# Patient Record
Sex: FEMALE | Race: NATIVE HAWAIIAN OR OTHER PACIFIC ISLANDER | NOT HISPANIC OR LATINO | Employment: UNEMPLOYED | URBAN - METROPOLITAN AREA
[De-identification: names, ages, dates, MRNs, and addresses within clinical notes are randomized per-mention and may not be internally consistent; named-entity substitution may affect disease eponyms.]

---

## 2019-06-02 ENCOUNTER — HOSPITAL ENCOUNTER (INPATIENT)
Facility: MEDICAL CENTER | Age: 39
LOS: 6 days | DRG: 194 | End: 2019-06-08
Attending: EMERGENCY MEDICINE | Admitting: INTERNAL MEDICINE
Payer: MEDICAID

## 2019-06-02 ENCOUNTER — APPOINTMENT (OUTPATIENT)
Dept: RADIOLOGY | Facility: MEDICAL CENTER | Age: 39
DRG: 194 | End: 2019-06-02
Attending: EMERGENCY MEDICINE
Payer: MEDICAID

## 2019-06-02 DIAGNOSIS — F41.9 ANXIETY: ICD-10-CM

## 2019-06-02 DIAGNOSIS — D64.9 ANEMIA, UNSPECIFIED TYPE: ICD-10-CM

## 2019-06-02 DIAGNOSIS — R11.2 NON-INTRACTABLE VOMITING WITH NAUSEA, UNSPECIFIED VOMITING TYPE: ICD-10-CM

## 2019-06-02 DIAGNOSIS — J18.9 PNEUMONIA OF RIGHT MIDDLE LOBE DUE TO INFECTIOUS ORGANISM: ICD-10-CM

## 2019-06-02 DIAGNOSIS — C79.9 METASTATIC CANCER (HCC): ICD-10-CM

## 2019-06-02 PROBLEM — E87.6 HYPOKALEMIA: Status: ACTIVE | Noted: 2019-06-02

## 2019-06-02 PROBLEM — R73.9 HYPERGLYCEMIA: Status: ACTIVE | Noted: 2019-06-02

## 2019-06-02 PROBLEM — Z72.0 NICOTINE ABUSE: Status: ACTIVE | Noted: 2019-06-02

## 2019-06-02 LAB
ALBUMIN SERPL BCP-MCNC: 3.9 G/DL (ref 3.2–4.9)
ALBUMIN/GLOB SERPL: 1 G/DL
ALP SERPL-CCNC: 122 U/L (ref 30–99)
ALT SERPL-CCNC: 41 U/L (ref 2–50)
ANION GAP SERPL CALC-SCNC: 6 MMOL/L (ref 0–11.9)
ANISOCYTOSIS BLD QL SMEAR: ABNORMAL
AST SERPL-CCNC: 38 U/L (ref 12–45)
BASOPHILS # BLD AUTO: 1.7 % (ref 0–1.8)
BASOPHILS # BLD: 0.13 K/UL (ref 0–0.12)
BILIRUB SERPL-MCNC: 0.9 MG/DL (ref 0.1–1.5)
BUN SERPL-MCNC: 9 MG/DL (ref 8–22)
CALCIUM SERPL-MCNC: 8.5 MG/DL (ref 8.5–10.5)
CHLORIDE SERPL-SCNC: 102 MMOL/L (ref 96–112)
CO2 SERPL-SCNC: 27 MMOL/L (ref 20–33)
CREAT SERPL-MCNC: 0.57 MG/DL (ref 0.5–1.4)
EOSINOPHIL # BLD AUTO: 0.13 K/UL (ref 0–0.51)
EOSINOPHIL NFR BLD: 1.7 % (ref 0–6.9)
ERYTHROCYTE [DISTWIDTH] IN BLOOD BY AUTOMATED COUNT: 67.6 FL (ref 35.9–50)
GIANT PLATELETS BLD QL SMEAR: NORMAL
GLOBULIN SER CALC-MCNC: 3.9 G/DL (ref 1.9–3.5)
GLUCOSE SERPL-MCNC: 118 MG/DL (ref 65–99)
HCG SERPL QL: NEGATIVE
HCT VFR BLD AUTO: 26.4 % (ref 37–47)
HGB BLD-MCNC: 7.7 G/DL (ref 12–16)
HYPOCHROMIA BLD QL SMEAR: ABNORMAL
LACTATE BLD-SCNC: 1.2 MMOL/L (ref 0.5–2)
LIPASE SERPL-CCNC: 5 U/L (ref 11–82)
LYMPHOCYTES # BLD AUTO: 2.04 K/UL (ref 1–4.8)
LYMPHOCYTES NFR BLD: 26.1 % (ref 22–41)
MACROCYTES BLD QL SMEAR: ABNORMAL
MANUAL DIFF BLD: NORMAL
MCH RBC QN AUTO: 26.3 PG (ref 27–33)
MCHC RBC AUTO-ENTMCNC: 29.2 G/DL (ref 33.6–35)
MCV RBC AUTO: 90.1 FL (ref 81.4–97.8)
METAMYELOCYTES NFR BLD MANUAL: 0.9 %
MICROCYTES BLD QL SMEAR: ABNORMAL
MONOCYTES # BLD AUTO: 0.27 K/UL (ref 0–0.85)
MONOCYTES NFR BLD AUTO: 3.5 % (ref 0–13.4)
MORPHOLOGY BLD-IMP: NORMAL
NEUTROPHILS # BLD AUTO: 5.16 K/UL (ref 2–7.15)
NEUTROPHILS NFR BLD: 66.1 % (ref 44–72)
NRBC # BLD AUTO: 0.39 K/UL
NRBC BLD-RTO: 5 /100 WBC
PLATELET # BLD AUTO: 130 K/UL (ref 164–446)
PLATELET BLD QL SMEAR: NORMAL
PMV BLD AUTO: 9.4 FL (ref 9–12.9)
POIKILOCYTOSIS BLD QL SMEAR: NORMAL
POLYCHROMASIA BLD QL SMEAR: NORMAL
POTASSIUM SERPL-SCNC: 3.3 MMOL/L (ref 3.6–5.5)
PROT SERPL-MCNC: 7.8 G/DL (ref 6–8.2)
RBC # BLD AUTO: 2.93 M/UL (ref 4.2–5.4)
RBC BLD AUTO: PRESENT
SCHISTOCYTES BLD QL SMEAR: NORMAL
SMUDGE CELLS BLD QL SMEAR: NORMAL
SODIUM SERPL-SCNC: 135 MMOL/L (ref 135–145)
VARIANT LYMPHS BLD QL SMEAR: NORMAL
WBC # BLD AUTO: 7.8 K/UL (ref 4.8–10.8)

## 2019-06-02 PROCEDURE — 36415 COLL VENOUS BLD VENIPUNCTURE: CPT

## 2019-06-02 PROCEDURE — 700105 HCHG RX REV CODE 258: Performed by: EMERGENCY MEDICINE

## 2019-06-02 PROCEDURE — 99285 EMERGENCY DEPT VISIT HI MDM: CPT

## 2019-06-02 PROCEDURE — 83605 ASSAY OF LACTIC ACID: CPT

## 2019-06-02 PROCEDURE — 84703 CHORIONIC GONADOTROPIN ASSAY: CPT

## 2019-06-02 PROCEDURE — 700105 HCHG RX REV CODE 258: Performed by: INTERNAL MEDICINE

## 2019-06-02 PROCEDURE — 87086 URINE CULTURE/COLONY COUNT: CPT

## 2019-06-02 PROCEDURE — 96365 THER/PROPH/DIAG IV INF INIT: CPT

## 2019-06-02 PROCEDURE — 85007 BL SMEAR W/DIFF WBC COUNT: CPT

## 2019-06-02 PROCEDURE — 94760 N-INVAS EAR/PLS OXIMETRY 1: CPT

## 2019-06-02 PROCEDURE — 87641 MR-STAPH DNA AMP PROBE: CPT

## 2019-06-02 PROCEDURE — 700111 HCHG RX REV CODE 636 W/ 250 OVERRIDE (IP): Performed by: INTERNAL MEDICINE

## 2019-06-02 PROCEDURE — 99223 1ST HOSP IP/OBS HIGH 75: CPT | Mod: 25 | Performed by: INTERNAL MEDICINE

## 2019-06-02 PROCEDURE — 96375 TX/PRO/DX INJ NEW DRUG ADDON: CPT

## 2019-06-02 PROCEDURE — 85027 COMPLETE CBC AUTOMATED: CPT

## 2019-06-02 PROCEDURE — 83690 ASSAY OF LIPASE: CPT

## 2019-06-02 PROCEDURE — 700111 HCHG RX REV CODE 636 W/ 250 OVERRIDE (IP): Performed by: EMERGENCY MEDICINE

## 2019-06-02 PROCEDURE — 770009 HCHG ROOM/CARE - ONCOLOGY SEMI PRI*

## 2019-06-02 PROCEDURE — 80053 COMPREHEN METABOLIC PANEL: CPT

## 2019-06-02 PROCEDURE — 99407 BEHAV CHNG SMOKING > 10 MIN: CPT | Performed by: INTERNAL MEDICINE

## 2019-06-02 PROCEDURE — 74022 RADEX COMPL AQT ABD SERIES: CPT

## 2019-06-02 PROCEDURE — A9270 NON-COVERED ITEM OR SERVICE: HCPCS | Performed by: INTERNAL MEDICINE

## 2019-06-02 PROCEDURE — 700102 HCHG RX REV CODE 250 W/ 637 OVERRIDE(OP): Performed by: INTERNAL MEDICINE

## 2019-06-02 PROCEDURE — 87040 BLOOD CULTURE FOR BACTERIA: CPT

## 2019-06-02 RX ORDER — SODIUM CHLORIDE 9 MG/ML
INJECTION, SOLUTION INTRAVENOUS CONTINUOUS
Status: DISCONTINUED | OUTPATIENT
Start: 2019-06-02 | End: 2019-06-07

## 2019-06-02 RX ORDER — POLYETHYLENE GLYCOL 3350 17 G/17G
1 POWDER, FOR SOLUTION ORAL
Status: DISCONTINUED | OUTPATIENT
Start: 2019-06-02 | End: 2019-06-08 | Stop reason: HOSPADM

## 2019-06-02 RX ORDER — ONDANSETRON 2 MG/ML
4 INJECTION INTRAMUSCULAR; INTRAVENOUS EVERY 4 HOURS PRN
Status: DISCONTINUED | OUTPATIENT
Start: 2019-06-02 | End: 2019-06-08 | Stop reason: HOSPADM

## 2019-06-02 RX ORDER — ACETAMINOPHEN 325 MG/1
650 TABLET ORAL EVERY 6 HOURS PRN
Status: DISCONTINUED | OUTPATIENT
Start: 2019-06-02 | End: 2019-06-08 | Stop reason: HOSPADM

## 2019-06-02 RX ORDER — PROMETHAZINE HYDROCHLORIDE 25 MG/1
12.5-25 TABLET ORAL EVERY 4 HOURS PRN
Status: DISCONTINUED | OUTPATIENT
Start: 2019-06-02 | End: 2019-06-08 | Stop reason: HOSPADM

## 2019-06-02 RX ORDER — ONDANSETRON 2 MG/ML
4 INJECTION INTRAMUSCULAR; INTRAVENOUS ONCE
Status: COMPLETED | OUTPATIENT
Start: 2019-06-02 | End: 2019-06-02

## 2019-06-02 RX ORDER — SODIUM CHLORIDE 9 MG/ML
1000 INJECTION, SOLUTION INTRAVENOUS ONCE
Status: COMPLETED | OUTPATIENT
Start: 2019-06-02 | End: 2019-06-02

## 2019-06-02 RX ORDER — BISACODYL 10 MG
10 SUPPOSITORY, RECTAL RECTAL
Status: DISCONTINUED | OUTPATIENT
Start: 2019-06-02 | End: 2019-06-08 | Stop reason: HOSPADM

## 2019-06-02 RX ORDER — OXYCODONE HYDROCHLORIDE 30 MG/1
30 TABLET ORAL EVERY 4 HOURS PRN
Status: ON HOLD | COMMUNITY
End: 2019-06-08

## 2019-06-02 RX ORDER — PROMETHAZINE HYDROCHLORIDE 25 MG/1
12.5-25 SUPPOSITORY RECTAL EVERY 4 HOURS PRN
Status: DISCONTINUED | OUTPATIENT
Start: 2019-06-02 | End: 2019-06-08 | Stop reason: HOSPADM

## 2019-06-02 RX ORDER — OXYCODONE HCL 40 MG/1
40 TABLET, FILM COATED, EXTENDED RELEASE ORAL EVERY 12 HOURS
Status: ON HOLD | COMMUNITY
End: 2019-06-08

## 2019-06-02 RX ORDER — POTASSIUM CHLORIDE 20 MEQ/1
40 TABLET, EXTENDED RELEASE ORAL ONCE
Status: COMPLETED | OUTPATIENT
Start: 2019-06-02 | End: 2019-06-03

## 2019-06-02 RX ORDER — ALPRAZOLAM 1 MG/1
1 TABLET ORAL 3 TIMES DAILY PRN
Status: ON HOLD | COMMUNITY
End: 2019-06-08

## 2019-06-02 RX ORDER — AMOXICILLIN 250 MG
2 CAPSULE ORAL 2 TIMES DAILY
Status: DISCONTINUED | OUTPATIENT
Start: 2019-06-02 | End: 2019-06-08 | Stop reason: HOSPADM

## 2019-06-02 RX ORDER — ONDANSETRON 4 MG/1
4 TABLET, ORALLY DISINTEGRATING ORAL EVERY 4 HOURS PRN
Status: DISCONTINUED | OUTPATIENT
Start: 2019-06-02 | End: 2019-06-08 | Stop reason: HOSPADM

## 2019-06-02 RX ADMIN — SODIUM CHLORIDE: 900 INJECTION INTRAVENOUS at 21:59

## 2019-06-02 RX ADMIN — ONDANSETRON 4 MG: 4 TABLET, ORALLY DISINTEGRATING ORAL at 19:47

## 2019-06-02 RX ADMIN — VANCOMYCIN HYDROCHLORIDE 2300 MG: 100 INJECTION, POWDER, LYOPHILIZED, FOR SOLUTION INTRAVENOUS at 22:04

## 2019-06-02 RX ADMIN — PIPERACILLIN AND TAZOBACTAM 4.5 G: 4; .5 INJECTION, POWDER, LYOPHILIZED, FOR SOLUTION INTRAVENOUS; PARENTERAL at 18:45

## 2019-06-02 RX ADMIN — ONDANSETRON 4 MG: 2 INJECTION INTRAMUSCULAR; INTRAVENOUS at 17:45

## 2019-06-02 RX ADMIN — ACETAMINOPHEN 650 MG: 325 TABLET, FILM COATED ORAL at 19:47

## 2019-06-02 RX ADMIN — SODIUM CHLORIDE 1000 ML: 9 INJECTION, SOLUTION INTRAVENOUS at 17:45

## 2019-06-02 RX ADMIN — PROCHLORPERAZINE EDISYLATE 5 MG: 5 INJECTION INTRAMUSCULAR; INTRAVENOUS at 20:21

## 2019-06-02 ASSESSMENT — COPD QUESTIONNAIRES
HAVE YOU SMOKED AT LEAST 100 CIGARETTES IN YOUR ENTIRE LIFE: YES
DURING THE PAST 4 WEEKS HOW MUCH DID YOU FEEL SHORT OF BREATH: NONE/LITTLE OF THE TIME
DO YOU EVER COUGH UP ANY MUCUS OR PHLEGM?: NO/ONLY WITH OCCASIONAL COLDS OR INFECTIONS
COPD SCREENING SCORE: 3

## 2019-06-02 ASSESSMENT — LIFESTYLE VARIABLES
EVER_SMOKED: YES
DO YOU DRINK ALCOHOL: NO

## 2019-06-02 ASSESSMENT — ENCOUNTER SYMPTOMS
VOMITING: 1
NECK PAIN: 0
PALPITATIONS: 0
COUGH: 0
BACK PAIN: 0
SHORTNESS OF BREATH: 0
EYE DISCHARGE: 0
EYE PAIN: 0
HEADACHES: 0
DIARRHEA: 1
FEVER: 0
INSOMNIA: 0
CHILLS: 0
MYALGIAS: 0
HEMOPTYSIS: 1
FOCAL WEAKNESS: 0
DIZZINESS: 0
HEARTBURN: 0
BLURRED VISION: 0
ORTHOPNEA: 0
NERVOUS/ANXIOUS: 0
NAUSEA: 1
EYE REDNESS: 0
WEIGHT LOSS: 0
SPUTUM PRODUCTION: 0
DEPRESSION: 0
SEIZURES: 0
STRIDOR: 0
ABDOMINAL PAIN: 1

## 2019-06-02 NOTE — ED TRIAGE NOTES
"Pt just moved here from [place]. Pt has epithelioid hemangioendotherlioma, diagnosed january 2018. Pt last chemotherapy last week. Pt does not have oncologist set up in [place] and now finds out the one she was referred to does not accept her insurance. Pt c/o vomiting, states \"can't keep anything down\".   "

## 2019-06-03 ENCOUNTER — APPOINTMENT (OUTPATIENT)
Dept: RADIOLOGY | Facility: MEDICAL CENTER | Age: 39
DRG: 194 | End: 2019-06-03
Attending: INTERNAL MEDICINE
Payer: MEDICAID

## 2019-06-03 PROBLEM — E66.9 CLASS 1 OBESITY IN ADULT: Status: ACTIVE | Noted: 2019-06-03

## 2019-06-03 LAB
ABO + RH BLD: NORMAL
ABO GROUP BLD: NORMAL
ALBUMIN SERPL BCP-MCNC: 3.3 G/DL (ref 3.2–4.9)
ALBUMIN/GLOB SERPL: 1.1 G/DL
ALP SERPL-CCNC: 107 U/L (ref 30–99)
ALT SERPL-CCNC: 31 U/L (ref 2–50)
ANION GAP SERPL CALC-SCNC: 7 MMOL/L (ref 0–11.9)
APPEARANCE UR: CLEAR
AST SERPL-CCNC: 30 U/L (ref 12–45)
BACTERIA #/AREA URNS HPF: NEGATIVE /HPF
BARCODED ABORH UBTYP: 9500
BARCODED PRD CODE UBPRD: NORMAL
BARCODED UNIT NUM UBUNT: NORMAL
BILIRUB SERPL-MCNC: 0.8 MG/DL (ref 0.1–1.5)
BILIRUB UR QL STRIP.AUTO: NEGATIVE
BLD GP AB SCN SERPL QL: NORMAL
BUN SERPL-MCNC: 8 MG/DL (ref 8–22)
CALCIUM SERPL-MCNC: 7.7 MG/DL (ref 8.5–10.5)
CHLORIDE SERPL-SCNC: 106 MMOL/L (ref 96–112)
CO2 SERPL-SCNC: 25 MMOL/L (ref 20–33)
COLOR UR: YELLOW
COMPONENT R 8504R: NORMAL
CREAT SERPL-MCNC: 0.61 MG/DL (ref 0.5–1.4)
EPI CELLS #/AREA URNS HPF: ABNORMAL /HPF
ERYTHROCYTE [DISTWIDTH] IN BLOOD BY AUTOMATED COUNT: 69.4 FL (ref 35.9–50)
GLOBULIN SER CALC-MCNC: 3.1 G/DL (ref 1.9–3.5)
GLUCOSE SERPL-MCNC: 110 MG/DL (ref 65–99)
GLUCOSE UR STRIP.AUTO-MCNC: NEGATIVE MG/DL
HCT VFR BLD AUTO: 22.6 % (ref 37–47)
HGB BLD-MCNC: 6.5 G/DL (ref 12–16)
HYALINE CASTS #/AREA URNS LPF: ABNORMAL /LPF
KETONES UR STRIP.AUTO-MCNC: NEGATIVE MG/DL
LEUKOCYTE ESTERASE UR QL STRIP.AUTO: ABNORMAL
MCH RBC QN AUTO: 26.4 PG (ref 27–33)
MCHC RBC AUTO-ENTMCNC: 28.8 G/DL (ref 33.6–35)
MCV RBC AUTO: 91.9 FL (ref 81.4–97.8)
MICRO URNS: ABNORMAL
MRSA DNA SPEC QL NAA+PROBE: NORMAL
NITRITE UR QL STRIP.AUTO: NEGATIVE
PH UR STRIP.AUTO: 7 [PH]
PLATELET # BLD AUTO: 115 K/UL (ref 164–446)
PMV BLD AUTO: 9 FL (ref 9–12.9)
POTASSIUM SERPL-SCNC: 3.4 MMOL/L (ref 3.6–5.5)
PROCALCITONIN SERPL-MCNC: 0.11 NG/ML
PRODUCT TYPE UPROD: NORMAL
PROT SERPL-MCNC: 6.4 G/DL (ref 6–8.2)
PROT UR QL STRIP: 30 MG/DL
RBC # BLD AUTO: 2.46 M/UL (ref 4.2–5.4)
RBC # URNS HPF: ABNORMAL /HPF
RBC UR QL AUTO: ABNORMAL
RH BLD: NORMAL
SIGNIFICANT IND 70042: NORMAL
SITE SITE: NORMAL
SODIUM SERPL-SCNC: 138 MMOL/L (ref 135–145)
SOURCE SOURCE: NORMAL
SP GR UR STRIP.AUTO: 1
UNIT STATUS USTAT: NORMAL
UROBILINOGEN UR STRIP.AUTO-MCNC: 0.2 MG/DL
WBC # BLD AUTO: 6.9 K/UL (ref 4.8–10.8)
WBC #/AREA URNS HPF: ABNORMAL /HPF

## 2019-06-03 PROCEDURE — 700105 HCHG RX REV CODE 258: Performed by: INTERNAL MEDICINE

## 2019-06-03 PROCEDURE — 700111 HCHG RX REV CODE 636 W/ 250 OVERRIDE (IP): Performed by: INTERNAL MEDICINE

## 2019-06-03 PROCEDURE — 86900 BLOOD TYPING SEROLOGIC ABO: CPT

## 2019-06-03 PROCEDURE — 86901 BLOOD TYPING SEROLOGIC RH(D): CPT

## 2019-06-03 PROCEDURE — A9270 NON-COVERED ITEM OR SERVICE: HCPCS | Performed by: INTERNAL MEDICINE

## 2019-06-03 PROCEDURE — P9016 RBC LEUKOCYTES REDUCED: HCPCS

## 2019-06-03 PROCEDURE — 85027 COMPLETE CBC AUTOMATED: CPT

## 2019-06-03 PROCEDURE — 86923 COMPATIBILITY TEST ELECTRIC: CPT

## 2019-06-03 PROCEDURE — 99406 BEHAV CHNG SMOKING 3-10 MIN: CPT | Performed by: INTERNAL MEDICINE

## 2019-06-03 PROCEDURE — 86850 RBC ANTIBODY SCREEN: CPT

## 2019-06-03 PROCEDURE — 770009 HCHG ROOM/CARE - ONCOLOGY SEMI PRI*

## 2019-06-03 PROCEDURE — 36415 COLL VENOUS BLD VENIPUNCTURE: CPT

## 2019-06-03 PROCEDURE — 87086 URINE CULTURE/COLONY COUNT: CPT

## 2019-06-03 PROCEDURE — 81001 URINALYSIS AUTO W/SCOPE: CPT

## 2019-06-03 PROCEDURE — 80053 COMPREHEN METABOLIC PANEL: CPT

## 2019-06-03 PROCEDURE — 700102 HCHG RX REV CODE 250 W/ 637 OVERRIDE(OP): Performed by: INTERNAL MEDICINE

## 2019-06-03 PROCEDURE — 36430 TRANSFUSION BLD/BLD COMPNT: CPT

## 2019-06-03 PROCEDURE — 99233 SBSQ HOSP IP/OBS HIGH 50: CPT | Mod: 25 | Performed by: INTERNAL MEDICINE

## 2019-06-03 PROCEDURE — 84145 PROCALCITONIN (PCT): CPT

## 2019-06-03 RX ADMIN — ENOXAPARIN SODIUM 40 MG: 100 INJECTION SUBCUTANEOUS at 06:27

## 2019-06-03 RX ADMIN — PIPERACILLIN AND TAZOBACTAM 4.5 G: 4; .5 INJECTION, POWDER, LYOPHILIZED, FOR SOLUTION INTRAVENOUS; PARENTERAL at 01:41

## 2019-06-03 RX ADMIN — PROCHLORPERAZINE EDISYLATE 10 MG: 5 INJECTION INTRAMUSCULAR; INTRAVENOUS at 17:17

## 2019-06-03 RX ADMIN — POTASSIUM CHLORIDE 40 MEQ: 1500 TABLET, EXTENDED RELEASE ORAL at 06:26

## 2019-06-03 RX ADMIN — PIPERACILLIN AND TAZOBACTAM 4.5 G: 4; .5 INJECTION, POWDER, LYOPHILIZED, FOR SOLUTION INTRAVENOUS; PARENTERAL at 17:14

## 2019-06-03 RX ADMIN — PIPERACILLIN AND TAZOBACTAM 4.5 G: 4; .5 INJECTION, POWDER, LYOPHILIZED, FOR SOLUTION INTRAVENOUS; PARENTERAL at 11:13

## 2019-06-03 ASSESSMENT — ENCOUNTER SYMPTOMS
BACK PAIN: 0
DIARRHEA: 0
BLURRED VISION: 0
ABDOMINAL PAIN: 0
PALPITATIONS: 0
NAUSEA: 1
HEADACHES: 0
SPUTUM PRODUCTION: 0
WEIGHT LOSS: 0
WEAKNESS: 1
PND: 0
DEPRESSION: 0
CONSTIPATION: 0
WHEEZING: 0
FEVER: 0
SEIZURES: 0
FALLS: 0
TREMORS: 0
VOMITING: 0
DIZZINESS: 0
NECK PAIN: 0
SPEECH CHANGE: 0
COUGH: 1
HEARTBURN: 0
SHORTNESS OF BREATH: 0
EYE PAIN: 0
CHILLS: 0

## 2019-06-03 ASSESSMENT — COGNITIVE AND FUNCTIONAL STATUS - GENERAL
SUGGESTED CMS G CODE MODIFIER DAILY ACTIVITY: CH
WALKING IN HOSPITAL ROOM: A LITTLE
MOVING FROM LYING ON BACK TO SITTING ON SIDE OF FLAT BED: A LITTLE
SUGGESTED CMS G CODE MODIFIER MOBILITY: CK
DAILY ACTIVITIY SCORE: 24
MOBILITY SCORE: 19
CLIMB 3 TO 5 STEPS WITH RAILING: A LOT
STANDING UP FROM CHAIR USING ARMS: A LITTLE

## 2019-06-03 ASSESSMENT — COPD QUESTIONNAIRES
COPD SCREENING SCORE: 2
DO YOU EVER COUGH UP ANY MUCUS OR PHLEGM?: NO/ONLY WITH OCCASIONAL COLDS OR INFECTIONS
IN THE PAST 12 MONTHS DO YOU DO LESS THAN YOU USED TO BECAUSE OF YOUR BREATHING PROBLEMS: DISAGREE/UNSURE
HAVE YOU SMOKED AT LEAST 100 CIGARETTES IN YOUR ENTIRE LIFE: YES
DURING THE PAST 4 WEEKS HOW MUCH DID YOU FEEL SHORT OF BREATH: NONE/LITTLE OF THE TIME

## 2019-06-03 ASSESSMENT — LIFESTYLE VARIABLES
SUBSTANCE_ABUSE: 0
ALCOHOL_USE: NO
EVER_SMOKED: YES

## 2019-06-03 ASSESSMENT — PATIENT HEALTH QUESTIONNAIRE - PHQ9
2. FEELING DOWN, DEPRESSED, IRRITABLE, OR HOPELESS: NOT AT ALL
SUM OF ALL RESPONSES TO PHQ9 QUESTIONS 1 AND 2: 0
1. LITTLE INTEREST OR PLEASURE IN DOING THINGS: NOT AT ALL

## 2019-06-03 NOTE — ED PROVIDER NOTES
"ED Provider Note    Scribed for Savanna Melgoza M.D. by Cecil Castellanos. 6/2/2019, 5:21 PM.    Primary care provider: No primary care provider on file.  Means of arrival: Walk in  History obtained from: Patient  History limited by: none    CHIEF COMPLAINT  Chief Complaint   Patient presents with   • Vomiting     HPI  Amanda Bae is a 39 y.o. female who presents to the Emergency Department for evaluation of vomiting for the last two days. She endorses associated decreased appetite, nausea, abdominal pain, and diarrhea initially but has resolved. Patient has OTC and prescribed medications that she takes for nausea and vomiting, but is unsure of the name of medication at this time. Patient has history of epithelioid hemangioendotherlioma, diagnosed 01/2018. Her last IV chemotherapy treatment was last week. She needs 6 more chemotherapy treatments and PET scan, with her next treatment that needs to be done this week. Patient has abdominal surgery to remove gallstones. Denies fever, chills, rash     REVIEW OF SYSTEMS  Pertinent positives include Vomiting, decreased appetite, nausea, abdominal pain, and diarrhea. Pertinent negatives include no fever, chills, rash . All other systems reviewed and negative.     PAST MEDICAL HISTORY   Patient with history of epithelioid hemangioendotherlioma, diagnosed 01/2018    SURGICAL HISTORY  patient denies any surgical history    SOCIAL HISTORY  Social History   Substance Use Topics   • Smoking status: Not on file   • Smokeless tobacco: Not on file   • Alcohol use Not on file      History   Drug use: Unknown       CURRENT MEDICATIONS  Home Medications    **Home medications have not yet been reviewed for this encounter**         ALLERGIES  No Known Allergies    PHYSICAL EXAM  VITAL SIGNS: /70   Pulse (!) 102   Temp 36.9 °C (98.5 °F) (Temporal)   Resp 16   Ht 1.626 m (5' 4\")   Wt 90.7 kg (200 lb)   SpO2 95%   BMI 34.33 kg/m²     Constitutional: Patient laying " uncomfortably in ED bed with a mask on, Well developed, No acute distress, Non-toxic appearance.   HENT: Normocephalic, Atraumatic, Bilateral external ears normal, Oropharynx moist, No oral exudates, Nose normal.   Eyes: PERRL, EOMI, Conjunctiva normal, No discharge.   Neck: Normal range of motion, No tenderness, Supple, No stridor. No meningeal signs  Cardiovascular: Normal heart rate, Normal rhythm,    Thorax & Lungs: Normal breath sounds, No respiratory distress, No chest tenderness.   Abdomen: Diffuse abdominal tenderness, no focal tenderness, no guarding no rebound, no masses, no pulsatile mass, no tenderness, no distention  Skin: Warm, Dry, No erythema, No rash.   Back: No tenderness, No CVA tenderness.   Extremities: Intact distal pulses, No edema, No tenderness   Neurologic: Alert & oriented x 3, Normal motor function, Normal sensory function, No focal deficits noted.   Psychiatric: Appropriate                                                     DIAGNOSTIC STUDIES / PROCEDURES\    LABS  Results for orders placed or performed during the hospital encounter of 06/02/19   CBC WITH DIFFERENTIAL   Result Value Ref Range    WBC 7.8 4.8 - 10.8 K/uL    RBC 2.93 (L) 4.20 - 5.40 M/uL    Hemoglobin 7.7 (L) 12.0 - 16.0 g/dL    Hematocrit 26.4 (L) 37.0 - 47.0 %    MCV 90.1 81.4 - 97.8 fL    MCH 26.3 (L) 27.0 - 33.0 pg    MCHC 29.2 (L) 33.6 - 35.0 g/dL    RDW 67.6 (H) 35.9 - 50.0 fL    Platelet Count 130 (L) 164 - 446 K/uL    MPV 9.4 9.0 - 12.9 fL    Neutrophils-Polys 66.10 44.00 - 72.00 %    Lymphocytes 26.10 22.00 - 41.00 %    Monocytes 3.50 0.00 - 13.40 %    Eosinophils 1.70 0.00 - 6.90 %    Basophils 1.70 0.00 - 1.80 %    Nucleated RBC 5.00 /100 WBC    Neutrophils (Absolute) 5.16 2.00 - 7.15 K/uL    Lymphs (Absolute) 2.04 1.00 - 4.80 K/uL    Monos (Absolute) 0.27 0.00 - 0.85 K/uL    Eos (Absolute) 0.13 0.00 - 0.51 K/uL    Baso (Absolute) 0.13 (H) 0.00 - 0.12 K/uL    NRBC (Absolute) 0.39 K/uL    Hypochromia 1+      Anisocytosis 1+     Macrocytosis 1+     Microcytosis 1+    COMP METABOLIC PANEL   Result Value Ref Range    Sodium 135 135 - 145 mmol/L    Potassium 3.3 (L) 3.6 - 5.5 mmol/L    Chloride 102 96 - 112 mmol/L    Co2 27 20 - 33 mmol/L    Anion Gap 6.0 0.0 - 11.9    Glucose 118 (H) 65 - 99 mg/dL    Bun 9 8 - 22 mg/dL    Creatinine 0.57 0.50 - 1.40 mg/dL    Calcium 8.5 8.5 - 10.5 mg/dL    AST(SGOT) 38 12 - 45 U/L    ALT(SGPT) 41 2 - 50 U/L    Alkaline Phosphatase 122 (H) 30 - 99 U/L    Total Bilirubin 0.9 0.1 - 1.5 mg/dL    Albumin 3.9 3.2 - 4.9 g/dL    Total Protein 7.8 6.0 - 8.2 g/dL    Globulin 3.9 (H) 1.9 - 3.5 g/dL    A-G Ratio 1.0 g/dL   LIPASE   Result Value Ref Range    Lipase 5 (L) 11 - 82 U/L   LACTIC ACID   Result Value Ref Range    Lactic Acid 1.2 0.5 - 2.0 mmol/L   HCG QUAL SERUM   Result Value Ref Range    Beta-Hcg Qualitative Serum Negative Negative   ESTIMATED GFR   Result Value Ref Range    GFR If African American >60 >60 mL/min/1.73 m 2    GFR If Non African American >60 >60 mL/min/1.73 m 2   DIFFERENTIAL MANUAL   Result Value Ref Range    Metamyelocytes 0.90 %    Manual Diff Status PERFORMED    PERIPHERAL SMEAR REVIEW   Result Value Ref Range    Peripheral Smear Review see below    PLATELET ESTIMATE   Result Value Ref Range    Plt Estimation Decreased    MORPHOLOGY   Result Value Ref Range    RBC Morphology Present     Giant Platelets 1+     Polychromia 2+     Poikilocytosis 1+     Schistocytes 1+     Smudge Cells Few     Reactive Lymphocytes Few       All labs reviewed by me.    RADIOLOGY  DX-ABDOMEN COMPLETE WITH AP OR PA CXR   Final Result         Airspace opacities in the right middle lobe and right middle lobe, in keeping with multifocal pneumonia.      No finding to suggest small bowel obstruction.           The radiologist's interpretation of all radiological studies have been reviewed by me.    COURSE & MEDICAL DECISION MAKING  Nursing notes, VS, PMSFHx reviewed in chart.     Patient with a  history of cancer with recent chemotherapy last week presents with vomiting.  She has diffuse abdominal tenderness but no focal tenderness.  She has positive bowel sounds.  I have low suspicion for obstruction but will obtain an x-ray of her abdomen.  I do not feel at this point with just nausea that she needs a CAT scan.  Patient will be given IV fluids.  Will evaluate for possible neutropenia and sepsis since patient had recent chemotherapy.  Patient is not hypotensive here and is afebrile.  She does not report being ill or febrile at home.  We will also rule out dehydration.    5:21 PM Patient seen and examined at bedside.  Ordered for Abdomen Xray, HCG qual, CBC, CMP, Lipase, UA culture, Blood culture, lactic acid to evaluate. Patient was treated with IV fluids, and Zofran 4 MG for her symptoms.     Patient's labs have returned and show evidence of dehydration.  Patient also has evidence of pneumonia.  Labs do not show signs of sepsis.  Patient has a normal white count and there is no evidence of neutropenia.  She also does not have a elevated white count or bandemia.  Lactic acid was normal.  Blood cultures are pending.  Patient was treated with Vanco and Zosyn per pharmacy's recommendations since patient has had recent chemo.  Patient is anemic and has a history of anemia in the past.    6:29 PM - I discussed the patient's case and the above findings with Dr. Hayden () who has been notified and is aware of the issues with patient. Review of the pertinent abnormalities. Orders have been given. Care turned over to the admitting physician at this time.      6:30 PM Recheck: Patient re-evaluated at [facility]. I updated the patient and discussed the plan for admission. Answered all patient’s questions. Patient agreeable to admission     DISPOSITION:  Patient will be admitted to Dr. Hayden in guarded condition.      FINAL IMPRESSION  1. Non-intractable vomiting with nausea, unspecified vomiting type    2. Pneumonia of  right middle lobe due to infectious organism (HCC)    3. Anemia, unspecified type       I, Cecil Castellanos (Scribe), am scribing for, and in the presence of, Savanna Melgoza M.D..    Electronically signed by: Cecil Castellanos (Scribe), 6/2/2019    ISavanna M.D. personally performed the services described in this documentation, as scribed by Cecil Castellanos in my presence, and it is both accurate and complete.    The note accurately reflects work and decisions made by me.  Savanna Melgoza  6/3/2019  12:08 AM

## 2019-06-03 NOTE — CARE PLAN
Problem: Communication  Goal: The ability to communicate needs accurately and effectively will improve  Outcome: PROGRESSING AS EXPECTED  Plan of care discussed with patient and , all questions answered.    Problem: Safety  Goal: Will remain free from injury  Outcome: PROGRESSING AS EXPECTED  Call light within reach, non-skid socks in place, bed locked and in lowest position, hourly rounding.

## 2019-06-03 NOTE — ED NOTES
"Pt c/o increasted level of pain at 10/10 \"everywhere\" associated w/ nausea. Pt medicated per MAR and updated on poc.  "

## 2019-06-03 NOTE — ASSESSMENT & PLAN NOTE
The patient is undergoing chemotherapy  Cont with zosyn, I DCed vanco  Check pro calcitonin  De-escalate as needed

## 2019-06-03 NOTE — ASSESSMENT & PLAN NOTE
epithelioid hemangioendotherlioma  On chemotherapy with gemsar  Requesting medical record from [facility]  Her oncologist is Dr. Dewayne Florentino  Patient said she will stay local here and needed a oncologist consultation in the hospital.  She will establish with [facility] as outpatient

## 2019-06-03 NOTE — PROGRESS NOTES
2 RN skin check complete with HAYLEY Trevino. BULE and trunk flaky and peeling.  Devices in place: PIV.  Skin assessed under devices: clean, dry, intact.  No confirmed pressure ulcers found.  No new potential pressure ulcers noted.  The following interventions in place: patient turns self from side to side.

## 2019-06-03 NOTE — ED NOTES
Verbalizes understanding of POC. PIV established, blood drawn and sent to lab including first set of cultures. Medicated per MAR and transported to imaging by wheelchair.

## 2019-06-03 NOTE — PROGRESS NOTES
Patient arrived to unit from ED, transferred to bed,  at bedside. Oriented patient and  to room and unit. Patient complaining of nausea, compazine given. R PIV patent, running NS @ 100 mL/hr. Patient is up SBA to wheelchair, brought own wheelchair from home. POC discussed with patient and , all questions answered. Bed locked and in lowest position, call light within reach, non-skid socks in place, hourly rounding. Patient reports no further needs at this time.

## 2019-06-03 NOTE — PROGRESS NOTES
"Pharmacy Kinetics 39 y.o. female on vancomycin day # 1 6/2/2019    Currently on Vancomycin 2300 mg iv x 1 loading dose given 22:00    Indication for Treatment: CAP in the presence of Epithelioid hemangioendothelioma    Pertinent history per medical record: Admitted on 6/2/2019 for worsening SOB and vomiting. Pt just moved here from [place]. Pt has epithelioid hemangioendotherlioma, diagnosedJjanuary 2018. Patient's last chemotherapy last week.  Empiric antibiotic initiated for Pneumonia in the presence of cancer.    Other antibioics: Zosyn 4.5 g IV q8h    Allergies: Patient has no known allergies.     List concerns for renal function: none    Pertinent cultures to date:   06/02/19 BC x 2 NGTD  06/02/19 MRSA nares swab not yet collected      Recent Labs      06/02/19   1735   WBC  7.8   NEUTSPOLYS  66.10     Recent Labs      06/02/19   1735   BUN  9   CREATININE  0.57   ALBUMIN  3.9     No results for input(s): VANCOTROUGH, VANCOPEAK, VANCORANDOM in the last 72 hours.No intake or output data in the 24 hours ending 06/02/19 1843   /70   Pulse 84   Temp 36.9 °C (98.5 °F) (Temporal)   Resp 19   Ht 1.626 m (5' 4\")   Wt 90.7 kg (200 lb)   SpO2 99%        A/P   1. Vancomycin dose change: new start, give Vancomycin 1100 mg (12mg/kg) IV q8h  2. Next vancomycin level: not yet ordered  3. Goal trough: 16 - 20 mcg/mL  4. Comments: No concerns for Vancomycin accumulation at this time. Scheduled dosing entered.  Daytime clinical pharmacist to follow up, determine when next level is to be collected, and make changes as clinical status dictates.  Pharmacy will continue to monitor and adjust or de-escalate as appropriate.      Wan Balderas [facility]    "

## 2019-06-03 NOTE — PROGRESS NOTES
[facility] Medicine Daily Progress Note    Date of Service  6/3/2019    Chief Complaint  39 y.o. female admitted 6/2/2019 with Complains of weakness and nausea vomiting status post chemotherapy    Hospital Course    Past medical history of newly diagnosed cancer unknown details presented with complaint of nausea and weakness.  Apparently patient said she had chemotherapy recently.  In the ER patient was noticed to have possible pneumonia.  Patient was admitted and started with antibiotics.        Interval Problem Update  6/3.  Patient remained hemodynamic stable overnight however still complains of nausea and weakness.  Patient does not know what type of cancer she has.  Patient says she will stay in local area and will need a new oncologist at this moment.  Patient complains of general body achiness. Patient's pain is general 3-4/10, intermittent and does not radiate to other location, sharp and with some tingling. Can be controlled by pain meds.     Consultants/Specialty  Onc    Code Status  full    Disposition  TBD    Review of Systems  Review of Systems   Constitutional: Positive for malaise/fatigue. Negative for chills, fever and weight loss.   HENT: Negative for congestion, ear discharge, ear pain, hearing loss and nosebleeds.    Eyes: Negative for blurred vision and pain.   Respiratory: Positive for cough. Negative for sputum production, shortness of breath and wheezing.    Cardiovascular: Negative for chest pain, palpitations, leg swelling and PND.   Gastrointestinal: Positive for nausea. Negative for abdominal pain, constipation, diarrhea, heartburn and vomiting.   Genitourinary: Negative for dysuria, frequency and hematuria.   Musculoskeletal: Negative for back pain, falls, joint pain and neck pain.   Skin: Negative for rash.   Neurological: Positive for weakness. Negative for dizziness, tremors, speech change, seizures and headaches.   Psychiatric/Behavioral: Negative for depression, substance abuse and  suicidal ideas.        Physical Exam  Temp:  [36.7 °C (98 °F)-37.1 °C (98.7 °F)] 36.8 °C (98.3 °F)  Pulse:  [] 91  Resp:  [14-29] 18  BP: (108-126)/(64-79) 126/79  SpO2:  [90 %-99 %] 96 %    Physical Exam   Constitutional: She is oriented to person, place, and time. She appears well-developed and well-nourished.   HENT:   Head: Normocephalic.   Nose: Nose normal.   Mouth/Throat: No oropharyngeal exudate.   Eyes: Pupils are equal, round, and reactive to light. EOM are normal.   Neck: Normal range of motion. Neck supple. No JVD present. No thyromegaly present.   Cardiovascular: Normal rate, regular rhythm and normal heart sounds.  Exam reveals no gallop and no friction rub.    No murmur heard.  Pulmonary/Chest: Effort normal. No respiratory distress. She has no wheezes. She has rales.   Abdominal: Soft. Bowel sounds are normal. She exhibits no distension and no mass. There is no tenderness. There is no rebound and no guarding.   Musculoskeletal: Normal range of motion. She exhibits no edema or tenderness.   Lymphadenopathy:     She has no cervical adenopathy.   Neurological: She is alert and oriented to person, place, and time. No cranial nerve deficit.   Skin: Skin is warm. No rash noted.   Psychiatric: Her behavior is normal.       Fluids    Intake/Output Summary (Last 24 hours) at 06/03/19 0923  Last data filed at 06/02/19 1915   Gross per 24 hour   Intake             1100 ml   Output                0 ml   Net             1100 ml       Laboratory  Recent Labs      06/02/19 1735 06/03/19 0013   WBC  7.8  6.9   RBC  2.93*  2.46*   HEMOGLOBIN  7.7*  6.5*   HEMATOCRIT  26.4*  22.6*   MCV  90.1  91.9   MCH  26.3*  26.4*   MCHC  29.2*  28.8*   RDW  67.6*  69.4*   PLATELETCT  130*  115*   MPV  9.4  9.0     Recent Labs      06/02/19 1735 06/03/19 0013   SODIUM  135  138   POTASSIUM  3.3*  3.4*   CHLORIDE  102  106   CO2  27  25   GLUCOSE  118*  110*   BUN  9  8   CREATININE  0.57  0.61   CALCIUM  8.5  7.7*                    Imaging  DX-ABDOMEN COMPLETE WITH AP OR PA CXR   Final Result         Airspace opacities in the right middle lobe and right middle lobe, in keeping with multifocal pneumonia.      No finding to suggest small bowel obstruction.              Assessment/Plan  * Metastatic cancer (HCC)- (present on admission)   Assessment & Plan    The patient doesn't know what type of cancer she has, she only said it is blood cancer  On chemotherapy with gemsa? She doesn't know the name of chemo drugs  Requesting medical record from [facility] [place]  Her oncologist is Dr. Dewayne Florentino  Patient said she will stay local here and needed a oncologist consultation in the hospital.  I consulted our oncologist   Pending medical record to be obtained.     HCAP (healthcare-associated pneumonia)- (present on admission)   Assessment & Plan    The patient is undergoing chemotherapy  Cont with zosyn, I DCed vanco  Check pro calcitonin  De-escalate as needed       Class 1 obesity in adult- (present on admission)   Assessment & Plan    Body mass index is 34.33 kg/m².  With reduction education provided.     Nicotine abuse   Assessment & Plan    - Smoking cessation education provided  - Nicotine patch  I spent 4 minutes on tobacco cessation counseling including nicotine patches, gum, and dangers of smoking. Also discussed options of nicotine patch, medical treatment with wellbutrin and chantix. Discussed the risks of smoking including increased risk of heart disease, stroke, cancer and COPD. Discussed the benefits of quitting smoking. Nicotine replacement protocol provided to the patient.    The pt indicated that will quit.     47562 (smoking and tobacco cessation counseling visit; 3-10 min)     Hyperglycemia- (present on admission)   Assessment & Plan    No history of DM     Hypokalemia- (present on admission)   Assessment & Plan    Replete as needed       Normocytic anemia- (present on admission)    Assessment & Plan    Likely related to chemotherapy  Do not know about her baseline  Hgb 6.8 received PRBC transfusion today.  Transfuse as needed if hemoglobin less than 7  Follow cbc in am          VTE prophylaxis: Lovenox      Current Facility-Administered Medications:   •  senna-docusate (PERICOLACE or SENOKOT S) 8.6-50 MG per tablet 2 Tab, 2 Tab, Oral, BID **AND** polyethylene glycol/lytes (MIRALAX) PACKET 1 Packet, 1 Packet, Oral, QDAY PRN **AND** magnesium hydroxide (MILK OF MAGNESIA) suspension 30 mL, 30 mL, Oral, QDAY PRN **AND** bisacodyl (DULCOLAX) suppository 10 mg, 10 mg, Rectal, QDAY PRN, Giancarlo Hayden M.D.  •  Respiratory Care per Protocol, , Nebulization, Continuous RT, Giancarlo Hayden M.D.  •  NS infusion, , Intravenous, Continuous, Giancarlo Hayden M.D., Last Rate: 100 mL/hr at 06/02/19 2159  •  enoxaparin (LOVENOX) inj 40 mg, 40 mg, Subcutaneous, DAILY, Giancarlo Hayden M.D., 40 mg at 06/03/19 0627  •  acetaminophen (TYLENOL) tablet 650 mg, 650 mg, Oral, Q6HRS PRN, Giancarlo Hayden M.D., 650 mg at 06/02/19 1947  •  [COMPLETED] piperacillin-tazobactam (ZOSYN) 4.5 g in  mL IVPB, 4.5 g, Intravenous, Once, Stopped at 06/02/19 1915 **AND** piperacillin-tazobactam (ZOSYN) 4.5 g in  mL IVPB, 4.5 g, Intravenous, Q8HRS, Giancarlo Hayden M.D., Stopped at 06/03/19 0459  •  ondansetron (ZOFRAN) syringe/vial injection 4 mg, 4 mg, Intravenous, Q4HRS PRN, Giancarlo Hayden M.D.  •  ondansetron (ZOFRAN ODT) dispertab 4 mg, 4 mg, Oral, Q4HRS PRN, Giancarlo Hayden M.D., 4 mg at 06/02/19 1947  •  promethazine (PHENERGAN) tablet 12.5-25 mg, 12.5-25 mg, Oral, Q4HRS PRN, Giancarlo Hayden M.D.  •  promethazine (PHENERGAN) suppository 12.5-25 mg, 12.5-25 mg, Rectal, Q4HRS PRN, Giancarlo Hayden M.D.  •  prochlorperazine (COMPAZINE) injection 5-10 mg, 5-10 mg, Intravenous, Q4HRS PRN, Giancarlo Hayden M.D., 5 mg at 06/02/19 2021

## 2019-06-03 NOTE — ED NOTES
Returned from imaging. Second set of blood cultures obtained and sent to lab. Aware of need for urine sample.

## 2019-06-03 NOTE — H&P
Hospital Medicine History & Physical Note    Date of Service  6/2/2019    Primary Care Physician  Pcp Pt States None    Consultants  none    Code Status  full    Chief Complaint  Nausea, vomiting, diarrhea    History of Presenting Illness  39 y.o. female with history of stage IV blood cancer(she doesn't know the name of the cancer) on chemotherapy just finished 2nd round from [place], just moved here to [place] who presented 6/2/2019 with nausea, vomiting, diarrhea for the past few days. She stated that she always has hemoptysis since she was diagnosed with cancer in 1/18. Her oncologist is Dr. Dewayne Florentino from [facility]. Unfortunately we dont have any medical records from [facility] at this point.   In the ER, she had CXR done and showed multifocal pneumonia on the right. She will be admitted for further management.    Review of Systems  Review of Systems   Constitutional: Negative for chills, fever and weight loss.   HENT: Negative for congestion and nosebleeds.    Eyes: Negative for blurred vision, pain, discharge and redness.   Respiratory: Positive for hemoptysis. Negative for cough, sputum production, shortness of breath and stridor.    Cardiovascular: Negative for chest pain, palpitations and orthopnea.   Gastrointestinal: Positive for abdominal pain, diarrhea, nausea and vomiting. Negative for heartburn.   Genitourinary: Negative for dysuria, frequency and urgency.   Musculoskeletal: Negative for back pain, myalgias and neck pain.   Skin: Negative for itching and rash.   Neurological: Negative for dizziness, focal weakness, seizures and headaches.   Psychiatric/Behavioral: Negative for depression. The patient is not nervous/anxious and does not have insomnia.        Past Medical History  Obesity, blood cancer    Surgical History  Hip surgery, cholecystectomy     Family History  Reviewed and no pertinent family history     Social History    she smoke 1ppd, denied alcohol and illicit drug use    Allergies  No Known  Allergies    Medications  None       Physical Exam  Temp:  [36.9 °C (98.5 °F)] 36.9 °C (98.5 °F)  Pulse:  [] 88  Resp:  [16-22] 18  BP: (119)/(70) 119/70  SpO2:  [95 %-98 %] 98 %    Physical Exam   Constitutional: She is oriented to person, place, and time. No distress.   HENT:   Head: Normocephalic and atraumatic.   Mouth/Throat: Oropharynx is clear and moist.   Eyes: Pupils are equal, round, and reactive to light. Conjunctivae and EOM are normal.   Neck: Normal range of motion. Neck supple. No tracheal deviation present. No thyromegaly present.   Cardiovascular: Normal rate and regular rhythm.    No murmur heard.  Pulmonary/Chest: Effort normal and breath sounds normal. No respiratory distress. She has no wheezes.   Abdominal: Soft. Bowel sounds are normal. She exhibits no distension. There is no tenderness.   Musculoskeletal: She exhibits no edema or tenderness.   Neurological: She is alert and oriented to person, place, and time. No cranial nerve deficit.   Skin: Skin is warm and dry. She is not diaphoretic. No erythema.   Psychiatric: She has a normal mood and affect. Her behavior is normal. Thought content normal.   Nursing note and vitals reviewed.      Laboratory:  Recent Labs      06/02/19   1735   WBC  7.8   RBC  2.93*   HEMOGLOBIN  7.7*   HEMATOCRIT  26.4*   MCV  90.1   MCH  26.3*   MCHC  29.2*   RDW  67.6*   PLATELETCT  130*   MPV  9.4     Recent Labs      06/02/19   1735   SODIUM  135   POTASSIUM  3.3*   CHLORIDE  102   CO2  27   GLUCOSE  118*   BUN  9   CREATININE  0.57   CALCIUM  8.5     Recent Labs      06/02/19   1735   ALTSGPT  41   ASTSGOT  38   ALKPHOSPHAT  122*   TBILIRUBIN  0.9   LIPASE  5*   GLUCOSE  118*                 No results for input(s): TROPONINI in the last 72 hours.    Urinalysis:    No results found     Imaging:  DX-ABDOMEN COMPLETE WITH AP OR PA CXR   Final Result         Airspace opacities in the right middle lobe and right middle lobe, in keeping with multifocal pneumonia.       No finding to suggest small bowel obstruction.               Assessment/Plan:  I anticipate this patient will require at least two midnights for appropriate medical management, necessitating inpatient admission.    Metastatic cancer (HCC)- (present on admission)   Assessment & Plan    The patient doesn't know what type of cancer she has, she only said it is blood cancer  On chemotherapy with gemsa? She doesn't know the name of chemo drugs  Requesting medical record from [facility]  Her oncologist is Dr. Dewayne Florentino         HCAP (healthcare-associated pneumonia)- (present on admission)   Assessment & Plan    The patient is undergoing chemotherapy  I started her empiriically with IV vanco and zosyn  De-escalate as needed       Nicotine abuse   Assessment & Plan    Tobacco cessation education provided for more than 11 minutes, discussed options of nicotine patch, medical treatment with wellbutrin and chantix. Discussed the risks of smoking including increased risk of heart disease, stroke, cancer and COPD. Discussed the benefits of quitting smoking. Nicotine replacement protocol will be provided to the patient.     Hyperglycemia- (present on admission)   Assessment & Plan    No history of DM     Hypokalemia- (present on admission)   Assessment & Plan    Replete as needed       Normocytic anemia- (present on admission)   Assessment & Plan    Likely related to chemotherapy  Do not know about her baseline  Follow cbc in am         VTE prophylaxis: lovenox
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
## 2019-06-04 ENCOUNTER — APPOINTMENT (OUTPATIENT)
Dept: RADIOLOGY | Facility: MEDICAL CENTER | Age: 39
DRG: 194 | End: 2019-06-04
Attending: INTERNAL MEDICINE
Payer: MEDICAID

## 2019-06-04 PROBLEM — G89.3 CANCER ASSOCIATED PAIN: Status: ACTIVE | Noted: 2019-06-04

## 2019-06-04 LAB
ANION GAP SERPL CALC-SCNC: 9 MMOL/L (ref 0–11.9)
ANISOCYTOSIS BLD QL SMEAR: ABNORMAL
BASOPHILS # BLD AUTO: 0 % (ref 0–1.8)
BASOPHILS # BLD: 0 K/UL (ref 0–0.12)
BUN SERPL-MCNC: 18 MG/DL (ref 8–22)
CALCIUM SERPL-MCNC: 7.7 MG/DL (ref 8.5–10.5)
CHLORIDE SERPL-SCNC: 106 MMOL/L (ref 96–112)
CO2 SERPL-SCNC: 23 MMOL/L (ref 20–33)
CREAT SERPL-MCNC: 2.23 MG/DL (ref 0.5–1.4)
EOSINOPHIL # BLD AUTO: 0.15 K/UL (ref 0–0.51)
EOSINOPHIL NFR BLD: 1.7 % (ref 0–6.9)
ERYTHROCYTE [DISTWIDTH] IN BLOOD BY AUTOMATED COUNT: 64.7 FL (ref 35.9–50)
GLUCOSE SERPL-MCNC: 127 MG/DL (ref 65–99)
HCT VFR BLD AUTO: 25.7 % (ref 37–47)
HGB BLD-MCNC: 7.6 G/DL (ref 12–16)
LYMPHOCYTES # BLD AUTO: 1.47 K/UL (ref 1–4.8)
LYMPHOCYTES NFR BLD: 16.7 % (ref 22–41)
MACROCYTES BLD QL SMEAR: ABNORMAL
MANUAL DIFF BLD: NORMAL
MCH RBC QN AUTO: 26.7 PG (ref 27–33)
MCHC RBC AUTO-ENTMCNC: 29.6 G/DL (ref 33.6–35)
MCV RBC AUTO: 90.2 FL (ref 81.4–97.8)
MONOCYTES # BLD AUTO: 0.15 K/UL (ref 0–0.85)
MONOCYTES NFR BLD AUTO: 1.7 % (ref 0–13.4)
MORPHOLOGY BLD-IMP: NORMAL
NEUTROPHILS # BLD AUTO: 7.03 K/UL (ref 2–7.15)
NEUTROPHILS NFR BLD: 78.1 % (ref 44–72)
NEUTS BAND NFR BLD MANUAL: 1.8 % (ref 0–10)
NRBC # BLD AUTO: 0.35 K/UL
NRBC BLD-RTO: 4 /100 WBC
PLATELET # BLD AUTO: 157 K/UL (ref 164–446)
PLATELET BLD QL SMEAR: NORMAL
PMV BLD AUTO: 9.4 FL (ref 9–12.9)
POLYCHROMASIA BLD QL SMEAR: NORMAL
POTASSIUM SERPL-SCNC: 4.1 MMOL/L (ref 3.6–5.5)
PROCALCITONIN SERPL-MCNC: 0.42 NG/ML
RBC # BLD AUTO: 2.85 M/UL (ref 4.2–5.4)
RBC BLD AUTO: PRESENT
SODIUM SERPL-SCNC: 138 MMOL/L (ref 135–145)
WBC # BLD AUTO: 8.8 K/UL (ref 4.8–10.8)

## 2019-06-04 PROCEDURE — 84145 PROCALCITONIN (PCT): CPT

## 2019-06-04 PROCEDURE — 76775 US EXAM ABDO BACK WALL LIM: CPT

## 2019-06-04 PROCEDURE — A9270 NON-COVERED ITEM OR SERVICE: HCPCS | Performed by: INTERNAL MEDICINE

## 2019-06-04 PROCEDURE — 700105 HCHG RX REV CODE 258: Performed by: INTERNAL MEDICINE

## 2019-06-04 PROCEDURE — 700111 HCHG RX REV CODE 636 W/ 250 OVERRIDE (IP): Performed by: INTERNAL MEDICINE

## 2019-06-04 PROCEDURE — 700102 HCHG RX REV CODE 250 W/ 637 OVERRIDE(OP): Performed by: INTERNAL MEDICINE

## 2019-06-04 PROCEDURE — 85007 BL SMEAR W/DIFF WBC COUNT: CPT

## 2019-06-04 PROCEDURE — 770009 HCHG ROOM/CARE - ONCOLOGY SEMI PRI*

## 2019-06-04 PROCEDURE — 85027 COMPLETE CBC AUTOMATED: CPT

## 2019-06-04 PROCEDURE — 99233 SBSQ HOSP IP/OBS HIGH 50: CPT | Performed by: INTERNAL MEDICINE

## 2019-06-04 PROCEDURE — 80048 BASIC METABOLIC PNL TOTAL CA: CPT

## 2019-06-04 PROCEDURE — 36415 COLL VENOUS BLD VENIPUNCTURE: CPT

## 2019-06-04 RX ORDER — AMOXICILLIN AND CLAVULANATE POTASSIUM 875; 125 MG/1; MG/1
1 TABLET, FILM COATED ORAL EVERY 12 HOURS
Status: COMPLETED | OUTPATIENT
Start: 2019-06-05 | End: 2019-06-06

## 2019-06-04 RX ORDER — ALPRAZOLAM 1 MG/1
1 TABLET ORAL 3 TIMES DAILY PRN
Status: DISCONTINUED | OUTPATIENT
Start: 2019-06-04 | End: 2019-06-08 | Stop reason: HOSPADM

## 2019-06-04 RX ORDER — OXYCODONE HCL 40 MG/1
40 TABLET, FILM COATED, EXTENDED RELEASE ORAL EVERY 12 HOURS
Status: DISCONTINUED | OUTPATIENT
Start: 2019-06-04 | End: 2019-06-07

## 2019-06-04 RX ORDER — OXYCODONE HYDROCHLORIDE 30 MG/1
30 TABLET ORAL EVERY 4 HOURS PRN
Status: DISCONTINUED | OUTPATIENT
Start: 2019-06-04 | End: 2019-06-08 | Stop reason: HOSPADM

## 2019-06-04 RX ADMIN — SENNOSIDES,DOCUSATE SODIUM 2 TABLET: 8.6; 5 TABLET, FILM COATED ORAL at 17:47

## 2019-06-04 RX ADMIN — OXYCODONE HYDROCHLORIDE 40 MG: 40 TABLET, FILM COATED, EXTENDED RELEASE ORAL at 21:51

## 2019-06-04 RX ADMIN — OXYCODONE HYDROCHLORIDE 30 MG: 30 TABLET ORAL at 11:06

## 2019-06-04 RX ADMIN — PROCHLORPERAZINE EDISYLATE 10 MG: 5 INJECTION INTRAMUSCULAR; INTRAVENOUS at 20:04

## 2019-06-04 RX ADMIN — PIPERACILLIN AND TAZOBACTAM 4.5 G: 4; .5 INJECTION, POWDER, LYOPHILIZED, FOR SOLUTION INTRAVENOUS; PARENTERAL at 10:52

## 2019-06-04 RX ADMIN — PIPERACILLIN AND TAZOBACTAM 4.5 G: 4; .5 INJECTION, POWDER, LYOPHILIZED, FOR SOLUTION INTRAVENOUS; PARENTERAL at 17:46

## 2019-06-04 RX ADMIN — PIPERACILLIN AND TAZOBACTAM 4.5 G: 4; .5 INJECTION, POWDER, LYOPHILIZED, FOR SOLUTION INTRAVENOUS; PARENTERAL at 01:44

## 2019-06-04 RX ADMIN — OXYCODONE HYDROCHLORIDE 40 MG: 40 TABLET, FILM COATED, EXTENDED RELEASE ORAL at 11:06

## 2019-06-04 RX ADMIN — ENOXAPARIN SODIUM 40 MG: 100 INJECTION SUBCUTANEOUS at 05:49

## 2019-06-04 RX ADMIN — SODIUM CHLORIDE: 900 INJECTION INTRAVENOUS at 01:43

## 2019-06-04 RX ADMIN — PROCHLORPERAZINE EDISYLATE 5 MG: 5 INJECTION INTRAMUSCULAR; INTRAVENOUS at 01:47

## 2019-06-04 RX ADMIN — ACETAMINOPHEN 650 MG: 325 TABLET, FILM COATED ORAL at 10:52

## 2019-06-04 RX ADMIN — OXYCODONE HYDROCHLORIDE 30 MG: 30 TABLET ORAL at 21:53

## 2019-06-04 ASSESSMENT — ENCOUNTER SYMPTOMS
MYALGIAS: 1
SPEECH CHANGE: 0
ABDOMINAL PAIN: 0
INSOMNIA: 0
CONSTIPATION: 0
SHORTNESS OF BREATH: 0
SORE THROAT: 0
BLURRED VISION: 0
SENSORY CHANGE: 0
DIARRHEA: 0
WEAKNESS: 0
HEADACHES: 0
VOMITING: 0
CLAUDICATION: 0
CHILLS: 0
NERVOUS/ANXIOUS: 0
DEPRESSION: 0
COUGH: 0
NAUSEA: 0
FEVER: 0
DIZZINESS: 0
HEARTBURN: 0
PHOTOPHOBIA: 0

## 2019-06-04 NOTE — PROGRESS NOTES
Received report & assumed care of patient at 1900. Assessment completed. Patient is A&Ox4, up SBA with FWW or wheelchair. L PIV patent, infusing NS at 100 mL/hr. Patient denies any pain, n/v at this time. POC discussed & questions answered. Bed locked and in lowest position, call light within reach, non-skid socks in place, hourly rounding. Patient reports no further needs at this time.

## 2019-06-04 NOTE — CARE PLAN
Problem: Safety  Goal: Will remain free from injury  Outcome: PROGRESSING AS EXPECTED  Pt steady on her feet calls out appropriately    Problem: Venous Thromboembolism (VTW)/Deep Vein Thrombosis (DVT) Prevention:  Goal: Patient will participate in Venous Thrombosis (VTE)/Deep Vein Thrombosis (DVT)Prevention Measures  Outcome: PROGRESSING AS EXPECTED  Pt compliant with SCD's and ambulation

## 2019-06-04 NOTE — CARE PLAN
Problem: Communication  Goal: The ability to communicate needs accurately and effectively will improve  Outcome: PROGRESSING AS EXPECTED  Plan of care discussed with patient, all questions answered.    Problem: Safety  Goal: Will remain free from falls  Outcome: PROGRESSING AS EXPECTED  Call light within reach, non-skid socks in place, bed locked and in lowest position, hourly rounding.

## 2019-06-05 ENCOUNTER — APPOINTMENT (OUTPATIENT)
Dept: RADIOLOGY | Facility: MEDICAL CENTER | Age: 39
DRG: 194 | End: 2019-06-05
Attending: INTERNAL MEDICINE
Payer: MEDICAID

## 2019-06-05 PROBLEM — N17.9 AKI (ACUTE KIDNEY INJURY) (HCC): Status: ACTIVE | Noted: 2019-06-05

## 2019-06-05 LAB
ANION GAP SERPL CALC-SCNC: 10 MMOL/L (ref 0–11.9)
BASOPHILS # BLD AUTO: 0.4 % (ref 0–1.8)
BASOPHILS # BLD: 0.03 K/UL (ref 0–0.12)
BUN SERPL-MCNC: 18 MG/DL (ref 8–22)
CALCIUM SERPL-MCNC: 7.8 MG/DL (ref 8.5–10.5)
CHLORIDE SERPL-SCNC: 107 MMOL/L (ref 96–112)
CHLORIDE UR-SCNC: 38 MMOL/L
CO2 SERPL-SCNC: 23 MMOL/L (ref 20–33)
CREAT SERPL-MCNC: 2.94 MG/DL (ref 0.5–1.4)
CREAT UR-MCNC: 31.3 MG/DL
EOSINOPHIL # BLD AUTO: 0.1 K/UL (ref 0–0.51)
EOSINOPHIL NFR BLD: 1.3 % (ref 0–6.9)
EOSINOPHIL URNS QL MICRO: NORMAL
ERYTHROCYTE [DISTWIDTH] IN BLOOD BY AUTOMATED COUNT: 68.2 FL (ref 35.9–50)
GLUCOSE SERPL-MCNC: 146 MG/DL (ref 65–99)
HCT VFR BLD AUTO: 25.9 % (ref 37–47)
HGB BLD-MCNC: 7.3 G/DL (ref 12–16)
IMM GRANULOCYTES # BLD AUTO: 0.14 K/UL (ref 0–0.11)
IMM GRANULOCYTES NFR BLD AUTO: 1.8 % (ref 0–0.9)
LYMPHOCYTES # BLD AUTO: 2.13 K/UL (ref 1–4.8)
LYMPHOCYTES NFR BLD: 27.2 % (ref 22–41)
MCH RBC QN AUTO: 26.3 PG (ref 27–33)
MCHC RBC AUTO-ENTMCNC: 28.2 G/DL (ref 33.6–35)
MCV RBC AUTO: 93.2 FL (ref 81.4–97.8)
MONOCYTES # BLD AUTO: 0.75 K/UL (ref 0–0.85)
MONOCYTES NFR BLD AUTO: 9.6 % (ref 0–13.4)
NEUTROPHILS # BLD AUTO: 4.68 K/UL (ref 2–7.15)
NEUTROPHILS NFR BLD: 59.7 % (ref 44–72)
NRBC # BLD AUTO: 0.12 K/UL
NRBC BLD-RTO: 1.5 /100 WBC
PLATELET # BLD AUTO: 149 K/UL (ref 164–446)
PMV BLD AUTO: 8.7 FL (ref 9–12.9)
POTASSIUM SERPL-SCNC: 3.7 MMOL/L (ref 3.6–5.5)
POTASSIUM UR-SCNC: 7.6 MMOL/L
PROT UR-MCNC: 10.4 MG/DL (ref 0–15)
RBC # BLD AUTO: 2.78 M/UL (ref 4.2–5.4)
SODIUM SERPL-SCNC: 140 MMOL/L (ref 135–145)
SODIUM UR-SCNC: 38 MMOL/L
WBC # BLD AUTO: 7.8 K/UL (ref 4.8–10.8)

## 2019-06-05 PROCEDURE — A9270 NON-COVERED ITEM OR SERVICE: HCPCS | Performed by: INTERNAL MEDICINE

## 2019-06-05 PROCEDURE — 84156 ASSAY OF PROTEIN URINE: CPT

## 2019-06-05 PROCEDURE — 700105 HCHG RX REV CODE 258: Performed by: INTERNAL MEDICINE

## 2019-06-05 PROCEDURE — 85025 COMPLETE CBC W/AUTO DIFF WBC: CPT

## 2019-06-05 PROCEDURE — 84133 ASSAY OF URINE POTASSIUM: CPT

## 2019-06-05 PROCEDURE — 82570 ASSAY OF URINE CREATININE: CPT

## 2019-06-05 PROCEDURE — 80048 BASIC METABOLIC PNL TOTAL CA: CPT

## 2019-06-05 PROCEDURE — 770009 HCHG ROOM/CARE - ONCOLOGY SEMI PRI*

## 2019-06-05 PROCEDURE — 700102 HCHG RX REV CODE 250 W/ 637 OVERRIDE(OP): Performed by: INTERNAL MEDICINE

## 2019-06-05 PROCEDURE — 82436 ASSAY OF URINE CHLORIDE: CPT

## 2019-06-05 PROCEDURE — 36415 COLL VENOUS BLD VENIPUNCTURE: CPT

## 2019-06-05 PROCEDURE — 99255 IP/OBS CONSLTJ NEW/EST HI 80: CPT | Performed by: INTERNAL MEDICINE

## 2019-06-05 PROCEDURE — 71045 X-RAY EXAM CHEST 1 VIEW: CPT

## 2019-06-05 PROCEDURE — 700111 HCHG RX REV CODE 636 W/ 250 OVERRIDE (IP): Performed by: INTERNAL MEDICINE

## 2019-06-05 PROCEDURE — 89190 NASAL SMEAR FOR EOSINOPHILS: CPT

## 2019-06-05 PROCEDURE — 84300 ASSAY OF URINE SODIUM: CPT

## 2019-06-05 PROCEDURE — 99232 SBSQ HOSP IP/OBS MODERATE 35: CPT | Performed by: INTERNAL MEDICINE

## 2019-06-05 RX ORDER — HEPARIN SODIUM 5000 [USP'U]/ML
5000 INJECTION, SOLUTION INTRAVENOUS; SUBCUTANEOUS EVERY 8 HOURS
Status: DISCONTINUED | OUTPATIENT
Start: 2019-06-05 | End: 2019-06-08 | Stop reason: HOSPADM

## 2019-06-05 RX ADMIN — HEPARIN SODIUM 5000 UNITS: 5000 INJECTION, SOLUTION INTRAVENOUS; SUBCUTANEOUS at 21:31

## 2019-06-05 RX ADMIN — SODIUM CHLORIDE: 900 INJECTION INTRAVENOUS at 00:14

## 2019-06-05 RX ADMIN — OXYCODONE HYDROCHLORIDE 40 MG: 40 TABLET, FILM COATED, EXTENDED RELEASE ORAL at 06:25

## 2019-06-05 RX ADMIN — ENOXAPARIN SODIUM 40 MG: 100 INJECTION SUBCUTANEOUS at 06:26

## 2019-06-05 RX ADMIN — OXYCODONE HYDROCHLORIDE 40 MG: 40 TABLET, FILM COATED, EXTENDED RELEASE ORAL at 18:17

## 2019-06-05 RX ADMIN — OXYCODONE HYDROCHLORIDE 30 MG: 30 TABLET ORAL at 13:01

## 2019-06-05 RX ADMIN — SENNOSIDES,DOCUSATE SODIUM 2 TABLET: 8.6; 5 TABLET, FILM COATED ORAL at 18:17

## 2019-06-05 RX ADMIN — OXYCODONE HYDROCHLORIDE 30 MG: 30 TABLET ORAL at 21:36

## 2019-06-05 RX ADMIN — OXYCODONE HYDROCHLORIDE 30 MG: 30 TABLET ORAL at 06:25

## 2019-06-05 RX ADMIN — SODIUM CHLORIDE: 900 INJECTION INTRAVENOUS at 19:39

## 2019-06-05 RX ADMIN — AMOXICILLIN AND CLAVULANATE POTASSIUM 1 TABLET: 875; 125 TABLET, FILM COATED ORAL at 06:25

## 2019-06-05 RX ADMIN — AMOXICILLIN AND CLAVULANATE POTASSIUM 1 TABLET: 875; 125 TABLET, FILM COATED ORAL at 18:18

## 2019-06-05 RX ADMIN — SENNOSIDES,DOCUSATE SODIUM 2 TABLET: 8.6; 5 TABLET, FILM COATED ORAL at 06:25

## 2019-06-05 ASSESSMENT — ENCOUNTER SYMPTOMS
CLAUDICATION: 0
DEPRESSION: 0
SENSORY CHANGE: 0
SHORTNESS OF BREATH: 0
MYALGIAS: 1
BLURRED VISION: 0
DIZZINESS: 0
VOMITING: 0
DIARRHEA: 0
HEADACHES: 0
PHOTOPHOBIA: 0
COUGH: 0
WEAKNESS: 0
NERVOUS/ANXIOUS: 0
CHILLS: 0
NAUSEA: 0
SORE THROAT: 0
SPEECH CHANGE: 0
INSOMNIA: 0
CONSTIPATION: 0
HEARTBURN: 0
FEVER: 0
ABDOMINAL PAIN: 0

## 2019-06-05 NOTE — PROGRESS NOTES
Pt A/OX4. Very pleasant. She was in excruciating pain this morning; turns out her home pain medications that she takes for cancer pain were never ordered. Oxycodone and oxycontin home doses ordered; pt feeling much much better. Renal US taken due to renal function worsening.  Family visiting at bedside. Tolerating regular diet.

## 2019-06-05 NOTE — PROGRESS NOTES
Pt A/OX4. Pain controlled today. Pt lost IV and is known to have poor venous access. PICC team called at 1330 for US guided IV placement after failed attempt at PIV by floor staff. Spoke with the PICC team on the phone hours ago, still waiting for IV placement.     At 1630 pt was found to be tachycardic, low grade fever and desaturated on RA (88%) until encouraged to take deep breaths, which brought O2 to 90%. Also incidently noticed that LUE is visibly more swollen compared to RUE. Pt is asymptomatic.  Dr. Mojica notified. STAT CXR ordered.

## 2019-06-05 NOTE — PROGRESS NOTES
"[facility] Medicine Daily Progress Note    Date of Service  6/4/2019    Chief Complaint  39 y.o. female admitted 6/2/2019 with nausea, vomiting and diarrhea.    Hospital Course    Patient with imaging and examination consistent with pneumonia and started on antibiotics with improvement in her symptoms.  She has epithelioid hemangioendotherlioma and was under the care of [place] clinician but is now moving here and will need oncologist to follow her.      Interval Problem Update  Patient with uncontrolled pain as her medications she usually takes (oxycodone and oxycontin) were not ordered on admission.  I've ordered these to help control her pain.  She states her cough has improved along with the remaining symptoms of her infection.    Consultants/Specialty  none    Code Status  full    Disposition  Home likely tomorrow.    Review of Systems  Review of Systems   Constitutional: Negative for chills and fever.   HENT: Negative for congestion and sore throat.    Eyes: Negative for blurred vision and photophobia.   Respiratory: Negative for cough and shortness of breath.    Cardiovascular: Negative for chest pain, claudication and leg swelling.   Gastrointestinal: Negative for abdominal pain, constipation, diarrhea, heartburn, nausea and vomiting.   Genitourinary: Negative for dysuria and hematuria.   Musculoskeletal: Positive for myalgias (global \"cancer pain\"). Negative for joint pain.   Skin: Negative for itching and rash.   Neurological: Negative for dizziness, sensory change, speech change, weakness and headaches.   Psychiatric/Behavioral: Negative for depression. The patient is not nervous/anxious and does not have insomnia.         Physical Exam  Temp:  [36.2 °C (97.2 °F)-37.1 °C (98.8 °F)] 36.4 °C (97.5 °F)  Pulse:  [86-99] 86  Resp:  [17-18] 18  BP: (113-132)/(77-82) 118/82  SpO2:  [90 %-95 %] 90 %    Physical Exam   Constitutional: She is oriented to person, place, and time. She appears well-developed and " well-nourished. No distress.   HENT:   Head: Normocephalic and atraumatic.   Eyes: Conjunctivae are normal. No scleral icterus.   Neck: Neck supple. No JVD present.   Cardiovascular: Normal rate, regular rhythm and normal heart sounds.  Exam reveals no gallop and no friction rub.    No murmur heard.  Pulmonary/Chest: Effort normal and breath sounds normal. No respiratory distress. She exhibits no tenderness.   Abdominal: Soft. Bowel sounds are normal. She exhibits no distension. There is no guarding.   Musculoskeletal: She exhibits no edema or tenderness.   Lymphadenopathy:     She has no cervical adenopathy.   Neurological: She is alert and oriented to person, place, and time. No cranial nerve deficit.   Skin: Skin is warm and dry. She is not diaphoretic. No erythema. No pallor.   Psychiatric: She has a normal mood and affect. Her behavior is normal.   Nursing note and vitals reviewed.      Fluids  No intake or output data in the 24 hours ending 06/04/19 1939    Laboratory  Recent Labs      06/02/19   1735  06/03/19   0013  06/04/19   0010   WBC  7.8  6.9  8.8   RBC  2.93*  2.46*  2.85*   HEMOGLOBIN  7.7*  6.5*  7.6*   HEMATOCRIT  26.4*  22.6*  25.7*   MCV  90.1  91.9  90.2   MCH  26.3*  26.4*  26.7*   MCHC  29.2*  28.8*  29.6*   RDW  67.6*  69.4*  64.7*   PLATELETCT  130*  115*  157*   MPV  9.4  9.0  9.4     Recent Labs      06/02/19   1735  06/03/19   0013  06/04/19   0010   SODIUM  135  138  138   POTASSIUM  3.3*  3.4*  4.1   CHLORIDE  102  106  106   CO2  27  25  23   GLUCOSE  118*  110*  127*   BUN  9  8  18   CREATININE  0.57  0.61  2.23*   CALCIUM  8.5  7.7*  7.7*                   Imaging  US-RENAL   Final Result      No hydronephrosis is seen.      Nephromegaly.      Very enlarged spleen is noted.      IR-US GUIDED PIV   Final Result    Ultrasound-guided PERIPHERAL IV INSERTION performed by    qualified nursing staff as above.            DX-ABDOMEN COMPLETE WITH AP OR PA CXR   Final Result         Airspace  opacities in the right middle lobe and right middle lobe, in keeping with multifocal pneumonia.      No finding to suggest small bowel obstruction.              Assessment/Plan  * Metastatic cancer (HCC)- (present on admission)   Assessment & Plan    epithelioid hemangioendotherlioma  On chemotherapy with gemsar  Requesting medical record from [facility]  Her oncologist is Dr. Dewayne Florentino  Patient said she will stay local here and needed a oncologist consultation in the hospital.  She will establish with [facility] as outpatient       HCAP (healthcare-associated pneumonia)- (present on admission)   Assessment & Plan    The patient is undergoing chemotherapy  Cont with zosyn, I DCed vanco  Check pro calcitonin  De-escalate as needed       Cancer associated pain   Assessment & Plan    Resume home meds  oxycontin 40mg bid, oxycodone 30 mg prn       Class 1 obesity in adult- (present on admission)   Assessment & Plan    Body mass index is 34.33 kg/m².       Nicotine abuse   Assessment & Plan    - Smoking cessation education provided  - Nicotine patch       Hyperglycemia- (present on admission)   Assessment & Plan    No history of DM     Hypokalemia- (present on admission)   Assessment & Plan    Replete as needed       Normocytic anemia- (present on admission)   Assessment & Plan    Likely related to chemotherapy  Transfuse as needed if hemoglobin less than 7  Follow cbc in am          VTE prophylaxis: lovenox

## 2019-06-05 NOTE — PROGRESS NOTES
"Hospital Medicine Daily Progress Note    Date of Service  6/5/2019    Chief Complaint  39 y.o. female admitted 6/2/2019 with nausea, vomiting and diarrhea.    Hospital Course    Patient with imaging and examination consistent with pneumonia and started on antibiotics with improvement in her symptoms.  She has epithelioid hemangioendotherlioma and was under the care of [place] clinician but is now moving here and will need oncologist to follow her.      Interval Problem Update  6/4 Patient with uncontrolled pain as her medications she usually takes (oxycodone and oxycontin) were not ordered on admission.  I've ordered these to help control her pain.  She states her cough has improved along with the remaining symptoms of her infection.  6/5 Patient with improved pain control since resuming her home medications.  Unfortunately her cr increased again overnight.  She is not sure if her kidneys are involved with her cancer but states her oncologist told her it was \"everywhere\" on the last PET scan.  I don't have records from her oncologist though they were supposed to be requested.  I'll have unit clerk request them again.  On admission her cr was normal.  I'm concerned the dose of vancomycin she received early on admission for broad spectrum coverage may have contributed to her elevated cr.  Patient states she is urinating well and is hoping to work with therapies while she is here so she can start using a walker more and out of her wheelchair.      Consultants/Specialty  Nephrology - [facility]    Code Status  full    Disposition  Home likely tomorrow.    Review of Systems  Review of Systems   Constitutional: Negative for chills and fever.   HENT: Negative for congestion and sore throat.    Eyes: Negative for blurred vision and photophobia.   Respiratory: Negative for cough and shortness of breath.    Cardiovascular: Negative for chest pain, claudication and leg swelling.   Gastrointestinal: Negative for abdominal pain, " constipation, diarrhea, heartburn, nausea and vomiting.   Genitourinary: Negative for dysuria and hematuria.   Musculoskeletal: Positive for myalgias (better). Negative for joint pain.   Skin: Negative for itching and rash.   Neurological: Negative for dizziness, sensory change, speech change, weakness and headaches.   Psychiatric/Behavioral: Negative for depression. The patient is not nervous/anxious and does not have insomnia.         Physical Exam  Temp:  [36.4 °C (97.5 °F)-37.4 °C (99.3 °F)] 37.4 °C (99.3 °F)  Pulse:  [] 112  Resp:  [18] 18  BP: (116-126)/(77-90) 116/80  SpO2:  [90 %-92 %] 90 %    Physical Exam   Constitutional: She is oriented to person, place, and time. She appears well-developed and well-nourished. No distress.   HENT:   Head: Normocephalic and atraumatic.   Eyes: Conjunctivae are normal. No scleral icterus.   Neck: Neck supple. No JVD present.   Cardiovascular: Normal rate, regular rhythm and normal heart sounds.  Exam reveals no gallop and no friction rub.    No murmur heard.  Pulmonary/Chest: Effort normal and breath sounds normal. No respiratory distress. She exhibits no tenderness.   Abdominal: Soft. Bowel sounds are normal. She exhibits no distension. There is no guarding.   Musculoskeletal: She exhibits no edema or tenderness.   Lymphadenopathy:     She has no cervical adenopathy.   Neurological: She is alert and oriented to person, place, and time. No cranial nerve deficit.   Skin: Skin is warm and dry. She is not diaphoretic. No erythema. No pallor.   Psychiatric: She has a normal mood and affect. Her behavior is normal.   Nursing note and vitals reviewed.      Fluids  No intake or output data in the 24 hours ending 06/05/19 1601    Laboratory  Recent Labs      06/03/19   0013  06/04/19   0010  06/05/19   0013   WBC  6.9  8.8  7.8   RBC  2.46*  2.85*  2.78*   HEMOGLOBIN  6.5*  7.6*  7.3*   HEMATOCRIT  22.6*  25.7*  25.9*   MCV  91.9  90.2  93.2   MCH  26.4*  26.7*  26.3*   MCHC   28.8*  29.6*  28.2*   RDW  69.4*  64.7*  68.2*   PLATELETCT  115*  157*  149*   MPV  9.0  9.4  8.7*     Recent Labs      06/03/19   0013  06/04/19   0010  06/05/19   0013   SODIUM  138  138  140   POTASSIUM  3.4*  4.1  3.7   CHLORIDE  106  106  107   CO2  25  23  23   GLUCOSE  110*  127*  146*   BUN  8  18  18   CREATININE  0.61  2.23*  2.94*   CALCIUM  7.7*  7.7*  7.8*                   Imaging  US-RENAL   Final Result      No hydronephrosis is seen.      Nephromegaly.      Very enlarged spleen is noted.      IR-US GUIDED PIV   Final Result    Ultrasound-guided PERIPHERAL IV INSERTION performed by    qualified nursing staff as above.            DX-ABDOMEN COMPLETE WITH AP OR PA CXR   Final Result         Airspace opacities in the right middle lobe and right middle lobe, in keeping with multifocal pneumonia.      No finding to suggest small bowel obstruction.         IR-US GUIDED PIV    (Results Pending)        Assessment/Plan  * Metastatic cancer (HCC)- (present on admission)   Assessment & Plan    epithelioid hemangioendotherlioma  On chemotherapy with gemsar  Requesting medical record from [facility]  Her oncologist is Dr. Dewayne Florentino  Patient said she will stay local here and needed a oncologist consultation in the hospital.  She will establish with [facility] as outpatient       HCAP (healthcare-associated pneumonia)- (present on admission)   Assessment & Plan    The patient is undergoing chemotherapy  Cont with zosyn, I DCed vanco  Check pro calcitonin  De-escalate as needed       JOANNE (acute kidney injury) (HCC)   Assessment & Plan    Cr jumped on 6/4 to 2.23 and with continued hydration is now up to 2.94  Renal US - WNL  Nephrology consultation  Increase IVF       Cancer associated pain   Assessment & Plan    Resume home meds  oxycontin 40mg bid, oxycodone 30 mg prn       Class 1 obesity in adult- (present on admission)   Assessment & Plan    Body mass index is 34.33 kg/m².        Nicotine abuse   Assessment & Plan    - Smoking cessation education provided  - Nicotine patch       Hyperglycemia- (present on admission)   Assessment & Plan    No history of DM     Hypokalemia- (present on admission)   Assessment & Plan    Replete as needed       Normocytic anemia- (present on admission)   Assessment & Plan    Likely related to chemotherapy  Transfuse as needed if hemoglobin less than 7  Follow cbc in am          VTE prophylaxis: lovenox

## 2019-06-05 NOTE — ASSESSMENT & PLAN NOTE
Resume home meds  oxycontin 40mg bid, oxycodone 30 mg prn - records received from oncology in [facility] - she is supposed to be on 60mg oxycontin- dose changed.

## 2019-06-05 NOTE — ASSESSMENT & PLAN NOTE
Trending back down - 2.55  DC IVF, single dose of lasix given respiratory compromise.  Continue IVF  Renal US - WNL  Nephrology consultation - f/u with Dr Bautista as outpatient

## 2019-06-06 LAB
ALBUMIN SERPL BCP-MCNC: 3.5 G/DL (ref 3.2–4.9)
ALBUMIN/GLOB SERPL: 1 G/DL
ALP SERPL-CCNC: 132 U/L (ref 30–99)
ALT SERPL-CCNC: 17 U/L (ref 2–50)
ANION GAP SERPL CALC-SCNC: 9 MMOL/L (ref 0–11.9)
AST SERPL-CCNC: 11 U/L (ref 12–45)
BACTERIA UR CULT: NORMAL
BASOPHILS # BLD AUTO: 0.5 % (ref 0–1.8)
BASOPHILS # BLD: 0.05 K/UL (ref 0–0.12)
BILIRUB SERPL-MCNC: 0.8 MG/DL (ref 0.1–1.5)
BUN SERPL-MCNC: 18 MG/DL (ref 8–22)
CALCIUM SERPL-MCNC: 8.1 MG/DL (ref 8.5–10.5)
CHLORIDE SERPL-SCNC: 104 MMOL/L (ref 96–112)
CO2 SERPL-SCNC: 20 MMOL/L (ref 20–33)
CREAT SERPL-MCNC: 2.93 MG/DL (ref 0.5–1.4)
EKG IMPRESSION: NORMAL
EOSINOPHIL # BLD AUTO: 0.15 K/UL (ref 0–0.51)
EOSINOPHIL NFR BLD: 1.4 % (ref 0–6.9)
ERYTHROCYTE [DISTWIDTH] IN BLOOD BY AUTOMATED COUNT: 70.4 FL (ref 35.9–50)
GLOBULIN SER CALC-MCNC: 3.5 G/DL (ref 1.9–3.5)
GLUCOSE SERPL-MCNC: 134 MG/DL (ref 65–99)
HCT VFR BLD AUTO: 25.8 % (ref 37–47)
HGB BLD-MCNC: 7.3 G/DL (ref 12–16)
IMM GRANULOCYTES # BLD AUTO: 0.3 K/UL (ref 0–0.11)
IMM GRANULOCYTES NFR BLD AUTO: 2.8 % (ref 0–0.9)
LYMPHOCYTES # BLD AUTO: 2.92 K/UL (ref 1–4.8)
LYMPHOCYTES NFR BLD: 26.9 % (ref 22–41)
MCH RBC QN AUTO: 26.5 PG (ref 27–33)
MCHC RBC AUTO-ENTMCNC: 28.3 G/DL (ref 33.6–35)
MCV RBC AUTO: 93.8 FL (ref 81.4–97.8)
MONOCYTES # BLD AUTO: 1.05 K/UL (ref 0–0.85)
MONOCYTES NFR BLD AUTO: 9.7 % (ref 0–13.4)
NEUTROPHILS # BLD AUTO: 6.37 K/UL (ref 2–7.15)
NEUTROPHILS NFR BLD: 58.7 % (ref 44–72)
NRBC # BLD AUTO: 0.31 K/UL
NRBC BLD-RTO: 2.9 /100 WBC
PLATELET # BLD AUTO: 157 K/UL (ref 164–446)
PMV BLD AUTO: 9.3 FL (ref 9–12.9)
POTASSIUM SERPL-SCNC: 4.1 MMOL/L (ref 3.6–5.5)
PROCALCITONIN SERPL-MCNC: 1.32 NG/ML
PROT SERPL-MCNC: 7 G/DL (ref 6–8.2)
RBC # BLD AUTO: 2.75 M/UL (ref 4.2–5.4)
SIGNIFICANT IND 70042: NORMAL
SITE SITE: NORMAL
SODIUM SERPL-SCNC: 133 MMOL/L (ref 135–145)
SOURCE SOURCE: NORMAL
WBC # BLD AUTO: 10.8 K/UL (ref 4.8–10.8)

## 2019-06-06 PROCEDURE — 99232 SBSQ HOSP IP/OBS MODERATE 35: CPT | Performed by: INTERNAL MEDICINE

## 2019-06-06 PROCEDURE — 97162 PT EVAL MOD COMPLEX 30 MIN: CPT

## 2019-06-06 PROCEDURE — 700102 HCHG RX REV CODE 250 W/ 637 OVERRIDE(OP): Performed by: INTERNAL MEDICINE

## 2019-06-06 PROCEDURE — A9270 NON-COVERED ITEM OR SERVICE: HCPCS | Performed by: INTERNAL MEDICINE

## 2019-06-06 PROCEDURE — 93010 ELECTROCARDIOGRAM REPORT: CPT | Performed by: INTERNAL MEDICINE

## 2019-06-06 PROCEDURE — 84145 PROCALCITONIN (PCT): CPT

## 2019-06-06 PROCEDURE — 85025 COMPLETE CBC W/AUTO DIFF WBC: CPT

## 2019-06-06 PROCEDURE — 80053 COMPREHEN METABOLIC PANEL: CPT

## 2019-06-06 PROCEDURE — 700111 HCHG RX REV CODE 636 W/ 250 OVERRIDE (IP): Performed by: INTERNAL MEDICINE

## 2019-06-06 PROCEDURE — 36415 COLL VENOUS BLD VENIPUNCTURE: CPT

## 2019-06-06 PROCEDURE — 93005 ELECTROCARDIOGRAM TRACING: CPT | Performed by: INTERNAL MEDICINE

## 2019-06-06 PROCEDURE — 770009 HCHG ROOM/CARE - ONCOLOGY SEMI PRI*

## 2019-06-06 PROCEDURE — 700105 HCHG RX REV CODE 258: Performed by: INTERNAL MEDICINE

## 2019-06-06 RX ADMIN — OXYCODONE HYDROCHLORIDE 30 MG: 30 TABLET ORAL at 02:16

## 2019-06-06 RX ADMIN — HEPARIN SODIUM 5000 UNITS: 5000 INJECTION, SOLUTION INTRAVENOUS; SUBCUTANEOUS at 15:12

## 2019-06-06 RX ADMIN — AMOXICILLIN AND CLAVULANATE POTASSIUM 1 TABLET: 875; 125 TABLET, FILM COATED ORAL at 05:10

## 2019-06-06 RX ADMIN — PROCHLORPERAZINE EDISYLATE 10 MG: 5 INJECTION INTRAMUSCULAR; INTRAVENOUS at 05:51

## 2019-06-06 RX ADMIN — OXYCODONE HYDROCHLORIDE 40 MG: 40 TABLET, FILM COATED, EXTENDED RELEASE ORAL at 17:37

## 2019-06-06 RX ADMIN — HEPARIN SODIUM 5000 UNITS: 5000 INJECTION, SOLUTION INTRAVENOUS; SUBCUTANEOUS at 05:09

## 2019-06-06 RX ADMIN — AMOXICILLIN AND CLAVULANATE POTASSIUM 1 TABLET: 875; 125 TABLET, FILM COATED ORAL at 17:38

## 2019-06-06 RX ADMIN — OXYCODONE HYDROCHLORIDE 40 MG: 40 TABLET, FILM COATED, EXTENDED RELEASE ORAL at 05:09

## 2019-06-06 RX ADMIN — OXYCODONE HYDROCHLORIDE 30 MG: 30 TABLET ORAL at 11:02

## 2019-06-06 RX ADMIN — SODIUM CHLORIDE: 900 INJECTION INTRAVENOUS at 16:11

## 2019-06-06 RX ADMIN — OXYCODONE HYDROCHLORIDE 30 MG: 30 TABLET ORAL at 21:03

## 2019-06-06 RX ADMIN — SENNOSIDES,DOCUSATE SODIUM 2 TABLET: 8.6; 5 TABLET, FILM COATED ORAL at 05:09

## 2019-06-06 RX ADMIN — SODIUM CHLORIDE: 900 INJECTION INTRAVENOUS at 11:02

## 2019-06-06 RX ADMIN — SODIUM CHLORIDE: 900 INJECTION INTRAVENOUS at 02:16

## 2019-06-06 RX ADMIN — HEPARIN SODIUM 5000 UNITS: 5000 INJECTION, SOLUTION INTRAVENOUS; SUBCUTANEOUS at 21:00

## 2019-06-06 ASSESSMENT — GAIT ASSESSMENTS
ASSISTIVE DEVICE: FRONT WHEEL WALKER
GAIT LEVEL OF ASSIST: SUPERVISED
DISTANCE (FEET): 75

## 2019-06-06 ASSESSMENT — ENCOUNTER SYMPTOMS
NAUSEA: 0
NERVOUS/ANXIOUS: 0
CHILLS: 0
SPEECH CHANGE: 0
ABDOMINAL PAIN: 0
FEVER: 0
CONSTIPATION: 0
PALPITATIONS: 0
VOMITING: 0
FLANK PAIN: 0
DEPRESSION: 0
SHORTNESS OF BREATH: 0
EYES NEGATIVE: 1
WEIGHT LOSS: 0
SORE THROAT: 0
DIARRHEA: 0
SINUS PAIN: 0
PHOTOPHOBIA: 0
INSOMNIA: 0
COUGH: 0
HEARTBURN: 0
DIZZINESS: 0
CLAUDICATION: 0
WEAKNESS: 0
HEADACHES: 0
MYALGIAS: 1
ORTHOPNEA: 0
WHEEZING: 0
HEMOPTYSIS: 0
BLURRED VISION: 0
SENSORY CHANGE: 0

## 2019-06-06 ASSESSMENT — COGNITIVE AND FUNCTIONAL STATUS - GENERAL
SUGGESTED CMS G CODE MODIFIER MOBILITY: CI
MOBILITY SCORE: 23
CLIMB 3 TO 5 STEPS WITH RAILING: A LITTLE

## 2019-06-06 NOTE — PROGRESS NOTES
Patient is tachycardic, all other VS are stable. Patient states she believes HR is elevated d/t anxiety and today is the first time she has walked in a long time. thereapuetic communication and PRN anxiety medication offered.   A&Ox4  Room Air.  Patient reporting 8/10 generalized pain, medicated per MAR.  Tolerating a regular diet.  Denies N&V.  + void  Patient up with SBA and wheeled walker.  Patient's HR frequently monitored throughout night - MD is aware of increased HR.   Consistent rounding provided to ensure patient's safety.  All needs meet, call light within reach.

## 2019-06-06 NOTE — CONSULTS
DATE OF SERVICE:  06/05/2019    NEPHROLOGY CONSULTATION    REQUESTING PHYSICIAN:  Ally Mojica DO    REASON FOR CONSULTATION:  To evaluate patient with acute kidney injury.    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  Patient was admitted to the hospital on   06/02/2019 with complaints of nausea, vomiting, diarrhea, diagnosed with   pneumonia.  Her baseline creatinine level at 0.6-0.7.  Hospital course   complicated with acute kidney injury, worsening creatinine level up on   2.2-2.9.  Currently, patient states doing well, has no complaints.  No nausea,   vomiting.  Has good appetite, good p.o. intake.  No difficulties to urinate.    No dysuria, no hematuria.  During hospitalization, was treated with   vancomycin.  Renal ultrasound without abnormalities.    REVIEW OF SYSTEMS:  GENERAL:  No fever or chills, no weight loss, no fatigue.  HENT:  No nosebleeds, no sore throat, no sinus pain.  EYES:  No double or blurry vision, no pain.  RESPIRATORY:  No cough, no shortness of breath, no wheezes.  CARDIOVASCULAR:  No palpitations, no dyspnea, no chest pain.  GASTROINTESTINAL:  No abdominal pain.  No nausea, vomiting, diarrhea.  GENITOURINARY:  No dysuria, no hematuria, no flank pain.  All other systems reviewed, all negative.    PAST MEDICAL HISTORY:  Metastatic cancer.    PAST SURGICAL HISTORY:  Right lower extremity surgery, hip surgery and   cholecystectomy.    FAMILY HISTORY:  No history of cancer.  No history of kidney disease.    ALLERGIES:  No known drug allergies.    OUTPATIENT MEDICATIONS:  None.    PHYSICAL EXAMINATION:  VITAL SIGNS:  Blood pressure 116/80, heart rate 112, temperature 37.4 Celsius.  GENERAL APPEARANCE:  Well-developed, well-nourished female in no acute   distress.  HEENT:  Normocephalic, atraumatic.  Pupils equal, round, reactive to light.     Extraocular movement intact.  Nares patent.  Oropharynx clear, moist mucosa,   no erythema or exudate.  NECK:  Supple, no lymphadenopathy, no thyromegaly appreciated.  LUNGS:  Clear to auscultation bilaterally.  No rales or wheezes, no rhonchi.  HEART:  Regular rate, no rub or gallop.  ABDOMEN:  Soft, nontender, nondistended.  Bowel sounds present.  EXTREMITIES:  With 1+ edema bilaterally noticed, discoloration of her right   pretibial area.  NEUROLOGIC:  Alert, oriented x3.  No focal deficit.    LABORATORY RESULTS:  Reviewed, revealed hemoglobin 7.3, platelets 149.  Sodium   140, potassium 3.7, CO2 of 23, BUN 18 and creatinine 2.9.    Urinalysis, trace protein, small blood, white blood count 20-50, red blood   count 2-5.    ASSESSMENT AND PLAN:  The patient is a very pleasant 39-year-old female with   metastatic cancer, admitted for pneumonia, volume depleted, treated with   vancomycin, developed acute kidney injury.  1.  Acute kidney injury of unclear etiology.  To complete urine electrolytes   and urine eosinophils.  To monitor closely.  Continue IV fluids.  2.  Electrolytes remain well controlled, to monitor.  3.  Anemia, low hemoglobin level, to monitor.  4.  Volume.  Continue IV fluids.  5.  Hypertension.  Blood pressure remains well controlled.    RECOMMENDATIONS:  Continue IV fluids with normal saline.  Avoid nephrotoxic   agents.  Complete urine electrolytes.  We will follow patient closely.    Thank you for the consult.  Above plan was discussed with Dr. Ally Mojica.       ____________________________________     MD GENEVIEVE WOODRUFF / TRACI    DD:  06/05/2019 18:01:52  DT:  06/05/2019 21:04:29    D#:  [id number]  Job#:  [id number]

## 2019-06-06 NOTE — THERAPY
"Physical Therapy Evaluation completed.   Bed Mobility:  Supine to Sit: Modified Independent  Transfers: Sit to Stand: Supervised  Gait: Level Of Assist: Supervised with Front-Wheel Walker       Plan of Care: Will benefit from Physical Therapy 3 times per week  Discharge Recommendations: Equipment: Front-Wheel Walker. Post-acute therapy: see below.    See \"Rehab Therapy-Acute\" Patient Summary Report for complete documentation.    Patient is a 40 YO female that was admitted following complaints of vomiting x2 days. Medical dx of pneumonia. PMHx significant for epithelioid hemantioendotherlioma. She presented to PT with reports of pain and weakness. Patient unable to localize pain but ambulated with antalgic gait pattern and later reported L knee pain, unable to describe. She reported she has been unable to care for herself or walk much since her diagnosis; reported use of WC for primary mobility PTA. Spouse not currently working and acts as caregiver. She ambulated approximately 75ft x2 with FWW and supervision and required no physical assist throughout session. She is somewhat self limiting; reported she is unable to do things due to her cancer, but unable to specify symptoms that limit mobility/ADLs. Provided education regarding progressing OOB activity and encouraged sitting in chair for meals and walking with ancillary staff as able. Anticipate patient will be able to return home following medical DC. Will continue to visit during acute stay.  "

## 2019-06-06 NOTE — PROGRESS NOTES
Nephrology Daily Progress Note    Date of Service  6/6/2019    Chief Complaint  39 y.o. Female with metastatic cancer, admitted 6/2/2019 with N/V/D, dx'ed with PNA,   Hospital course complicated with JOANNE    Interval Problem Update  Doing well, no complaints  Good UOP  Creat level slightly better    Review of Systems  Review of Systems   Constitutional: Negative for chills, fever, malaise/fatigue and weight loss.   HENT: Negative for congestion, hearing loss, nosebleeds and sinus pain.    Eyes: Negative.    Respiratory: Negative for cough, hemoptysis, shortness of breath and wheezing.    Cardiovascular: Negative for chest pain, palpitations, orthopnea and leg swelling.   Gastrointestinal: Negative for abdominal pain, diarrhea, nausea and vomiting.   Genitourinary: Negative for dysuria, flank pain, frequency, hematuria and urgency.   Skin: Negative.    All other systems reviewed and are negative.       Physical Exam  Temp:  [36.8 °C (98.2 °F)-37.6 °C (99.6 °F)] 36.8 °C (98.2 °F)  Pulse:  [] 94  Resp:  [16-20] 19  BP: (116-156)/(73-94) 127/82  SpO2:  [91 %-99 %] 96 %    Physical Exam   Constitutional: She is oriented to person, place, and time. She appears well-developed and well-nourished. No distress.   HENT:   Head: Normocephalic and atraumatic.   Nose: Nose normal.   Mouth/Throat: Oropharynx is clear and moist.   Eyes: Pupils are equal, round, and reactive to light. Conjunctivae and EOM are normal. No scleral icterus.   Neck: Normal range of motion. Neck supple. No thyromegaly present.   Cardiovascular: Normal rate, regular rhythm and intact distal pulses.  Exam reveals no gallop and no friction rub.    Pulmonary/Chest: Effort normal and breath sounds normal. No respiratory distress. She has no wheezes. She has no rales.   Abdominal: Soft. Bowel sounds are normal. She exhibits no distension and no mass. There is no tenderness. There is no guarding.   Musculoskeletal: She exhibits no edema.   Neurological: She  is alert and oriented to person, place, and time. No cranial nerve deficit.   Skin: Skin is warm. No rash noted. No erythema.   Nursing note and vitals reviewed.      Fluids  No intake or output data in the 24 hours ending 06/06/19 1545    Laboratory  Recent Labs      06/04/19   0010  06/05/19   0013  06/06/19   0507   WBC  8.8  7.8  10.8   RBC  2.85*  2.78*  2.75*   HEMOGLOBIN  7.6*  7.3*  7.3*   HEMATOCRIT  25.7*  25.9*  25.8*   MCV  90.2  93.2  93.8   MCH  26.7*  26.3*  26.5*   MCHC  29.6*  28.2*  28.3*   RDW  64.7*  68.2*  70.4*   PLATELETCT  157*  149*  157*   MPV  9.4  8.7*  9.3     Recent Labs      06/04/19   0010  06/05/19   0013  06/06/19   0507   SODIUM  138  140  133*   POTASSIUM  4.1  3.7  4.1   CHLORIDE  106  107  104   CO2  23  23  20   GLUCOSE  127*  146*  134*   BUN  18  18  18   CREATININE  2.23*  2.94*  2.93*   CALCIUM  7.7*  7.8*  8.1*                   Imaging  Renal US reviewed    Assessment/Plan  1.JOANNE -good UOP, continue IVF -to monitor closely  2.HTN: BP well controlled  3.Electrolytes: well controlled.  4.Anemia: low Hb level stable  5.Volume: continue IVF    Recs: continue current treatment             Monitor BMP             F/u in Nephrology Clinic after d/c home

## 2019-06-06 NOTE — PROGRESS NOTES
"[facility] Medicine Daily Progress Note    Date of Service  6/6/2019    Chief Complaint  39 y.o. female admitted 6/2/2019 with nausea, vomiting and diarrhea.    Hospital Course    Patient with imaging and examination consistent with pneumonia and started on antibiotics with improvement in her symptoms.  She has epithelioid hemangioendotherlioma and was under the care of [place] clinician but is now moving here and will need oncologist to follow her.      Interval Problem Update  6/4 Patient with uncontrolled pain as her medications she usually takes (oxycodone and oxycontin) were not ordered on admission.  I've ordered these to help control her pain.  She states her cough has improved along with the remaining symptoms of her infection.  6/5 Patient with improved pain control since resuming her home medications.  Unfortunately her cr increased again overnight.  She is not sure if her kidneys are involved with her cancer but states her oncologist told her it was \"everywhere\" on the last PET scan.  I don't have records from her oncologist though they were supposed to be requested.  I'll have unit clerk request them again.  On admission her cr was normal.  I'm concerned the dose of vancomycin she received early on admission for broad spectrum coverage may have contributed to her elevated cr.  Patient states she is urinating well and is hoping to work with therapies while she is here so she can start using a walker more and out of her wheelchair.    6/6 Patient with improvement of cough symptoms overall but is now requiring oxygen to maintain saturations.  She denies feeling short of breath but sats down to high 80s on room air.  She has been on DVT prophylaxis dose of heparin or lovenox since admission so clot is low probability.  D/w Dr Bautista and outpatient follow up recommended.  I will continue with IVF but will reduce rate from 150 to 100cc.hour.    Consultants/Specialty  Nephrology - Michele    Code " Status  full    Disposition  Home likely tomorrow.    Review of Systems  Review of Systems   Constitutional: Negative for chills and fever.   HENT: Negative for congestion and sore throat.    Eyes: Negative for blurred vision and photophobia.   Respiratory: Negative for cough and shortness of breath.    Cardiovascular: Negative for chest pain, claudication and leg swelling.   Gastrointestinal: Negative for abdominal pain, constipation, diarrhea, heartburn, nausea and vomiting.   Genitourinary: Negative for dysuria and hematuria.   Musculoskeletal: Positive for myalgias (better). Negative for joint pain.   Skin: Negative for itching and rash.   Neurological: Negative for dizziness, sensory change, speech change, weakness and headaches.   Psychiatric/Behavioral: Negative for depression. The patient is not nervous/anxious and does not have insomnia.         Physical Exam  Temp:  [36.8 °C (98.2 °F)-37.6 °C (99.6 °F)] 36.8 °C (98.2 °F)  Pulse:  [] 94  Resp:  [16-20] 19  BP: (116-156)/(73-94) 127/82  SpO2:  [91 %-99 %] 96 %    Physical Exam   Constitutional: She is oriented to person, place, and time. She appears well-developed and well-nourished. No distress.   HENT:   Head: Normocephalic and atraumatic.   Eyes: Conjunctivae are normal. No scleral icterus.   Neck: Neck supple. No JVD present.   Cardiovascular: Normal rate and normal heart sounds.  Exam reveals no gallop and no friction rub.    No murmur heard.  Tachycardia     Pulmonary/Chest: Effort normal and breath sounds normal. No respiratory distress. She has no wheezes. She has no rales. She exhibits no tenderness.   Abdominal: Soft. Bowel sounds are normal. She exhibits no distension. There is no guarding.   Musculoskeletal: She exhibits no edema or tenderness.   Lymphadenopathy:     She has no cervical adenopathy.   Neurological: She is alert and oriented to person, place, and time. No cranial nerve deficit.   Skin: Skin is warm and dry. She is not  diaphoretic. No erythema. No pallor.   Psychiatric: She has a normal mood and affect. Her behavior is normal.   Nursing note and vitals reviewed.      Fluids  No intake or output data in the 24 hours ending 06/06/19 1524    Laboratory  Recent Labs      06/04/19   0010  06/05/19   0013  06/06/19   0507   WBC  8.8  7.8  10.8   RBC  2.85*  2.78*  2.75*   HEMOGLOBIN  7.6*  7.3*  7.3*   HEMATOCRIT  25.7*  25.9*  25.8*   MCV  90.2  93.2  93.8   MCH  26.7*  26.3*  26.5*   MCHC  29.6*  28.2*  28.3*   RDW  64.7*  68.2*  70.4*   PLATELETCT  157*  149*  157*   MPV  9.4  8.7*  9.3     Recent Labs      06/04/19   0010  06/05/19   0013  06/06/19   0507   SODIUM  138  140  133*   POTASSIUM  4.1  3.7  4.1   CHLORIDE  106  107  104   CO2  23  23  20   GLUCOSE  127*  146*  134*   BUN  18  18  18   CREATININE  2.23*  2.94*  2.93*   CALCIUM  7.7*  7.8*  8.1*                   Imaging  DX-CHEST-PORTABLE (1 VIEW)   Final Result         1.  Right middle lobe pneumonitis unchanged from previous exam.      IR-US GUIDED PIV   Final Result    Ultrasound-guided PERIPHERAL IV INSERTION performed by    qualified nursing staff as above.            US-RENAL   Final Result      No hydronephrosis is seen.      Nephromegaly.      Very enlarged spleen is noted.      IR-US GUIDED PIV   Final Result    Ultrasound-guided PERIPHERAL IV INSERTION performed by    qualified nursing staff as above.            DX-ABDOMEN COMPLETE WITH AP OR PA CXR   Final Result         Airspace opacities in the right middle lobe and right middle lobe, in keeping with multifocal pneumonia.      No finding to suggest small bowel obstruction.              Assessment/Plan  * Metastatic cancer (HCC)- (present on admission)   Assessment & Plan    epithelioid hemangioendotherlioma  On chemotherapy with gemsar  Requesting medical record from [facility]  Her oncologist is Dr. Dewayne Florentino  Patient said she will stay local here and needed a oncologist  consultation in the hospital.  She will establish with [facility] as outpatient       HCAP (healthcare-associated pneumonia)- (present on admission)   Assessment & Plan    The patient is undergoing chemotherapy  Cont with zosyn, I DCed vanco  Check pro calcitonin  De-escalate as needed       JOANNE (acute kidney injury) (HCC)   Assessment & Plan    Cr jumped on 6/4 to 2.23 and with continued hydration is now up to 2.94, stable at 2.93  Continue IVF  Renal US - WNL  Nephrology consultation - f/u with Dr Bautista as outpatient           Cancer associated pain   Assessment & Plan    Resume home meds  oxycontin 40mg bid, oxycodone 30 mg prn       Class 1 obesity in adult- (present on admission)   Assessment & Plan    Body mass index is 34.33 kg/m².       Nicotine abuse   Assessment & Plan    - Smoking cessation education provided  - Nicotine patch       Hyperglycemia- (present on admission)   Assessment & Plan    No history of DM     Hypokalemia- (present on admission)   Assessment & Plan    Replete as needed       Normocytic anemia- (present on admission)   Assessment & Plan    Likely related to chemotherapy  Transfuse as needed if hemoglobin less than 7  Follow cbc in am          VTE prophylaxis: lovenox

## 2019-06-06 NOTE — PROGRESS NOTES
Hospitalist contacted at 0423 regarding increase need for O2 and tachycardia. MD to order CBC and Chem panel.

## 2019-06-07 LAB
ANION GAP SERPL CALC-SCNC: 7 MMOL/L (ref 0–11.9)
BACTERIA BLD CULT: NORMAL
BACTERIA BLD CULT: NORMAL
BASOPHILS # BLD AUTO: 0.4 % (ref 0–1.8)
BASOPHILS # BLD: 0.04 K/UL (ref 0–0.12)
BUN SERPL-MCNC: 17 MG/DL (ref 8–22)
CALCIUM SERPL-MCNC: 8.7 MG/DL (ref 8.5–10.5)
CHLORIDE SERPL-SCNC: 105 MMOL/L (ref 96–112)
CO2 SERPL-SCNC: 22 MMOL/L (ref 20–33)
CREAT SERPL-MCNC: 2.55 MG/DL (ref 0.5–1.4)
EOSINOPHIL # BLD AUTO: 0.17 K/UL (ref 0–0.51)
EOSINOPHIL NFR BLD: 1.8 % (ref 0–6.9)
ERYTHROCYTE [DISTWIDTH] IN BLOOD BY AUTOMATED COUNT: 69.2 FL (ref 35.9–50)
GLUCOSE SERPL-MCNC: 126 MG/DL (ref 65–99)
HCT VFR BLD AUTO: 24.3 % (ref 37–47)
HGB BLD-MCNC: 7.1 G/DL (ref 12–16)
IMM GRANULOCYTES # BLD AUTO: 0.29 K/UL (ref 0–0.11)
IMM GRANULOCYTES NFR BLD AUTO: 3.1 % (ref 0–0.9)
LYMPHOCYTES # BLD AUTO: 1.77 K/UL (ref 1–4.8)
LYMPHOCYTES NFR BLD: 19.2 % (ref 22–41)
MCH RBC QN AUTO: 27.2 PG (ref 27–33)
MCHC RBC AUTO-ENTMCNC: 29.2 G/DL (ref 33.6–35)
MCV RBC AUTO: 93.1 FL (ref 81.4–97.8)
MONOCYTES # BLD AUTO: 0.92 K/UL (ref 0–0.85)
MONOCYTES NFR BLD AUTO: 10 % (ref 0–13.4)
MORPHOLOGY BLD-IMP: NORMAL
NEUTROPHILS # BLD AUTO: 6.03 K/UL (ref 2–7.15)
NEUTROPHILS NFR BLD: 65.5 % (ref 44–72)
NRBC # BLD AUTO: 0.17 K/UL
NRBC BLD-RTO: 1.8 /100 WBC
PLATELET # BLD AUTO: 157 K/UL (ref 164–446)
PMV BLD AUTO: 8.9 FL (ref 9–12.9)
POTASSIUM SERPL-SCNC: 5 MMOL/L (ref 3.6–5.5)
RBC # BLD AUTO: 2.61 M/UL (ref 4.2–5.4)
SIGNIFICANT IND 70042: NORMAL
SIGNIFICANT IND 70042: NORMAL
SITE SITE: NORMAL
SITE SITE: NORMAL
SODIUM SERPL-SCNC: 134 MMOL/L (ref 135–145)
SOURCE SOURCE: NORMAL
SOURCE SOURCE: NORMAL
WBC # BLD AUTO: 9.2 K/UL (ref 4.8–10.8)

## 2019-06-07 PROCEDURE — 700102 HCHG RX REV CODE 250 W/ 637 OVERRIDE(OP): Performed by: INTERNAL MEDICINE

## 2019-06-07 PROCEDURE — 99232 SBSQ HOSP IP/OBS MODERATE 35: CPT | Performed by: INTERNAL MEDICINE

## 2019-06-07 PROCEDURE — 700111 HCHG RX REV CODE 636 W/ 250 OVERRIDE (IP): Performed by: INTERNAL MEDICINE

## 2019-06-07 PROCEDURE — 85025 COMPLETE CBC W/AUTO DIFF WBC: CPT

## 2019-06-07 PROCEDURE — 36415 COLL VENOUS BLD VENIPUNCTURE: CPT

## 2019-06-07 PROCEDURE — 80048 BASIC METABOLIC PNL TOTAL CA: CPT

## 2019-06-07 PROCEDURE — A9270 NON-COVERED ITEM OR SERVICE: HCPCS | Performed by: INTERNAL MEDICINE

## 2019-06-07 PROCEDURE — 770009 HCHG ROOM/CARE - ONCOLOGY SEMI PRI*

## 2019-06-07 RX ORDER — FUROSEMIDE 10 MG/ML
20 INJECTION INTRAMUSCULAR; INTRAVENOUS ONCE
Status: COMPLETED | OUTPATIENT
Start: 2019-06-07 | End: 2019-06-07

## 2019-06-07 RX ADMIN — OXYCODONE HYDROCHLORIDE 40 MG: 40 TABLET, FILM COATED, EXTENDED RELEASE ORAL at 04:47

## 2019-06-07 RX ADMIN — HEPARIN SODIUM 5000 UNITS: 5000 INJECTION, SOLUTION INTRAVENOUS; SUBCUTANEOUS at 04:46

## 2019-06-07 RX ADMIN — OXYCODONE HYDROCHLORIDE 30 MG: 30 TABLET ORAL at 08:31

## 2019-06-07 RX ADMIN — HEPARIN SODIUM 5000 UNITS: 5000 INJECTION, SOLUTION INTRAVENOUS; SUBCUTANEOUS at 15:43

## 2019-06-07 RX ADMIN — HEPARIN SODIUM 5000 UNITS: 5000 INJECTION, SOLUTION INTRAVENOUS; SUBCUTANEOUS at 20:49

## 2019-06-07 RX ADMIN — OXYCODONE HYDROCHLORIDE 60 MG: 40 TABLET, FILM COATED, EXTENDED RELEASE ORAL at 18:16

## 2019-06-07 RX ADMIN — FUROSEMIDE 20 MG: 10 INJECTION, SOLUTION INTRAMUSCULAR; INTRAVENOUS at 15:40

## 2019-06-07 RX ADMIN — OXYCODONE HYDROCHLORIDE 30 MG: 30 TABLET ORAL at 02:01

## 2019-06-07 ASSESSMENT — ENCOUNTER SYMPTOMS
ORTHOPNEA: 0
NAUSEA: 0
MYALGIAS: 1
SORE THROAT: 0
WHEEZING: 0
SINUS PAIN: 0
DIZZINESS: 0
FLANK PAIN: 0
DEPRESSION: 0
NERVOUS/ANXIOUS: 0
HEMOPTYSIS: 0
HEADACHES: 0
VOMITING: 0
WEIGHT LOSS: 0
CLAUDICATION: 0
FEVER: 0
SENSORY CHANGE: 0
INSOMNIA: 0
PHOTOPHOBIA: 0
PALPITATIONS: 0
WEAKNESS: 0
SHORTNESS OF BREATH: 1
EYES NEGATIVE: 1
DIARRHEA: 0
BLURRED VISION: 0
COUGH: 0
ABDOMINAL PAIN: 0
HEARTBURN: 0
CHILLS: 0
CONSTIPATION: 0
SPEECH CHANGE: 0
SHORTNESS OF BREATH: 0

## 2019-06-07 NOTE — PROGRESS NOTES
Patient alert and oriented up with one person assist. Patient denies pain at this time. Patient is anxious to go home. Patient did have a shower today. PIV saline lock at this time. Incentive spirometer given to patient today. Instructed how to use. Encourage to use it 10 X every hourly. Reviewed POC with patient call light within reach hourly rounding in practice.

## 2019-06-07 NOTE — PROGRESS NOTES
"Hospital Medicine Daily Progress Note    Date of Service  6/7/2019    Chief Complaint  39 y.o. female admitted 6/2/2019 with nausea, vomiting and diarrhea.    Hospital Course    Patient with imaging and examination consistent with pneumonia and started on antibiotics with improvement in her symptoms.  She has epithelioid hemangioendotherlioma and was under the care of [place] clinician but is now moving here and will need oncologist to follow her.      Interval Problem Update  6/4 Patient with uncontrolled pain as her medications she usually takes (oxycodone and oxycontin) were not ordered on admission.  I've ordered these to help control her pain.  She states her cough has improved along with the remaining symptoms of her infection.  6/5 Patient with improved pain control since resuming her home medications.  Unfortunately her cr increased again overnight.  She is not sure if her kidneys are involved with her cancer but states her oncologist told her it was \"everywhere\" on the last PET scan.  I don't have records from her oncologist though they were supposed to be requested.  I'll have unit clerk request them again.  On admission her cr was normal.  I'm concerned the dose of vancomycin she received early on admission for broad spectrum coverage may have contributed to her elevated cr.  Patient states she is urinating well and is hoping to work with therapies while she is here so she can start using a walker more and out of her wheelchair.    6/6 Patient with improvement of cough symptoms overall but is now requiring oxygen to maintain saturations.  She denies feeling short of breath but sats down to high 80s on room air.  She has been on DVT prophylaxis dose of heparin or lovenox since admission so clot is low probability.  D/w Dr Bautista and outpatient follow up recommended.  I will continue with IVF but will reduce rate from 150 to 100cc.hour.  6/7 Patient now showing signs of volume overload, will stop IVF and " give single dose of lasix to see if breathing improves.  Cr has started to drop and should be fine to receive single dose of lasix.  CR is 2.55.  She will follow up with nephrology after discharge and will establish with PCP and oncologist here in [place].     Consultants/Specialty  Nephrology - Michele    Code Status  full    Disposition  Home when off oxygen.    Review of Systems  Review of Systems   Constitutional: Negative for chills and fever.   HENT: Negative for congestion and sore throat.    Eyes: Negative for blurred vision and photophobia.   Respiratory: Positive for shortness of breath. Negative for cough.    Cardiovascular: Negative for chest pain, claudication and leg swelling.   Gastrointestinal: Negative for abdominal pain, constipation, diarrhea, heartburn, nausea and vomiting.   Genitourinary: Negative for dysuria and hematuria.   Musculoskeletal: Positive for myalgias (better). Negative for joint pain.   Skin: Negative for itching and rash.   Neurological: Negative for dizziness, sensory change, speech change, weakness and headaches.   Psychiatric/Behavioral: Negative for depression. The patient is not nervous/anxious and does not have insomnia.         Physical Exam  Temp:  [36.7 °C (98.1 °F)-37.2 °C (98.9 °F)] 36.9 °C (98.5 °F)  Pulse:  [] 100  Resp:  [16-19] 18  BP: (124-148)/(81-94) 141/94  SpO2:  [92 %-98 %] 98 %    Physical Exam   Constitutional: She is oriented to person, place, and time. She appears well-developed and well-nourished. No distress.   HENT:   Head: Normocephalic and atraumatic.   Eyes: Conjunctivae are normal. No scleral icterus.   Neck: Neck supple. No JVD present.   Cardiovascular: Normal rate and normal heart sounds.  Exam reveals no gallop and no friction rub.    No murmur heard.  Tachycardia     Pulmonary/Chest: Effort normal. No respiratory distress. She has no wheezes. She has rales. She exhibits no tenderness.   Abdominal: Soft. Bowel sounds are normal. She exhibits no  distension. There is no guarding.   Musculoskeletal: She exhibits no edema or tenderness.   Lymphadenopathy:     She has no cervical adenopathy.   Neurological: She is alert and oriented to person, place, and time. No cranial nerve deficit.   Skin: Skin is warm and dry. She is not diaphoretic. No erythema. No pallor.   Psychiatric: She has a normal mood and affect. Her behavior is normal.   Nursing note and vitals reviewed.      Fluids  No intake or output data in the 24 hours ending 06/07/19 1611    Laboratory  Recent Labs      06/05/19   0013  06/06/19   0507  06/07/19   1022   WBC  7.8  10.8  9.2   RBC  2.78*  2.75*  2.61*   HEMOGLOBIN  7.3*  7.3*  7.1*   HEMATOCRIT  25.9*  25.8*  24.3*   MCV  93.2  93.8  93.1   MCH  26.3*  26.5*  27.2   MCHC  28.2*  28.3*  29.2*   RDW  68.2*  70.4*  69.2*   PLATELETCT  149*  157*  157*   MPV  8.7*  9.3  8.9*     Recent Labs      06/05/19   0013  06/06/19   0507  06/07/19   1022   SODIUM  140  133*  134*   POTASSIUM  3.7  4.1  5.0   CHLORIDE  107  104  105   CO2  23  20  22   GLUCOSE  146*  134*  126*   BUN  18  18  17   CREATININE  2.94*  2.93*  2.55*   CALCIUM  7.8*  8.1*  8.7                   Imaging  DX-CHEST-PORTABLE (1 VIEW)   Final Result         1.  Right middle lobe pneumonitis unchanged from previous exam.      IR-US GUIDED PIV   Final Result    Ultrasound-guided PERIPHERAL IV INSERTION performed by    qualified nursing staff as above.            US-RENAL   Final Result      No hydronephrosis is seen.      Nephromegaly.      Very enlarged spleen is noted.      IR-US GUIDED PIV   Final Result    Ultrasound-guided PERIPHERAL IV INSERTION performed by    qualified nursing staff as above.            DX-ABDOMEN COMPLETE WITH AP OR PA CXR   Final Result         Airspace opacities in the right middle lobe and right middle lobe, in keeping with multifocal pneumonia.      No finding to suggest small bowel obstruction.              Assessment/Plan  * Metastatic cancer (HCC)-  (present on admission)   Assessment & Plan    epithelioid hemangioendotherlioma  On chemotherapy with gemsar  Requesting medical record from [facility]  Her oncologist is Dr. Dewayne Florentino  Patient said she will stay local here and needed a oncologist consultation in the hospital.  She will establish with [facility] as outpatient       HCAP (healthcare-associated pneumonia)- (present on admission)   Assessment & Plan    The patient is undergoing chemotherapy  Cont with zosyn, I DCed vanco  Check pro calcitonin  De-escalate as needed       JOANNE (acute kidney injury) (HCC)   Assessment & Plan    Cr jumped on 6/4 to 2.23 and with continued hydration is now up to 2.94, stable at 2.93  Continue IVF  Renal US - WNL  Nephrology consultation - f/u with Dr Bautista as outpatient           Cancer associated pain   Assessment & Plan    Resume home meds  oxycontin 40mg bid, oxycodone 30 mg prn - records received from oncology in [facility] - she is supposed to be on 60mg oxycontin- dose changed.         Class 1 obesity in adult- (present on admission)   Assessment & Plan    Body mass index is 34.33 kg/m².       Nicotine abuse   Assessment & Plan    - Smoking cessation education provided  - Nicotine patch       Hyperglycemia- (present on admission)   Assessment & Plan    No history of DM     Hypokalemia- (present on admission)   Assessment & Plan    Replete as needed       Normocytic anemia- (present on admission)   Assessment & Plan    Likely related to chemotherapy  Transfuse as needed if hemoglobin less than 7  Follow cbc in am          VTE prophylaxis: lovenox

## 2019-06-07 NOTE — PROGRESS NOTES
Nephrology Daily Progress Note    Date of Service  6/7/2019    Chief Complaint  39 y.o. Female with metastatic cancer, admitted 6/2/2019 with N/V/D, dx'ed with PNA,   Hospital course complicated with JOANNE    Interval Problem Update  Doing well  Good UOP  JOANNE: creat level improving    Review of Systems  Review of Systems   Constitutional: Negative for chills, fever, malaise/fatigue and weight loss.   HENT: Negative for congestion, hearing loss, nosebleeds, sinus pain and sore throat.    Eyes: Negative.    Respiratory: Negative for cough, hemoptysis, shortness of breath and wheezing.    Cardiovascular: Negative for chest pain, palpitations, orthopnea and leg swelling.   Gastrointestinal: Negative for abdominal pain, diarrhea, nausea and vomiting.   Genitourinary: Negative for dysuria, flank pain, frequency, hematuria and urgency.   Skin: Negative.    All other systems reviewed and are negative.       Physical Exam  Temp:  [36.7 °C (98.1 °F)-37.2 °C (98.9 °F)] 36.9 °C (98.5 °F)  Pulse:  [] 100  Resp:  [16-19] 18  BP: (124-148)/(81-94) 141/94  SpO2:  [92 %-98 %] 98 %    Physical Exam   Constitutional: She is oriented to person, place, and time. She appears well-developed and well-nourished. No distress.   HENT:   Head: Normocephalic and atraumatic.   Nose: Nose normal.   Mouth/Throat: Oropharynx is clear and moist.   Eyes: Pupils are equal, round, and reactive to light. Conjunctivae and EOM are normal.   Neck: Normal range of motion. Neck supple. No thyromegaly present.   Cardiovascular: Normal rate, regular rhythm and intact distal pulses.  Exam reveals no gallop and no friction rub.    Pulmonary/Chest: Effort normal and breath sounds normal. No respiratory distress. She has no wheezes. She has no rales.   Abdominal: Soft. Bowel sounds are normal. She exhibits no distension and no mass. There is no tenderness. There is no guarding.   Musculoskeletal: She exhibits no edema.   Neurological: She is alert and oriented  to person, place, and time. No cranial nerve deficit.   Skin: Skin is warm. No rash noted. No erythema.   Nursing note and vitals reviewed.      Fluids  No intake or output data in the 24 hours ending 06/07/19 1454    Laboratory  Recent Labs      06/05/19   0013  06/06/19   0507  06/07/19   1022   WBC  7.8  10.8  9.2   RBC  2.78*  2.75*  2.61*   HEMOGLOBIN  7.3*  7.3*  7.1*   HEMATOCRIT  25.9*  25.8*  24.3*   MCV  93.2  93.8  93.1   MCH  26.3*  26.5*  27.2   MCHC  28.2*  28.3*  29.2*   RDW  68.2*  70.4*  69.2*   PLATELETCT  149*  157*  157*   MPV  8.7*  9.3  8.9*     Recent Labs      06/05/19   0013  06/06/19   0507  06/07/19   1022   SODIUM  140  133*  134*   POTASSIUM  3.7  4.1  5.0   CHLORIDE  107  104  105   CO2  23  20  22   GLUCOSE  146*  134*  126*   BUN  18  18  17   CREATININE  2.94*  2.93*  2.55*   CALCIUM  7.8*  8.1*  8.7                   Imaging  Renal US reviewed    Assessment/Plan  1.JOANNE -improving to monitor  2.HTN: BP well controlled  3.Electrolytes: well controlled  4.Anemia: low Hb level -to monitor  5.Volume: d/c IVF    Recs: continue current treatment             D/c IVF             Monitor BMP             F/u in Nephrology Clinic after d/c home

## 2019-06-07 NOTE — CARE PLAN
Problem: Safety  Goal: Will remain free from falls    Intervention: Assess risk factors for falls  Patient kathleen approprietly for assistance      Problem: Respiratory:  Goal: Respiratory status will improve    Intervention: Assess and monitor pulmonary status  Monitoring patients oxygen status

## 2019-06-07 NOTE — PROGRESS NOTES
Patient alert and oriented up with one person assist with a walker. Family present this evening. Patient calls for assistance. Patient continues to be on 2 liters of oxygen. We tried to ween patient off patient was down to 0.5 liters Patient would go down to 77 % we place her back on 2 liters. Call light within reach hourly rounding in practice.

## 2019-06-07 NOTE — CARE PLAN
Problem: Safety  Goal: Will remain free from falls    Intervention: Assess risk factors for falls  Patient call approprietly for assistance      Problem: Respiratory:  Goal: Respiratory status will improve    Intervention: Assess and monitor pulmonary status  Monitoring patient's respiratory weening patient off oxygen

## 2019-06-08 VITALS
WEIGHT: 230.82 LBS | OXYGEN SATURATION: 94 % | BODY MASS INDEX: 39.41 KG/M2 | HEART RATE: 116 BPM | TEMPERATURE: 99.4 F | HEIGHT: 64 IN | DIASTOLIC BLOOD PRESSURE: 105 MMHG | RESPIRATION RATE: 18 BRPM | SYSTOLIC BLOOD PRESSURE: 129 MMHG

## 2019-06-08 PROCEDURE — 700111 HCHG RX REV CODE 636 W/ 250 OVERRIDE (IP): Performed by: INTERNAL MEDICINE

## 2019-06-08 PROCEDURE — 99239 HOSP IP/OBS DSCHRG MGMT >30: CPT | Performed by: INTERNAL MEDICINE

## 2019-06-08 PROCEDURE — A9270 NON-COVERED ITEM OR SERVICE: HCPCS | Performed by: INTERNAL MEDICINE

## 2019-06-08 PROCEDURE — 700102 HCHG RX REV CODE 250 W/ 637 OVERRIDE(OP): Performed by: INTERNAL MEDICINE

## 2019-06-08 RX ORDER — ALPRAZOLAM 1 MG/1
1 TABLET ORAL 3 TIMES DAILY PRN
Qty: 30 TAB | Refills: 0 | Status: SHIPPED | OUTPATIENT
Start: 2019-06-08 | End: 2019-06-22

## 2019-06-08 RX ORDER — OXYCODONE HYDROCHLORIDE 30 MG/1
30 TABLET ORAL EVERY 4 HOURS PRN
Qty: 30 TAB | Refills: 0 | Status: SHIPPED | OUTPATIENT
Start: 2019-06-08 | End: 2019-06-22

## 2019-06-08 RX ORDER — OXYCODONE HYDROCHLORIDE 60 MG/1
60 TABLET, FILM COATED, EXTENDED RELEASE ORAL EVERY 12 HOURS
Qty: 28 EACH | Refills: 0 | Status: SHIPPED | OUTPATIENT
Start: 2019-06-08 | End: 2019-06-22

## 2019-06-08 RX ADMIN — OXYCODONE HYDROCHLORIDE 30 MG: 30 TABLET ORAL at 08:55

## 2019-06-08 RX ADMIN — OXYCODONE HYDROCHLORIDE 60 MG: 40 TABLET, FILM COATED, EXTENDED RELEASE ORAL at 05:31

## 2019-06-08 RX ADMIN — METOPROLOL TARTRATE 25 MG: 25 TABLET ORAL at 08:39

## 2019-06-08 RX ADMIN — HEPARIN SODIUM 5000 UNITS: 5000 INJECTION, SOLUTION INTRAVENOUS; SUBCUTANEOUS at 05:31

## 2019-06-08 NOTE — DISCHARGE INSTRUCTIONS
Discharge Instructions    Discharged to home by car with relative. Discharged via wheelchair, hospital escort: Yes.  Special equipment needed: Not Applicable    Be sure to schedule a follow-up appointment with your primary care doctor or any specialists as instructed.     Discharge Plan:   Smoking Cessation Offered: Patient Refused  Influenza Vaccine Indication: Not indicated: Previously immunized this influenza season and > 8 years of age    I understand that a diet low in cholesterol, fat, and sodium is recommended for good health. Unless I have been given specific instructions below for another diet, I accept this instruction as my diet prescription.       Special Instructions: None    · Is patient discharged on Warfarin / Coumadin?   No     Depression / Suicide Risk    As you are discharged from this [facility] facility, it is important to learn how to keep safe from harming yourself.    Recognize the warning signs:  · Abrupt changes in personality, positive or negative- including increase in energy   · Giving away possessions  · Change in eating patterns- significant weight changes-  positive or negative  · Change in sleeping patterns- unable to sleep or sleeping all the time   · Unwillingness or inability to communicate  · Depression  · Unusual sadness, discouragement and loneliness  · Talk of wanting to die  · Neglect of personal appearance   · Rebelliousness- reckless behavior  · Withdrawal from people/activities they love  · Confusion- inability to concentrate     If you or a loved one observes any of these behaviors or has concerns about self-harm, here's what you can do:  · Talk about it- your feelings and reasons for harming yourself  · Remove any means that you might use to hurt yourself (examples: pills, rope, extension cords, firearm)  · Get professional help from the community (Mental Health, Substance Abuse, psychological counseling)  · Do not be alone:Call your Safe Contact- someone whom you  trust who will be there for you.  · Call your local CRISIS HOTLINE 641-1451 or 222-292-1803  · Call your local Children's Mobile Crisis Response Team [place] (337) 413-1981 or www.[employer]  · Call the toll free National Suicide Prevention Hotlines   · National Suicide Prevention Lifeline 589-145-JKEB (0723)  · National Hope Line Network 800-SUICIDE (834-0474)      Healthcare-Associated Pneumonia  Healthcare-associated pneumonia is a lung infection that a person can get when in a health care setting or during certain procedures. The infection causes air sacs inside the lungs to fill with pus or fluid.  Healthcare-associated pneumonia is usually caused by bacteria that are common in health care settings. These bacteria may be resistant to some antibiotic medicines.  What are the causes?  This condition is caused by bacteria that get into your lungs. You can get this condition if you:  · Breathe in droplets from an infected person's cough or sneeze.  · Touch something that an infected person coughed or sneezed on and then touch your mouth, nose, or eyes.  · Have a bacterial infection somewhere else in your body, if the bacteria spread to your lungs through your blood.  What increases the risk?  This condition is more likely to develop in people who:  · Have a disease that weakens their body's defense system (immune system) or their ability to cough out germs.  · Are older than age 65.  · Having trouble swallowing.  · Use a feeding or breathing tube.  · Have a cold or the flu.  · Have an IV tube inserted in a vein.  · Have surgery.  · Have a bed sore.  · Live in a long-term care facility, such as a nursing home.  · Were in the hospital for two or more days in the past 3 months.  · Received hemodialysis in the past 30 days.  What are the signs or symptoms?  Symptoms of this condition include:  · Fever.  · Chills.  · Cough.  · Shortness of breath.  · Wheezing or crackling sounds when breathing.  How is this  diagnosed?  This condition may be diagnosed based on:  · Your symptoms.  · A chest X-ray.  · A measurement of the amount of oxygen in your blood.  How is this treated?  This condition is treated with antibiotics. Your health care provider may take a sample of cells (culture) from your throat to determine what type of bacteria is in your lungs and change your antibiotic based on the results. If you have bacteria in your blood, trouble breathing, or a low oxygen level, you may need to be treated at the hospital. At the hospital, you will be given antibiotics through an IV tube. You may also be given oxygen or breathing treatments.  Follow these instructions at home:  Medicine  · Take your antibiotic medicine as told by your health care provider. Do not stop taking the antibiotic even if you start to feel better.  · Take over-the-counter and other prescription medicines only as told by your health care provider.  Activity  · Rest at home until you feel better.  · Return to your normal activities as told by your health care provider. Ask your health care provider what activities are safe for you.  General instructions  · Drink enough fluid to keep your urine clear or pale yellow.  · Do not use any products that contain nicotine or tobacco, such as cigarettes and e-cigarettes. If you need help quitting, ask your health care provider.  · Limit alcohol intake to no more than 1 drink per day for nonpregnant women and 2 drinks per day for men. One drink equals 12 oz of beer, 5 oz of wine, or 1½ oz of hard liquor.  · Keep all follow-up visits as told by your health care provider. This is important.  How is this prevented?  Actions that I can take  To lower your risk of getting this condition again:  · Do not smoke. This includes e-cigarettes.  · Do not drink too much alcohol.  · Keep your immune system healthy by eating well and getting enough sleep.  · Get a flu shot every year (annually).  · Get a pneumonia vaccination  if:  ¨ You are older than age 65.  ¨ You smoke.  ¨ You have a long-lasting condition like lung disease.  · Exercise your lungs by taking deep breaths, walking, and using an incentive spirometer as directed.  · Wash your hands often with soap and water. If you cannot get to a sink to wash your hands, use an alcohol-based hand .  · Make sure your health care providers are washing their hands. If you do not see them wash their hands, ask them to do so.  · When you are in a health care facility, avoid touching your eyes, nose, and mouth.  · Avoid touching any surface near where people have coughed or sneezed.  · Stand away from sick people when they are coughing or sneezing.  · Wear a mask if you cannot avoid exposure to people who are sick.  · Clean all surfaces often with a disinfectant , especially if someone is sick at home or work.  Precautions of my health care team  Hospitals, nursing homes, and other health care facilities take special care to try to prevent healthcare-associated pneumonia. To do this, your health care team may:  · Clean their hands with soap and water or with alcohol-based hand  before and after seeing patients.  · Wear gloves or masks during treatment.  · Sanitize medical instruments, tubes, other equipment, and surfaces in patient rooms.  · Raise (elevate) the head of your hospital bed so you are not lying flat. The head of the bed may be elevated 30 degrees or more.  · Have you sit up and move around as soon as possible after surgery.  · Only insert a breathing tube if needed.  · Do these things for you if you have a breathing tube:  ¨ Clean the inside of your mouth regularly.  ¨ Remove the breathing tube as soon as it is no longer needed.  Contact a health care provider if:  · Your symptoms do not get better or they get worse.  · Your symptoms come back after you have finished taking your antibiotics.  Get help right away if:  · You have trouble breathing.  · You have  confusion or difficulty thinking.  This information is not intended to replace advice given to you by your health care provider. Make sure you discuss any questions you have with your health care provider.  Document Released: 05/09/2017 Document Revised: 10/03/2017 Document Reviewed: 09/15/2017  [employer] Interactive Patient Education © 2017 [employer]    Metoprolol tablets  What is this medicine?  METOPROLOL (me TOE proe lole) is a beta-blocker. Beta-blockers reduce the workload on the heart and help it to beat more regularly. This medicine is used to treat high blood pressure and to prevent chest pain. It is also used to after a heart attack and to prevent an additional heart attack from occurring.  This medicine may be used for other purposes; ask your health care provider or pharmacist if you have questions.  COMMON BRAND NAME(S): Lopressor  What should I tell my health care provider before I take this medicine?  They need to know if you have any of these conditions:  -diabetes  -heart or vessel disease like slow heart rate, worsening heart failure, heart block, sick sinus syndrome or Raynaud's disease  -kidney disease  -liver disease  -lung or breathing disease, like asthma or emphysema  -pheochromocytoma  -thyroid disease  -an unusual or allergic reaction to metoprolol, other beta-blockers, medicines, foods, dyes, or preservatives  -pregnant or trying to get pregnant  -breast-feeding  How should I use this medicine?  Take this medicine by mouth with a drink of water. Follow the directions on the prescription label. Take this medicine immediately after meals. Take your doses at regular intervals. Do not take more medicine than directed. Do not stop taking this medicine suddenly. This could lead to serious heart-related effects.  Talk to your pediatrician regarding the use of this medicine in children. Special care may be needed.  Overdosage: If you think you have taken too much of this medicine contact a  poison control center or emergency room at once.  NOTE: This medicine is only for you. Do not share this medicine with others.  What if I miss a dose?  If you miss a dose, take it as soon as you can. If it is almost time for your next dose, take only that dose. Do not take double or extra doses.  What may interact with this medicine?  This medicine may interact with the following medications:  -certain medicines for blood pressure, heart disease, irregular heart beat  -certain medicines for depression like monoamine oxidase (MAO) inhibitors, fluoxetine, or paroxetine  -clonidine  -dobutamine  -epinephrine  -isoproterenol  -reserpine  This list may not describe all possible interactions. Give your health care provider a list of all the medicines, herbs, non-prescription drugs, or dietary supplements you use. Also tell them if you smoke, drink alcohol, or use illegal drugs. Some items may interact with your medicine.  What should I watch for while using this medicine?  Visit your doctor or health care professional for regular check ups. Contact your doctor right away if your symptoms worsen. Check your blood pressure and pulse rate regularly. Ask your health care professional what your blood pressure and pulse rate should be, and when you should contact them.  You may get drowsy or dizzy. Do not drive, use machinery, or do anything that needs mental alertness until you know how this medicine affects you. Do not sit or stand up quickly, especially if you are an older patient. This reduces the risk of dizzy or fainting spells. Contact your doctor if these symptoms continue. Alcohol may interfere with the effect of this medicine. Avoid alcoholic drinks.  What side effects may I notice from receiving this medicine?  Side effects that you should report to your doctor or health care professional as soon as possible:  -allergic reactions like skin rash, itching or hives  -cold or numb hands or feet  -depression  -difficulty  breathing  -faint  -fever with sore throat  -irregular heartbeat, chest pain  -rapid weight gain  -swollen legs or ankles  Side effects that usually do not require medical attention (report to your doctor or health care professional if they continue or are bothersome):  -anxiety or nervousness  -change in sex drive or performance  -dry skin  -headache  -nightmares or trouble sleeping  -short term memory loss  -stomach upset or diarrhea  -unusually tired  This list may not describe all possible side effects. Call your doctor for medical advice about side effects. You may report side effects to FDA at 6-402-QUP-9097.  Where should I keep my medicine?  Keep out of the reach of children.  Store at room temperature between 15 and 30 degrees C (59 and 86 degrees F). Throw away any unused medicine after the expiration date.  NOTE: This sheet is a summary. It may not cover all possible information. If you have questions about this medicine, talk to your doctor, pharmacist, or health care provider.  © 2018 Elsevier/Gold Standard (2014-08-22 14:40:36)

## 2019-06-08 NOTE — DISCHARGE PLANNING
DME choice obtained and faxed to [facility] at ext. [phone]. Per nurse, Avril, pt states to have walker delivered to her home,not to the hospital.

## 2019-06-08 NOTE — PROGRESS NOTES
Patient discharged to home in stable condition with all personal belongings and medications. IV removed. Patient given discharge instructions and follow up appointment scheduled. Discharge education provided and all questions addressed.  arranging for FWW to be delivered to patient's house. Hospitalist working on getting patient set up for follow up with oncology.   Signed copy in paper chart.

## 2019-06-08 NOTE — DISCHARGE SUMMARY
Discharge Summary    CHIEF COMPLAINT ON ADMISSION  Chief Complaint   Patient presents with   • Vomiting       Reason for Admission  Vomiting     Admission Date  6/2/2019    CODE STATUS  full    HPI & HOSPITAL COURSE  This is a 39 y.o. female here with rare malignancy and had recently moved from [place] to [place], presented to the ED with nausea/vomiting and cough.  Her imaging and examination was consistent with pneumonia and she was started on antibiotics with improvement in her symptoms.  She has epithelioid hemangioendotherlioma and was under the care of [place] clinician but is now moving here and will need oncologist to follow her.  Her hospitalization was complicated by acute kidney injury that may have been caused by Vancomycin.  Nephrology consulted and with IV hydration, her renal function improved slowly.  She briefly required oxygen and that was likely due to the hydration for the renal function, after function improved, she was diuresed and no longer required oxygen supplementation.  Records from her oncologist were received and reviewed and she was out of her pain medications.  I provided prescription for 14 days of her pain medications until she is able to establish with an oncologist in [place].    Therefore, she is discharged in good and stable condition to home with close outpatient follow-up.    The patient met 2-midnight criteria for an inpatient stay at the time of discharge.    Discharge Date  6/8/2019    FOLLOW UP ITEMS POST DISCHARGE  Establish with [facility] for PCP  Follow up with Dr Bautista for nephrology  Establish with Dr Delaney at [facility] for oncology    DISCHARGE DIAGNOSES  Principal Problem:    Metastatic cancer (HCC) POA: Yes  Active Problems:    HCAP (healthcare-associated pneumonia) POA: Yes    Normocytic anemia POA: Yes    Hypokalemia POA: Yes    Hyperglycemia POA: Yes    Nicotine abuse POA: Unknown    Class 1 obesity in adult POA: Yes    Cancer associated pain POA: Unknown    JOANNE (acute  kidney injury) (HCC) POA: Unknown  Resolved Problems:    * No resolved hospital problems. *      FOLLOW UP  Future Appointments  Date Time Provider Department Center   6/13/2019 10:30 AM Mary Bautista M.D. [facility] [address]   7/11/2019 7:40 AM Tex Carpenter M.D. [facility]     Rosita Delaney M.D.  [address] [place] [id number]  [phone]          Mary Bautista M.D.  [address]  [address]  [place] [id number]  [phone]    In 2 weeks      Tex Carpenter M.D.  [address]  [place] [id number] [phone]            MEDICATIONS ON DISCHARGE     Medication List      START taking these medications      Instructions   metoprolol 25 MG Tabs  Commonly known as:  LOPRESSOR   Take 1 Tab by mouth 2 Times a Day.  Dose:  25 mg        CHANGE how you take these medications      Instructions   * oxyCODONE 30 MG immediate release tablet  What changed:  Another medication with the same name was changed. Make sure you understand how and when to take each.  Commonly known as:  OXY-IR   Take 1 Tab by mouth every four hours as needed for Moderate Pain for up to 14 days.  Dose:  30 mg     * oxyCODONE CR 60 MG T12a tablet  What changed:  · medication strength  · how much to take  Commonly known as:  OXYCONTIN   Take 1 Tab by mouth every 12 hours for 14 days.  Dose:  60 mg        * This list has 2 medication(s) that are the same as other medications prescribed for you. Read the directions carefully, and ask your doctor or other care provider to review them with you.            CONTINUE taking these medications      Instructions   ALPRAZolam 1 MG Tabs  Commonly known as:  XANAX   Take 1 Tab by mouth 3 times a day as needed for Sleep or Anxiety for up to 14 days.  Dose:  1 mg            Allergies  No Known Allergies    DIET  No orders of the defined types were placed in this encounter.      ACTIVITY  As tolerated.  Weight bearing as tolerated    CONSULTATIONS  Mary Bautista -  nephrology      PROCEDURES  none    LABORATORY  Lab Results   Component Value Date    SODIUM 134 (L) 06/07/2019    POTASSIUM 5.0 06/07/2019    CHLORIDE 105 06/07/2019    CO2 22 06/07/2019    GLUCOSE 126 (H) 06/07/2019    BUN 17 06/07/2019    CREATININE 2.55 (H) 06/07/2019        Lab Results   Component Value Date    WBC 9.2 06/07/2019    HEMOGLOBIN 7.1 (L) 06/07/2019    HEMATOCRIT 24.3 (L) 06/07/2019    PLATELETCT 157 (L) 06/07/2019        Total time of the discharge process exceeds 35 minutes.

## 2019-06-08 NOTE — PROGRESS NOTES
Social work paged RE FWW. Patient will also be set up with Oncology appointment by our hospitalist.

## 2019-06-08 NOTE — DISCHARGE PLANNING
Agency/Facility Name: [facility]  Spoke To: Nella  Outcome: Notified walker to be delivered to patients home.

## 2019-06-08 NOTE — DISCHARGE PLANNING
Received Choice form at [phone]  Agency/Facility Name: [facility]  Referral sent per Choice form @ 1955  For DME walker

## 2019-06-13 ENCOUNTER — HOSPITAL ENCOUNTER (OUTPATIENT)
Dept: LAB | Facility: MEDICAL CENTER | Age: 39
End: 2019-06-13
Attending: INTERNAL MEDICINE
Payer: MEDICAID

## 2019-06-13 ENCOUNTER — OFFICE VISIT (OUTPATIENT)
Dept: NEPHROLOGY | Facility: MEDICAL CENTER | Age: 39
End: 2019-06-13
Payer: MEDICAID

## 2019-06-13 VITALS
RESPIRATION RATE: 14 BRPM | BODY MASS INDEX: 39.27 KG/M2 | OXYGEN SATURATION: 100 % | TEMPERATURE: 99 F | SYSTOLIC BLOOD PRESSURE: 122 MMHG | DIASTOLIC BLOOD PRESSURE: 64 MMHG | HEIGHT: 64 IN | WEIGHT: 230 LBS | HEART RATE: 102 BPM

## 2019-06-13 DIAGNOSIS — N17.9 AKI (ACUTE KIDNEY INJURY) (HCC): ICD-10-CM

## 2019-06-13 DIAGNOSIS — I10 ESSENTIAL HYPERTENSION: ICD-10-CM

## 2019-06-13 DIAGNOSIS — D64.9 NORMOCYTIC ANEMIA: ICD-10-CM

## 2019-06-13 DIAGNOSIS — E55.9 VITAMIN D DEFICIENCY: ICD-10-CM

## 2019-06-13 LAB
ANION GAP SERPL CALC-SCNC: 10 MMOL/L (ref 0–11.9)
BUN SERPL-MCNC: 8 MG/DL (ref 8–22)
CALCIUM SERPL-MCNC: 7.7 MG/DL (ref 8.5–10.5)
CHLORIDE SERPL-SCNC: 101 MMOL/L (ref 96–112)
CO2 SERPL-SCNC: 27 MMOL/L (ref 20–33)
CREAT SERPL-MCNC: 1.12 MG/DL (ref 0.5–1.4)
GLUCOSE SERPL-MCNC: 140 MG/DL (ref 65–99)
POTASSIUM SERPL-SCNC: 3 MMOL/L (ref 3.6–5.5)
SODIUM SERPL-SCNC: 138 MMOL/L (ref 135–145)

## 2019-06-13 PROCEDURE — 99213 OFFICE O/P EST LOW 20 MIN: CPT | Performed by: INTERNAL MEDICINE

## 2019-06-13 PROCEDURE — 36415 COLL VENOUS BLD VENIPUNCTURE: CPT

## 2019-06-13 PROCEDURE — 80048 BASIC METABOLIC PNL TOTAL CA: CPT

## 2019-06-13 ASSESSMENT — ENCOUNTER SYMPTOMS
HEMOPTYSIS: 0
ABDOMINAL PAIN: 0
VOMITING: 0
NAUSEA: 0
FLANK PAIN: 0
SHORTNESS OF BREATH: 0
CHILLS: 0
SINUS PAIN: 0
PALPITATIONS: 0
COUGH: 0
SORE THROAT: 0
FEVER: 0
WHEEZING: 0
DIARRHEA: 0
EYES NEGATIVE: 1
ORTHOPNEA: 0
WEIGHT LOSS: 0

## 2019-06-13 NOTE — PROGRESS NOTES
"Subjective:      Amanda Bae is a 39 y.o. female who presents with Follow-Up and Chronic Kidney Disease            HPI  Amanda is coming today for post hospitalization f/u for JOANNE  Has hx/of metastatic cancer, admitted with PNA.  Hospital course complicated with JOANNE -creat at 2.94 (baseline 0.6) -improved to 2.5  Good UOP  Currently doing better, no complaints  No dysuria/hematuria/flank pain  Renal US : no major abnormalities    Review of Systems   Constitutional: Positive for malaise/fatigue. Negative for chills, fever and weight loss.   HENT: Negative for congestion, hearing loss, nosebleeds, sinus pain and sore throat.    Eyes: Negative.    Respiratory: Negative for cough, hemoptysis, shortness of breath and wheezing.    Cardiovascular: Negative for chest pain, palpitations, orthopnea and leg swelling.   Gastrointestinal: Negative for abdominal pain, diarrhea, nausea and vomiting.   Genitourinary: Negative for dysuria, flank pain, frequency, hematuria and urgency.   Skin: Negative.    All other systems reviewed and are negative.    Past Medical History:   Diagnosis Date   • Cancer (HCC)        No family history on file.    Social History     Social History   • Marital status:      Spouse name: N/A   • Number of children: N/A   • Years of education: N/A     Social History Main Topics   • Smoking status: Current Every Day Smoker   • Smokeless tobacco: Never Used   • Alcohol use Not on file   • Drug use: Unknown   • Sexual activity: Not on file     Other Topics Concern   • Not on file     Social History Narrative   • No narrative on file          Objective:     /64 (BP Location: Right arm, Patient Position: Sitting, BP Cuff Size: Adult)   Pulse (!) 102   Temp 37.2 °C (99 °F) (Temporal)   Resp 14   Ht 1.626 m (5' 4\")   Wt 104.3 kg (230 lb) Comment: Pt stated wt. She is in a wheelchair  LMP 05/15/2019 (Exact Date)   SpO2 100%   BMI 39.48 kg/m²      Physical Exam   Constitutional: " She is oriented to person, place, and time. She appears well-developed and well-nourished. No distress.   HENT:   Head: Normocephalic and atraumatic.   Nose: Nose normal.   Mouth/Throat: Oropharynx is clear and moist.   Eyes: Pupils are equal, round, and reactive to light. EOM are normal.   Cardiovascular: Normal rate, regular rhythm and normal heart sounds.  Exam reveals no gallop and no friction rub.    Pulmonary/Chest: Effort normal and breath sounds normal. No respiratory distress. She has no wheezes. She has no rales.   Abdominal: Soft. Bowel sounds are normal. She exhibits no distension and no mass. There is no tenderness. There is no guarding.   Musculoskeletal: She exhibits no edema.   Neurological: She is alert and oriented to person, place, and time. No cranial nerve deficit. Coordination normal.   Skin: Skin is warm. She is not diaphoretic.          Laboratory results  Lab Results   Component Value Date/Time    CREATININE 2.55 (H) 06/07/2019 10:22 AM    POTASSIUM 5.0 06/07/2019 10:22 AM          Assessment/Plan:     1. JOANNE (acute kidney injury) (HCC)      To complete renal panel and call clinic to discuss results  - Basic Metabolic Panel; Future  - Basic Metabolic Panel; Future    2. Normocytic anemia      To monitor Hb level  - CBC WITHOUT DIFFERENTIAL; Future    3. Essential hypertension      BP well controlled -to monitor  - Basic Metabolic Panel; Future  - Basic Metabolic Panel; Future    4. Vitamin D deficiency     - VITAMIN D 25-HYDROXY    Recs: keep well hydrated , repeat renal panel, low Na diet, avoid NSAID's             F/u in 1 month

## 2019-06-22 ENCOUNTER — HOSPITAL ENCOUNTER (INPATIENT)
Facility: MEDICAL CENTER | Age: 39
LOS: 3 days | DRG: 843 | End: 2019-06-25
Attending: EMERGENCY MEDICINE | Admitting: HOSPITALIST
Payer: MEDICAID

## 2019-06-22 ENCOUNTER — APPOINTMENT (OUTPATIENT)
Dept: RADIOLOGY | Facility: MEDICAL CENTER | Age: 39
DRG: 843 | End: 2019-06-22
Attending: EMERGENCY MEDICINE
Payer: MEDICAID

## 2019-06-22 DIAGNOSIS — C79.9 METASTATIC CANCER (HCC): ICD-10-CM

## 2019-06-22 DIAGNOSIS — R11.2 INTRACTABLE VOMITING WITH NAUSEA, UNSPECIFIED VOMITING TYPE: ICD-10-CM

## 2019-06-22 PROBLEM — J18.9 MULTIFOCAL PNEUMONIA: Status: ACTIVE | Noted: 2019-06-22

## 2019-06-22 PROBLEM — D64.9 ANEMIA: Status: ACTIVE | Noted: 2019-06-22

## 2019-06-22 PROBLEM — J96.01 ACUTE HYPOXEMIC RESPIRATORY FAILURE (HCC): Status: ACTIVE | Noted: 2019-06-22

## 2019-06-22 LAB
ALBUMIN SERPL BCP-MCNC: 4.4 G/DL (ref 3.2–4.9)
ALBUMIN/GLOB SERPL: 0.9 G/DL
ALP SERPL-CCNC: 119 U/L (ref 30–99)
ALT SERPL-CCNC: 11 U/L (ref 2–50)
ANION GAP SERPL CALC-SCNC: 12 MMOL/L (ref 0–11.9)
ANISOCYTOSIS BLD QL SMEAR: ABNORMAL
AST SERPL-CCNC: 19 U/L (ref 12–45)
BASOPHILS # BLD AUTO: 0 % (ref 0–1.8)
BASOPHILS # BLD: 0 K/UL (ref 0–0.12)
BILIRUB SERPL-MCNC: 1.1 MG/DL (ref 0.1–1.5)
BUN SERPL-MCNC: 12 MG/DL (ref 8–22)
CALCIUM SERPL-MCNC: 9.8 MG/DL (ref 8.5–10.5)
CHLORIDE SERPL-SCNC: 103 MMOL/L (ref 96–112)
CO2 SERPL-SCNC: 24 MMOL/L (ref 20–33)
CREAT SERPL-MCNC: 0.76 MG/DL (ref 0.5–1.4)
EOSINOPHIL # BLD AUTO: 0 K/UL (ref 0–0.51)
EOSINOPHIL NFR BLD: 0 % (ref 0–6.9)
ERYTHROCYTE [DISTWIDTH] IN BLOOD BY AUTOMATED COUNT: 67.2 FL (ref 35.9–50)
FERRITIN SERPL-MCNC: 111.2 NG/ML (ref 10–291)
FOLATE SERPL-MCNC: 10 NG/ML
GLOBULIN SER CALC-MCNC: 5.1 G/DL (ref 1.9–3.5)
GLUCOSE SERPL-MCNC: 143 MG/DL (ref 65–99)
HCT VFR BLD AUTO: 28.2 % (ref 37–47)
HGB BLD-MCNC: 8.4 G/DL (ref 12–16)
HGB RETIC QN AUTO: 27.3 PG/CELL (ref 29–35)
HYPOCHROMIA BLD QL SMEAR: ABNORMAL
IMM RETICS NFR: 49.1 % (ref 9.3–17.4)
IRON SATN MFR SERPL: 14 % (ref 15–55)
IRON SERPL-MCNC: 55 UG/DL (ref 40–170)
LIPASE SERPL-CCNC: 9 U/L (ref 11–82)
LYMPHOCYTES # BLD AUTO: 1.85 K/UL (ref 1–4.8)
LYMPHOCYTES NFR BLD: 18.7 % (ref 22–41)
MACROCYTES BLD QL SMEAR: ABNORMAL
MANUAL DIFF BLD: NORMAL
MCH RBC QN AUTO: 26.7 PG (ref 27–33)
MCHC RBC AUTO-ENTMCNC: 29.8 G/DL (ref 33.6–35)
MCV RBC AUTO: 89.5 FL (ref 81.4–97.8)
METAMYELOCYTES NFR BLD MANUAL: 0.9 %
MICROCYTES BLD QL SMEAR: ABNORMAL
MONOCYTES # BLD AUTO: 0.27 K/UL (ref 0–0.85)
MONOCYTES NFR BLD AUTO: 2.7 % (ref 0–13.4)
MORPHOLOGY BLD-IMP: NORMAL
MYELOCYTES NFR BLD MANUAL: 0.9 %
NEUTROPHILS # BLD AUTO: 7.6 K/UL (ref 2–7.15)
NEUTROPHILS NFR BLD: 76.8 % (ref 44–72)
NRBC # BLD AUTO: 0.39 K/UL
NRBC BLD-RTO: 3.9 /100 WBC
PLATELET # BLD AUTO: 167 K/UL (ref 164–446)
PLATELET BLD QL SMEAR: NORMAL
PMV BLD AUTO: 9.4 FL (ref 9–12.9)
POLYCHROMASIA BLD QL SMEAR: NORMAL
POTASSIUM SERPL-SCNC: 3.8 MMOL/L (ref 3.6–5.5)
PROCALCITONIN SERPL-MCNC: 0.1 NG/ML
PROT SERPL-MCNC: 9.5 G/DL (ref 6–8.2)
RBC # BLD AUTO: 3.15 M/UL (ref 4.2–5.4)
RBC BLD AUTO: PRESENT
RETICS # AUTO: 0.21 M/UL (ref 0.04–0.06)
RETICS/RBC NFR: 6.7 % (ref 0.8–2.1)
SODIUM SERPL-SCNC: 139 MMOL/L (ref 135–145)
TIBC SERPL-MCNC: 400 UG/DL (ref 250–450)
VIT B12 SERPL-MCNC: 502 PG/ML (ref 211–911)
WBC # BLD AUTO: 9.9 K/UL (ref 4.8–10.8)

## 2019-06-22 PROCEDURE — 700101 HCHG RX REV CODE 250: Performed by: HOSPITALIST

## 2019-06-22 PROCEDURE — 84145 PROCALCITONIN (PCT): CPT

## 2019-06-22 PROCEDURE — 83540 ASSAY OF IRON: CPT

## 2019-06-22 PROCEDURE — 85027 COMPLETE CBC AUTOMATED: CPT

## 2019-06-22 PROCEDURE — 82728 ASSAY OF FERRITIN: CPT

## 2019-06-22 PROCEDURE — 770004 HCHG ROOM/CARE - ONCOLOGY PRIVATE *

## 2019-06-22 PROCEDURE — 71275 CT ANGIOGRAPHY CHEST: CPT

## 2019-06-22 PROCEDURE — 770009 HCHG ROOM/CARE - ONCOLOGY SEMI PRI*

## 2019-06-22 PROCEDURE — 96374 THER/PROPH/DIAG INJ IV PUSH: CPT

## 2019-06-22 PROCEDURE — A9270 NON-COVERED ITEM OR SERVICE: HCPCS | Performed by: HOSPITALIST

## 2019-06-22 PROCEDURE — 87150 DNA/RNA AMPLIFIED PROBE: CPT | Mod: 91

## 2019-06-22 PROCEDURE — 80053 COMPREHEN METABOLIC PANEL: CPT

## 2019-06-22 PROCEDURE — 700105 HCHG RX REV CODE 258: Performed by: HOSPITALIST

## 2019-06-22 PROCEDURE — 96375 TX/PRO/DX INJ NEW DRUG ADDON: CPT

## 2019-06-22 PROCEDURE — 83550 IRON BINDING TEST: CPT

## 2019-06-22 PROCEDURE — 700117 HCHG RX CONTRAST REV CODE 255: Performed by: EMERGENCY MEDICINE

## 2019-06-22 PROCEDURE — 99285 EMERGENCY DEPT VISIT HI MDM: CPT

## 2019-06-22 PROCEDURE — 83690 ASSAY OF LIPASE: CPT

## 2019-06-22 PROCEDURE — 700102 HCHG RX REV CODE 250 W/ 637 OVERRIDE(OP): Performed by: HOSPITALIST

## 2019-06-22 PROCEDURE — 87077 CULTURE AEROBIC IDENTIFY: CPT

## 2019-06-22 PROCEDURE — 85046 RETICYTE/HGB CONCENTRATE: CPT

## 2019-06-22 PROCEDURE — 700111 HCHG RX REV CODE 636 W/ 250 OVERRIDE (IP): Performed by: EMERGENCY MEDICINE

## 2019-06-22 PROCEDURE — 71045 X-RAY EXAM CHEST 1 VIEW: CPT

## 2019-06-22 PROCEDURE — 96376 TX/PRO/DX INJ SAME DRUG ADON: CPT

## 2019-06-22 PROCEDURE — 99223 1ST HOSP IP/OBS HIGH 75: CPT | Mod: 25 | Performed by: HOSPITALIST

## 2019-06-22 PROCEDURE — 87040 BLOOD CULTURE FOR BACTERIA: CPT

## 2019-06-22 PROCEDURE — 700111 HCHG RX REV CODE 636 W/ 250 OVERRIDE (IP): Performed by: HOSPITALIST

## 2019-06-22 PROCEDURE — 94760 N-INVAS EAR/PLS OXIMETRY 1: CPT

## 2019-06-22 PROCEDURE — 82746 ASSAY OF FOLIC ACID SERUM: CPT

## 2019-06-22 PROCEDURE — 85007 BL SMEAR W/DIFF WBC COUNT: CPT

## 2019-06-22 PROCEDURE — 99407 BEHAV CHNG SMOKING > 10 MIN: CPT | Performed by: HOSPITALIST

## 2019-06-22 PROCEDURE — 82607 VITAMIN B-12: CPT

## 2019-06-22 PROCEDURE — 700105 HCHG RX REV CODE 258: Performed by: EMERGENCY MEDICINE

## 2019-06-22 RX ORDER — BISACODYL 10 MG
10 SUPPOSITORY, RECTAL RECTAL
Status: DISCONTINUED | OUTPATIENT
Start: 2019-06-22 | End: 2019-06-25 | Stop reason: HOSPADM

## 2019-06-22 RX ORDER — OXYCODONE HYDROCHLORIDE 5 MG/1
5 TABLET ORAL
Status: DISCONTINUED | OUTPATIENT
Start: 2019-06-22 | End: 2019-06-23

## 2019-06-22 RX ORDER — DEXAMETHASONE SODIUM PHOSPHATE 4 MG/ML
4 INJECTION, SOLUTION INTRA-ARTICULAR; INTRALESIONAL; INTRAMUSCULAR; INTRAVENOUS; SOFT TISSUE EVERY 6 HOURS
Status: DISCONTINUED | OUTPATIENT
Start: 2019-06-22 | End: 2019-06-25 | Stop reason: HOSPADM

## 2019-06-22 RX ORDER — OXYCODONE HYDROCHLORIDE 10 MG/1
10 TABLET ORAL
Status: DISCONTINUED | OUTPATIENT
Start: 2019-06-22 | End: 2019-06-23

## 2019-06-22 RX ORDER — ONDANSETRON 4 MG/1
4 TABLET, ORALLY DISINTEGRATING ORAL EVERY 4 HOURS PRN
Status: DISCONTINUED | OUTPATIENT
Start: 2019-06-22 | End: 2019-06-25 | Stop reason: HOSPADM

## 2019-06-22 RX ORDER — PROMETHAZINE HYDROCHLORIDE 25 MG/1
12.5-25 SUPPOSITORY RECTAL EVERY 4 HOURS PRN
Status: DISCONTINUED | OUTPATIENT
Start: 2019-06-22 | End: 2019-06-25 | Stop reason: HOSPADM

## 2019-06-22 RX ORDER — ALPRAZOLAM 0.25 MG/1
1 TABLET ORAL 3 TIMES DAILY PRN
Status: DISCONTINUED | OUTPATIENT
Start: 2019-06-22 | End: 2019-06-22

## 2019-06-22 RX ORDER — AMOXICILLIN 250 MG
2 CAPSULE ORAL 2 TIMES DAILY
Status: DISCONTINUED | OUTPATIENT
Start: 2019-06-23 | End: 2019-06-25 | Stop reason: HOSPADM

## 2019-06-22 RX ORDER — SODIUM CHLORIDE 9 MG/ML
1000 INJECTION, SOLUTION INTRAVENOUS ONCE
Status: COMPLETED | OUTPATIENT
Start: 2019-06-22 | End: 2019-06-22

## 2019-06-22 RX ORDER — OXYCODONE HYDROCHLORIDE 30 MG/1
30 TABLET ORAL 2 TIMES DAILY
Status: ON HOLD | COMMUNITY
End: 2019-06-24

## 2019-06-22 RX ORDER — PROMETHAZINE HYDROCHLORIDE 25 MG/1
12.5-25 TABLET ORAL EVERY 4 HOURS PRN
Status: DISCONTINUED | OUTPATIENT
Start: 2019-06-22 | End: 2019-06-25 | Stop reason: HOSPADM

## 2019-06-22 RX ORDER — ONDANSETRON 2 MG/ML
4 INJECTION INTRAMUSCULAR; INTRAVENOUS ONCE
Status: COMPLETED | OUTPATIENT
Start: 2019-06-22 | End: 2019-06-22

## 2019-06-22 RX ORDER — POLYETHYLENE GLYCOL 3350 17 G/17G
1 POWDER, FOR SOLUTION ORAL
Status: DISCONTINUED | OUTPATIENT
Start: 2019-06-22 | End: 2019-06-25 | Stop reason: HOSPADM

## 2019-06-22 RX ORDER — HEPARIN SODIUM 5000 [USP'U]/ML
5000 INJECTION, SOLUTION INTRAVENOUS; SUBCUTANEOUS EVERY 8 HOURS
Status: DISCONTINUED | OUTPATIENT
Start: 2019-06-22 | End: 2019-06-25 | Stop reason: HOSPADM

## 2019-06-22 RX ORDER — ONDANSETRON 2 MG/ML
4 INJECTION INTRAMUSCULAR; INTRAVENOUS EVERY 4 HOURS PRN
Status: DISCONTINUED | OUTPATIENT
Start: 2019-06-22 | End: 2019-06-25 | Stop reason: HOSPADM

## 2019-06-22 RX ORDER — MORPHINE SULFATE 4 MG/ML
4 INJECTION, SOLUTION INTRAMUSCULAR; INTRAVENOUS
Status: DISCONTINUED | OUTPATIENT
Start: 2019-06-22 | End: 2019-06-25 | Stop reason: HOSPADM

## 2019-06-22 RX ORDER — SODIUM CHLORIDE 9 MG/ML
INJECTION, SOLUTION INTRAVENOUS CONTINUOUS
Status: DISCONTINUED | OUTPATIENT
Start: 2019-06-22 | End: 2019-06-25

## 2019-06-22 RX ADMIN — IOHEXOL 55 ML: 350 INJECTION, SOLUTION INTRAVENOUS at 19:55

## 2019-06-22 RX ADMIN — SODIUM CHLORIDE 1000 ML: 9 INJECTION, SOLUTION INTRAVENOUS at 18:23

## 2019-06-22 RX ADMIN — ONDANSETRON 4 MG: 2 INJECTION INTRAMUSCULAR; INTRAVENOUS at 19:36

## 2019-06-22 RX ADMIN — METOPROLOL TARTRATE 25 MG: 25 TABLET, FILM COATED ORAL at 21:38

## 2019-06-22 RX ADMIN — DEXAMETHASONE SODIUM PHOSPHATE 4 MG: 4 INJECTION, SOLUTION INTRAMUSCULAR; INTRAVENOUS at 21:37

## 2019-06-22 RX ADMIN — HEPARIN SODIUM 5000 UNITS: 5000 INJECTION, SOLUTION INTRAVENOUS; SUBCUTANEOUS at 21:37

## 2019-06-22 RX ADMIN — CEFTRIAXONE SODIUM 2 G: 2 INJECTION, POWDER, FOR SOLUTION INTRAMUSCULAR; INTRAVENOUS at 21:36

## 2019-06-22 RX ADMIN — SODIUM CHLORIDE: 9 INJECTION, SOLUTION INTRAVENOUS at 20:15

## 2019-06-22 RX ADMIN — ONDANSETRON 4 MG: 2 INJECTION INTRAMUSCULAR; INTRAVENOUS at 21:37

## 2019-06-22 RX ADMIN — FENTANYL CITRATE 50 MCG: 50 INJECTION INTRAMUSCULAR; INTRAVENOUS at 18:29

## 2019-06-22 RX ADMIN — DOXYCYCLINE 100 MG: 100 INJECTION, POWDER, LYOPHILIZED, FOR SOLUTION INTRAVENOUS at 21:40

## 2019-06-22 RX ADMIN — ONDANSETRON 4 MG: 2 INJECTION INTRAMUSCULAR; INTRAVENOUS at 18:28

## 2019-06-22 ASSESSMENT — COPD QUESTIONNAIRES
DO YOU EVER COUGH UP ANY MUCUS OR PHLEGM?: YES, A FEW DAYS A WEEK OR MONTH
DURING THE PAST 4 WEEKS HOW MUCH DID YOU FEEL SHORT OF BREATH: SOME OF THE TIME
COPD SCREENING SCORE: 5
HAVE YOU SMOKED AT LEAST 100 CIGARETTES IN YOUR ENTIRE LIFE: YES

## 2019-06-22 ASSESSMENT — ENCOUNTER SYMPTOMS
SHORTNESS OF BREATH: 1
FOCAL WEAKNESS: 0
BLURRED VISION: 0
FEVER: 0
SPUTUM PRODUCTION: 1
DIZZINESS: 0
FLANK PAIN: 0
SENSORY CHANGE: 0
DOUBLE VISION: 0
EYE DISCHARGE: 0
DEPRESSION: 0
BRUISES/BLEEDS EASILY: 0
SPEECH CHANGE: 0
WEAKNESS: 0
PALPITATIONS: 0
CHILLS: 1
HEMOPTYSIS: 0
HEARTBURN: 0
COUGH: 1
WEIGHT LOSS: 1
ABDOMINAL PAIN: 1
NAUSEA: 1
VOMITING: 1
MYALGIAS: 0
HALLUCINATIONS: 0

## 2019-06-22 ASSESSMENT — LIFESTYLE VARIABLES
EVER_SMOKED: YES
SUBSTANCE_ABUSE: 0

## 2019-06-23 PROBLEM — F11.93 NARCOTIC WITHDRAWAL (HCC): Status: ACTIVE | Noted: 2019-06-23

## 2019-06-23 LAB
ALBUMIN SERPL BCP-MCNC: 4.5 G/DL (ref 3.2–4.9)
ALBUMIN/GLOB SERPL: 0.9 G/DL
ALP SERPL-CCNC: 123 U/L (ref 30–99)
ALT SERPL-CCNC: 7 U/L (ref 2–50)
ANION GAP SERPL CALC-SCNC: 11 MMOL/L (ref 0–11.9)
AST SERPL-CCNC: 16 U/L (ref 12–45)
BASOPHILS # BLD AUTO: 0.5 % (ref 0–1.8)
BASOPHILS # BLD: 0.05 K/UL (ref 0–0.12)
BILIRUB SERPL-MCNC: 0.9 MG/DL (ref 0.1–1.5)
BUN SERPL-MCNC: 11 MG/DL (ref 8–22)
CALCIUM SERPL-MCNC: 9.7 MG/DL (ref 8.5–10.5)
CHLORIDE SERPL-SCNC: 101 MMOL/L (ref 96–112)
CO2 SERPL-SCNC: 23 MMOL/L (ref 20–33)
CREAT SERPL-MCNC: 0.79 MG/DL (ref 0.5–1.4)
EOSINOPHIL # BLD AUTO: 0.04 K/UL (ref 0–0.51)
EOSINOPHIL NFR BLD: 0.4 % (ref 0–6.9)
ERYTHROCYTE [DISTWIDTH] IN BLOOD BY AUTOMATED COUNT: 73.1 FL (ref 35.9–50)
GLOBULIN SER CALC-MCNC: 4.9 G/DL (ref 1.9–3.5)
GLUCOSE SERPL-MCNC: 155 MG/DL (ref 65–99)
HCT VFR BLD AUTO: 31.1 % (ref 37–47)
HGB BLD-MCNC: 8.6 G/DL (ref 12–16)
IMM GRANULOCYTES # BLD AUTO: 0.42 K/UL (ref 0–0.11)
IMM GRANULOCYTES NFR BLD AUTO: 4.2 % (ref 0–0.9)
LYMPHOCYTES # BLD AUTO: 1.9 K/UL (ref 1–4.8)
LYMPHOCYTES NFR BLD: 18.8 % (ref 22–41)
MCH RBC QN AUTO: 25.8 PG (ref 27–33)
MCHC RBC AUTO-ENTMCNC: 27.7 G/DL (ref 33.6–35)
MCV RBC AUTO: 93.4 FL (ref 81.4–97.8)
MONOCYTES # BLD AUTO: 0.15 K/UL (ref 0–0.85)
MONOCYTES NFR BLD AUTO: 1.5 % (ref 0–13.4)
NEUTROPHILS # BLD AUTO: 7.54 K/UL (ref 2–7.15)
NEUTROPHILS NFR BLD: 74.6 % (ref 44–72)
NRBC # BLD AUTO: 0.3 K/UL
NRBC BLD-RTO: 3 /100 WBC
PLATELET # BLD AUTO: 160 K/UL (ref 164–446)
PMV BLD AUTO: 9.1 FL (ref 9–12.9)
POTASSIUM SERPL-SCNC: 3.8 MMOL/L (ref 3.6–5.5)
PROT SERPL-MCNC: 9.4 G/DL (ref 6–8.2)
RBC # BLD AUTO: 3.33 M/UL (ref 4.2–5.4)
SODIUM SERPL-SCNC: 135 MMOL/L (ref 135–145)
WBC # BLD AUTO: 10.1 K/UL (ref 4.8–10.8)

## 2019-06-23 PROCEDURE — 36415 COLL VENOUS BLD VENIPUNCTURE: CPT

## 2019-06-23 PROCEDURE — 85025 COMPLETE CBC W/AUTO DIFF WBC: CPT

## 2019-06-23 PROCEDURE — 700102 HCHG RX REV CODE 250 W/ 637 OVERRIDE(OP): Performed by: HOSPITALIST

## 2019-06-23 PROCEDURE — 700102 HCHG RX REV CODE 250 W/ 637 OVERRIDE(OP): Performed by: INTERNAL MEDICINE

## 2019-06-23 PROCEDURE — 99233 SBSQ HOSP IP/OBS HIGH 50: CPT | Performed by: INTERNAL MEDICINE

## 2019-06-23 PROCEDURE — 700101 HCHG RX REV CODE 250: Performed by: HOSPITALIST

## 2019-06-23 PROCEDURE — 87040 BLOOD CULTURE FOR BACTERIA: CPT

## 2019-06-23 PROCEDURE — A9270 NON-COVERED ITEM OR SERVICE: HCPCS | Performed by: INTERNAL MEDICINE

## 2019-06-23 PROCEDURE — 94668 MNPJ CHEST WALL SBSQ: CPT

## 2019-06-23 PROCEDURE — 80053 COMPREHEN METABOLIC PANEL: CPT

## 2019-06-23 PROCEDURE — A9270 NON-COVERED ITEM OR SERVICE: HCPCS | Performed by: HOSPITALIST

## 2019-06-23 PROCEDURE — 94760 N-INVAS EAR/PLS OXIMETRY 1: CPT

## 2019-06-23 PROCEDURE — 700111 HCHG RX REV CODE 636 W/ 250 OVERRIDE (IP): Performed by: HOSPITALIST

## 2019-06-23 PROCEDURE — 94667 MNPJ CHEST WALL 1ST: CPT

## 2019-06-23 PROCEDURE — 770004 HCHG ROOM/CARE - ONCOLOGY PRIVATE *

## 2019-06-23 PROCEDURE — 700105 HCHG RX REV CODE 258: Performed by: HOSPITALIST

## 2019-06-23 RX ORDER — ALBUTEROL SULFATE 90 UG/1
2 AEROSOL, METERED RESPIRATORY (INHALATION) EVERY 4 HOURS PRN
Status: DISCONTINUED | OUTPATIENT
Start: 2019-06-23 | End: 2019-06-25 | Stop reason: HOSPADM

## 2019-06-23 RX ORDER — OLANZAPINE 5 MG/1
5 TABLET ORAL EVERY EVENING
Status: DISCONTINUED | OUTPATIENT
Start: 2019-06-23 | End: 2019-06-25 | Stop reason: HOSPADM

## 2019-06-23 RX ORDER — OXYCODONE HYDROCHLORIDE 30 MG/1
30 TABLET ORAL EVERY 4 HOURS PRN
Status: DISCONTINUED | OUTPATIENT
Start: 2019-06-23 | End: 2019-06-25 | Stop reason: HOSPADM

## 2019-06-23 RX ADMIN — OXYCODONE HYDROCHLORIDE 60 MG: 40 TABLET, FILM COATED, EXTENDED RELEASE ORAL at 09:09

## 2019-06-23 RX ADMIN — OXYCODONE HYDROCHLORIDE 30 MG: 30 TABLET ORAL at 22:12

## 2019-06-23 RX ADMIN — OXYCODONE HYDROCHLORIDE 30 MG: 30 TABLET ORAL at 12:04

## 2019-06-23 RX ADMIN — OLANZAPINE 5 MG: 5 TABLET, FILM COATED ORAL at 18:06

## 2019-06-23 RX ADMIN — METRONIDAZOLE 500 MG: 500 INJECTION, SOLUTION INTRAVENOUS at 22:13

## 2019-06-23 RX ADMIN — OXYCODONE HYDROCHLORIDE 60 MG: 40 TABLET, FILM COATED, EXTENDED RELEASE ORAL at 18:08

## 2019-06-23 RX ADMIN — METOPROLOL TARTRATE 25 MG: 25 TABLET, FILM COATED ORAL at 18:06

## 2019-06-23 RX ADMIN — ONDANSETRON 4 MG: 2 INJECTION INTRAMUSCULAR; INTRAVENOUS at 02:52

## 2019-06-23 RX ADMIN — DEXAMETHASONE SODIUM PHOSPHATE 4 MG: 4 INJECTION, SOLUTION INTRAMUSCULAR; INTRAVENOUS at 00:07

## 2019-06-23 RX ADMIN — OXYCODONE HYDROCHLORIDE 10 MG: 10 TABLET ORAL at 02:35

## 2019-06-23 RX ADMIN — DEXAMETHASONE SODIUM PHOSPHATE 4 MG: 4 INJECTION, SOLUTION INTRAMUSCULAR; INTRAVENOUS at 12:04

## 2019-06-23 RX ADMIN — HEPARIN SODIUM 5000 UNITS: 5000 INJECTION, SOLUTION INTRAVENOUS; SUBCUTANEOUS at 05:43

## 2019-06-23 RX ADMIN — HEPARIN SODIUM 5000 UNITS: 5000 INJECTION, SOLUTION INTRAVENOUS; SUBCUTANEOUS at 14:00

## 2019-06-23 RX ADMIN — DEXAMETHASONE SODIUM PHOSPHATE 4 MG: 4 INJECTION, SOLUTION INTRAMUSCULAR; INTRAVENOUS at 05:43

## 2019-06-23 RX ADMIN — NICOTINE 7 MG: 7 PATCH, EXTENDED RELEASE TRANSDERMAL at 05:43

## 2019-06-23 RX ADMIN — DEXAMETHASONE SODIUM PHOSPHATE 4 MG: 4 INJECTION, SOLUTION INTRAMUSCULAR; INTRAVENOUS at 18:05

## 2019-06-23 RX ADMIN — SENNOSIDES AND DOCUSATE SODIUM 2 TABLET: 8.6; 5 TABLET ORAL at 05:43

## 2019-06-23 RX ADMIN — DOXYCYCLINE 100 MG: 100 INJECTION, POWDER, LYOPHILIZED, FOR SOLUTION INTRAVENOUS at 09:10

## 2019-06-23 RX ADMIN — METRONIDAZOLE 500 MG: 500 INJECTION, SOLUTION INTRAVENOUS at 14:00

## 2019-06-23 RX ADMIN — OXYCODONE HYDROCHLORIDE 30 MG: 30 TABLET ORAL at 18:07

## 2019-06-23 RX ADMIN — METRONIDAZOLE 500 MG: 500 INJECTION, SOLUTION INTRAVENOUS at 00:08

## 2019-06-23 RX ADMIN — METRONIDAZOLE 500 MG: 500 INJECTION, SOLUTION INTRAVENOUS at 05:43

## 2019-06-23 RX ADMIN — DOXYCYCLINE 100 MG: 100 INJECTION, POWDER, LYOPHILIZED, FOR SOLUTION INTRAVENOUS at 18:05

## 2019-06-23 RX ADMIN — HEPARIN SODIUM 5000 UNITS: 5000 INJECTION, SOLUTION INTRAVENOUS; SUBCUTANEOUS at 22:12

## 2019-06-23 RX ADMIN — METOPROLOL TARTRATE 25 MG: 25 TABLET, FILM COATED ORAL at 05:44

## 2019-06-23 ASSESSMENT — ENCOUNTER SYMPTOMS
NAUSEA: 1
INSOMNIA: 0
SORE THROAT: 0
HEADACHES: 0
SHORTNESS OF BREATH: 1
VOMITING: 1
CLAUDICATION: 0
SENSORY CHANGE: 0
FEVER: 0
NERVOUS/ANXIOUS: 0
MYALGIAS: 0
COUGH: 0
ABDOMINAL PAIN: 1
CHILLS: 0
DIZZINESS: 0
HEARTBURN: 0
SPEECH CHANGE: 0
DEPRESSION: 0
PHOTOPHOBIA: 0
CONSTIPATION: 0
DIARRHEA: 0
WEAKNESS: 0
BLURRED VISION: 0

## 2019-06-23 ASSESSMENT — COGNITIVE AND FUNCTIONAL STATUS - GENERAL
SUGGESTED CMS G CODE MODIFIER DAILY ACTIVITY: CH
SUGGESTED CMS G CODE MODIFIER MOBILITY: CH
DAILY ACTIVITIY SCORE: 24
MOBILITY SCORE: 24

## 2019-06-23 NOTE — ED TRIAGE NOTES
Amanda Bae  39 y.o. female  Chief Complaint   Patient presents with   • N/V     Started today.    • Other     PT reports that they have stage 4 cancer. Last chemotherapy was 1 month ago. Pt has not recieved treatment since moving to [place].     Pt to triage in home wheelchair.  Pt is alert and oriented, speaking in full sentences, follows commands and responds appropriately to questions. Resp are even and unlabored.    Pt moaning and vomiting.     Mask on patient. Placed in senior lounge.    Pt placed in lobby. Pt educated on triage process. Pt encouraged to alert staff for any changes.

## 2019-06-23 NOTE — H&P
Hospital Medicine History & Physical Note    Date of Service  6/22/2019    Primary Care Physician  Tex Carpenter M.D.    Consultants  none    Code Status  full    Chief Complaint  N/v, pain     History of Presenting Illness  39 y.o. female who presented 6/22/2019 with past medical history of ovarian malignancy who recently moved from [facility] now presents with nausea vomiting and diffuse pain.  She is also had cough subjective fevers and chills.  She was recently admitted to the hospital and discharged on 6/8/2019.  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  She does not follow with an oncologist because she was not able to get an appointment.  She will be admitted to the hospital for further management of her symptoms.  She otherwise has no known alleviating or exacerbating factors to her symptoms.       Review of Systems  Review of Systems   Constitutional: Positive for chills, malaise/fatigue and weight loss. Negative for fever.   HENT: Negative for congestion, hearing loss and tinnitus.    Eyes: Negative for blurred vision, double vision and discharge.   Respiratory: Positive for cough, sputum production and shortness of breath. Negative for hemoptysis.    Cardiovascular: Negative for chest pain, palpitations and leg swelling.   Gastrointestinal: Positive for abdominal pain, nausea and vomiting. Negative for heartburn.   Genitourinary: Negative for dysuria and flank pain.   Musculoskeletal: Negative for joint pain and myalgias.   Skin: Negative for rash.   Neurological: Negative for dizziness, sensory change, speech change, focal weakness and weakness.   Endo/Heme/Allergies: Negative for environmental allergies. Does not bruise/bleed easily.   Psychiatric/Behavioral: Negative for depression, hallucinations and substance abuse.       Past Medical History   has a past medical history of Cancer (HCC).    Surgical History  Reviewed and not pertinent     Family  History  Reviewed and not pertinent     Social History   reports that she has been smoking.  She has never used smokeless tobacco. She reports that she does not drink alcohol or use drugs.    Allergies  No Known Allergies    Medications  Prior to Admission Medications   Prescriptions Last Dose Informant Patient Reported? Taking?   ALPRAZolam (XANAX) 1 MG Tab   No No   Sig: Take 1 Tab by mouth 3 times a day as needed for Sleep or Anxiety for up to 14 days.   metoprolol (LOPRESSOR) 25 MG Tab   No No   Sig: Take 1 Tab by mouth 2 Times a Day.   oxyCODONE (OXY-IR) 30 MG immediate release tablet   No No   Sig: Take 1 Tab by mouth every four hours as needed for Moderate Pain for up to 14 days.   oxyCODONE CR (OXYCONTIN) 60 MG Tablet Extended Release 12 hour Abuse-Deterrent tablet   No No   Sig: Take 1 Tab by mouth every 12 hours for 14 days.      Facility-Administered Medications: None       Physical Exam  Temp:  [36.9 °C (98.4 °F)] 36.9 °C (98.4 °F)  Pulse:  [] 100  Resp:  [16-18] 18  BP: (127-147)/(93-97) 147/93  SpO2:  [99 %-100 %] 99 %    Physical Exam   Constitutional: She is oriented to person, place, and time. She appears well-developed and well-nourished. She appears distressed.   HENT:   Head: Normocephalic and atraumatic.   Dry oral mucosa   Eyes: Pupils are equal, round, and reactive to light. Conjunctivae and EOM are normal.   Neck: Normal range of motion. Neck supple. No JVD present.   Cardiovascular: Normal rate, regular rhythm, normal heart sounds and intact distal pulses.    No murmur heard.  Pulmonary/Chest: Effort normal and breath sounds normal.   Diffuse ronchi    Abdominal: Soft. Bowel sounds are normal. She exhibits no distension. There is no tenderness.   Musculoskeletal: Normal range of motion. She exhibits no edema.   Neurological: She is alert and oriented to person, place, and time. She exhibits normal muscle tone.   Skin: Skin is warm and dry.   Psychiatric: She has a normal mood and  affect. Her behavior is normal. Judgment and thought content normal.   Nursing note and vitals reviewed.      Laboratory:  Recent Labs      06/22/19 1815   WBC  9.9   RBC  3.15*   HEMOGLOBIN  8.4*   HEMATOCRIT  28.2*   MCV  89.5   MCH  26.7*   MCHC  29.8*   RDW  67.2*   PLATELETCT  167   MPV  9.4     Recent Labs      06/22/19   1815   SODIUM  139   POTASSIUM  3.8   CHLORIDE  103   CO2  24   GLUCOSE  143*   BUN  12   CREATININE  0.76   CALCIUM  9.8     Recent Labs      06/22/19   1815   ALTSGPT  11   ASTSGOT  19   ALKPHOSPHAT  119*   TBILIRUBIN  1.1   LIPASE  9*   GLUCOSE  143*                 No results for input(s): TROPONINI in the last 72 hours.    Urinalysis:    No results found     Imaging:  CT-CTA CHEST PULMONARY ARTERY W/ RECONS   Final Result         1. No definite CT evidence of pulmonary embolism. Limited evaluation of the subsegmental branches due to poor contrast enhancement.      2. Extensive airspace opacities in the right middle lobe, right lower lobe and left lower lobe as well as patchy opacities in the right upper lobe, in keeping with severe multifocal pneumonias.      3. Significantly enlarged and heterogeneous spleen with multiple area of low-attenuation, likely infiltrate with disease.      4. Diffuse heterogeneous sclerotic and lytic lesions throughout the visualized spine, ribs and sternum, likely bone metastasis.      DX-CHEST-PORTABLE (1 VIEW)   Final Result         Diffuse interstitial prominence could relate to atypical infection or mild edema.      Persistent ill-defined right middle lobe opacities could relate to persistent pneumonia.            Assessment/Plan:  I anticipate this patient will require at least two midnights for appropriate medical management, necessitating inpatient admission.    * Multifocal pneumonia   Assessment & Plan    Noted multifocal pneumonia on CT scan  Immunocompromised with cancer   Recent admission for similar   cutlures ordered  IV abx broad  spectrum  Consider ID consult   montior for clinical improvement      Metastatic cancer (HCC)- (present on admission)   Assessment & Plan    epithelioid hemangioendotherlioma rare cancer   Needs oncologist     Anemia   Assessment & Plan    No evidence of acute blood loss  Lab workup ordered     Intractable nausea and vomiting   Assessment & Plan    Cont with IVF   Anti emetics   Pain control   Advance diet as tolerated     Acute hypoxemic respiratory failure (HCC)   Assessment & Plan    Due to multifocal pneumonia   resp care protocol      Cancer associated pain- (present on admission)   Assessment & Plan    Diffuse pain, cont pain control regimine      Class 1 obesity in adult- (present on admission)   Assessment & Plan    Encouraged weight loss     Nicotine abuse- (present on admission)   Assessment & Plan    Greater than 10 minutes spent with patient smoking cessation counseling. Discussed cardiovascular risk factors of smoking, nicotine patch ordered.          VTE prophylaxis: heparin

## 2019-06-23 NOTE — ED PROVIDER NOTES
ED Provider Note    CHIEF COMPLAINT  Chief Complaint   Patient presents with   • N/V     Started today.    • Other     PT reports that they have stage 4 cancer. Last chemotherapy was 1 month ago. Pt has not recieved treatment since moving to [place].       HPI  Amanda Bae is a 39 y.o. female who presents with vomiting.  The patient states that she has stage IV cancer.  She states she has a rare cancer called epithelioid hemangioma endothelioma.  The patient moved from [place] approximately 30 days prior to arrival in [place] and she has not had follow-up from an oncologic standpoint.  She was hospitalized about a week ago with similar symptoms.  She had acute kidney injury as well as pneumonia.  She states her cough is pretty much resolved.  She continues to have vomiting as well as nausea.  She has diffuse pain.  She has not had any fevers.  She has not had any chemotherapy over the last 30 days and she has been in [place].  The patient does not have any pain or swelling to her lower extremities.  She has not had any rhinorrhea.    REVIEW OF SYSTEMS  See HPI for further details. All other systems are negative.     PAST MEDICAL HISTORY  Past Medical History:   Diagnosis Date   • Cancer (HCC)        FAMILY HISTORY  [unfilled]    SOCIAL HISTORY  Social History     Social History   • Marital status:      Spouse name: N/A   • Number of children: N/A   • Years of education: N/A     Social History Main Topics   • Smoking status: Current Every Day Smoker   • Smokeless tobacco: Never Used   • Alcohol use No   • Drug use: No   • Sexual activity: Not on file     Other Topics Concern   • Not on file     Social History Narrative   • No narrative on file       SURGICAL HISTORY  No past surgical history on file.    CURRENT MEDICATIONS  Home Medications    **Home medications have not yet been reviewed for this encounter**         ALLERGIES  No Known Allergies    PHYSICAL EXAM  VITAL SIGNS: /97   Pulse (!) 103    "Temp 36.9 °C (98.4 °F) (Temporal)   Resp 18   Ht 1.626 m (5' 4\")   Wt 95.3 kg (210 lb 1.6 oz)   SpO2 100%   BMI 36.06 kg/m²       Constitutional: Ill in appearance  HENT: Normocephalic, Atraumatic, Bilateral external ears normal, Oropharynx moist, No oral exudates, Nose normal.   Eyes: PERRLA, EOMI, Conjunctiva normal, No discharge.   Neck: Normal range of motion, No tenderness, Supple, No stridor.   Lymphatic: No lymphadenopathy noted.   Cardiovascular: Tachycardic heart rate, Normal rhythm, No murmurs, No rubs, No gallops.   Thorax & Lungs: Normal breath sounds, No respiratory distress, No wheezing, No chest tenderness.   Abdomen: Bowel sounds normal, Soft, No tenderness, No masses, No pulsatile masses.   Skin: Warm, Dry, No erythema, No rash.   Back: No tenderness, No CVA tenderness.   Extremities: Intact distal pulses, No edema, No tenderness, No cyanosis, No clubbing.   Musculoskeletal: Good range of motion in all major joints. No tenderness to palpation or major deformities noted.   Neurologic: Alert & oriented x 3, Normal motor function, Normal sensory function, No focal deficits noted.   Psychiatric: Depressed affect      RADIOLOGY/PROCEDURES  DX-CHEST-PORTABLE (1 VIEW)   Final Result         Diffuse interstitial prominence could relate to atypical infection or mild edema.      Persistent ill-defined right middle lobe opacities could relate to persistent pneumonia.      CT-CTA CHEST PULMONARY ARTERY W/ RECONS    (Results Pending)         COURSE & MEDICAL DECISION MAKING  Pertinent Labs & Imaging studies reviewed. (See chart for details)  This is an ill-appearing 39-year-old female who presents the emerge department with vomiting and diffuse pain.  I am not familiar with her type of malignant cancer.  I suspect this is causing some element of gastritis.  I did order a chest x-ray as she was recently treated for pneumonia and this does show some continued opacities and therefore CT scan of the chest was " ordered.  The patient received Zofran and intravenous fluids on repeat exam she continues to have vomiting.  Therefore the patient will be admitted back to the hospital to the hospitalist for continued IV hydration and treatment.  The patient's anemia is improved from before and I do not appreciate any evidence of active bleeding.  I suspect this is from her current condition.  FINAL IMPRESSION  1.  Nausea and vomiting suspect secondary to gastritis  2.  Dehydration  3.  Malignant cancer  4.  Anemia  5.  Pulmonary infiltrates unclear source    Disposition  The patient will be admitted in stable condition         Electronically signed by: Kali Beltran, 6/22/2019 6:08 PM

## 2019-06-23 NOTE — ASSESSMENT & PLAN NOTE
Patient prescribed by me 60mg oxycontin bid and 30mg oxycodone q4h PRN as continuation of her previous RX as written by her oncologist in [place], when she ran out 48 hours prior to admission and symptoms started with severe abdominal pain and uncontrolled vomiting prior to admit - same as previous admission    Resume home dose of narcotics - patient will need appointment for oncology made by hospital so this abrupt withdrawal of opiates doesn't happen again.    I've written for 30 day RX for oxycontin and oxycodone - these are in her medical paper chart.

## 2019-06-23 NOTE — ASSESSMENT & PLAN NOTE
Greater than 10 minutes spent with patient smoking cessation counseling. Discussed cardiovascular risk factors of smoking, nicotine patch ordered.

## 2019-06-23 NOTE — PROGRESS NOTES
"Hospital Medicine Daily Progress Note    Date of Service  6/23/2019    Chief Complaint  39 y.o. female admitted 6/22/2019 with return of nausea and vomiting along with shortness of breath.    Hospital Course    Patient with similar presentation when she did not have her pain medications - stolen that time.  This time she completed the RX I gave her on discharge and within 48 hours of this she had nausea, vomiting and overall feeling poorly - consistent with high dose narcotic withdrawal.  The findings on CT that are concerning for a multifocal pneumonia are the same areas that were highlighted on her most recent PET scan - likely representing cancer and not acute infection - although this is likely the case, I will continue with antibiotics until she feels better with resuming her oxycodone/oxycontin regimen.      Interval Problem Update  Patient very tired and achy \"all over\" .  Timing of onset of symptoms typical of opiate withdrawal rather than infection recurrence.  Resume home dose of oxy and I will call [facility] tomorrow to get patient scheduled with Dr Delaney.    Consultants/Specialty  none    Code Status  full    Disposition  Home when appropriate.    Review of Systems  Review of Systems   Constitutional: Negative for chills and fever.   HENT: Negative for congestion and sore throat.    Eyes: Negative for blurred vision and photophobia.   Respiratory: Positive for shortness of breath. Negative for cough.    Cardiovascular: Negative for chest pain, claudication and leg swelling.   Gastrointestinal: Positive for abdominal pain, nausea and vomiting. Negative for constipation, diarrhea and heartburn.   Genitourinary: Negative for dysuria and hematuria.   Musculoskeletal: Negative for joint pain and myalgias.   Skin: Negative for itching and rash.   Neurological: Negative for dizziness, sensory change, speech change, weakness and headaches.   Psychiatric/Behavioral: Negative for depression. The patient is not " nervous/anxious and does not have insomnia.         Physical Exam  Temp:  [36.3 °C (97.3 °F)-36.9 °C (98.4 °F)] 36.4 °C (97.5 °F)  Pulse:  [] 89  Resp:  [16-18] 17  BP: (127-158)/() 130/90  SpO2:  [97 %-100 %] 100 %    Physical Exam   Constitutional: She is oriented to person, place, and time. She appears well-developed and well-nourished. No distress.   HENT:   Head: Normocephalic and atraumatic.   Eyes: Conjunctivae are normal. No scleral icterus.   Neck: Neck supple. No JVD present.   Cardiovascular: Normal rate, regular rhythm and normal heart sounds.  Exam reveals no gallop and no friction rub.    No murmur heard.  Pulmonary/Chest: Effort normal and breath sounds normal. No respiratory distress. She has no rales. She exhibits no tenderness.   Decreased breath sounds at bases     Abdominal: Soft. Bowel sounds are normal. She exhibits no distension. There is no guarding.   Musculoskeletal: She exhibits no edema or tenderness.   Lymphadenopathy:     She has no cervical adenopathy.   Neurological: She is alert and oriented to person, place, and time. No cranial nerve deficit.   Skin: Skin is warm and dry. She is not diaphoretic. No erythema. No pallor.   Psychiatric: She has a normal mood and affect. Her behavior is normal.   Nursing note and vitals reviewed.      Fluids    Intake/Output Summary (Last 24 hours) at 06/23/19 1408  Last data filed at 06/22/19 2308   Gross per 24 hour   Intake             1000 ml   Output              500 ml   Net              500 ml       Laboratory  Recent Labs      06/22/19 1815 06/23/19   0019   WBC  9.9  10.1   RBC  3.15*  3.33*   HEMOGLOBIN  8.4*  8.6*   HEMATOCRIT  28.2*  31.1*   MCV  89.5  93.4   MCH  26.7*  25.8*   MCHC  29.8*  27.7*   RDW  67.2*  73.1*   PLATELETCT  167  160*   MPV  9.4  9.1     Recent Labs      06/22/19   1815 06/23/19   0019   SODIUM  139  135   POTASSIUM  3.8  3.8   CHLORIDE  103  101   CO2  24  23   GLUCOSE  143*  155*   BUN  12  11    CREATININE  0.76  0.79   CALCIUM  9.8  9.7                   Imaging  CT-CTA CHEST PULMONARY ARTERY W/ RECONS   Final Result         1. No definite CT evidence of pulmonary embolism. Limited evaluation of the subsegmental branches due to poor contrast enhancement.      2. Extensive airspace opacities in the right middle lobe, right lower lobe and left lower lobe as well as patchy opacities in the right upper lobe, in keeping with severe multifocal pneumonias.      3. Significantly enlarged and heterogeneous spleen with multiple area of low-attenuation, likely infiltrate with disease.      4. Diffuse heterogeneous sclerotic and lytic lesions throughout the visualized spine, ribs and sternum, likely bone metastasis.      DX-CHEST-PORTABLE (1 VIEW)   Final Result         Diffuse interstitial prominence could relate to atypical infection or mild edema.      Persistent ill-defined right middle lobe opacities could relate to persistent pneumonia.           Assessment/Plan  * Multifocal pneumonia   Assessment & Plan    Noted multifocal pneumonia on CT scan - consistent with areas of highlight on PET scan - likely is her cancer, not acute pneumonia.  IV abx broad spectrum  montior for clinical improvement      Metastatic cancer (HCC)- (present on admission)   Assessment & Plan    epithelioid hemangioendotherlioma rare cancer   Needs oncologist - will call [facility] tomorrow to make appointment.     Narcotic withdrawal (HCC)   Assessment & Plan    Patient prescribed by me 60mg oxycontin bid and 30mg oxycodone q4h PRN as continuation of her previous RX as written by her oncologist in [place], when she ran out 48 hours prior to admission and symptoms started with severe abdominal pain and uncontrolled vomiting prior to admit - same as previous admission    Resume home dose of narcotics - patient will need appointment for oncology made by hospital so this abrupt withdrawal of opiates doesn't happen again.       Anemia    Assessment & Plan    No evidence of acute blood loss  Lab workup ordered     Intractable nausea and vomiting   Assessment & Plan    Cont with IVF   Anti emetics   Pain control   Advance diet as tolerated     Acute hypoxemic respiratory failure (HCC)   Assessment & Plan    Due to multifocal pneumonia   resp care protocol      Cancer associated pain- (present on admission)   Assessment & Plan    Diffuse pain, cont pain control regimine      Class 1 obesity in adult- (present on admission)   Assessment & Plan    Encouraged weight loss     Nicotine abuse- (present on admission)   Assessment & Plan    Greater than 10 minutes spent with patient smoking cessation counseling. Discussed cardiovascular risk factors of smoking, nicotine patch ordered.           VTE prophylaxis: heparin

## 2019-06-23 NOTE — ED NOTES
Pt resting in bed with eyes open, breathing even and unlabored, able to answer all questions appropriately, pt states she is nauseous, Dr. Beltran aware

## 2019-06-23 NOTE — RESPIRATORY CARE
COPD EDUCATION by COPD CLINICAL EDUCATOR  6/23/2019 at 7:04 AM by Stephy Pack     Patient reviewed by COPD education team. Patient does not qualify for the COPD program.

## 2019-06-23 NOTE — ED NOTES
The patient is resting on stretcher with eyes closed at this time. Respirations non labored. Skin warm and dry.

## 2019-06-23 NOTE — ASSESSMENT & PLAN NOTE
Noted multifocal pneumonia on CT scan - consistent with areas of highlight on PET scan - likely is her cancer, not acute pneumonia.  IV abx broad spectrum  montior for clinical improvement

## 2019-06-23 NOTE — PROGRESS NOTES
Pt is tired today.  Stating she hasn't slept very well for last few days.  PT stated she feels a little better on new pain regimen.  No appetite.  Denies nausea presently.  LS with crackles.  Mult ivabx.

## 2019-06-24 PROBLEM — R78.81 POSITIVE BLOOD CULTURE: Status: ACTIVE | Noted: 2019-06-24

## 2019-06-24 LAB
ABO GROUP BLD: NORMAL
ALBUMIN SERPL BCP-MCNC: 3.6 G/DL (ref 3.2–4.9)
ALBUMIN/GLOB SERPL: 1 G/DL
ALP SERPL-CCNC: 91 U/L (ref 30–99)
ALT SERPL-CCNC: 8 U/L (ref 2–50)
ANION GAP SERPL CALC-SCNC: 5 MMOL/L (ref 0–11.9)
ANISOCYTOSIS BLD QL SMEAR: ABNORMAL
AST SERPL-CCNC: 10 U/L (ref 12–45)
BACTERIA BLD CULT: ABNORMAL
BACTERIA BLD CULT: ABNORMAL
BARCODED ABORH UBTYP: 5100
BARCODED PRD CODE UBPRD: NORMAL
BARCODED UNIT NUM UBUNT: NORMAL
BASOPHILS # BLD AUTO: 0 % (ref 0–1.8)
BASOPHILS # BLD: 0 K/UL (ref 0–0.12)
BILIRUB SERPL-MCNC: 0.5 MG/DL (ref 0.1–1.5)
BLD GP AB SCN SERPL QL: NORMAL
BUN SERPL-MCNC: 13 MG/DL (ref 8–22)
CALCIUM SERPL-MCNC: 8.5 MG/DL (ref 8.5–10.5)
CHLORIDE SERPL-SCNC: 106 MMOL/L (ref 96–112)
CO2 SERPL-SCNC: 26 MMOL/L (ref 20–33)
COMPONENT R 8504R: NORMAL
CREAT SERPL-MCNC: 0.82 MG/DL (ref 0.5–1.4)
EOSINOPHIL # BLD AUTO: 0 K/UL (ref 0–0.51)
EOSINOPHIL NFR BLD: 0 % (ref 0–6.9)
ERYTHROCYTE [DISTWIDTH] IN BLOOD BY AUTOMATED COUNT: 73 FL (ref 35.9–50)
GLOBULIN SER CALC-MCNC: 3.7 G/DL (ref 1.9–3.5)
GLUCOSE SERPL-MCNC: 173 MG/DL (ref 65–99)
HCT VFR BLD AUTO: 24.2 % (ref 37–47)
HGB BLD-MCNC: 6.9 G/DL (ref 12–16)
LYMPHOCYTES # BLD AUTO: 1.68 K/UL (ref 1–4.8)
LYMPHOCYTES NFR BLD: 17.7 % (ref 22–41)
MACROCYTES BLD QL SMEAR: ABNORMAL
MANUAL DIFF BLD: NORMAL
MCH RBC QN AUTO: 27 PG (ref 27–33)
MCHC RBC AUTO-ENTMCNC: 28.5 G/DL (ref 33.6–35)
MCV RBC AUTO: 94.5 FL (ref 81.4–97.8)
MICROCYTES BLD QL SMEAR: ABNORMAL
MONOCYTES # BLD AUTO: 0.33 K/UL (ref 0–0.85)
MONOCYTES NFR BLD AUTO: 3.5 % (ref 0–13.4)
MORPHOLOGY BLD-IMP: NORMAL
MYELOCYTES NFR BLD MANUAL: 1.8 %
NEUTROPHILS # BLD AUTO: 7.32 K/UL (ref 2–7.15)
NEUTROPHILS NFR BLD: 77 % (ref 44–72)
NRBC # BLD AUTO: 0.32 K/UL
NRBC BLD-RTO: 3.4 /100 WBC
PLATELET # BLD AUTO: 138 K/UL (ref 164–446)
PLATELET BLD QL SMEAR: NORMAL
PMV BLD AUTO: 9.3 FL (ref 9–12.9)
POLYCHROMASIA BLD QL SMEAR: NORMAL
POTASSIUM SERPL-SCNC: 4.5 MMOL/L (ref 3.6–5.5)
PRODUCT TYPE UPROD: NORMAL
PROT SERPL-MCNC: 7.3 G/DL (ref 6–8.2)
RBC # BLD AUTO: 2.56 M/UL (ref 4.2–5.4)
RBC BLD AUTO: PRESENT
RH BLD: NORMAL
SIGNIFICANT IND 70042: ABNORMAL
SITE SITE: ABNORMAL
SODIUM SERPL-SCNC: 137 MMOL/L (ref 135–145)
SOURCE SOURCE: ABNORMAL
UNIT STATUS USTAT: NORMAL
WBC # BLD AUTO: 9.5 K/UL (ref 4.8–10.8)

## 2019-06-24 PROCEDURE — 99232 SBSQ HOSP IP/OBS MODERATE 35: CPT | Performed by: INTERNAL MEDICINE

## 2019-06-24 PROCEDURE — 85027 COMPLETE CBC AUTOMATED: CPT

## 2019-06-24 PROCEDURE — 36415 COLL VENOUS BLD VENIPUNCTURE: CPT

## 2019-06-24 PROCEDURE — A9270 NON-COVERED ITEM OR SERVICE: HCPCS | Performed by: HOSPITALIST

## 2019-06-24 PROCEDURE — 36430 TRANSFUSION BLD/BLD COMPNT: CPT

## 2019-06-24 PROCEDURE — 700102 HCHG RX REV CODE 250 W/ 637 OVERRIDE(OP): Performed by: HOSPITALIST

## 2019-06-24 PROCEDURE — 700102 HCHG RX REV CODE 250 W/ 637 OVERRIDE(OP): Performed by: INTERNAL MEDICINE

## 2019-06-24 PROCEDURE — 86923 COMPATIBILITY TEST ELECTRIC: CPT

## 2019-06-24 PROCEDURE — P9016 RBC LEUKOCYTES REDUCED: HCPCS

## 2019-06-24 PROCEDURE — 80053 COMPREHEN METABOLIC PANEL: CPT

## 2019-06-24 PROCEDURE — 94760 N-INVAS EAR/PLS OXIMETRY 1: CPT

## 2019-06-24 PROCEDURE — 86900 BLOOD TYPING SEROLOGIC ABO: CPT

## 2019-06-24 PROCEDURE — 94668 MNPJ CHEST WALL SBSQ: CPT

## 2019-06-24 PROCEDURE — 770004 HCHG ROOM/CARE - ONCOLOGY PRIVATE *

## 2019-06-24 PROCEDURE — 86901 BLOOD TYPING SEROLOGIC RH(D): CPT

## 2019-06-24 PROCEDURE — 700101 HCHG RX REV CODE 250: Performed by: HOSPITALIST

## 2019-06-24 PROCEDURE — 700111 HCHG RX REV CODE 636 W/ 250 OVERRIDE (IP): Performed by: HOSPITALIST

## 2019-06-24 PROCEDURE — 30233N1 TRANSFUSION OF NONAUTOLOGOUS RED BLOOD CELLS INTO PERIPHERAL VEIN, PERCUTANEOUS APPROACH: ICD-10-PCS | Performed by: HOSPITALIST

## 2019-06-24 PROCEDURE — 97161 PT EVAL LOW COMPLEX 20 MIN: CPT

## 2019-06-24 PROCEDURE — A9270 NON-COVERED ITEM OR SERVICE: HCPCS | Performed by: INTERNAL MEDICINE

## 2019-06-24 PROCEDURE — 85007 BL SMEAR W/DIFF WBC COUNT: CPT

## 2019-06-24 PROCEDURE — 86850 RBC ANTIBODY SCREEN: CPT

## 2019-06-24 PROCEDURE — 700105 HCHG RX REV CODE 258: Performed by: HOSPITALIST

## 2019-06-24 RX ORDER — OXYCODONE HYDROCHLORIDE 60 MG/1
60 TABLET, FILM COATED, EXTENDED RELEASE ORAL EVERY 12 HOURS
Qty: 60 EACH | Refills: 0 | Status: SHIPPED | OUTPATIENT
Start: 2019-06-24 | End: 2019-07-24

## 2019-06-24 RX ORDER — OXYCODONE HYDROCHLORIDE 30 MG/1
30 TABLET ORAL EVERY 4 HOURS PRN
Qty: 90 TAB | Refills: 0 | Status: SHIPPED | OUTPATIENT
Start: 2019-06-24 | End: 2019-07-24

## 2019-06-24 RX ADMIN — METOPROLOL TARTRATE 25 MG: 25 TABLET, FILM COATED ORAL at 18:12

## 2019-06-24 RX ADMIN — OXYCODONE HYDROCHLORIDE 60 MG: 40 TABLET, FILM COATED, EXTENDED RELEASE ORAL at 06:02

## 2019-06-24 RX ADMIN — SENNOSIDES AND DOCUSATE SODIUM 2 TABLET: 8.6; 5 TABLET ORAL at 18:13

## 2019-06-24 RX ADMIN — OLANZAPINE 5 MG: 5 TABLET, FILM COATED ORAL at 18:12

## 2019-06-24 RX ADMIN — CEFTRIAXONE SODIUM 2 G: 2 INJECTION, POWDER, FOR SOLUTION INTRAMUSCULAR; INTRAVENOUS at 05:57

## 2019-06-24 RX ADMIN — METRONIDAZOLE 500 MG: 500 INJECTION, SOLUTION INTRAVENOUS at 15:27

## 2019-06-24 RX ADMIN — OXYCODONE HYDROCHLORIDE 30 MG: 30 TABLET ORAL at 02:03

## 2019-06-24 RX ADMIN — METRONIDAZOLE 500 MG: 500 INJECTION, SOLUTION INTRAVENOUS at 08:04

## 2019-06-24 RX ADMIN — METOPROLOL TARTRATE 25 MG: 25 TABLET, FILM COATED ORAL at 06:01

## 2019-06-24 RX ADMIN — DEXAMETHASONE SODIUM PHOSPHATE 4 MG: 4 INJECTION, SOLUTION INTRAMUSCULAR; INTRAVENOUS at 13:29

## 2019-06-24 RX ADMIN — DEXAMETHASONE SODIUM PHOSPHATE 4 MG: 4 INJECTION, SOLUTION INTRAMUSCULAR; INTRAVENOUS at 00:46

## 2019-06-24 RX ADMIN — OXYCODONE HYDROCHLORIDE 30 MG: 30 TABLET ORAL at 23:52

## 2019-06-24 RX ADMIN — DOXYCYCLINE 100 MG: 100 INJECTION, POWDER, LYOPHILIZED, FOR SOLUTION INTRAVENOUS at 18:11

## 2019-06-24 RX ADMIN — DEXAMETHASONE SODIUM PHOSPHATE 4 MG: 4 INJECTION, SOLUTION INTRAMUSCULAR; INTRAVENOUS at 18:11

## 2019-06-24 RX ADMIN — DEXAMETHASONE SODIUM PHOSPHATE 4 MG: 4 INJECTION, SOLUTION INTRAMUSCULAR; INTRAVENOUS at 06:01

## 2019-06-24 RX ADMIN — HEPARIN SODIUM 5000 UNITS: 5000 INJECTION, SOLUTION INTRAVENOUS; SUBCUTANEOUS at 06:00

## 2019-06-24 RX ADMIN — METRONIDAZOLE 500 MG: 500 INJECTION, SOLUTION INTRAVENOUS at 22:52

## 2019-06-24 RX ADMIN — HEPARIN SODIUM 5000 UNITS: 5000 INJECTION, SOLUTION INTRAVENOUS; SUBCUTANEOUS at 15:27

## 2019-06-24 RX ADMIN — DOXYCYCLINE 100 MG: 100 INJECTION, POWDER, LYOPHILIZED, FOR SOLUTION INTRAVENOUS at 05:58

## 2019-06-24 RX ADMIN — DEXAMETHASONE SODIUM PHOSPHATE 4 MG: 4 INJECTION, SOLUTION INTRAMUSCULAR; INTRAVENOUS at 23:52

## 2019-06-24 RX ADMIN — OXYCODONE HYDROCHLORIDE 60 MG: 40 TABLET, FILM COATED, EXTENDED RELEASE ORAL at 18:12

## 2019-06-24 RX ADMIN — NICOTINE 7 MG: 7 PATCH, EXTENDED RELEASE TRANSDERMAL at 06:01

## 2019-06-24 RX ADMIN — OXYCODONE HYDROCHLORIDE 30 MG: 30 TABLET ORAL at 10:26

## 2019-06-24 ASSESSMENT — ENCOUNTER SYMPTOMS
ABDOMINAL PAIN: 1
SHORTNESS OF BREATH: 0
PHOTOPHOBIA: 0
CLAUDICATION: 0
INSOMNIA: 0
NAUSEA: 0
CONSTIPATION: 0
WEAKNESS: 0
DIARRHEA: 0
SORE THROAT: 0
BLURRED VISION: 0
SENSORY CHANGE: 0
VOMITING: 0
HEARTBURN: 0
DIZZINESS: 0
DEPRESSION: 0
CHILLS: 0
COUGH: 0
MYALGIAS: 0
SPEECH CHANGE: 0
HEADACHES: 0
FEVER: 0
NERVOUS/ANXIOUS: 0

## 2019-06-24 ASSESSMENT — GAIT ASSESSMENTS
ASSISTIVE DEVICE: FRONT WHEEL WALKER
DEVIATION: BRADYKINETIC
DISTANCE (FEET): 250
GAIT LEVEL OF ASSIST: SUPERVISED

## 2019-06-24 ASSESSMENT — COGNITIVE AND FUNCTIONAL STATUS - GENERAL
MOBILITY SCORE: 24
SUGGESTED CMS G CODE MODIFIER MOBILITY: CH

## 2019-06-24 NOTE — THERAPY
"Pt is a 40 y/o female admittted with N/V and recent PNA with hx of epithelioid hemantioendotherlioma. Pt presents to acute PT at PLOF baseline. Pt reports she ambulated more today than she has in a while. Typically uses FWW and WC for mobility, has not ambulated without support of an AD in > 1 year. Pt reports no further needs, but does note she may need a shower seat upon DC home. No further acute PT needs, continue to mobilize with nursing staff.     Physical Therapy Evaluation completed.   Bed Mobility:  Supine to Sit: Supervised (HOB elevated)  Transfers: Sit to Stand: Supervised  Gait: Level Of Assist: Supervised with Front-Wheel Walker       Plan of Care: Patient with no further skilled PT needs in the acute care setting at this time  Discharge Recommendations: Equipment: No Equipment Needed. Post-acute therapy: Currently anticipate no further skilled therapy needs once patient is discharged from the inpatient setting.    See \"Rehab Therapy-Acute\" Patient Summary Report for complete documentation.     "

## 2019-06-24 NOTE — PROGRESS NOTES
Patient is alert and oriented up with one person assist to the BSC. Reviewed POC with patient. Patient has blood infusing at this time. Tolerating well. Call light within reach hourly rounding in practice.

## 2019-06-24 NOTE — DISCHARGE PLANNING
Care Transition Team Assessment    Information for this assessment was compiled from a bedside interview with the pt as well as chart information. Pt reports living in a single family home with her children, , and extended family. Pt reports being independent with ADL/IADLs with the exception of sometimes using a walker or wheelchair to ambulate. Pt is expected to dc to home. Pt has requested a 3 in 1 chair upon dc.      Information Source  Orientation : Oriented x 4  Information Given By: Patient  Who is responsible for making decisions for patient? : Patient         Elopement Risk  Legal Hold: No  Ambulatory or Self Mobile in Wheelchair: Yes  Disoriented: No  Psychiatric Symptoms: None  History of Wandering: No  Elopement this Admit: No  Vocalizing Wanting to Leave: No  Displays Behaviors, Body Language Wanting to Leave: No-Not at Risk for Elopement  Elopement Risk: Not at Risk for Elopement    Interdisciplinary Discharge Planning  Patient or legal guardian wants to designate a caregiver (see row info): No    Discharge Preparedness  What is your plan after discharge?: Home with help  What are your discharge supports?: Child, Sibling  Prior Functional Level: Ambulatory, Independent with Activities of Daily Living, Independent with Medication Management, Uses Walker, Uses Wheelchair  Difficulity with ADLs: Walking  Difficulty with ADLs Comment: Pt sometimes uses a walker to ambulate.   Difficulity with IADLs: None    Functional Assesment  Prior Functional Level: Ambulatory, Independent with Activities of Daily Living, Independent with Medication Management, Uses Walker, Uses Wheelchair    Finances  Financial Barriers to Discharge: No  Prescription Coverage: Yes              Advance Directive  Advance Directive?: None  Advance Directive offered?: AD Booklet refused    Domestic Abuse  Have you ever been the victim of abuse or violence?: Not Sure    Psychological Assessment  History of Substance Abuse:  None  History of Psychiatric Problems: No  Non-compliant with Treatment: No  Newly Diagnosed Illness: No    Discharge Risks or Barriers  Discharge risks or barriers?: No    Anticipated Discharge Information  Anticipated discharge disposition: Home

## 2019-06-24 NOTE — CARE PLAN
Problem: Safety  Goal: Will remain free from falls    Intervention: Assess risk factors for falls  Bed alarm on for patient safety      Problem: Respiratory:  Goal: Respiratory status will improve    Intervention: Assess and monitor pulmonary status  Patient lungs sound clear at this time. Will continue to monitor

## 2019-06-24 NOTE — PROGRESS NOTES
Lab called with critical result of Hmg 6.9 at 0220. Critical lab result read back to Lab.   Dr. Modi notified of critical lab result at 0255.  Critical lab result read back by Dr. Modi.

## 2019-06-24 NOTE — CARE PLAN
Problem: Safety  Goal: Will remain free from falls  Outcome: PROGRESSING AS EXPECTED  Assessed patient's mobility. Reinforced use of call light. Encouraged to call for assistance when needed.     Problem: Pain Management  Goal: Pain level will decrease to patient's comfort goal  Outcome: PROGRESSING AS EXPECTED  Discussed pain control and comfort goal with patient at bedside. Encouraged non pharmacological interventions to decrease stress and pain. Medication administered - see EMAR

## 2019-06-24 NOTE — CARE PLAN
Problem: Hyperinflation:  Goal: Prevent or improve atelectasis  Outcome: PROGRESSING AS EXPECTED      Respiratory Update    Treatment modality:     PEP QID    Pt tolerating current treatments well with no adverse reactions.

## 2019-06-24 NOTE — DISCHARGE PLANNING
Anticipated Discharge Disposition: Home    Action: DME Choice    Presented pt with DME Choice form for a walker. Pt states she already has a walker. Pt stated she needs a shower chair. Pt has a wc in their room which she can utilize upon dc. .     Informed attending physician, Dr. Mojica about pt request. She states she will put in an order for a 3 in 1 chair.    Faxed and updated Annamaria MAY    Barriers to Discharge: none    Plan: Pt to dc home

## 2019-06-24 NOTE — PROGRESS NOTES
"Hospital Medicine Daily Progress Note    Date of Service  6/24/2019    Chief Complaint  39 y.o. female admitted 6/22/2019 with return of nausea and vomiting along with shortness of breath.    Hospital Course    Patient with similar presentation when she did not have her pain medications - stolen that time.  This time she completed the RX I gave her on discharge and within 48 hours of this she had nausea, vomiting and overall feeling poorly - consistent with high dose narcotic withdrawal.  The findings on CT that are concerning for a multifocal pneumonia are the same areas that were highlighted on her most recent PET scan - likely representing cancer and not acute infection - although this is likely the case, I will continue with antibiotics until she feels better with resuming her oxycodone/oxycontin regimen.      Interval Problem Update  6/23 Patient very tired and achy \"all over\" .  Timing of onset of symptoms typical of opiate withdrawal rather than infection recurrence.  Resume home dose of oxy and I will call [facility] tomorrow to get patient scheduled with Dr Delaney.  6/24 Patient feeling much better today, no further nausea and vomiting and no longer in opiate withdrawal.  Her breathing is back to her baseline and she doesn't have examination consistent with pneumonia and isn't requiring oxygen.  Will continue with antibiotics for one more day and then discontinue.  I've written RX for her oxycodone and oxycontin for 30 day supply and am waiting for a call back from [facility] to confirm appointment for oncology follow up before patient discharges home.  I anticipate patient okay to leave hospital tomorrow.  Hbg 6.9 this am, 1 unit of blood transfused.      Consultants/Specialty  none    Code Status  full    Disposition  Home likely tomorrow.    Review of Systems  Review of Systems   Constitutional: Negative for chills and fever.   HENT: Negative for congestion and sore throat.    Eyes: Negative for blurred vision and " photophobia.   Respiratory: Negative for cough and shortness of breath.    Cardiovascular: Negative for chest pain, claudication and leg swelling.   Gastrointestinal: Positive for abdominal pain (baseline). Negative for constipation, diarrhea, heartburn, nausea and vomiting.   Genitourinary: Negative for dysuria and hematuria.   Musculoskeletal: Negative for joint pain and myalgias.   Skin: Negative for itching and rash.   Neurological: Negative for dizziness, sensory change, speech change, weakness and headaches.   Psychiatric/Behavioral: Negative for depression. The patient is not nervous/anxious and does not have insomnia.         Physical Exam  Temp:  [36.1 °C (97 °F)-36.7 °C (98 °F)] 36.1 °C (97 °F)  Pulse:  [78-92] 92  Resp:  [14-18] 16  BP: (102-124)/(67-77) 119/71  SpO2:  [94 %-100 %] 95 %    Physical Exam   Constitutional: She is oriented to person, place, and time. She appears well-developed and well-nourished. No distress.   HENT:   Head: Normocephalic and atraumatic.   Eyes: Conjunctivae are normal. No scleral icterus.   Neck: Neck supple. No JVD present.   Cardiovascular: Normal rate, regular rhythm and normal heart sounds.  Exam reveals no gallop and no friction rub.    No murmur heard.  Pulmonary/Chest: Effort normal and breath sounds normal. No respiratory distress. She has no rales. She exhibits no tenderness.   Decreased breath sounds at bases     Abdominal: Soft. Bowel sounds are normal. She exhibits no distension. There is no guarding.   Musculoskeletal: She exhibits no edema or tenderness.   Lymphadenopathy:     She has no cervical adenopathy.   Neurological: She is alert and oriented to person, place, and time. No cranial nerve deficit.   Skin: Skin is warm and dry. She is not diaphoretic. No erythema. No pallor.   Psychiatric: She has a normal mood and affect. Her behavior is normal.   Nursing note and vitals reviewed.      Fluids    Intake/Output Summary (Last 24 hours) at 06/24/19 1322  Last  data filed at 06/24/19 1303   Gross per 24 hour   Intake              360 ml   Output                0 ml   Net              360 ml       Laboratory  Recent Labs      06/22/19   1815  06/23/19   0019  06/24/19   0200   WBC  9.9  10.1  9.5   RBC  3.15*  3.33*  2.56*   HEMOGLOBIN  8.4*  8.6*  6.9*   HEMATOCRIT  28.2*  31.1*  24.2*   MCV  89.5  93.4  94.5   MCH  26.7*  25.8*  27.0   MCHC  29.8*  27.7*  28.5*   RDW  67.2*  73.1*  73.0*   PLATELETCT  167  160*  138*   MPV  9.4  9.1  9.3     Recent Labs      06/22/19 1815 06/23/19   0019  06/24/19   0046   SODIUM  139  135  137   POTASSIUM  3.8  3.8  4.5   CHLORIDE  103  101  106   CO2  24  23  26   GLUCOSE  143*  155*  173*   BUN  12  11  13   CREATININE  0.76  0.79  0.82   CALCIUM  9.8  9.7  8.5                   Imaging  CT-CTA CHEST PULMONARY ARTERY W/ RECONS   Final Result         1. No definite CT evidence of pulmonary embolism. Limited evaluation of the subsegmental branches due to poor contrast enhancement.      2. Extensive airspace opacities in the right middle lobe, right lower lobe and left lower lobe as well as patchy opacities in the right upper lobe, in keeping with severe multifocal pneumonias.      3. Significantly enlarged and heterogeneous spleen with multiple area of low-attenuation, likely infiltrate with disease.      4. Diffuse heterogeneous sclerotic and lytic lesions throughout the visualized spine, ribs and sternum, likely bone metastasis.      DX-CHEST-PORTABLE (1 VIEW)   Final Result         Diffuse interstitial prominence could relate to atypical infection or mild edema.      Persistent ill-defined right middle lobe opacities could relate to persistent pneumonia.           Assessment/Plan  * Multifocal pneumonia   Assessment & Plan    Noted multifocal pneumonia on CT scan - consistent with areas of highlight on PET scan - likely is her cancer, not acute pneumonia.  IV abx broad spectrum  montior for clinical improvement      Metastatic  cancer (HCC)- (present on admission)   Assessment & Plan    epithelioid hemangioendotherlioma rare cancer   Needs oncologist - [facility] appointment.     Positive blood culture   Assessment & Plan    1 of 2 culture positive, gram positive - likely contaminant  Continue current antibiotics.       Narcotic withdrawal (HCC)   Assessment & Plan    Patient prescribed by me 60mg oxycontin bid and 30mg oxycodone q4h PRN as continuation of her previous RX as written by her oncologist in [place], when she ran out 48 hours prior to admission and symptoms started with severe abdominal pain and uncontrolled vomiting prior to admit - same as previous admission    Resume home dose of narcotics - patient will need appointment for oncology made by hospital so this abrupt withdrawal of opiates doesn't happen again.    I've written for 30 day RX for oxycontin and oxycodone - these are in her medical paper chart.         Anemia   Assessment & Plan    No evidence of acute blood loss,  Transfuse 6/24/19 - 1 unit  Likely related to her cancer.       Intractable nausea and vomiting   Assessment & Plan    Cont with IVF   Anti emetics   Pain control   Advance diet as tolerated     Acute hypoxemic respiratory failure (HCC)   Assessment & Plan    Due to multifocal pneumonia   resp care protocol      Cancer associated pain- (present on admission)   Assessment & Plan    Diffuse pain, cont pain control regimine      Class 1 obesity in adult- (present on admission)   Assessment & Plan    Encouraged weight loss     Nicotine abuse- (present on admission)   Assessment & Plan    Greater than 10 minutes spent with patient smoking cessation counseling. Discussed cardiovascular risk factors of smoking, nicotine patch ordered.           VTE prophylaxis: heparin

## 2019-06-24 NOTE — DISCHARGE PLANNING
Received Choice form at 2018 from Daniel STEPHEN  Agency/Facility Name: [facility]  Referral sent per Choice form @ [id number]

## 2019-06-25 VITALS
BODY MASS INDEX: 35.87 KG/M2 | SYSTOLIC BLOOD PRESSURE: 139 MMHG | DIASTOLIC BLOOD PRESSURE: 83 MMHG | TEMPERATURE: 97.7 F | HEART RATE: 86 BPM | OXYGEN SATURATION: 99 % | HEIGHT: 64 IN | RESPIRATION RATE: 18 BRPM | WEIGHT: 210.1 LBS

## 2019-06-25 PROBLEM — J96.01 ACUTE HYPOXEMIC RESPIRATORY FAILURE (HCC): Status: RESOLVED | Noted: 2019-06-22 | Resolved: 2019-06-25

## 2019-06-25 LAB
HCT VFR BLD AUTO: 28 % (ref 37–47)
HGB BLD-MCNC: 8.2 G/DL (ref 12–16)
PROCALCITONIN SERPL-MCNC: 0.13 NG/ML

## 2019-06-25 PROCEDURE — 700111 HCHG RX REV CODE 636 W/ 250 OVERRIDE (IP): Performed by: HOSPITALIST

## 2019-06-25 PROCEDURE — 700102 HCHG RX REV CODE 250 W/ 637 OVERRIDE(OP): Performed by: HOSPITALIST

## 2019-06-25 PROCEDURE — 700105 HCHG RX REV CODE 258: Performed by: HOSPITALIST

## 2019-06-25 PROCEDURE — A9270 NON-COVERED ITEM OR SERVICE: HCPCS | Performed by: INTERNAL MEDICINE

## 2019-06-25 PROCEDURE — 99239 HOSP IP/OBS DSCHRG MGMT >30: CPT | Performed by: HOSPITALIST

## 2019-06-25 PROCEDURE — 84145 PROCALCITONIN (PCT): CPT

## 2019-06-25 PROCEDURE — 700102 HCHG RX REV CODE 250 W/ 637 OVERRIDE(OP): Performed by: INTERNAL MEDICINE

## 2019-06-25 PROCEDURE — 36415 COLL VENOUS BLD VENIPUNCTURE: CPT

## 2019-06-25 PROCEDURE — 85018 HEMOGLOBIN: CPT

## 2019-06-25 PROCEDURE — A9270 NON-COVERED ITEM OR SERVICE: HCPCS | Performed by: HOSPITALIST

## 2019-06-25 PROCEDURE — 85014 HEMATOCRIT: CPT

## 2019-06-25 PROCEDURE — 700101 HCHG RX REV CODE 250: Performed by: HOSPITALIST

## 2019-06-25 RX ADMIN — OXYCODONE HYDROCHLORIDE 30 MG: 30 TABLET ORAL at 04:44

## 2019-06-25 RX ADMIN — SODIUM CHLORIDE: 9 INJECTION, SOLUTION INTRAVENOUS at 04:40

## 2019-06-25 RX ADMIN — DEXAMETHASONE SODIUM PHOSPHATE 4 MG: 4 INJECTION, SOLUTION INTRAMUSCULAR; INTRAVENOUS at 05:17

## 2019-06-25 RX ADMIN — HEPARIN SODIUM 5000 UNITS: 5000 INJECTION, SOLUTION INTRAVENOUS; SUBCUTANEOUS at 05:17

## 2019-06-25 RX ADMIN — METRONIDAZOLE 500 MG: 500 INJECTION, SOLUTION INTRAVENOUS at 07:07

## 2019-06-25 RX ADMIN — NICOTINE 7 MG: 7 PATCH, EXTENDED RELEASE TRANSDERMAL at 05:18

## 2019-06-25 RX ADMIN — OXYCODONE HYDROCHLORIDE 60 MG: 40 TABLET, FILM COATED, EXTENDED RELEASE ORAL at 05:18

## 2019-06-25 RX ADMIN — DOXYCYCLINE 100 MG: 100 INJECTION, POWDER, LYOPHILIZED, FOR SOLUTION INTRAVENOUS at 05:16

## 2019-06-25 RX ADMIN — CEFTRIAXONE SODIUM 2 G: 2 INJECTION, POWDER, FOR SOLUTION INTRAMUSCULAR; INTRAVENOUS at 05:15

## 2019-06-25 RX ADMIN — METOPROLOL TARTRATE 25 MG: 25 TABLET, FILM COATED ORAL at 05:20

## 2019-06-25 NOTE — DISCHARGE PLANNING
Agency/Facility Name: [facility]  Spoke To: Intake  Outcome: Did not receive referral. [facility] refaxed to [phone].    Lina CORBETT notified.

## 2019-06-25 NOTE — PROGRESS NOTES
Pt is A&O.  Up adlib.  Gait is steady.  Afebrile.  VSS.  Pain is under good control.  Hs Rx.  Presently trying to get pt an appointment at [facility] prior to DC.

## 2019-06-25 NOTE — DISCHARGE PLANNING
Agency/Facility Name: [facility]  Spoke To: intake   Outcome: Pt accepted and commode was delivered 6/24.

## 2019-06-25 NOTE — DISCHARGE INSTRUCTIONS
Discharge Instructions    Discharged to home by car with relative. Discharged via wheelchair, hospital escort: Yes.  Special equipment needed: Not Applicable    Be sure to schedule a follow-up appointment with your primary care doctor or any specialists as instructed.     Discharge Plan:   Diet Plan: Discussed  Activity Level: Discussed  Smoking Cessation Offered: Patient Refused  Confirmed Follow up Appointment: Patient to Call and Schedule Appointment  Confirmed Symptoms Management: Discussed  Medication Reconciliation Updated: Yes  Influenza Vaccine Indication: Patient Refuses    I understand that a diet low in cholesterol, fat, and sodium is recommended for good health. Unless I have been given specific instructions below for another diet, I accept this instruction as my diet prescription.   Other diet: regular      Special Instructions: None    · Is patient discharged on Warfarin / Coumadin?   No     Depression / Suicide Risk    As you are discharged from this [facility] facility, it is important to learn how to keep safe from harming yourself.    Recognize the warning signs:  · Abrupt changes in personality, positive or negative- including increase in energy   · Giving away possessions  · Change in eating patterns- significant weight changes-  positive or negative  · Change in sleeping patterns- unable to sleep or sleeping all the time   · Unwillingness or inability to communicate  · Depression  · Unusual sadness, discouragement and loneliness  · Talk of wanting to die  · Neglect of personal appearance   · Rebelliousness- reckless behavior  · Withdrawal from people/activities they love  · Confusion- inability to concentrate     If you or a loved one observes any of these behaviors or has concerns about self-harm, here's what you can do:  · Talk about it- your feelings and reasons for harming yourself  · Remove any means that you might use to hurt yourself (examples: pills, rope, extension cords,  firearm)  · Get professional help from the community (Mental Health, Substance Abuse, psychological counseling)  · Do not be alone:Call your Safe Contact- someone whom you trust who will be there for you.  · Call your local CRISIS HOTLINE 081-9159 or 506-218-1330  · Call your local Children's Mobile Crisis Response Team [place] (973) 738-8322 or www.[employer]  · Call the toll free National Suicide Prevention Hotlines   · National Suicide Prevention Lifeline 399-644-OSQO (6021)  · National Hope Line Network 800-SUICIDE (104-2273)      Discharge Instructions per Boni Gomes M.D.        DIET: regular diet    ACTIVITY: as tolerated    DIAGNOSIS: narcotic withdrawal, metastatic cancer    Return to ER if shortness of breath return or having fever.

## 2019-06-26 ENCOUNTER — PATIENT OUTREACH (OUTPATIENT)
Dept: OTHER | Facility: MEDICAL CENTER | Age: 39
End: 2019-06-26

## 2019-06-26 NOTE — PROGRESS NOTES
2018    RE: Wally Shubham, : 1989      Patient returns now 2-month follow-up after left inguinal hernia repair as well as left groin tendon lengthening surgery.  He is continuing to perform rehab exercises particularly for the groin surgery.  He reports no real significant ongoing discomforts or difficulties associated with the inguinal hernia repair.  He has occasional mild discomfort but reports overall improvement from his preoperative level of discomfort and dysfunction.  He has returned to running at this time.     On examination: His incision is well-healed.  There is no evidence of recurrent hernia.     At this time I stressed the need for ongoing core strengthening and flexibility training.  Recommended some degree of deep tissue massage as well.  He has no further restrictions from my perspective but is still working on the rehab for the groin surgery.  I would happily see him back at anytime in the future should questions or problems arise.  Overall he seems pleased with his outcome.        Andrew Rush MD   Oncology Nurse Navigation attempted to reach out to patient regarding new referral received today to help patient with getting to her appointments and get hooked up again to receive oncology treatment again here in [place].  I left a message with the number listed on the chart with my contact information.  The voicemail was not stating that the number belonged to the patient.  I will wait return call and mail a letter to the address on file.

## 2019-06-26 NOTE — DISCHARGE SUMMARY
Discharge Summary    CHIEF COMPLAINT ON ADMISSION  Chief Complaint   Patient presents with   • N/V     Started today.    • Other     PT reports that they have stage 4 cancer. Last chemotherapy was 1 month ago. Pt has not recieved treatment since moving to [place].       Reason for Admission  Stomach pain, vomitting     Admission Date  6/22/2019    CODE STATUS  Full code    HPI & HOSPITAL COURSE  Please see original H&P for specific information, patient was admitted due to abdominal pain and vomiting patient has history of ovarian cancer with metastasis she has recently moved from [place] she has history of epithelioid hemangioendotherlioma and no follow-up with oncology patient had CT showing multifocal pneumonia patient was started on broad-spectrum antibiotics, she was found to have acute respiratory failure, patient responded well to treatment, she had opiate withdrawal symptoms this was treated with oxycodone upon evaluation of previous PET/CT findings on CT scan on this admission likely related to metastasis disease, procalcitonin is negative, no leukocytosis, no fever, patient has been able to be weaned off oxygen, cultures are negative, patient today she is alert oriented follows commands, she is feeling much better she denies any nausea vomiting diarrhea constipation, no fever chills no shortness of breath, patient had low hemoglobin and she received 1 unit of PRBCs, repeated hemoglobin is improved, there is no signs of acute bleeding, patient is hemodynamically stable for discharge and she is eager to go back home, patient needs to follow-up with primary care physician, follow-up with oncology Dr. Delaney,, patient expressed understanding of her discharge plan and agree with it, since her procalcitonin was negative and no signs of infection antibiotics were stopped.       Therefore, she is discharged in good and stable condition to home with close outpatient follow-up.    The patient met 2-midnight criteria  for an inpatient stay at the time of discharge.    Discharge Date  6/25/2019    FOLLOW UP ITEMS POST DISCHARGE  Primary care physician in 1 week  Oncology in 1 week      DISCHARGE DIAGNOSES  Active Problems:    Metastatic cancer (HCC) POA: Yes    Nicotine abuse POA: Yes    Class 1 obesity in adult POA: Yes    Cancer associated pain POA: Yes    Intractable nausea and vomiting POA: Unknown    Anemia POA: Unknown    Narcotic withdrawal (HCC) POA: Unknown    Positive blood culture POA: Unknown  Resolved Problems:    Acute hypoxemic respiratory failure (HCC) POA: Unknown      FOLLOW UP  Future Appointments  Date Time Provider Department Center   7/10/2019 3:30 PM Mary Bautista M.D. [facility] [address]   7/11/2019 7:40 AM Tex Carpenter M.D. [facility]     Tex Carpenter M.D.  [address]  [place] [id number]  [phone]          Rosita Delaney M.D.  [address] [place] [id number] [phone]            MEDICATIONS ON DISCHARGE     Medication List      START taking these medications      Instructions   nicotine 7 MG/24HR Pt24  Commonly known as:  NICODERM   Apply 1 Patch to skin as directed every 24 hours.  Dose:  1 Patch     * oxyCODONE CR 60 MG T12a tablet  Commonly known as:  OXYCONTIN  Replaces:  oxyCODONE 30 MG immediate release tablet   Take 1 Tab by mouth every 12 hours for 30 days.  Dose:  60 mg     * oxyCODONE 30 MG immediate release tablet  Commonly known as:  OXY-IR   Take 1 Tab by mouth every four hours as needed for up to 30 days.  Dose:  30 mg        * This list has 2 medication(s) that are the same as other medications prescribed for you. Read the directions carefully, and ask your doctor or other care provider to review them with you.            CONTINUE taking these medications      Instructions   metoprolol 25 MG Tabs  Commonly known as:  LOPRESSOR   Take 25 mg by mouth 2 times a day.  Dose:  25 mg        STOP taking these medications    oxyCODONE 30 MG immediate release tablet  Commonly  known as:  OXY-IR  Replaced by:  oxyCODONE CR 60 MG T12a tablet            Allergies  Allergies   Allergen Reactions   • Vancomycin      vanco induced kidney failure       DIET  Healthy diet    ACTIVITY  As tolerated.  Weight bearing as tolerated    CONSULTATIONS  None    PROCEDURES  None    LABORATORY  Lab Results   Component Value Date    SODIUM 137 06/24/2019    POTASSIUM 4.5 06/24/2019    CHLORIDE 106 06/24/2019    CO2 26 06/24/2019    GLUCOSE 173 (H) 06/24/2019    BUN 13 06/24/2019    CREATININE 0.82 06/24/2019        Lab Results   Component Value Date    WBC 9.5 06/24/2019    HEMOGLOBIN 8.2 (L) 06/25/2019    HEMATOCRIT 28.0 (L) 06/25/2019    PLATELETCT 138 (L) 06/24/2019        Total time of the discharge process exceeds 36 minutes.

## 2019-06-27 ENCOUNTER — PATIENT OUTREACH (OUTPATIENT)
Dept: OTHER | Facility: MEDICAL CENTER | Age: 39
End: 2019-06-27

## 2019-06-27 NOTE — PROGRESS NOTES
Oncology Nurse Navigator called the patient.  She states that she and her  had to relocate from [place] because they had no family or support there.  She stated that she and her  and their 3 children are living with her sister-in law.  She said that she is unable to care for her children on her own anymore.  She said that she is not feeling good alot of nausea.  Her  has not been able to work due to her being ill but now that they have more support he is currently looking for work.  She states that she is currently on disability and state assistance for food.  I asked her if she had heard from [facility] yet she said just yesterday 06/26/2019 she was able to get a hold of them but they were still processing her paperwork and they would call her back.  I confirmed with her that she did still have enough pain meds to last her.  I told her if she did not hear back from [facility] by early next week to call me and I would try to get her appointment but unfortunately [facility] is not very responsive to myself or requests either.  The patient had her last chemo Gemzar the end of May.

## 2019-06-28 LAB
BACTERIA BLD CULT: NORMAL
SIGNIFICANT IND 70042: NORMAL
SITE SITE: NORMAL
SOURCE SOURCE: NORMAL

## 2019-07-10 ENCOUNTER — HOSPITAL ENCOUNTER (OUTPATIENT)
Dept: LAB | Facility: MEDICAL CENTER | Age: 39
End: 2019-07-10
Attending: INTERNAL MEDICINE
Payer: MEDICAID

## 2019-07-10 DIAGNOSIS — N17.9 AKI (ACUTE KIDNEY INJURY) (HCC): ICD-10-CM

## 2019-07-10 DIAGNOSIS — I10 ESSENTIAL HYPERTENSION: ICD-10-CM

## 2019-07-10 DIAGNOSIS — D64.9 NORMOCYTIC ANEMIA: ICD-10-CM

## 2019-07-10 LAB
25(OH)D3 SERPL-MCNC: 9 NG/ML (ref 30–100)
ERYTHROCYTE [DISTWIDTH] IN BLOOD BY AUTOMATED COUNT: 73.7 FL (ref 35.9–50)
HCT VFR BLD AUTO: 30.9 % (ref 37–47)
HGB BLD-MCNC: 9 G/DL (ref 12–16)
MCH RBC QN AUTO: 27.7 PG (ref 27–33)
MCHC RBC AUTO-ENTMCNC: 29.1 G/DL (ref 33.6–35)
MCV RBC AUTO: 95.1 FL (ref 81.4–97.8)
PLATELET # BLD AUTO: 109 K/UL (ref 164–446)
PMV BLD AUTO: 9.3 FL (ref 9–12.9)
RBC # BLD AUTO: 3.25 M/UL (ref 4.2–5.4)
WBC # BLD AUTO: 8.8 K/UL (ref 4.8–10.8)

## 2019-07-10 PROCEDURE — 80048 BASIC METABOLIC PNL TOTAL CA: CPT

## 2019-07-10 PROCEDURE — 36415 COLL VENOUS BLD VENIPUNCTURE: CPT

## 2019-07-10 PROCEDURE — 82306 VITAMIN D 25 HYDROXY: CPT

## 2019-07-10 PROCEDURE — 85027 COMPLETE CBC AUTOMATED: CPT

## 2019-07-11 LAB
ANION GAP SERPL CALC-SCNC: 14 MMOL/L (ref 0–11.9)
BUN SERPL-MCNC: 13 MG/DL (ref 8–22)
CALCIUM SERPL-MCNC: 9.2 MG/DL (ref 8.5–10.5)
CHLORIDE SERPL-SCNC: 99 MMOL/L (ref 96–112)
CO2 SERPL-SCNC: 21 MMOL/L (ref 20–33)
CREAT SERPL-MCNC: 0.8 MG/DL (ref 0.5–1.4)
GLUCOSE SERPL-MCNC: 127 MG/DL (ref 65–99)
POTASSIUM SERPL-SCNC: 4.4 MMOL/L (ref 3.6–5.5)
SODIUM SERPL-SCNC: 134 MMOL/L (ref 135–145)

## 2019-07-22 ENCOUNTER — HOSPITAL ENCOUNTER (OUTPATIENT)
Dept: RADIOLOGY | Facility: MEDICAL CENTER | Age: 39
End: 2019-07-22
Attending: INTERNAL MEDICINE
Payer: MEDICAID

## 2019-07-22 DIAGNOSIS — C40.22 MALIGNANT NEOPLASM OF LONG BONE OF LEFT LOWER EXTREMITY (HCC): ICD-10-CM

## 2019-07-22 PROCEDURE — A9585 GADOBUTROL INJECTION: HCPCS | Performed by: INTERNAL MEDICINE

## 2019-07-22 PROCEDURE — 70553 MRI BRAIN STEM W/O & W/DYE: CPT

## 2019-07-22 PROCEDURE — 700117 HCHG RX CONTRAST REV CODE 255: Performed by: INTERNAL MEDICINE

## 2019-07-22 RX ORDER — GADOBUTROL 604.72 MG/ML
10 INJECTION INTRAVENOUS ONCE
Status: COMPLETED | OUTPATIENT
Start: 2019-07-22 | End: 2019-07-22

## 2019-07-22 RX ADMIN — GADOBUTROL 10 ML: 604.72 INJECTION INTRAVENOUS at 19:02

## 2019-07-30 ENCOUNTER — HOSPITAL ENCOUNTER (OUTPATIENT)
Dept: RADIOLOGY | Facility: MEDICAL CENTER | Age: 39
End: 2019-07-30
Attending: INTERNAL MEDICINE
Payer: MEDICAID

## 2019-07-30 DIAGNOSIS — C40.22 MALIGNANT NEOPLASM OF LONG BONE OF LEFT LOWER EXTREMITY (HCC): ICD-10-CM

## 2019-07-30 PROCEDURE — A9552 F18 FDG: HCPCS

## 2019-08-01 ENCOUNTER — PATIENT OUTREACH (OUTPATIENT)
Dept: HEALTH INFORMATION MANAGEMENT | Facility: OTHER | Age: 39
End: 2019-08-01

## 2019-08-01 NOTE — PROGRESS NOTES
Nicole reached out to the patient and she did confirm she is under the care of [facility] and she is being treated at their office.

## 2019-08-03 ENCOUNTER — HOSPITAL ENCOUNTER (EMERGENCY)
Facility: MEDICAL CENTER | Age: 39
End: 2019-08-03
Attending: EMERGENCY MEDICINE
Payer: MEDICAID

## 2019-08-03 VITALS
HEART RATE: 89 BPM | BODY MASS INDEX: 32.78 KG/M2 | WEIGHT: 192 LBS | TEMPERATURE: 96.8 F | OXYGEN SATURATION: 98 % | SYSTOLIC BLOOD PRESSURE: 116 MMHG | HEIGHT: 64 IN | RESPIRATION RATE: 16 BRPM | DIASTOLIC BLOOD PRESSURE: 78 MMHG

## 2019-08-03 DIAGNOSIS — Z76.0 MEDICATION REFILL: ICD-10-CM

## 2019-08-03 PROCEDURE — 99284 EMERGENCY DEPT VISIT MOD MDM: CPT

## 2019-08-03 PROCEDURE — 700102 HCHG RX REV CODE 250 W/ 637 OVERRIDE(OP): Performed by: EMERGENCY MEDICINE

## 2019-08-03 PROCEDURE — A9270 NON-COVERED ITEM OR SERVICE: HCPCS | Performed by: EMERGENCY MEDICINE

## 2019-08-03 RX ORDER — OXYCODONE HYDROCHLORIDE 5 MG/1
10 TABLET ORAL ONCE
Status: COMPLETED | OUTPATIENT
Start: 2019-08-03 | End: 2019-08-03

## 2019-08-03 RX ORDER — OXYCODONE HYDROCHLORIDE 30 MG/1
30 TABLET ORAL EVERY 8 HOURS PRN
Qty: 12 TAB | Refills: 0 | Status: ON HOLD | OUTPATIENT
Start: 2019-08-03 | End: 2019-08-05 | Stop reason: SDUPTHER

## 2019-08-03 RX ORDER — OXYCODONE HYDROCHLORIDE 60 MG/1
60 TABLET, FILM COATED, EXTENDED RELEASE ORAL EVERY 12 HOURS
Qty: 8 TAB | Refills: 0 | Status: ON HOLD | OUTPATIENT
Start: 2019-08-03 | End: 2019-08-05 | Stop reason: SDUPTHER

## 2019-08-03 RX ADMIN — OXYCODONE HYDROCHLORIDE 10 MG: 5 TABLET ORAL at 01:36

## 2019-08-03 NOTE — ED PROVIDER NOTES
ED Provider Note    CHIEF COMPLAINT  Chief Complaint   Patient presents with   • Medication Refill     Pt here for medication refills of oxycodone and oxycontin. Pt has recent dx of cancer and her oncologist is out of town. Pt gets prescriptions filled by Dr. Mojica.        HPI  Amanda Bae is a 39 y.o. female who presents for evaluation of chronic pain in the left hip and thigh which is been present for many months.  Patient notes she normally gets her narcotic prescriptions from her oncologist however she is out of town till the 15th.  She has run out of her prescriptions which are prescribed in June.  She states she has been out of immediate release oxycodone for several days and took her last OxyContin this morning.  She has no new pain and no other symptoms.    REVIEW OF SYSTEMS  Constitutional: No fevers or chills  Chest: No pain   Pulm: No shortness of breath or cough  Gastrointestinal: No nausea, vomiting, or diarrhea  Genitourinary: No dysuria or hematuria  Musculoskeletal: No recent trauma, swelling, or weakness  Neurologic: No confusion or disorientation.      PAST MEDICAL HISTORY   has a past medical history of Cancer (HCC).    SOCIAL HISTORY  Social History     Tobacco Use   • Smoking status: Current Every Day Smoker     Packs/day: 0.25     Types: Cigarettes   • Smokeless tobacco: Never Used   Substance and Sexual Activity   • Alcohol use: No   • Drug use: No   • Sexual activity: Not on file       SURGICAL HISTORY   has a past surgical history that includes other orthopedic surgery.    CURRENT MEDICATIONS  Home Medications     Reviewed by Jaime Schafer R.N. (Registered Nurse) on 08/03/19 at 0047  Med List Status: Partial   Medication Last Dose Status   metoprolol (LOPRESSOR) 25 MG Tab  Active   nicotine (NICODERM) 7 MG/24HR PATCH 24 HR  Active                ALLERGIES  Allergies   Allergen Reactions   • Vancomycin      vanco induced kidney failure       PHYSICAL EXAM  VITAL SIGNS: BP  "116/78   Pulse 89   Temp 36 °C (96.8 °F) (Temporal)   Resp 16   Ht 1.626 m (5' 4\")   Wt 87.1 kg (192 lb)   SpO2 98%   BMI 32.96 kg/m²    Gen: Alert in no apparent distress.  HEENT: No signs of trauma, Bilateral external ears normal, Nose normal. Conjunctiva normal, Non-icteric.   Cardiovascular: Regular rate and rhythm, no murmurs.   Thorax & Lungs: Normal breath sounds, No respiratory distress, No wheezing bilateral chest rise  Abdomen: Bowel sounds normal, Soft, No tenderness  Skin: Warm, Dry, No erythema, No rash to exposed areas  Extremities: Intact distal pulses, No edema.  Capillary refill less than 3 seconds to both upper extremities, radial pulses intact and 2+ bilaterally.  Neurologic: Alert , no facial droop, grossly normal coordination and strength  Psychiatric: Affect normal, Judgment normal, Mood normal.     I reviewed prescription monitoring program for patient's narcotic use before prescribing a scheduled drug.The patient will not drink alcohol nor drive with prescribed medications. The patient will return for new or worsening symptoms and is stable at the time of discharge.     The patient is referred to a primary physician for blood pressure management, diabetic screening, and for all other preventative health concerns.     In prescribing controlled substances to this patient, I certify that I have obtained and reviewed the medical history of the patient. I have also made a good andres effort to obtain applicable records from other providers who have treated the patient and records demonstrated a chronic dependence on narcotic pain medications but the last prescriptions were noted to be at the end of June.  I have conducted a physical exam and documented it. I have reviewed the patient's prescription history as maintained by the [place] Prescription Monitoring Program.      I have assessed the patient’s risk for abuse, dependency, and addiction using the validated Opioid Risk Tool available at " https://www.mdcalc.com/nzdbmr-vlwk-vehc-ort-narcotic-abuse.      Given the above, I believe the benefits of controlled substance therapy outweigh the risks. The reasons for prescribing controlled substances include in my professional opinion, controlled substances are the only reasonable choice for this patient because over-the-counter medications are unlikely to control the pain adequately. Accordingly, I have discussed the risk and benefits, treatment plan, and alternative therapies with the patient.     COURSE & MEDICAL DECISION MAKING  Pertinent Labs & Imaging studies reviewed. (See chart for details)  Patient arrives for a medication refill including her immediate release and sustained release oxycodone.  Patient medical history is consistent with chronic severe pain from her metastatic cancer and I did review her prescription drug monitoring data and noted extremely heavy, regular use of oxycodone and OxyContin.  Importantly, the patient has had no prescriptions since 28 June.  She does not appear to be drug-seeking and is quite calm and reasonable.  Unfortunately, the patient has metastatic cancer and associated pain which is likely to be controlled by any other means at this point.  I feel it is reasonable to give the patient a short prescription for both her oxycodone and OxyContin based on the most recent usage levels.  She stated clear understanding that I can only do a short-term prescription and that longer-term prescriptions need to come from her oncology office.  There are no new symptoms or findings today to suggest the need for further labs or imaging and the patient will be discharged to outpatient follow-up.  FINAL IMPRESSION  1. Medication refill        Electronically signed by: Alfonso Gibbons, 8/3/2019 1:06 AM

## 2019-08-03 NOTE — ED NOTES
All lines, monitors DC'd. Discharge instructions given with prescriptions x2. Pt given time to ask any questions. Pt wheeled out of department. All pt belongings in pt's possession. Pt verbalized understanding.

## 2019-08-03 NOTE — ED TRIAGE NOTES
Medication Refill (Pt here for medication refills of oxycodone and oxycontin. Pt has recent dx of cancer and her oncologist is out of town. Pt gets prescriptions filled by Dr. Mojica. )

## 2019-08-04 ENCOUNTER — HOSPITAL ENCOUNTER (OUTPATIENT)
Facility: MEDICAL CENTER | Age: 39
End: 2019-08-06
Attending: EMERGENCY MEDICINE | Admitting: HOSPITALIST
Payer: MEDICAID

## 2019-08-04 DIAGNOSIS — C79.9 METASTATIC CANCER (HCC): ICD-10-CM

## 2019-08-04 DIAGNOSIS — G89.3 CANCER ASSOCIATED PAIN: ICD-10-CM

## 2019-08-04 DIAGNOSIS — Z76.0 MEDICATION REFILL: ICD-10-CM

## 2019-08-04 DIAGNOSIS — R11.10 ACUTE VOMITING: ICD-10-CM

## 2019-08-04 DIAGNOSIS — D69.6 THROMBOCYTOPENIA (HCC): ICD-10-CM

## 2019-08-04 DIAGNOSIS — D64.9 NORMOCYTIC ANEMIA: ICD-10-CM

## 2019-08-04 DIAGNOSIS — E66.9 CLASS 1 OBESITY WITHOUT SERIOUS COMORBIDITY WITH BODY MASS INDEX (BMI) OF 34.0 TO 34.9 IN ADULT, UNSPECIFIED OBESITY TYPE: ICD-10-CM

## 2019-08-04 DIAGNOSIS — R11.2 INTRACTABLE VOMITING WITH NAUSEA, UNSPECIFIED VOMITING TYPE: ICD-10-CM

## 2019-08-04 DIAGNOSIS — F11.93 NARCOTIC WITHDRAWAL (HCC): ICD-10-CM

## 2019-08-04 DIAGNOSIS — N17.9 AKI (ACUTE KIDNEY INJURY) (HCC): ICD-10-CM

## 2019-08-04 PROBLEM — I10 HTN (HYPERTENSION): Status: ACTIVE | Noted: 2019-08-04

## 2019-08-04 LAB
ALBUMIN SERPL BCP-MCNC: 4.4 G/DL (ref 3.2–4.9)
ALBUMIN/GLOB SERPL: 1.1 G/DL
ALP SERPL-CCNC: 110 U/L (ref 30–99)
ALT SERPL-CCNC: 8 U/L (ref 2–50)
ANION GAP SERPL CALC-SCNC: 11 MMOL/L (ref 0–11.9)
ANISOCYTOSIS BLD QL SMEAR: ABNORMAL
AST SERPL-CCNC: 13 U/L (ref 12–45)
BASOPHILS # BLD AUTO: 0.5 % (ref 0–1.8)
BASOPHILS # BLD: 0.04 K/UL (ref 0–0.12)
BILIRUB SERPL-MCNC: 1 MG/DL (ref 0.1–1.5)
BUN SERPL-MCNC: 8 MG/DL (ref 8–22)
CALCIUM SERPL-MCNC: 9.4 MG/DL (ref 8.5–10.5)
CHLORIDE SERPL-SCNC: 100 MMOL/L (ref 96–112)
CO2 SERPL-SCNC: 26 MMOL/L (ref 20–33)
COMMENT 1642: NORMAL
CREAT SERPL-MCNC: 0.52 MG/DL (ref 0.5–1.4)
EKG IMPRESSION: NORMAL
EOSINOPHIL # BLD AUTO: 0.03 K/UL (ref 0–0.51)
EOSINOPHIL NFR BLD: 0.4 % (ref 0–6.9)
ERYTHROCYTE [DISTWIDTH] IN BLOOD BY AUTOMATED COUNT: 61.7 FL (ref 35.9–50)
FERRITIN SERPL-MCNC: 82.5 NG/ML (ref 10–291)
FOLATE SERPL-MCNC: 6.7 NG/ML
GLOBULIN SER CALC-MCNC: 4 G/DL (ref 1.9–3.5)
GLUCOSE SERPL-MCNC: 139 MG/DL (ref 65–99)
HCT VFR BLD AUTO: 31.6 % (ref 37–47)
HGB BLD-MCNC: 9.3 G/DL (ref 12–16)
HGB RETIC QN AUTO: 27.1 PG/CELL (ref 29–35)
IMM GRANULOCYTES # BLD AUTO: 0.15 K/UL (ref 0–0.11)
IMM GRANULOCYTES NFR BLD AUTO: 1.8 % (ref 0–0.9)
IMM RETICS NFR: 45.8 % (ref 9.3–17.4)
INR PPP: 1.08 (ref 0.87–1.13)
IRON SATN MFR SERPL: 12 % (ref 15–55)
IRON SERPL-MCNC: 46 UG/DL (ref 40–170)
LIPASE SERPL-CCNC: 10 U/L (ref 11–82)
LYMPHOCYTES # BLD AUTO: 1.51 K/UL (ref 1–4.8)
LYMPHOCYTES NFR BLD: 18.1 % (ref 22–41)
MACROCYTES BLD QL SMEAR: ABNORMAL
MCH RBC QN AUTO: 26.1 PG (ref 27–33)
MCHC RBC AUTO-ENTMCNC: 29.4 G/DL (ref 33.6–35)
MCV RBC AUTO: 88.5 FL (ref 81.4–97.8)
MONOCYTES # BLD AUTO: 0.24 K/UL (ref 0–0.85)
MONOCYTES NFR BLD AUTO: 2.9 % (ref 0–13.4)
MORPHOLOGY BLD-IMP: NORMAL
NEUTROPHILS # BLD AUTO: 6.36 K/UL (ref 2–7.15)
NEUTROPHILS NFR BLD: 76.3 % (ref 44–72)
NRBC # BLD AUTO: 0.19 K/UL
NRBC BLD-RTO: 2.3 /100 WBC
PLATELET # BLD AUTO: 140 K/UL (ref 164–446)
PLATELET BLD QL SMEAR: NORMAL
PMV BLD AUTO: 8.7 FL (ref 9–12.9)
POIKILOCYTOSIS BLD QL SMEAR: NORMAL
POLYCHROMASIA BLD QL SMEAR: NORMAL
POTASSIUM SERPL-SCNC: 3.7 MMOL/L (ref 3.6–5.5)
PROT SERPL-MCNC: 8.4 G/DL (ref 6–8.2)
PROTHROMBIN TIME: 14.2 SEC (ref 12–14.6)
RBC # BLD AUTO: 3.57 M/UL (ref 4.2–5.4)
RBC BLD AUTO: PRESENT
RETICS # AUTO: 0.16 M/UL (ref 0.04–0.06)
RETICS/RBC NFR: 4.4 % (ref 0.8–2.1)
SODIUM SERPL-SCNC: 137 MMOL/L (ref 135–145)
STOMATOCYTES BLD QL SMEAR: NORMAL
TIBC SERPL-MCNC: 393 UG/DL (ref 250–450)
VIT B12 SERPL-MCNC: 517 PG/ML (ref 211–911)
WBC # BLD AUTO: 8.3 K/UL (ref 4.8–10.8)

## 2019-08-04 PROCEDURE — 96374 THER/PROPH/DIAG INJ IV PUSH: CPT | Mod: XU

## 2019-08-04 PROCEDURE — G0378 HOSPITAL OBSERVATION PER HR: HCPCS

## 2019-08-04 PROCEDURE — 700102 HCHG RX REV CODE 250 W/ 637 OVERRIDE(OP): Performed by: HOSPITALIST

## 2019-08-04 PROCEDURE — 700105 HCHG RX REV CODE 258: Performed by: INTERNAL MEDICINE

## 2019-08-04 PROCEDURE — 700105 HCHG RX REV CODE 258: Performed by: EMERGENCY MEDICINE

## 2019-08-04 PROCEDURE — 700105 HCHG RX REV CODE 258: Performed by: HOSPITALIST

## 2019-08-04 PROCEDURE — 82746 ASSAY OF FOLIC ACID SERUM: CPT

## 2019-08-04 PROCEDURE — A9270 NON-COVERED ITEM OR SERVICE: HCPCS | Performed by: HOSPITALIST

## 2019-08-04 PROCEDURE — 82607 VITAMIN B-12: CPT

## 2019-08-04 PROCEDURE — 93005 ELECTROCARDIOGRAM TRACING: CPT

## 2019-08-04 PROCEDURE — 96372 THER/PROPH/DIAG INJ SC/IM: CPT

## 2019-08-04 PROCEDURE — 83550 IRON BINDING TEST: CPT

## 2019-08-04 PROCEDURE — 96375 TX/PRO/DX INJ NEW DRUG ADDON: CPT

## 2019-08-04 PROCEDURE — 85046 RETICYTE/HGB CONCENTRATE: CPT

## 2019-08-04 PROCEDURE — 85025 COMPLETE CBC W/AUTO DIFF WBC: CPT

## 2019-08-04 PROCEDURE — 85610 PROTHROMBIN TIME: CPT

## 2019-08-04 PROCEDURE — 80053 COMPREHEN METABOLIC PANEL: CPT

## 2019-08-04 PROCEDURE — 83540 ASSAY OF IRON: CPT

## 2019-08-04 PROCEDURE — 700111 HCHG RX REV CODE 636 W/ 250 OVERRIDE (IP): Performed by: EMERGENCY MEDICINE

## 2019-08-04 PROCEDURE — 700111 HCHG RX REV CODE 636 W/ 250 OVERRIDE (IP): Performed by: HOSPITALIST

## 2019-08-04 PROCEDURE — 99285 EMERGENCY DEPT VISIT HI MDM: CPT

## 2019-08-04 PROCEDURE — 36415 COLL VENOUS BLD VENIPUNCTURE: CPT

## 2019-08-04 PROCEDURE — 93005 ELECTROCARDIOGRAM TRACING: CPT | Performed by: EMERGENCY MEDICINE

## 2019-08-04 PROCEDURE — 82728 ASSAY OF FERRITIN: CPT

## 2019-08-04 PROCEDURE — 99220 PR INITIAL OBSERVATION CARE,LEVL III: CPT | Performed by: HOSPITALIST

## 2019-08-04 PROCEDURE — 83690 ASSAY OF LIPASE: CPT

## 2019-08-04 RX ORDER — HALOPERIDOL 5 MG/ML
5 INJECTION INTRAMUSCULAR ONCE
Status: COMPLETED | OUTPATIENT
Start: 2019-08-04 | End: 2019-08-04

## 2019-08-04 RX ORDER — SODIUM CHLORIDE 9 MG/ML
1000 INJECTION, SOLUTION INTRAVENOUS ONCE
Status: COMPLETED | OUTPATIENT
Start: 2019-08-04 | End: 2019-08-04

## 2019-08-04 RX ORDER — METOCLOPRAMIDE HYDROCHLORIDE 5 MG/ML
10 INJECTION INTRAMUSCULAR; INTRAVENOUS ONCE
Status: DISCONTINUED | OUTPATIENT
Start: 2019-08-04 | End: 2019-08-04

## 2019-08-04 RX ORDER — PROMETHAZINE HYDROCHLORIDE 25 MG/1
12.5-25 TABLET ORAL EVERY 4 HOURS PRN
Status: DISCONTINUED | OUTPATIENT
Start: 2019-08-04 | End: 2019-08-06 | Stop reason: HOSPADM

## 2019-08-04 RX ORDER — ONDANSETRON 4 MG/1
4 TABLET, ORALLY DISINTEGRATING ORAL EVERY 4 HOURS PRN
Status: DISCONTINUED | OUTPATIENT
Start: 2019-08-04 | End: 2019-08-06 | Stop reason: HOSPADM

## 2019-08-04 RX ORDER — PROMETHAZINE HYDROCHLORIDE 25 MG/1
12.5-25 SUPPOSITORY RECTAL EVERY 4 HOURS PRN
Status: DISCONTINUED | OUTPATIENT
Start: 2019-08-04 | End: 2019-08-06 | Stop reason: HOSPADM

## 2019-08-04 RX ORDER — SODIUM CHLORIDE 9 MG/ML
INJECTION, SOLUTION INTRAVENOUS CONTINUOUS
Status: DISCONTINUED | OUTPATIENT
Start: 2019-08-04 | End: 2019-08-06

## 2019-08-04 RX ORDER — HEPARIN SODIUM 5000 [USP'U]/ML
5000 INJECTION, SOLUTION INTRAVENOUS; SUBCUTANEOUS EVERY 8 HOURS
Status: DISCONTINUED | OUTPATIENT
Start: 2019-08-04 | End: 2019-08-06 | Stop reason: HOSPADM

## 2019-08-04 RX ORDER — MORPHINE SULFATE 10 MG/ML
6 INJECTION, SOLUTION INTRAMUSCULAR; INTRAVENOUS ONCE
Status: COMPLETED | OUTPATIENT
Start: 2019-08-04 | End: 2019-08-04

## 2019-08-04 RX ORDER — AMOXICILLIN 250 MG
2 CAPSULE ORAL 2 TIMES DAILY
Status: DISCONTINUED | OUTPATIENT
Start: 2019-08-04 | End: 2019-08-06 | Stop reason: HOSPADM

## 2019-08-04 RX ORDER — BISACODYL 10 MG
10 SUPPOSITORY, RECTAL RECTAL
Status: DISCONTINUED | OUTPATIENT
Start: 2019-08-04 | End: 2019-08-06 | Stop reason: HOSPADM

## 2019-08-04 RX ORDER — ONDANSETRON 2 MG/ML
4 INJECTION INTRAMUSCULAR; INTRAVENOUS EVERY 4 HOURS PRN
Status: DISCONTINUED | OUTPATIENT
Start: 2019-08-04 | End: 2019-08-06 | Stop reason: HOSPADM

## 2019-08-04 RX ORDER — SODIUM CHLORIDE 9 MG/ML
500 INJECTION, SOLUTION INTRAVENOUS ONCE
Status: COMPLETED | OUTPATIENT
Start: 2019-08-04 | End: 2019-08-04

## 2019-08-04 RX ORDER — POLYETHYLENE GLYCOL 3350 17 G/17G
1 POWDER, FOR SOLUTION ORAL
Status: DISCONTINUED | OUTPATIENT
Start: 2019-08-04 | End: 2019-08-06 | Stop reason: HOSPADM

## 2019-08-04 RX ORDER — OXYCODONE HYDROCHLORIDE 30 MG/1
30 TABLET ORAL EVERY 8 HOURS PRN
Status: DISCONTINUED | OUTPATIENT
Start: 2019-08-04 | End: 2019-08-06 | Stop reason: HOSPADM

## 2019-08-04 RX ADMIN — HEPARIN SODIUM 5000 UNITS: 5000 INJECTION, SOLUTION INTRAVENOUS; SUBCUTANEOUS at 21:25

## 2019-08-04 RX ADMIN — SENNOSIDES, DOCUSATE SODIUM 2 TABLET: 50; 8.6 TABLET, FILM COATED ORAL at 09:29

## 2019-08-04 RX ADMIN — SODIUM CHLORIDE: 9 INJECTION, SOLUTION INTRAVENOUS at 22:58

## 2019-08-04 RX ADMIN — SODIUM CHLORIDE 1000 ML: 9 INJECTION, SOLUTION INTRAVENOUS at 02:33

## 2019-08-04 RX ADMIN — HEPARIN SODIUM 5000 UNITS: 5000 INJECTION, SOLUTION INTRAVENOUS; SUBCUTANEOUS at 09:30

## 2019-08-04 RX ADMIN — NICOTINE 7 MG: 7 PATCH, EXTENDED RELEASE TRANSDERMAL at 09:30

## 2019-08-04 RX ADMIN — SODIUM CHLORIDE: 9 INJECTION, SOLUTION INTRAVENOUS at 05:07

## 2019-08-04 RX ADMIN — METOPROLOL TARTRATE 25 MG: 25 TABLET, FILM COATED ORAL at 17:35

## 2019-08-04 RX ADMIN — SODIUM CHLORIDE: 9 INJECTION, SOLUTION INTRAVENOUS at 15:15

## 2019-08-04 RX ADMIN — ONDANSETRON 4 MG: 2 INJECTION INTRAMUSCULAR; INTRAVENOUS at 05:12

## 2019-08-04 RX ADMIN — OXYCODONE HYDROCHLORIDE 60 MG: 40 TABLET, FILM COATED, EXTENDED RELEASE ORAL at 17:35

## 2019-08-04 RX ADMIN — HALOPERIDOL LACTATE 5 MG: 5 INJECTION, SOLUTION INTRAMUSCULAR at 02:25

## 2019-08-04 RX ADMIN — MORPHINE SULFATE 6 MG: 10 INJECTION INTRAVENOUS at 02:25

## 2019-08-04 RX ADMIN — HEPARIN SODIUM 5000 UNITS: 5000 INJECTION, SOLUTION INTRAVENOUS; SUBCUTANEOUS at 14:01

## 2019-08-04 RX ADMIN — FAMOTIDINE 20 MG: 10 INJECTION INTRAVENOUS at 02:26

## 2019-08-04 RX ADMIN — SODIUM CHLORIDE 1000 ML: 9 INJECTION, SOLUTION INTRAVENOUS at 04:15

## 2019-08-04 RX ADMIN — OXYCODONE HYDROCHLORIDE 60 MG: 40 TABLET, FILM COATED, EXTENDED RELEASE ORAL at 09:30

## 2019-08-04 RX ADMIN — SODIUM CHLORIDE 500 ML: 9 INJECTION, SOLUTION INTRAVENOUS at 21:48

## 2019-08-04 ASSESSMENT — COGNITIVE AND FUNCTIONAL STATUS - GENERAL
DAILY ACTIVITIY SCORE: 22
DRESSING REGULAR LOWER BODY CLOTHING: A LITTLE
STANDING UP FROM CHAIR USING ARMS: A LITTLE
TOILETING: A LITTLE
MOVING FROM LYING ON BACK TO SITTING ON SIDE OF FLAT BED: A LITTLE
STANDING UP FROM CHAIR USING ARMS: A LITTLE
DRESSING REGULAR LOWER BODY CLOTHING: A LITTLE
CLIMB 3 TO 5 STEPS WITH RAILING: TOTAL
MOBILITY SCORE: 16
SUGGESTED CMS G CODE MODIFIER MOBILITY: CK
WALKING IN HOSPITAL ROOM: TOTAL
SUGGESTED CMS G CODE MODIFIER DAILY ACTIVITY: CJ
WALKING IN HOSPITAL ROOM: TOTAL
TOILETING: A LITTLE
CLIMB 3 TO 5 STEPS WITH RAILING: TOTAL

## 2019-08-04 ASSESSMENT — ENCOUNTER SYMPTOMS
HALLUCINATIONS: 0
FEVER: 0
SEIZURES: 0
NECK PAIN: 0
BRUISES/BLEEDS EASILY: 0
BLURRED VISION: 0
EYES NEGATIVE: 1
CONSTITUTIONAL NEGATIVE: 1
MYALGIAS: 0
ABDOMINAL PAIN: 0
DOUBLE VISION: 0
SENSORY CHANGE: 0
DIZZINESS: 0
FLANK PAIN: 0
HEMOPTYSIS: 0
EYE PAIN: 0
DEPRESSION: 0
NAUSEA: 1
HEARTBURN: 0
CHILLS: 0
EYE DISCHARGE: 0
BACK PAIN: 0
BLOOD IN STOOL: 0
SPEECH CHANGE: 0
CARDIOVASCULAR NEGATIVE: 1
FOCAL WEAKNESS: 0
SORE THROAT: 0
VOMITING: 1
RESPIRATORY NEGATIVE: 1
PALPITATIONS: 0
WEAKNESS: 0
SHORTNESS OF BREATH: 0
COUGH: 0
HEADACHES: 0

## 2019-08-04 ASSESSMENT — PATIENT HEALTH QUESTIONNAIRE - PHQ9
1. LITTLE INTEREST OR PLEASURE IN DOING THINGS: NOT AT ALL
2. FEELING DOWN, DEPRESSED, IRRITABLE, OR HOPELESS: NOT AT ALL
SUM OF ALL RESPONSES TO PHQ9 QUESTIONS 1 AND 2: 0

## 2019-08-04 ASSESSMENT — LIFESTYLE VARIABLES
HOW MANY TIMES IN THE PAST YEAR HAVE YOU HAD 5 OR MORE DRINKS IN A DAY: 0
CONSUMPTION TOTAL: INCOMPLETE
HAVE YOU EVER FELT YOU SHOULD CUT DOWN ON YOUR DRINKING: NO
TOTAL SCORE: 0
EVER_SMOKED: YES
SUBSTANCE_ABUSE: 0
HAVE PEOPLE ANNOYED YOU BY CRITICIZING YOUR DRINKING: NO
EVER HAD A DRINK FIRST THING IN THE MORNING TO STEADY YOUR NERVES TO GET RID OF A HANGOVER: NO
HAVE PEOPLE ANNOYED YOU BY CRITICIZING YOUR DRINKING: NO
TOTAL SCORE: 0
TOTAL SCORE: 0
EVER FELT BAD OR GUILTY ABOUT YOUR DRINKING: NO
AVERAGE NUMBER OF DAYS PER WEEK YOU HAVE A DRINK CONTAINING ALCOHOL: 0
TOTAL SCORE: 0
HAVE YOU EVER FELT YOU SHOULD CUT DOWN ON YOUR DRINKING: NO
ALCOHOL_USE: NO
EVER HAD A DRINK FIRST THING IN THE MORNING TO STEADY YOUR NERVES TO GET RID OF A HANGOVER: NO
TOTAL SCORE: 0
ON A TYPICAL DAY WHEN YOU DRINK ALCOHOL HOW MANY DRINKS DO YOU HAVE: 0
CONSUMPTION TOTAL: NEGATIVE
ALCOHOL_USE: NO
TOTAL SCORE: 0
EVER FELT BAD OR GUILTY ABOUT YOUR DRINKING: NO

## 2019-08-04 NOTE — ASSESSMENT & PLAN NOTE
epithelioid hemangioendotherlioma- very rare cancer   Follows with Dr. downs - she has appointment on 8/15/19 for chemo planning.  Last chemo was in may 2019   Discussed results of PET scan and MRI

## 2019-08-04 NOTE — PROGRESS NOTES
Patient A&O x4. Chairbound. VSS. R AC with good BR. NS @100ml/hr. Complains of nausea.  at bedside. Patient received Haldol for nausea in ED and was sleepy , did not want to be disturbed upon arrival to floor.  provided information. Educated on safety precautions. To use call light for assistance. Bed alarm on and bed in lowest position. Needs met at this time.

## 2019-08-04 NOTE — CARE PLAN
Problem: Communication  Goal: The ability to communicate needs accurately and effectively will improve  Outcome: PROGRESSING AS EXPECTED  Questions encouraged and concerns addressed.     Problem: Safety  Goal: Will remain free from injury  Outcome: PROGRESSING AS EXPECTED   Patient encouraged to use call light for assistance. Bed alarm on, bed in lowest position. Hourly rounding done.

## 2019-08-04 NOTE — ED NOTES
Report received from Ayesha RN, assuming care of pt at this time. Patient resting in bed, sleeping, no signs of distress, unlabored breathing noted, attached to cardiac monitor, bed in low position, call light within reach. Awaiting lab results, ERP re-evaluation, & disposition. Family remains at bedside.

## 2019-08-04 NOTE — PROGRESS NOTES
"Pt A&Ox4. VS: /84   Pulse 90   Temp 37.2 °C (99 °F) (Temporal)   Resp 16   Ht 1.626 m (5' 4\")   Wt 89.3 kg (196 lb 13.9 oz)   SpO2 94%   BMI 33.79 kg/m² . Pt denies numbness, tingling, SOB and chest pain. Pt states pain 3/10, scheduled pain medication administed per MAR. Pt states nausea has improved and managed to take her morning PO medications. Pt needs met at this time, call light within reach, hourly rounding in effect, and will continue to monitor.       "

## 2019-08-04 NOTE — H&P
Hospital Medicine History & Physical Note    Date of Service  8/4/2019    Primary Care Physician  Pcp Pt States None    Consultants  none    Code Status  full    Chief Complaint  N/v     History of Presenting Illness  39 y.o. female who presented 8/4/2019 with past medical history of ovarian malignancy who presents with diffuse abdominal pain with associated nausea vomiting.  She has had any multiple episodes of nausea vomiting today.  She has no known alleviating or exacerbating factors to her symptoms.  She was seen in the emergency department yesterday as she ran out of her pain medications.  Despite using her pain medications she is continued nausea vomiting and abdominal pain.  She has been taking Zofran without improvement of her symptoms.  In the emergency department she was given some Haldol IV fluids and improvement of her symptoms.  She will be admitted to the hospital for rehydration and control of her pain as well as nausea vomiting.    Review of Systems  Review of Systems   Constitutional: Positive for malaise/fatigue. Negative for chills and fever.   HENT: Negative for congestion, hearing loss and tinnitus.    Eyes: Negative for blurred vision, double vision and discharge.   Respiratory: Negative for cough, hemoptysis and shortness of breath.    Cardiovascular: Negative for chest pain, palpitations and leg swelling.   Gastrointestinal: Positive for nausea and vomiting. Negative for abdominal pain and heartburn.   Genitourinary: Negative for dysuria and flank pain.   Musculoskeletal: Negative for joint pain and myalgias.   Skin: Negative for rash.   Neurological: Negative for dizziness, sensory change, speech change, focal weakness and weakness.   Endo/Heme/Allergies: Negative for environmental allergies. Does not bruise/bleed easily.   Psychiatric/Behavioral: Negative for depression, hallucinations and substance abuse.       Past Medical History   has a past medical history of Cancer (HCC).    Surgical  History   has a past surgical history that includes other orthopedic surgery.     Family History  Reviewed and not pertinent     Social History   reports that she has been smoking cigarettes. She has been smoking about 0.25 packs per day. She has never used smokeless tobacco. She reports that she does not drink alcohol or use drugs.    Allergies  Allergies   Allergen Reactions   • Vancomycin      vanco induced kidney failure       Medications  Prior to Admission Medications   Prescriptions Last Dose Informant Patient Reported? Taking?   metoprolol (LOPRESSOR) 25 MG Tab  Patient Yes No   Sig: Take 25 mg by mouth 2 times a day.   nicotine (NICODERM) 7 MG/24HR PATCH 24 HR   No No   Sig: Apply 1 Patch to skin as directed every 24 hours.   oxyCODONE CR (OXYCONTIN) 60 MG Tablet Extended Release 12 hour Abuse-Deterrent tablet 8/3/2019 at 2100  No No   Sig: Take 1 Tab by mouth every 12 hours for 4 days.   oxyCODONE immediate-release (OXY-IR) 30 MG immediate release tablet 8/3/2019 at 2100  No No   Sig: Take 1 Tab by mouth every 8 hours as needed for Severe Pain for up to 4 days.      Facility-Administered Medications: None       Physical Exam  Temp:  [35.9 °C (96.6 °F)-36.4 °C (97.5 °F)] 36.4 °C (97.5 °F)  Pulse:  [] 92  Resp:  [18-20] 18  BP: (131-132)/() 131/79  SpO2:  [97 %-99 %] 99 %    Physical Exam   Constitutional: She is oriented to person, place, and time. She appears well-developed and well-nourished. She appears distressed.   HENT:   Head: Normocephalic and atraumatic.   Eyes: Pupils are equal, round, and reactive to light. Conjunctivae and EOM are normal.   Neck: Normal range of motion. Neck supple. No JVD present.   Cardiovascular: Normal rate, regular rhythm, normal heart sounds and intact distal pulses.   No murmur heard.  Pulmonary/Chest: Effort normal and breath sounds normal. No respiratory distress. She has no wheezes.   Abdominal: Soft. Bowel sounds are normal. She exhibits no distension.  There is tenderness.   Epigastric ttp    Musculoskeletal: Normal range of motion. She exhibits no edema.   Neurological: She is alert and oriented to person, place, and time. She exhibits normal muscle tone.   Skin: Skin is warm and dry.   Psychiatric: She has a normal mood and affect. Her behavior is normal. Judgment and thought content normal.   Nursing note and vitals reviewed.      Laboratory:  Recent Labs     08/04/19  0209   WBC 8.3   RBC 3.57*   HEMOGLOBIN 9.3*   HEMATOCRIT 31.6*   MCV 88.5   MCH 26.1*   MCHC 29.4*   RDW 61.7*   PLATELETCT 140*   MPV 8.7*     Recent Labs     08/04/19  0209   SODIUM 137   POTASSIUM 3.7   CHLORIDE 100   CO2 26   GLUCOSE 139*   BUN 8   CREATININE 0.52   CALCIUM 9.4     Recent Labs     08/04/19 0209   ALTSGPT 8   ASTSGOT 13   ALKPHOSPHAT 110*   TBILIRUBIN 1.0   LIPASE 10*   GLUCOSE 139*     Recent Labs     08/04/19  0209   INR 1.08     No results for input(s): NTPROBNP in the last 72 hours.      No results for input(s): TROPONINT in the last 72 hours.    Urinalysis:    No results found     Imaging:  No orders to display         Assessment/Plan:  I anticipate this patient is appropriate for observation status at this time.    * Intractable nausea and vomiting- (present on admission)  Assessment & Plan  She has known metastatic cancer, she has not followed with oncologist in [place] yet  Cont with IVF, anti emetics pain control   Advance diet as tolerated       Thrombocytopenia (HCC)  Assessment & Plan  Mild, cont to montior     HTN (hypertension)  Assessment & Plan  Resume BB     Anemia- (present on admission)  Assessment & Plan  No evidence of bleeding   Lab workup ordered     Cancer associated pain- (present on admission)  Assessment & Plan  Resume home narcotic pain regmine   Seen in the ER yesterday for prescription for refill      Nicotine abuse- (present on admission)  Assessment & Plan  Greater than 10 minutes spent with patient smoking cessation counseling. Discussed  cardiovascular risk factors of smoking. Nicotine patch ordered    Metastatic cancer (HCC)- (present on admission)  Assessment & Plan  epithelioid hemangioendotherlioma rare cancer   Follows with Dr. downs   Last chemo was in may 2019       VTE prophylaxis: heparin

## 2019-08-04 NOTE — ED TRIAGE NOTES
Chief Complaint   Patient presents with   • N/V     Pt states she has been throwing up for a full day and is unable to keep food or water down despite using zofran.       Pt was seen here yesterday for medication refill.  She is a chronic cancer patient.  Tonight she says some kelly blood has been seen in her vomit.      Pt to triage in personal WC for above complaint.   Pt is alert and oriented, speaking in full sentences, follows commands and responds appropriately to questions. She appears uncomfortable and distressed.    Pt placed in lobby. Pt educated on triage process. Pt encouraged to alert staff for any changes.

## 2019-08-04 NOTE — ASSESSMENT & PLAN NOTE
She has known metastatic cancer, she has not followed with oncologist in [place] yet  Cont with IVF, anti emetics pain control   Advance diet as tolerated

## 2019-08-04 NOTE — PROGRESS NOTES
Patient seen and examined, admitted earlier today.  She has had issue with obtaining a physician who will continue her narcotic medication she was taking under the care of her oncologist in [place] prior to moving to [place]. I've seen her on admission twice now and have prescribed her pain medication for 1 month - last RX given in June 2017.  She has established with oncology, Dr Delaney but as this was the first visit, did not fill her narcotic medication and thus she has again run out and presented in opiate withdrawal causing nausea, vomiting and pain.  Now that she is back on her medications, she is feeling slightly better.

## 2019-08-04 NOTE — ED PROVIDER NOTES
ED Provider Note    CHIEF COMPLAINT  Chief Complaint   Patient presents with   • N/V     Pt states she has been throwing up for a full day and is unable to keep food or water down despite using zofran.         HPI  HPI     39 y.o. F states that she has had worsening nausea today.   Sx began earlier today.  Patient reported intermittent streaks of blood in vomit.  Patient denies any melena or hematochezia.  Pt has been taking zofran at home with no relief.  Pt reports constant vomiting.   Pt reports pain all over chest. Pain is similar to constant ongoing chest pain.  No new shortness of breath.   Pt reports not currently receiving chemo or radiation.       REVIEW OF SYSTEMS  Review of Systems   Constitutional: Negative.  Negative for fever.   HENT: Negative.  Negative for ear pain and sore throat.    Eyes: Negative.  Negative for pain.   Respiratory: Negative.  Negative for shortness of breath.    Cardiovascular: Negative.  Negative for chest pain.   Gastrointestinal: Positive for vomiting. Negative for abdominal pain and blood in stool.   Genitourinary: Negative for dysuria and flank pain.   Musculoskeletal: Negative for back pain, myalgias and neck pain.   Skin: Negative.  Negative for rash.   Neurological: Negative for focal weakness, seizures, weakness and headaches.   Endo/Heme/Allergies: Does not bruise/bleed easily.   Psychiatric/Behavioral: Negative for hallucinations and suicidal ideas.   All other systems reviewed and are negative.      PAST MEDICAL HISTORY   has a past medical history of Cancer (HCC).    SOCIAL HISTORY  Social History     Tobacco Use   • Smoking status: Current Every Day Smoker     Packs/day: 0.25     Types: Cigarettes   • Smokeless tobacco: Never Used   Substance and Sexual Activity   • Alcohol use: No   • Drug use: No   • Sexual activity: Not on file       SURGICAL HISTORY   has a past surgical history that includes other orthopedic surgery.    CURRENT MEDICATIONS  Home Medications      "Reviewed by Korin Chakraborty R.N. (Registered Nurse) on 08/04/19 at 0138  Med List Status: Partial   Medication Last Dose Status   metoprolol (LOPRESSOR) 25 MG Tab  Active   nicotine (NICODERM) 7 MG/24HR PATCH 24 HR  Active   oxyCODONE CR (OXYCONTIN) 60 MG Tablet Extended Release 12 hour Abuse-Deterrent tablet 8/3/2019 Active   oxyCODONE immediate-release (OXY-IR) 30 MG immediate release tablet 8/3/2019 Active                ALLERGIES  Allergies   Allergen Reactions   • Vancomycin      vanco induced kidney failure       PHYSICAL EXAM  VITAL SIGNS: /88   Pulse 86   Temp 36.9 °C (98.4 °F) (Temporal)   Resp 16   Ht 1.626 m (5' 4\")   Wt 89.3 kg (196 lb 13.9 oz)   SpO2 99%   BMI 33.79 kg/m²  @LAST[[id number]::@  Pulse ox interpretation: I interpret this pulse ox as normal.    Physical Exam   Constitutional: She is oriented to person, place, and time and well-developed, well-nourished, and in no distress.   HENT:   Head: Normocephalic and atraumatic.   Right Ear: External ear normal.   Left Ear: External ear normal.   Eyes: Conjunctivae and EOM are normal. No scleral icterus.   Neck: Normal range of motion.   Cardiovascular: Normal rate.   Pulmonary/Chest: Effort normal. No stridor. No respiratory distress. She has no wheezes.   Abdominal: She exhibits no distension. There is no tenderness.   Musculoskeletal: Normal range of motion. She exhibits no edema or deformity.   Neurological: She is alert and oriented to person, place, and time. Coordination normal.   Skin: Skin is warm and dry. No rash noted. No erythema.   Psychiatric: Affect and judgment normal.       DIAGNOSTIC STUDIES / PROCEDURES    LABS/EKG  Results for orders placed or performed during the hospital encounter of 08/04/19   CBC WITH DIFFERENTIAL   Result Value Ref Range    WBC 8.3 4.8 - 10.8 K/uL    RBC 3.57 (L) 4.20 - 5.40 M/uL    Hemoglobin 9.3 (L) 12.0 - 16.0 g/dL    Hematocrit 31.6 (L) 37.0 - 47.0 %    MCV 88.5 81.4 - 97.8 fL    MCH 26.1 (L) " 27.0 - 33.0 pg    MCHC 29.4 (L) 33.6 - 35.0 g/dL    RDW 61.7 (H) 35.9 - 50.0 fL    Platelet Count 140 (L) 164 - 446 K/uL    MPV 8.7 (L) 9.0 - 12.9 fL    Neutrophils-Polys 76.30 (H) 44.00 - 72.00 %    Lymphocytes 18.10 (L) 22.00 - 41.00 %    Monocytes 2.90 0.00 - 13.40 %    Eosinophils 0.40 0.00 - 6.90 %    Basophils 0.50 0.00 - 1.80 %    Immature Granulocytes 1.80 (H) 0.00 - 0.90 %    Nucleated RBC 2.30 /100 WBC    Neutrophils (Absolute) 6.36 2.00 - 7.15 K/uL    Lymphs (Absolute) 1.51 1.00 - 4.80 K/uL    Monos (Absolute) 0.24 0.00 - 0.85 K/uL    Eos (Absolute) 0.03 0.00 - 0.51 K/uL    Baso (Absolute) 0.04 0.00 - 0.12 K/uL    Immature Granulocytes (abs) 0.15 (H) 0.00 - 0.11 K/uL    NRBC (Absolute) 0.19 K/uL    Anisocytosis 1+     Macrocytosis 1+    COMP METABOLIC PANEL   Result Value Ref Range    Sodium 137 135 - 145 mmol/L    Potassium 3.7 3.6 - 5.5 mmol/L    Chloride 100 96 - 112 mmol/L    Co2 26 20 - 33 mmol/L    Anion Gap 11.0 0.0 - 11.9    Glucose 139 (H) 65 - 99 mg/dL    Bun 8 8 - 22 mg/dL    Creatinine 0.52 0.50 - 1.40 mg/dL    Calcium 9.4 8.5 - 10.5 mg/dL    AST(SGOT) 13 12 - 45 U/L    ALT(SGPT) 8 2 - 50 U/L    Alkaline Phosphatase 110 (H) 30 - 99 U/L    Total Bilirubin 1.0 0.1 - 1.5 mg/dL    Albumin 4.4 3.2 - 4.9 g/dL    Total Protein 8.4 (H) 6.0 - 8.2 g/dL    Globulin 4.0 (H) 1.9 - 3.5 g/dL    A-G Ratio 1.1 g/dL   LIPASE   Result Value Ref Range    Lipase 10 (L) 11 - 82 U/L   PROTHROMBIN TIME (INR)   Result Value Ref Range    PT 14.2 12.0 - 14.6 sec    INR 1.08 0.87 - 1.13   ESTIMATED GFR   Result Value Ref Range    GFR If African American >60 >60 mL/min/1.73 m 2    GFR If Non African American >60 >60 mL/min/1.73 m 2   PERIPHERAL SMEAR REVIEW   Result Value Ref Range    Peripheral Smear Review see below    PLATELET ESTIMATE   Result Value Ref Range    Plt Estimation Decreased    MORPHOLOGY   Result Value Ref Range    RBC Morphology Present     Polychromia 2+     Poikilocytosis 1+     Stomatocytes 1+     DIFFERENTIAL COMMENT   Result Value Ref Range    Comments-Diff see below    IRON/TOTAL IRON BIND   Result Value Ref Range    Iron 46 40 - 170 ug/dL    Total Iron Binding 393 250 - 450 ug/dL    % Saturation 12 (L) 15 - 55 %   FERRITIN   Result Value Ref Range    Ferritin 82.5 10.0 - 291.0 ng/mL   VITAMIN B12   Result Value Ref Range    Vitamin B12 -True Cobalamin 517 211 - 911 pg/mL   FOLATE   Result Value Ref Range    Folate -Folic Acid 6.7 >4.0 ng/mL   RETICULOCYTES COUNT   Result Value Ref Range    Reticulocyte Count 4.4 (H) 0.8 - 2.1 %    Retic, Absolute 0.16 (H) 0.04 - 0.06 M/uL    Imm. Reticulocyte Fraction 45.8 (H) 9.3 - 17.4 %    Retic Hgb Equivalent 27.1 (L) 29.0 - 35.0 pg/cell   EKG (NOW)   Result Value Ref Range    Report       [facility]    Test Date:  2019  Pt Name:    KAMI SINGH            Department: ER  MRN:        [id number]                      Room:  Gender:     Female                       Technician: [id number]  :        1980                   Requested By:ER TRIAGE PROTOCOL  Order #:    [id number]                    Reading MD:    Measurements  Intervals                                Axis  Rate:       99                           P:          49  TN:         156                          QRS:        4  QRSD:       76                           T:          22  QT:         364  QTc:        468    Interpretive Statements  SINUS RHYTHM  PROBABLE LEFT ATRIAL ABNORMALITY  RSR' IN V1 OR V2, RIGHT VCD OR RVH  LEFT VENTRICULAR HYPERTROPHY  BASELINE WANDER IN LEAD(S) V2  Compared to ECG 2019 13:34:26  Right ventricular hypertrophy now present  RSR' in V1 or V2 now present  Left ventricular hypertrophy now present  Sinus tachycardia no longer present         RADIOLOGY  No orders to display        COURSE & MEDICAL DECISION MAKING  Pertinent Labs & Imaging studies reviewed by me. (See chart for details)    39 y.o. female PMH metastatic cancer p/w  persistent vomiting since earlier today.     Differential diagnosis includes but is not limited to:  Unclear etiology of vomiting and patient with known metastatic cancer  Patient with no improvement with home Zofran  Patient provided with IV fluids given vomiting  Intravenous fluids administered for vomiting.  Patient not appropriate for oral rehydration, as surgical process needs to be ruled out before trial of oral rehydration.   On repeat evaluation, improved sx.  Pt w/ positive fluid response.   Patient unable to take home pain medication due to vomiting and vomiting despite Zofran  Given this plan for hospital admission and reevaluation  No abdominal pain to suggest acute intra-abdominal pathology at this time however if this develops would consider CT scan      FINAL IMPRESSION  Visit Diagnoses     ICD-10-CM   1. Acute vomiting R11.10              Electronically signed by: Alejandro Kan, 8/4/2019 2:20 AM

## 2019-08-05 LAB
ALBUMIN SERPL BCP-MCNC: 3.4 G/DL (ref 3.2–4.9)
ALBUMIN/GLOB SERPL: 1 G/DL
ALP SERPL-CCNC: 82 U/L (ref 30–99)
ALT SERPL-CCNC: 5 U/L (ref 2–50)
ANION GAP SERPL CALC-SCNC: 7 MMOL/L (ref 0–11.9)
ANISOCYTOSIS BLD QL SMEAR: ABNORMAL
AST SERPL-CCNC: 9 U/L (ref 12–45)
BASOPHILS # BLD AUTO: 0.6 % (ref 0–1.8)
BASOPHILS # BLD: 0.05 K/UL (ref 0–0.12)
BILIRUB SERPL-MCNC: 0.7 MG/DL (ref 0.1–1.5)
BUN SERPL-MCNC: 8 MG/DL (ref 8–22)
CALCIUM SERPL-MCNC: 8.3 MG/DL (ref 8.5–10.5)
CHLORIDE SERPL-SCNC: 103 MMOL/L (ref 96–112)
CO2 SERPL-SCNC: 28 MMOL/L (ref 20–33)
COMMENT 1642: NORMAL
CREAT SERPL-MCNC: 0.66 MG/DL (ref 0.5–1.4)
EOSINOPHIL # BLD AUTO: 0.09 K/UL (ref 0–0.51)
EOSINOPHIL NFR BLD: 1 % (ref 0–6.9)
ERYTHROCYTE [DISTWIDTH] IN BLOOD BY AUTOMATED COUNT: 66.2 FL (ref 35.9–50)
GLOBULIN SER CALC-MCNC: 3.3 G/DL (ref 1.9–3.5)
GLUCOSE SERPL-MCNC: 136 MG/DL (ref 65–99)
HCT VFR BLD AUTO: 24.7 % (ref 37–47)
HGB BLD-MCNC: 7.1 G/DL (ref 12–16)
HYPOCHROMIA BLD QL SMEAR: ABNORMAL
IMM GRANULOCYTES # BLD AUTO: 0.14 K/UL (ref 0–0.11)
IMM GRANULOCYTES NFR BLD AUTO: 1.6 % (ref 0–0.9)
LYMPHOCYTES # BLD AUTO: 3.63 K/UL (ref 1–4.8)
LYMPHOCYTES NFR BLD: 40.9 % (ref 22–41)
MACROCYTES BLD QL SMEAR: ABNORMAL
MCH RBC QN AUTO: 25.9 PG (ref 27–33)
MCHC RBC AUTO-ENTMCNC: 27.6 G/DL (ref 33.6–35)
MCV RBC AUTO: 94.1 FL (ref 81.4–97.8)
MONOCYTES # BLD AUTO: 0.55 K/UL (ref 0–0.85)
MONOCYTES NFR BLD AUTO: 6.2 % (ref 0–13.4)
MORPHOLOGY BLD-IMP: NORMAL
NEUTROPHILS # BLD AUTO: 4.41 K/UL (ref 2–7.15)
NEUTROPHILS NFR BLD: 49.7 % (ref 44–72)
NRBC # BLD AUTO: 0.23 K/UL
NRBC BLD-RTO: 2.6 /100 WBC
PLATELET # BLD AUTO: 141 K/UL (ref 164–446)
PLATELET BLD QL SMEAR: NORMAL
PMV BLD AUTO: 9 FL (ref 9–12.9)
POLYCHROMASIA BLD QL SMEAR: NORMAL
POTASSIUM SERPL-SCNC: 4 MMOL/L (ref 3.6–5.5)
PROT SERPL-MCNC: 6.7 G/DL (ref 6–8.2)
RBC # BLD AUTO: 2.7 M/UL (ref 4.2–5.4)
RBC BLD AUTO: PRESENT
SODIUM SERPL-SCNC: 138 MMOL/L (ref 135–145)
WBC # BLD AUTO: 8.9 K/UL (ref 4.8–10.8)

## 2019-08-05 PROCEDURE — A9270 NON-COVERED ITEM OR SERVICE: HCPCS | Performed by: INTERNAL MEDICINE

## 2019-08-05 PROCEDURE — 96372 THER/PROPH/DIAG INJ SC/IM: CPT

## 2019-08-05 PROCEDURE — 700105 HCHG RX REV CODE 258: Performed by: HOSPITALIST

## 2019-08-05 PROCEDURE — A9270 NON-COVERED ITEM OR SERVICE: HCPCS | Performed by: HOSPITALIST

## 2019-08-05 PROCEDURE — G0378 HOSPITAL OBSERVATION PER HR: HCPCS

## 2019-08-05 PROCEDURE — 85025 COMPLETE CBC W/AUTO DIFF WBC: CPT

## 2019-08-05 PROCEDURE — 80053 COMPREHEN METABOLIC PANEL: CPT

## 2019-08-05 PROCEDURE — 700111 HCHG RX REV CODE 636 W/ 250 OVERRIDE (IP): Performed by: HOSPITALIST

## 2019-08-05 PROCEDURE — 36415 COLL VENOUS BLD VENIPUNCTURE: CPT

## 2019-08-05 PROCEDURE — 700102 HCHG RX REV CODE 250 W/ 637 OVERRIDE(OP): Performed by: HOSPITALIST

## 2019-08-05 PROCEDURE — 99226 PR SUBSEQUENT OBSERVATION CARE,LEVEL III: CPT | Performed by: INTERNAL MEDICINE

## 2019-08-05 PROCEDURE — 700102 HCHG RX REV CODE 250 W/ 637 OVERRIDE(OP): Performed by: INTERNAL MEDICINE

## 2019-08-05 RX ORDER — OXYCODONE HYDROCHLORIDE 30 MG/1
30 TABLET ORAL EVERY 8 HOURS PRN
Qty: 90 TAB | Refills: 0 | Status: SHIPPED | OUTPATIENT
Start: 2019-08-05 | End: 2019-09-04

## 2019-08-05 RX ORDER — OXYCODONE HYDROCHLORIDE 60 MG/1
60 TABLET, FILM COATED, EXTENDED RELEASE ORAL EVERY 12 HOURS
Qty: 60 TAB | Refills: 0 | Status: SHIPPED | OUTPATIENT
Start: 2019-08-05 | End: 2019-09-04

## 2019-08-05 RX ORDER — OMEPRAZOLE 20 MG/1
20 CAPSULE, DELAYED RELEASE ORAL DAILY
Status: DISCONTINUED | OUTPATIENT
Start: 2019-08-05 | End: 2019-08-06 | Stop reason: HOSPADM

## 2019-08-05 RX ADMIN — OMEPRAZOLE 20 MG: 20 CAPSULE, DELAYED RELEASE ORAL at 10:01

## 2019-08-05 RX ADMIN — NICOTINE 7 MG: 7 PATCH, EXTENDED RELEASE TRANSDERMAL at 04:34

## 2019-08-05 RX ADMIN — OXYCODONE HYDROCHLORIDE 60 MG: 40 TABLET, FILM COATED, EXTENDED RELEASE ORAL at 04:32

## 2019-08-05 RX ADMIN — OXYCODONE HYDROCHLORIDE 60 MG: 40 TABLET, FILM COATED, EXTENDED RELEASE ORAL at 17:26

## 2019-08-05 RX ADMIN — SODIUM CHLORIDE: 9 INJECTION, SOLUTION INTRAVENOUS at 22:19

## 2019-08-05 RX ADMIN — HEPARIN SODIUM 5000 UNITS: 5000 INJECTION, SOLUTION INTRAVENOUS; SUBCUTANEOUS at 04:32

## 2019-08-05 RX ADMIN — HEPARIN SODIUM 5000 UNITS: 5000 INJECTION, SOLUTION INTRAVENOUS; SUBCUTANEOUS at 14:02

## 2019-08-05 RX ADMIN — METOPROLOL TARTRATE 25 MG: 25 TABLET, FILM COATED ORAL at 17:26

## 2019-08-05 RX ADMIN — METOPROLOL TARTRATE 25 MG: 25 TABLET, FILM COATED ORAL at 04:35

## 2019-08-05 RX ADMIN — HEPARIN SODIUM 5000 UNITS: 5000 INJECTION, SOLUTION INTRAVENOUS; SUBCUTANEOUS at 21:34

## 2019-08-05 ASSESSMENT — ENCOUNTER SYMPTOMS
ABDOMINAL PAIN: 1
HEADACHES: 0
NERVOUS/ANXIOUS: 0
INSOMNIA: 0
BLURRED VISION: 0
DEPRESSION: 0
FEVER: 0
MYALGIAS: 0
SENSORY CHANGE: 0
SORE THROAT: 0
DIARRHEA: 0
DIZZINESS: 0
CONSTIPATION: 0
NAUSEA: 1
PHOTOPHOBIA: 0
CLAUDICATION: 0
COUGH: 0
SPEECH CHANGE: 0
CHILLS: 0
WEAKNESS: 0
HEARTBURN: 0
VOMITING: 0
SHORTNESS OF BREATH: 0

## 2019-08-05 NOTE — CARE PLAN
Problem: Knowledge Deficit  Goal: Knowledge of disease process/condition, treatment plan, diagnostic tests, and medications will improve  Outcome: PROGRESSING AS EXPECTED  Questions about treatment plan answered. Concerns addressed.     Problem: Bowel/Gastric:  Goal: Normal bowel function is maintained or improved  Outcome: PROGRESSING SLOWER THAN EXPECTED   Patient educated on signs and symptoms of constipation. Bowel regimen given per MAR. Educated on side effects of narcotic on the bowel.

## 2019-08-05 NOTE — PROGRESS NOTES
2 RN skin check complete with Alice HARDY.   Devices in place: none.  Skin assessed under devices: n/a.  Confirmed pressure ulcers found on: none.  New potential pressure ulcers noted on: none.  The following interventions in place: pt has dry, flaky skin on her bilateral extremities. Moisturizer provided. Pt turns self independently to prevent skin breakdown.

## 2019-08-05 NOTE — PROGRESS NOTES
[facility] Medicine Daily Progress Note    Date of Service  8/5/2019    Chief Complaint  39 y.o. female admitted 8/4/2019 with nausea and vomiting.    Hospital Course    Patient with extremely rare cancer, having recently established with Dr Delaney for treatment.  She had the 1 month supply of Oxycontin and Oxycodone I had prescribed her from previous admission but Dr Delaney did not refill this medication when she established care with her on 7/19/19 - office note reviewed and comment was made that pain well controlled on current regimen.  She ran out of her medication and presented to the ED with withdrawal symptoms again and because of uncontrolled nausea and vomiting, was admitted again.  Her oxycontin and oxycodone were restarted and her symptoms have improved.      Interval Problem Update  Patient looks markedly better, she still has abdominal pain and nausea but vomiting has resolved.  Blood pressure required small bolus overnight and is better.  She should be watched again tonight to make sure her blood pressure remains normal and discharge home tomorrow.  I've written for 1 more month of her oxycodone and oxycontin but this is the last RX she can receive from me for narcotics and needs to receive this from her outpatient physicians.  She has legitimate and severe pain from her cancer and cannot function without the narcotic medication she has become dependent upon.    Consultants/Specialty  none    Code Status  full    Disposition  Home tomorrow.    Review of Systems  Review of Systems   Constitutional: Negative for chills and fever.   HENT: Negative for congestion and sore throat.    Eyes: Negative for blurred vision and photophobia.   Respiratory: Negative for cough and shortness of breath.    Cardiovascular: Negative for chest pain, claudication and leg swelling.   Gastrointestinal: Positive for abdominal pain (controlled on current regimen) and nausea. Negative for constipation, diarrhea, heartburn and  vomiting (resolved).   Genitourinary: Negative for dysuria and hematuria.   Musculoskeletal: Negative for joint pain and myalgias.   Skin: Negative for itching and rash.   Neurological: Negative for dizziness, sensory change, speech change, weakness and headaches.   Psychiatric/Behavioral: Negative for depression. The patient is not nervous/anxious and does not have insomnia.         Physical Exam  Temp:  [36.3 °C (97.4 °F)-36.9 °C (98.5 °F)] 36.6 °C (97.9 °F)  Pulse:  [] 89  Resp:  [16-18] 18  BP: ()/(55-71) 101/68  SpO2:  [91 %-96 %] 94 %    Physical Exam   Constitutional: She is oriented to person, place, and time. She appears well-developed and well-nourished. No distress.   HENT:   Head: Normocephalic and atraumatic.   Eyes: Conjunctivae are normal. No scleral icterus.   Neck: Neck supple. No JVD present.   Cardiovascular: Normal rate, regular rhythm and normal heart sounds. Exam reveals no gallop and no friction rub.   No murmur heard.  Pulmonary/Chest: Effort normal and breath sounds normal. No respiratory distress. She exhibits no tenderness.   Abdominal: Soft. Bowel sounds are normal. She exhibits no distension. There is no guarding.   Musculoskeletal: She exhibits no edema or tenderness.   Lymphadenopathy:     She has no cervical adenopathy.   Neurological: She is alert and oriented to person, place, and time. No cranial nerve deficit.   Skin: Skin is warm and dry. She is not diaphoretic. No erythema. No pallor.   Psychiatric: She has a normal mood and affect. Her behavior is normal.   Nursing note and vitals reviewed.      Fluids    Intake/Output Summary (Last 24 hours) at 8/5/2019 1332  Last data filed at 8/5/2019 0400  Gross per 24 hour   Intake 3385 ml   Output 700 ml   Net 2685 ml       Laboratory  Recent Labs     08/04/19  0209 08/05/19  0014   WBC 8.3 8.9   RBC 3.57* 2.70*   HEMOGLOBIN 9.3* 7.1*   HEMATOCRIT 31.6* 24.7*   MCV 88.5 94.1   MCH 26.1* 25.9*   MCHC 29.4* 27.6*   RDW 61.7*  66.2*   PLATELETCT 140* 141*   MPV 8.7* 9.0     Recent Labs     08/04/19  0209 08/05/19  0014   SODIUM 137 138   POTASSIUM 3.7 4.0   CHLORIDE 100 103   CO2 26 28   GLUCOSE 139* 136*   BUN 8 8   CREATININE 0.52 0.66   CALCIUM 9.4 8.3*     Recent Labs     08/04/19  0209   INR 1.08               Imaging  No orders to display        Assessment/Plan  * Intractable nausea and vomiting- (present on admission)  Assessment & Plan  She has known metastatic cancer, she has not followed with oncologist in [place] yet  Cont with IVF, anti emetics pain control   Advance diet as tolerated       Thrombocytopenia (HCC)  Assessment & Plan  Mild, cont to montior     HTN (hypertension)  Assessment & Plan  Resume BB     Anemia- (present on admission)  Assessment & Plan  No evidence of bleeding   Lab workup ordered     Cancer associated pain- (present on admission)  Assessment & Plan  Resume home narcotic pain regmine   Seen in the ER yesterday for prescription for refill      Nicotine abuse- (present on admission)  Assessment & Plan  Nicotine patch ordered    Metastatic cancer (HCC)- (present on admission)  Assessment & Plan  epithelioid hemangioendotherlioma- very rare cancer   Follows with Dr. downs - she has appointment on 8/15/19 for chemo planning.  Last chemo was in may 2019   Discussed results of PET scan and MRI           VTE prophylaxis: heparin

## 2019-08-05 NOTE — PROGRESS NOTES
Patient A&O x4. Chairbound. Moves via wheelchair or walker. R AC with good BR. NS @100ml/hr. No complaints of nausea or pain this shift. BP dropped to 82/57, MD notified. NS 500ML bolus given and BP on recheck increased to 99/63 .  Educated on safety precautions. Call light next to patient, calls appropriately. Bed alarm on and bed in lowest position. Needs met at this time. WCTM

## 2019-08-05 NOTE — PROGRESS NOTES
"Pt A&Ox4. VS: /68   Pulse 89   Temp 36.6 °C (97.9 °F) (Temporal)   Resp 18   Ht 1.626 m (5' 4\")   Wt 92.8 kg (204 lb 9.4 oz)   SpO2 94%   BMI 35.12 kg/m² . Pt denies pain, n/v, numbness, tingling, SOB and chest pain. Pt states some heartburn, will notify Dr. Mojica when available. PIV patent with positive blood return. Pt needs met at this time, call light within reach, hourly rounding in effect, and will continue to monitor.       " /patient/family/significant other

## 2019-08-06 ENCOUNTER — HOME HEALTH ADMISSION (OUTPATIENT)
Dept: HOME HEALTH SERVICES | Facility: HOME HEALTHCARE | Age: 39
End: 2019-08-06
Payer: MEDICAID

## 2019-08-06 VITALS
HEART RATE: 92 BPM | RESPIRATION RATE: 18 BRPM | SYSTOLIC BLOOD PRESSURE: 121 MMHG | OXYGEN SATURATION: 98 % | WEIGHT: 204.59 LBS | HEIGHT: 64 IN | BODY MASS INDEX: 34.93 KG/M2 | DIASTOLIC BLOOD PRESSURE: 75 MMHG | TEMPERATURE: 97.8 F

## 2019-08-06 PROBLEM — D48.9 EPITHELIOID HEMANGIOENDOTHELIOMA: Status: ACTIVE | Noted: 2019-08-06

## 2019-08-06 PROBLEM — R11.2 INTRACTABLE NAUSEA AND VOMITING: Status: RESOLVED | Noted: 2019-06-22 | Resolved: 2019-08-06

## 2019-08-06 PROBLEM — D63.8 ANEMIA OF CHRONIC DISEASE: Status: ACTIVE | Noted: 2019-06-22

## 2019-08-06 LAB
ANION GAP SERPL CALC-SCNC: 7 MMOL/L (ref 0–11.9)
BASOPHILS # BLD AUTO: 0.7 % (ref 0–1.8)
BASOPHILS # BLD: 0.05 K/UL (ref 0–0.12)
BUN SERPL-MCNC: 6 MG/DL (ref 8–22)
CALCIUM SERPL-MCNC: 9.2 MG/DL (ref 8.5–10.5)
CHLORIDE SERPL-SCNC: 104 MMOL/L (ref 96–112)
CO2 SERPL-SCNC: 27 MMOL/L (ref 20–33)
CREAT SERPL-MCNC: 0.7 MG/DL (ref 0.5–1.4)
EOSINOPHIL # BLD AUTO: 0.13 K/UL (ref 0–0.51)
EOSINOPHIL NFR BLD: 1.9 % (ref 0–6.9)
ERYTHROCYTE [DISTWIDTH] IN BLOOD BY AUTOMATED COUNT: 66.9 FL (ref 35.9–50)
GLUCOSE SERPL-MCNC: 110 MG/DL (ref 65–99)
HCT VFR BLD AUTO: 26.9 % (ref 37–47)
HGB BLD-MCNC: 7.5 G/DL (ref 12–16)
IMM GRANULOCYTES # BLD AUTO: 0.17 K/UL (ref 0–0.11)
IMM GRANULOCYTES NFR BLD AUTO: 2.5 % (ref 0–0.9)
LYMPHOCYTES # BLD AUTO: 2.64 K/UL (ref 1–4.8)
LYMPHOCYTES NFR BLD: 39.1 % (ref 22–41)
MCH RBC QN AUTO: 25.9 PG (ref 27–33)
MCHC RBC AUTO-ENTMCNC: 27.9 G/DL (ref 33.6–35)
MCV RBC AUTO: 92.8 FL (ref 81.4–97.8)
MONOCYTES # BLD AUTO: 0.31 K/UL (ref 0–0.85)
MONOCYTES NFR BLD AUTO: 4.6 % (ref 0–13.4)
NEUTROPHILS # BLD AUTO: 3.45 K/UL (ref 2–7.15)
NEUTROPHILS NFR BLD: 51.2 % (ref 44–72)
NRBC # BLD AUTO: 0.29 K/UL
NRBC BLD-RTO: 4.3 /100 WBC
PLATELET # BLD AUTO: 151 K/UL (ref 164–446)
PMV BLD AUTO: 8.7 FL (ref 9–12.9)
POTASSIUM SERPL-SCNC: 4.1 MMOL/L (ref 3.6–5.5)
RBC # BLD AUTO: 2.9 M/UL (ref 4.2–5.4)
SODIUM SERPL-SCNC: 138 MMOL/L (ref 135–145)
WBC # BLD AUTO: 6.8 K/UL (ref 4.8–10.8)

## 2019-08-06 PROCEDURE — 700111 HCHG RX REV CODE 636 W/ 250 OVERRIDE (IP): Performed by: HOSPITALIST

## 2019-08-06 PROCEDURE — 85025 COMPLETE CBC W/AUTO DIFF WBC: CPT

## 2019-08-06 PROCEDURE — 99217 PR OBSERVATION CARE DISCHARGE: CPT | Performed by: HOSPITALIST

## 2019-08-06 PROCEDURE — 80048 BASIC METABOLIC PNL TOTAL CA: CPT

## 2019-08-06 PROCEDURE — 700102 HCHG RX REV CODE 250 W/ 637 OVERRIDE(OP): Performed by: HOSPITALIST

## 2019-08-06 PROCEDURE — A9270 NON-COVERED ITEM OR SERVICE: HCPCS | Performed by: INTERNAL MEDICINE

## 2019-08-06 PROCEDURE — 36415 COLL VENOUS BLD VENIPUNCTURE: CPT

## 2019-08-06 PROCEDURE — G0378 HOSPITAL OBSERVATION PER HR: HCPCS

## 2019-08-06 PROCEDURE — 96372 THER/PROPH/DIAG INJ SC/IM: CPT

## 2019-08-06 PROCEDURE — 700102 HCHG RX REV CODE 250 W/ 637 OVERRIDE(OP): Performed by: INTERNAL MEDICINE

## 2019-08-06 PROCEDURE — A9270 NON-COVERED ITEM OR SERVICE: HCPCS | Performed by: HOSPITALIST

## 2019-08-06 RX ORDER — AMOXICILLIN 250 MG
2 CAPSULE ORAL 2 TIMES DAILY
Qty: 60 TAB | Refills: 3 | Status: SHIPPED | OUTPATIENT
Start: 2019-08-06 | End: 2019-10-16

## 2019-08-06 RX ORDER — GABAPENTIN 300 MG/1
300 CAPSULE ORAL 3 TIMES DAILY
Qty: 90 CAP | Refills: 3 | Status: SHIPPED | OUTPATIENT
Start: 2019-08-06 | End: 2019-10-21

## 2019-08-06 RX ORDER — ONDANSETRON 4 MG/1
4 TABLET, ORALLY DISINTEGRATING ORAL EVERY 4 HOURS PRN
Qty: 30 TAB | Refills: 3 | Status: SHIPPED | OUTPATIENT
Start: 2019-08-06 | End: 2019-10-05

## 2019-08-06 RX ADMIN — OXYCODONE HYDROCHLORIDE 60 MG: 40 TABLET, FILM COATED, EXTENDED RELEASE ORAL at 06:27

## 2019-08-06 RX ADMIN — HEPARIN SODIUM 5000 UNITS: 5000 INJECTION, SOLUTION INTRAVENOUS; SUBCUTANEOUS at 06:27

## 2019-08-06 RX ADMIN — OXYCODONE HYDROCHLORIDE 30 MG: 30 TABLET ORAL at 03:16

## 2019-08-06 RX ADMIN — NICOTINE 7 MG: 7 PATCH, EXTENDED RELEASE TRANSDERMAL at 06:27

## 2019-08-06 RX ADMIN — OMEPRAZOLE 20 MG: 20 CAPSULE, DELAYED RELEASE ORAL at 06:27

## 2019-08-06 RX ADMIN — METOPROLOL TARTRATE 25 MG: 25 TABLET, FILM COATED ORAL at 03:16

## 2019-08-06 NOTE — PROGRESS NOTES
Pt discharged to home. Pt left with a relative that escorted and drove pt home. General discharged instructions discussed including appointments, medications, activity, and diet. Asked pt if they had any questions and they mentioned they did not at this time. PIV removed. Tip was intact.

## 2019-08-06 NOTE — DISCHARGE PLANNING
ATTN: Case Management  RE: Referral for Home Health    As of 08/06/19, we have accepted the Home Health referral for the patient listed above.    A [facility] clinician will be out to see the patient within 48 hours. If you have any questions or concerns regarding the patient’s transition to Home Health, please do not hesitate to contact us at [phone].      We look forward to collaborating with you,  [facility] Team

## 2019-08-06 NOTE — DISCHARGE PLANNING
PENDING REFERRAL- Patient does not have a PCP. Waiting for response from  (oncologist) Ezra Brown M.D. To confirm is he's willing to follow patient and sign HH orders.

## 2019-08-06 NOTE — PROGRESS NOTES
Patient A&O x4. Up by wheelchair or walker. R AC patent with NS at 100ml/hr infusing. VSS. Denied pin, nausea or SOB. Uses call light appropriately for assistance. Bed alarm on, bed in lowest position. Needs met. WCTM.

## 2019-08-06 NOTE — DISCHARGE PLANNING
Anticipated Discharge Disposition: Home with Home Health    Action: Spoke with the patient at the bedside about HH choices. The patient picked [facility]  as her #1 choice and [facility] as her #2 choice. The patient signed the choice form and confirmed her address and phone #.  Choice form faxed to Haily MAY at [phone].    Barriers to Discharge: HH acceptance    Plan: as stated above.

## 2019-08-06 NOTE — PROGRESS NOTES
"/75   Pulse 92   Temp 36.6 °C (97.8 °F) (Temporal)   Resp 18   Ht 1.626 m (5' 4\")   Wt 92.8 kg (204 lb 9.4 oz)   SpO2 98%   BMI 35.12 kg/m²   Received report and assumed care of pt at 0700. Pt is A&O x4. Pt VSS stable. PIV is patent. Mobility assessed and steady gait. Bed is in lowest position and call light is within reach. Hourly rounding is in place.  "

## 2019-08-06 NOTE — DISCHARGE INSTRUCTIONS
Discharge Instructions    Discharged to home by car with relative. Discharged via wheelchair, hospital escort: Yes.  Special equipment needed: Not Applicable    Be sure to schedule a follow-up appointment with your primary care doctor or any specialists as instructed.     Discharge Plan:   Diet Plan: Discussed  Activity Level: Discussed  Confirmed Follow up Appointment: Appointment Scheduled  Confirmed Symptoms Management: Discussed  Medication Reconciliation Updated: Yes  Influenza Vaccine Indication: Patient Refuses    I understand that a diet low in cholesterol, fat, and sodium is recommended for good health. Unless I have been given specific instructions below for another diet, I accept this instruction as my diet prescription.   Other diet: regular    Special Instructions: None    · Is patient discharged on Warfarin / Coumadin?   No     Depression / Suicide Risk    As you are discharged from this [facility] facility, it is important to learn how to keep safe from harming yourself.    Recognize the warning signs:  · Abrupt changes in personality, positive or negative- including increase in energy   · Giving away possessions  · Change in eating patterns- significant weight changes-  positive or negative  · Change in sleeping patterns- unable to sleep or sleeping all the time   · Unwillingness or inability to communicate  · Depression  · Unusual sadness, discouragement and loneliness  · Talk of wanting to die  · Neglect of personal appearance   · Rebelliousness- reckless behavior  · Withdrawal from people/activities they love  · Confusion- inability to concentrate     If you or a loved one observes any of these behaviors or has concerns about self-harm, here's what you can do:  · Talk about it- your feelings and reasons for harming yourself  · Remove any means that you might use to hurt yourself (examples: pills, rope, extension cords, firearm)  · Get professional help from the community (Mental Health,  Substance Abuse, psychological counseling)  · Do not be alone:Call your Safe Contact- someone whom you trust who will be there for you.  · Call your local CRISIS HOTLINE 534-0520 or 527-084-8824  · Call your local Children's Mobile Crisis Response Team [place] (142) 990-3187 or www.[employer]ad[employer]  · Call the toll free National Suicide Prevention Hotlines   · National Suicide Prevention Lifeline 848-764-VXCG (2276)  · National Hope Line Network 800-SUICIDE (968-8733)

## 2019-08-06 NOTE — DISCHARGE PLANNING
Received Choice form at 0338  Agency/Facility Name: [facility]  Referral sent per Choice form @ 0518

## 2019-08-06 NOTE — CARE PLAN
Problem: Bowel/Gastric:  Goal: Normal bowel function is maintained or improved  Outcome: PROGRESSING AS EXPECTED     Problem: Mobility  Goal: Risk for activity intolerance will decrease  Outcome: PROGRESSING AS EXPECTED

## 2019-08-06 NOTE — DISCHARGE SUMMARY
Discharge Summary    CHIEF COMPLAINT ON ADMISSION  Chief Complaint   Patient presents with   • N/V     Pt states she has been throwing up for a full day and is unable to keep food or water down despite using zofran.         Reason for Admission  Vomitting     Admission Date  8/4/2019    CODE STATUS  Full Code    HPI & HOSPITAL COURSE  This is a 39 y.o. female admitted for intractable nausea and vomiting after running out of her prescribed oxycontin and oxycodone after 6/22/19 admission for pneumonia.  Patient has no cough, fever or symptoms of pneumonia.  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  She has had left hip and recent right tibia surgeries.  She is able to bear weight post op.  She never received physical therapy.  Therefore,  PT/OT was ordered.  She lives with her  and children and moved to [place] to be closer to her sister for  and support prior to initiation of chemotherapy.  Her  PET scan revealed multiple areas of metastases.  She will need follow up with Dr. Delaney on 8/15/2019 for review of PET scan and treatment options.   On discharge exam, she sits up well, lungs CTA b/l, on RA, no nausea or emesis in several days since restarted on her narcotic pain medications.  She has a well healed right tibia scar and left hip scar from recent surgery.  She has no abdominal pain.  She had been well hydrated on IVFs until discharge.  Anemia workup normal iron, B12, and folate.  She will need nicotine patches for continued smoking cessation, zofran prn prescribed as well as laxatives while on narcotics.  Dr. Mojica has prescribed oxycontin and oxycodone IR.  I have added neurontin in an attempt to lessen the need for high dose narcotics.        Therefore, she is discharged in good and stable condition to home with organized home healthcare and close outpatient follow-up.    The patient met 2-midnight criteria  for an inpatient stay at the time of discharge.    Discharge Date  8/6/2019    FOLLOW UP ITEMS POST DISCHARGE  Follow up with PCP as scheduled this week.  Follow up with Dr Delaney on 8/15/2019    DISCHARGE DIAGNOSES  Principal Problem (Resolved):    Intractable nausea and vomiting POA: Yes  Active Problems:    Metastatic cancer (HCC) POA: Yes    Nicotine abuse POA: Yes    Cancer associated pain POA: Yes    Anemia of chronic disease POA: Yes    HTN (hypertension) POA: Yes    Thrombocytopenia (HCC) POA: Yes    Epithelioid hemangioendothelioma POA: Yes      FOLLOW UP  Future Appointments   Date Time Provider Department Center   8/6/2019  1:00 PM FID[facility]   8/7/2019  9:15 AM [facility] EXAM 10 ECHO [facility]     Rosita Delaney M.D.  [address] [place] [id number]  [phone]    On 8/15/2019        MEDICATIONS ON DISCHARGE     Medication List      START taking these medications      Instructions   gabapentin 300 MG Caps  Commonly known as:  NEURONTIN   Take 1 Cap by mouth 3 times a day.  Dose:  300 mg     ondansetron 4 MG Tbdp  Commonly known as:  ZOFRAN ODT   Take 1 Tab by mouth every four hours as needed for Nausea for up to 60 days.  Dose:  4 mg     senna-docusate 8.6-50 MG Tabs  Commonly known as:  PERICOLACE or SENOKOT S   Take 2 Tabs by mouth 2 Times a Day.  Dose:  2 Tab        CONTINUE taking these medications      Instructions   metoprolol 25 MG Tabs  Commonly known as:  LOPRESSOR   Take 1 Tab by mouth 2 times a day.  Dose:  25 mg     nicotine 7 MG/24HR Pt24  Commonly known as:  NICODERM   Apply 1 Patch to skin as directed every 24 hours.  Dose:  1 Patch     * oxyCODONE CR 60 MG T12a tablet  Commonly known as:  OXYCONTIN   Take 1 Tab by mouth every 12 hours for 30 days.  Dose:  60 mg     * oxyCODONE 30 MG immediate release tablet  Commonly known as:  OXY-IR   Take 1 Tab by mouth every 8 hours as needed for Severe Pain for up to 30 days.  Dose:  30 mg          * This list has 2 medication(s) that are the same as other medications prescribed for you. Read the directions carefully, and ask your doctor or other care provider to review them with you.                Allergies  Allergies   Allergen Reactions   • Vancomycin      vanco induced kidney failure       DIET  Orders Placed This Encounter   Procedures   • Diet Order Regular     Standing Status:   Standing     Number of Occurrences:   1     Order Specific Question:   Diet:     Answer:   Regular [1]       ACTIVITY  As tolerated.  Weight bearing as tolerated    CONSULTATIONS  none    PROCEDURES  PET scan from 7/30/2019:   1.  Extensive bony metastatic disease involving appendicular and axial skeleton with heterogeneous sclerosis and multiple lytic lesions present.  Largest lytic lesions involve the LEFT iliac wing with associated soft tissue component.  2.  Possible small hypermetabolic liver lesions, likely metastatic.  3.  Enlarged spleen with numerous hypermetabolic lesions consistent with extensive metastatic disease.  4.  Ill-defined parenchymal opacities in both lungs with associated increased metabolic activity, most likely tumor.  5.  Postoperative change of LEFT proximal femur with hip prosthesis present.    LABORATORY  Lab Results   Component Value Date    SODIUM 138 08/06/2019    POTASSIUM 4.1 08/06/2019    CHLORIDE 104 08/06/2019    CO2 27 08/06/2019    GLUCOSE 110 (H) 08/06/2019    BUN 6 (L) 08/06/2019    CREATININE 0.70 08/06/2019        Lab Results   Component Value Date    WBC 6.8 08/06/2019    HEMOGLOBIN 7.5 (L) 08/06/2019    HEMATOCRIT 26.9 (L) 08/06/2019    PLATELETCT 151 (L) 08/06/2019        Total time of the discharge process exceeds 45 minutes.

## 2019-08-06 NOTE — DISCHARGE PLANNING
Care Transition Team Assessment      Spoke with the patient at the bedside. The patient has an appointment with a PCP at [address] [place] [address] and an appointment with Dr. Brown today. This CM discussed in detail the importance of establishing with the pain doctor as it was discussed with Dr Mojica in rounds yesterday. The patient understands and agrees.      Information Source  Orientation : Oriented x 4  Information Given By: Patient         Elopement Risk  Legal Hold: No  Ambulatory or Self Mobile in Wheelchair: Yes  Disoriented: No  Psychiatric Symptoms: None  History of Wandering: No  Elopement this Admit: No  Vocalizing Wanting to Leave: No  Displays Behaviors, Body Language Wanting to Leave: No-Not at Risk for Elopement  Elopement Risk: Not at Risk for Elopement    Interdisciplinary Discharge Planning  Lives with - Patient's Self Care Capacity: Spouse  Patient or legal guardian wants to designate a caregiver (see row info): No  Support Systems: Spouse / Significant Other  Able to Return to Previous ADL's: Yes  Mobility Issues: Yes  Patient Expects to be Discharged to:: home  Durable Medical Equipment: Walker    Discharge Preparedness  What is your plan after discharge?: Home with help  What are your discharge supports?: Child, Sibling, Spouse  Prior Functional Level: Uses Walker, Uses Wheelchair    Functional Assesment  Prior Functional Level: Uses Walker, Uses Wheelchair         Vision / Hearing Impairment  Vision Impairment : No  Hearing Impairment : No         Advance Directive  Advance Directive?: None  Advance Directive offered?: AD Booklet refused    Domestic Abuse  Have you ever been the victim of abuse or violence?: No  Physical Abuse or Sexual Abuse: No  Verbal Abuse or Emotional Abuse: No  Possible Abuse Reported to:: Not Applicable              Anticipated Discharge Information  Anticipated discharge disposition: Home

## 2019-08-06 NOTE — FACE TO FACE
Face to Face Supporting Documentation - Home Health    The encounter with this patient was in whole or in part the primary reason for home health admission.    Date of encounter:   Patient:                    MRN:                       YOB: 2017  Amanda Bae  [id number]  1980     Home health to see patient for:  Skilled Nursing care for assessment, interventions & education, Home health aide, Physical Therapy evaluation and treatment and Occupational therapy evaluation and treatment    Skilled need for:  Recent Deterioration of Health Status diagnosed with bone cancer 1/2018, uses FWW and WC at home, prior left hip and right tibia surgeries.  never received PT post op.    Skilled nursing interventions to include:  Comment: home safety    Homebound status evidenced by:  Need the aid of supportive devices such as crutches, canes, wheelchairs or walkers. Leaving home requires a considerable and taxing effort. There is a normal inability to leave the home.    Community Physician to provide follow up care: Pcp Pt States None     Optional Interventions? Yes, Details: medical management of pain      I certify the face to face encounter for this home health care referral meets the CMS requirements and the encounter/clinical assessment with the patient was, in whole, or in part, for the medical condition(s) listed above, which is the primary reason for home health care. Based on my clinical findings: the service(s) are medically necessary, support the need for home health care, and the homebound criteria are met.  I certify that this patient has had a face to face encounter by myself.  Tina Hall M.D. - NPI: [id number]

## 2019-08-07 ENCOUNTER — HOME CARE VISIT (OUTPATIENT)
Dept: HOME HEALTH SERVICES | Facility: HOME HEALTHCARE | Age: 39
End: 2019-08-07
Payer: MEDICAID

## 2019-08-07 VITALS
BODY MASS INDEX: 33.8 KG/M2 | SYSTOLIC BLOOD PRESSURE: 120 MMHG | OXYGEN SATURATION: 97 % | HEIGHT: 64 IN | DIASTOLIC BLOOD PRESSURE: 60 MMHG | HEART RATE: 100 BPM | TEMPERATURE: 97.1 F | RESPIRATION RATE: 18 BRPM | WEIGHT: 198 LBS

## 2019-08-07 PROCEDURE — G0493 RN CARE EA 15 MIN HH/HOSPICE: HCPCS

## 2019-08-07 ASSESSMENT — ENCOUNTER SYMPTOMS: MENTAL STATUS CHANGE: 0

## 2019-08-07 ASSESSMENT — PATIENT HEALTH QUESTIONNAIRE - PHQ9
CLINICAL INTERPRETATION OF PHQ2 SCORE: 0
2. FEELING DOWN, DEPRESSED, IRRITABLE, OR HOPELESS: 00
1. LITTLE INTEREST OR PLEASURE IN DOING THINGS: 00

## 2019-08-07 ASSESSMENT — ACTIVITIES OF DAILY LIVING (ADL): OASIS_M1830: 03

## 2019-08-08 ENCOUNTER — ANTICOAGULATION MONITORING (OUTPATIENT)
Dept: MEDICAL GROUP | Facility: PHYSICIAN GROUP | Age: 39
End: 2019-08-08

## 2019-08-08 NOTE — PROGRESS NOTES
Received referral from [facility]. Medications reviewed. No clinically significant interactions noted.

## 2019-08-11 ENCOUNTER — HOME CARE VISIT (OUTPATIENT)
Dept: HOME HEALTH SERVICES | Facility: HOME HEALTHCARE | Age: 39
End: 2019-08-11
Payer: MEDICAID

## 2019-08-13 ENCOUNTER — HOME CARE VISIT (OUTPATIENT)
Dept: HOME HEALTH SERVICES | Facility: HOME HEALTHCARE | Age: 39
End: 2019-08-13
Payer: MEDICAID

## 2019-08-14 ENCOUNTER — HOME CARE VISIT (OUTPATIENT)
Dept: HOME HEALTH SERVICES | Facility: HOME HEALTHCARE | Age: 39
End: 2019-08-14
Payer: MEDICAID

## 2019-08-15 ENCOUNTER — HOME CARE VISIT (OUTPATIENT)
Dept: HOME HEALTH SERVICES | Facility: HOME HEALTHCARE | Age: 39
End: 2019-08-15
Payer: MEDICAID

## 2019-08-16 ENCOUNTER — HOME CARE VISIT (OUTPATIENT)
Dept: HOME HEALTH SERVICES | Facility: HOME HEALTHCARE | Age: 39
End: 2019-08-16
Payer: MEDICAID

## 2019-08-19 RX ORDER — SODIUM CHLORIDE 9 MG/ML
INJECTION, SOLUTION INTRAVENOUS CONTINUOUS
Status: CANCELLED | OUTPATIENT
Start: 2019-08-27

## 2019-08-27 ENCOUNTER — TELEPHONE (OUTPATIENT)
Dept: MEDICAL GROUP | Facility: MEDICAL CENTER | Age: 39
End: 2019-08-27

## 2019-08-27 ENCOUNTER — HOSPITAL ENCOUNTER (OUTPATIENT)
Facility: MEDICAL CENTER | Age: 39
End: 2019-08-27
Attending: INTERNAL MEDICINE | Admitting: INTERNAL MEDICINE
Payer: MEDICAID

## 2019-08-27 ENCOUNTER — APPOINTMENT (OUTPATIENT)
Dept: RADIOLOGY | Facility: MEDICAL CENTER | Age: 39
End: 2019-08-27
Attending: INTERNAL MEDICINE
Payer: MEDICAID

## 2019-08-27 ENCOUNTER — APPOINTMENT (OUTPATIENT)
Dept: CARDIOLOGY | Facility: MEDICAL CENTER | Age: 39
End: 2019-08-27
Attending: INTERNAL MEDICINE
Payer: MEDICAID

## 2019-08-27 VITALS
HEART RATE: 118 BPM | RESPIRATION RATE: 18 BRPM | OXYGEN SATURATION: 94 % | SYSTOLIC BLOOD PRESSURE: 98 MMHG | DIASTOLIC BLOOD PRESSURE: 55 MMHG | WEIGHT: 200 LBS | BODY MASS INDEX: 34.15 KG/M2 | HEIGHT: 64 IN

## 2019-08-27 DIAGNOSIS — C40.22 MALIGNANT NEOPLASM OF LONG BONE OF LEFT LOWER EXTREMITY (HCC): ICD-10-CM

## 2019-08-27 LAB
HCG SERPL QL: NEGATIVE
LV EJECT FRACT  99904: 65
LV EJECT FRACT MOD 2C 99903: 49.69
LV EJECT FRACT MOD 4C 99902: 41.29
LV EJECT FRACT MOD BP 99901: 46.78

## 2019-08-27 PROCEDURE — 93306 TTE W/DOPPLER COMPLETE: CPT

## 2019-08-27 PROCEDURE — 700101 HCHG RX REV CODE 250

## 2019-08-27 PROCEDURE — 700111 HCHG RX REV CODE 636 W/ 250 OVERRIDE (IP)

## 2019-08-27 PROCEDURE — 84703 CHORIONIC GONADOTROPIN ASSAY: CPT | Mod: 91

## 2019-08-27 PROCEDURE — 84703 CHORIONIC GONADOTROPIN ASSAY: CPT

## 2019-08-27 PROCEDURE — 700111 HCHG RX REV CODE 636 W/ 250 OVERRIDE (IP): Performed by: RADIOLOGY

## 2019-08-27 PROCEDURE — 99153 MOD SED SAME PHYS/QHP EA: CPT

## 2019-08-27 PROCEDURE — 93306 TTE W/DOPPLER COMPLETE: CPT | Mod: 26 | Performed by: INTERNAL MEDICINE

## 2019-08-27 RX ORDER — CEFAZOLIN SODIUM 2 G/100ML
2 INJECTION, SOLUTION INTRAVENOUS ONCE
Status: COMPLETED | OUTPATIENT
Start: 2019-08-27 | End: 2019-08-27

## 2019-08-27 RX ORDER — NALOXONE HYDROCHLORIDE 0.4 MG/ML
INJECTION, SOLUTION INTRAMUSCULAR; INTRAVENOUS; SUBCUTANEOUS
Status: COMPLETED
Start: 2019-08-27 | End: 2019-08-27

## 2019-08-27 RX ORDER — LIDOCAINE HYDROCHLORIDE AND EPINEPHRINE 10; 10 MG/ML; UG/ML
INJECTION, SOLUTION INFILTRATION; PERINEURAL
Status: COMPLETED
Start: 2019-08-27 | End: 2019-08-27

## 2019-08-27 RX ORDER — CEFAZOLIN SODIUM 1 G/3ML
INJECTION, POWDER, FOR SOLUTION INTRAMUSCULAR; INTRAVENOUS
Status: COMPLETED
Start: 2019-08-27 | End: 2019-08-27

## 2019-08-27 RX ORDER — ONDANSETRON 2 MG/ML
4 INJECTION INTRAMUSCULAR; INTRAVENOUS PRN
Status: DISCONTINUED | OUTPATIENT
Start: 2019-08-27 | End: 2019-08-27 | Stop reason: HOSPADM

## 2019-08-27 RX ORDER — MIDAZOLAM HYDROCHLORIDE 1 MG/ML
INJECTION INTRAMUSCULAR; INTRAVENOUS
Status: COMPLETED
Start: 2019-08-27 | End: 2019-08-27

## 2019-08-27 RX ORDER — LIDOCAINE HYDROCHLORIDE 10 MG/ML
INJECTION, SOLUTION EPIDURAL; INFILTRATION; INTRACAUDAL; PERINEURAL
Status: COMPLETED
Start: 2019-08-27 | End: 2019-08-27

## 2019-08-27 RX ORDER — MIDAZOLAM HYDROCHLORIDE 1 MG/ML
.5-2 INJECTION INTRAMUSCULAR; INTRAVENOUS PRN
Status: DISCONTINUED | OUTPATIENT
Start: 2019-08-27 | End: 2019-08-27 | Stop reason: HOSPADM

## 2019-08-27 RX ORDER — SODIUM CHLORIDE 9 MG/ML
500 INJECTION, SOLUTION INTRAVENOUS
Status: DISCONTINUED | OUTPATIENT
Start: 2019-08-27 | End: 2019-08-27 | Stop reason: HOSPADM

## 2019-08-27 RX ADMIN — FENTANYL CITRATE 50 MCG: 0.05 INJECTION, SOLUTION INTRAMUSCULAR; INTRAVENOUS at 15:36

## 2019-08-27 RX ADMIN — MIDAZOLAM 1 MG: 1 INJECTION INTRAMUSCULAR; INTRAVENOUS at 15:47

## 2019-08-27 RX ADMIN — HEPARIN: 100 SYRINGE at 14:05

## 2019-08-27 RX ADMIN — MIDAZOLAM 1 MG: 1 INJECTION INTRAMUSCULAR; INTRAVENOUS at 15:36

## 2019-08-27 RX ADMIN — LIDOCAINE HYDROCHLORIDE,EPINEPHRINE BITARTRATE: 10; .01 INJECTION, SOLUTION INFILTRATION; PERINEURAL at 15:50

## 2019-08-27 RX ADMIN — CEFAZOLIN SODIUM 2 G: 2 INJECTION, SOLUTION INTRAVENOUS at 15:15

## 2019-08-27 RX ADMIN — FENTANYL CITRATE 50 MCG: 0.05 INJECTION, SOLUTION INTRAMUSCULAR; INTRAVENOUS at 15:54

## 2019-08-27 RX ADMIN — LIDOCAINE HYDROCHLORIDE: 10 INJECTION, SOLUTION EPIDURAL; INFILTRATION; INTRACAUDAL; PERINEURAL at 15:50

## 2019-08-27 RX ADMIN — MIDAZOLAM 1 MG: 1 INJECTION INTRAMUSCULAR; INTRAVENOUS at 15:53

## 2019-08-27 RX ADMIN — MIDAZOLAM 1 MG: 1 INJECTION INTRAMUSCULAR; INTRAVENOUS at 15:56

## 2019-08-27 RX ADMIN — FENTANYL CITRATE 50 MCG: 0.05 INJECTION, SOLUTION INTRAMUSCULAR; INTRAVENOUS at 15:46

## 2019-08-27 RX ADMIN — MIDAZOLAM 1 MG: 1 INJECTION INTRAMUSCULAR; INTRAVENOUS at 15:46

## 2019-08-27 NOTE — TELEPHONE ENCOUNTER
Left message with patient about no show to appointment today 8/27/19.  Explained this was her first n oshow and the nos how policy.

## 2019-08-27 NOTE — PROGRESS NOTES
[facility] Discharge Note:    Discharge instructions given to pt and  Narciso who asked appropriate questions and state full understanding of discharge instructions.  Provided with information packet for [employer].  PIV dc'd.  Pt d/c'd from [facility] accompanied by  Narciso, all belongings sent with pt.     at time of D/C, pt asymptomatic.  Per conversation with Dr. Workman, ok to D/C/ pt at this time.        Wound Care, Adult  Taking care of your wound properly can help to prevent pain and infection. It can also help your wound to heal more quickly.  How is this treated?  Wound care  · Follow instructions from your health care provider about how to take care of your wound. Make sure you:  ¨ Wash your hands with soap and water before you change the bandage (dressing). If soap and water are not available, use hand .  ¨ Change your dressing as told by your health care provider.  ¨ Leave stitches (sutures), skin glue, or adhesive strips in place. These skin closures may need to stay in place for 2 weeks or longer. If adhesive strip edges start to loosen and curl up, you may trim the loose edges. Do not remove adhesive strips completely unless your health care provider tells you to do that.  · Check your wound area every day for signs of infection. Check for:  ¨ More redness, swelling, or pain.  ¨ More fluid or blood.  ¨ Warmth.  ¨ Pus or a bad smell.  · Ask your health care provider if you should clean the wound with mild soap and water. Doing this may include:  ¨ Using a clean towel to pat the wound dry after cleaning it. Do not rub or scrub the wound.  ¨ Applying a cream or ointment. Do this only as told by your health care provider.  ¨ Covering the incision with a clean dressing.  · Ask your health care provider when you can leave the wound uncovered.  Medicines   · If you were prescribed an antibiotic medicine, cream, or ointment, take or use the antibiotic as told by your health care provider.  Do not stop taking or using the antibiotic even if your condition improves.  · Take over-the-counter and prescription medicines only as told by your health care provider. If you were prescribed pain medicine, take it at least 30 minutes before doing any wound care or as told by your health care provider.  General instructions  · Return to your normal activities as told by your health care provider. Ask your health care provider what activities are safe.  · Do not scratch or pick at the wound.  · Keep all follow-up visits as told by your health care provider. This is important.  · Eat a diet that includes protein, vitamin A, vitamin C, and other nutrient-rich foods. These help the wound heal:  ¨ Protein-rich foods include meat, dairy, beans, nuts, and other sources.  ¨ Vitamin A-rich foods include carrots and dark green, leafy vegetables.  ¨ Vitamin C-rich foods include citrus, tomatoes, and other fruits and vegetables.  ¨ Nutrient-rich foods have protein, carbohydrates, fat, vitamins, or minerals. Eat a variety of healthy foods including vegetables, fruits, and whole grains.  Contact a health care provider if:  · You received a tetanus shot and you have swelling, severe pain, redness, or bleeding at the injection site.  · Your pain is not controlled with medicine.  · You have more redness, swelling, or pain around the wound.  · You have more fluid or blood coming from the wound.  · Your wound feels warm to the touch.  · You have pus or a bad smell coming from the wound.  · You have a fever or chills.  · You are nauseous or you vomit.  · You are dizzy.  Get help right away if:  · You have a red streak going away from your wound.  · The edges of the wound open up and separate.  · Your wound is bleeding and the bleeding does not stop with gentle pressure.  · You have a rash.  · You faint.  · You have trouble breathing.  This information is not intended to replace advice given to you by your health care provider. Make  sure you discuss any questions you have with your health care provider.  Document Released: 09/26/2009 Document Revised: 08/16/2017 Document Reviewed: 07/04/2017  [employer] Interactive Patient Education © 2017 [employer]          Implanted Port Insertion  An implanted port is a central line that has a round shape and is placed under the skin. It is used as a long-term IV access for:   · Medicines, such as chemotherapy.    · Fluids.    · Liquid nutrition, such as total parenteral nutrition (TPN).    · Blood samples.    LET YOUR HEALTH CARE PROVIDER KNOW ABOUT:  · Allergies to food or medicine.    · Medicines taken, including vitamins, herbs, eye drops, creams, and over-the-counter medicines.    · Any allergies to heparin.  · Use of steroids (by mouth or creams).    · Previous problems with anesthetics or numbing medicines.    · History of bleeding problems or blood clots.    · Previous surgery.    · Other health problems, including diabetes and kidney problems.    · Possibility of pregnancy, if this applies.  RISKS AND COMPLICATIONS  Generally, this is a safe procedure. However, as with any procedure, problems can occur. Possible problems include:  · Damage to the blood vessel, bruising, or bleeding at the puncture site.    · Infection.  · Blood clot in the vessel that the port is in.  · Breakdown of the skin over your port.  · Very rarely a person may develop a condition called a pneumothorax, a collection of air in the chest that may cause one of the lungs to collapse. The placement of these catheters with the appropriate imaging guidance significantly decreases the risk of a pneumothorax.    BEFORE THE PROCEDURE   · Your health care provider may want you to have blood tests. These tests can help tell how well your kidneys and liver are working. They can also show how well your blood clots.    · If you take blood thinners (anticoagulant medicines), ask your health care provider when you should stop taking them.     · Make arrangements for someone to drive you home. This is necessary if you have been sedated for your procedure.    PROCEDURE   Port insertion usually takes about 30-45 minutes.   · An IV needle will be inserted in your arm. Medicine for pain and medicine to help relax you (sedative) will flow directly into your body through this needle.    · You will lie on an exam table, and you will be connected to monitors to keep track of your heart rate, blood pressure, and breathing throughout the procedure.  · An oxygen monitoring device may be attached to your finger. Oxygen will be given.    · Everything will be kept as germ free (sterile) as possible during the procedure. The skin near the point of the incision will be cleansed with antiseptic, and the area will be draped with sterile towels. The skin and deeper tissues over the port area will be made numb with a local anesthetic.  · Two small cuts (incisions) will be made in the skin to insert the port. One will be made in the neck to obtain access to the vein where the catheter will lie.    · Because the port reservoir will be placed under the skin, a small skin incision will be made in the upper chest, and a small pocket for the port will be made under the skin. The catheter that will be connected to the port tunnels to a large central vein in the chest. A small, raised area will remain on your body at the site of the reservoir when the procedure is complete.  · The port placement will be done under imaging guidance to ensure the proper placement.    · The reservoir has a silicone covering that can be punctured with a special needle.    · The port will be flushed with normal saline, and blood will be drawn to make sure it is working properly.  · There will be nothing remaining outside the skin when the procedure is finished.    · Incisions will be held together by stitches, surgical glue, or a special tape.  AFTER THE PROCEDURE  · You will stay in a recovery area  until the anesthesia has worn off. Your blood pressure and pulse will be checked.  · A final chest X-ray will be taken to check the placement of the port and to ensure that there is no injury to your lung.  This information is not intended to replace advice given to you by your health care provider. Make sure you discuss any questions you have with your health care provider.  Document Released: 10/08/2014 Document Revised: 01/08/2016 Document Reviewed: 10/08/2014  [employer] Interactive Patient Education © 2017 [employer]

## 2019-08-27 NOTE — OR SURGEON
Immediate Post- Operative Note        PostOp Diagnosis: metastatic cancer      Procedure(s): chest port      Estimated Blood Loss: Less than 5 ml        Complications: None            8/27/2019     4:18 PM     Ricci Workman

## 2019-08-27 NOTE — DISCHARGE INSTRUCTIONS
Wound Care, Adult  Taking care of your wound properly can help to prevent pain and infection. It can also help your wound to heal more quickly.  How is this treated?  Wound care  · Follow instructions from your health care provider about how to take care of your wound. Make sure you:  ¨ Wash your hands with soap and water before you change the bandage (dressing). If soap and water are not available, use hand .  ¨ Change your dressing as told by your health care provider.  ¨ Leave stitches (sutures), skin glue, or adhesive strips in place. These skin closures may need to stay in place for 2 weeks or longer. If adhesive strip edges start to loosen and curl up, you may trim the loose edges. Do not remove adhesive strips completely unless your health care provider tells you to do that.  · Check your wound area every day for signs of infection. Check for:  ¨ More redness, swelling, or pain.  ¨ More fluid or blood.  ¨ Warmth.  ¨ Pus or a bad smell.  · Ask your health care provider if you should clean the wound with mild soap and water. Doing this may include:  ¨ Using a clean towel to pat the wound dry after cleaning it. Do not rub or scrub the wound.  ¨ Applying a cream or ointment. Do this only as told by your health care provider.  ¨ Covering the incision with a clean dressing.  · Ask your health care provider when you can leave the wound uncovered.  Medicines   · If you were prescribed an antibiotic medicine, cream, or ointment, take or use the antibiotic as told by your health care provider. Do not stop taking or using the antibiotic even if your condition improves.  · Take over-the-counter and prescription medicines only as told by your health care provider. If you were prescribed pain medicine, take it at least 30 minutes before doing any wound care or as told by your health care provider.  General instructions  · Return to your normal activities as told by your health care provider. Ask your health care  provider what activities are safe.  · Do not scratch or pick at the wound.  · Keep all follow-up visits as told by your health care provider. This is important.  · Eat a diet that includes protein, vitamin A, vitamin C, and other nutrient-rich foods. These help the wound heal:  ¨ Protein-rich foods include meat, dairy, beans, nuts, and other sources.  ¨ Vitamin A-rich foods include carrots and dark green, leafy vegetables.  ¨ Vitamin C-rich foods include citrus, tomatoes, and other fruits and vegetables.  ¨ Nutrient-rich foods have protein, carbohydrates, fat, vitamins, or minerals. Eat a variety of healthy foods including vegetables, fruits, and whole grains.  Contact a health care provider if:  · You received a tetanus shot and you have swelling, severe pain, redness, or bleeding at the injection site.  · Your pain is not controlled with medicine.  · You have more redness, swelling, or pain around the wound.  · You have more fluid or blood coming from the wound.  · Your wound feels warm to the touch.  · You have pus or a bad smell coming from the wound.  · You have a fever or chills.  · You are nauseous or you vomit.  · You are dizzy.  Get help right away if:  · You have a red streak going away from your wound.  · The edges of the wound open up and separate.  · Your wound is bleeding and the bleeding does not stop with gentle pressure.  · You have a rash.  · You faint.  · You have trouble breathing.  This information is not intended to replace advice given to you by your health care provider. Make sure you discuss any questions you have with your health care provider.  Document Released: 09/26/2009 Document Revised: 08/16/2017 Document Reviewed: 07/04/2017  [employer] Interactive Patient Education © 2017 [employer]          Implanted Port Insertion  An implanted port is a central line that has a round shape and is placed under the skin. It is used as a long-term IV access for:   · Medicines, such as chemotherapy.     · Fluids.    · Liquid nutrition, such as total parenteral nutrition (TPN).    · Blood samples.    LET YOUR HEALTH CARE PROVIDER KNOW ABOUT:  · Allergies to food or medicine.    · Medicines taken, including vitamins, herbs, eye drops, creams, and over-the-counter medicines.    · Any allergies to heparin.  · Use of steroids (by mouth or creams).    · Previous problems with anesthetics or numbing medicines.    · History of bleeding problems or blood clots.    · Previous surgery.    · Other health problems, including diabetes and kidney problems.    · Possibility of pregnancy, if this applies.  RISKS AND COMPLICATIONS  Generally, this is a safe procedure. However, as with any procedure, problems can occur. Possible problems include:  · Damage to the blood vessel, bruising, or bleeding at the puncture site.    · Infection.  · Blood clot in the vessel that the port is in.  · Breakdown of the skin over your port.  · Very rarely a person may develop a condition called a pneumothorax, a collection of air in the chest that may cause one of the lungs to collapse. The placement of these catheters with the appropriate imaging guidance significantly decreases the risk of a pneumothorax.    BEFORE THE PROCEDURE   · Your health care provider may want you to have blood tests. These tests can help tell how well your kidneys and liver are working. They can also show how well your blood clots.    · If you take blood thinners (anticoagulant medicines), ask your health care provider when you should stop taking them.    · Make arrangements for someone to drive you home. This is necessary if you have been sedated for your procedure.    PROCEDURE   Port insertion usually takes about 30-45 minutes.   · An IV needle will be inserted in your arm. Medicine for pain and medicine to help relax you (sedative) will flow directly into your body through this needle.    · You will lie on an exam table, and you will be connected to monitors to keep  track of your heart rate, blood pressure, and breathing throughout the procedure.  · An oxygen monitoring device may be attached to your finger. Oxygen will be given.    · Everything will be kept as germ free (sterile) as possible during the procedure. The skin near the point of the incision will be cleansed with antiseptic, and the area will be draped with sterile towels. The skin and deeper tissues over the port area will be made numb with a local anesthetic.  · Two small cuts (incisions) will be made in the skin to insert the port. One will be made in the neck to obtain access to the vein where the catheter will lie.    · Because the port reservoir will be placed under the skin, a small skin incision will be made in the upper chest, and a small pocket for the port will be made under the skin. The catheter that will be connected to the port tunnels to a large central vein in the chest. A small, raised area will remain on your body at the site of the reservoir when the procedure is complete.  · The port placement will be done under imaging guidance to ensure the proper placement.    · The reservoir has a silicone covering that can be punctured with a special needle.    · The port will be flushed with normal saline, and blood will be drawn to make sure it is working properly.  · There will be nothing remaining outside the skin when the procedure is finished.    · Incisions will be held together by stitches, surgical glue, or a special tape.  AFTER THE PROCEDURE  · You will stay in a recovery area until the anesthesia has worn off. Your blood pressure and pulse will be checked.  · A final chest X-ray will be taken to check the placement of the port and to ensure that there is no injury to your lung.  This information is not intended to replace advice given to you by your health care provider. Make sure you discuss any questions you have with your health care provider.  Document Released: 10/08/2014 Document Revised:  01/08/2016 Document Reviewed: 10/08/2014  [employer] Interactive Patient Education © 2017 [employer]

## 2019-08-27 NOTE — PROGRESS NOTES
OPIR Procedure Note:    Procedure Confirmed with MD, RN, RT and patient pre procedure.  Tunneled Port Placement by MD Workman assisted by RT Brenton, Right chest access site x 2.    [facility] Qualitative study result is negative per phone conversation with Art, .    Pt with baseline HR of 118 just prior to start of procedure, Dr. Workman notified and OK to proceed with procedure at this time.    End Tidal CO2 range 28-36 throughout procedure.    Right upper chest pocket access site, sealed with dissolvable sutures, dermabond, steristrips, covered with gauze and tegaderm, C/D/I.  2nd Right upper chest access site sealed with dissolvable sutures, dermabond, steristrips, covered with gauze and tegaderm, C/D/I.    Patient tolerated procedure hemodynamically stable; pt drowsy but easily awakens and is talking post procedure.  Patient transported to OPIR pod #2 for recovery to be monitored by this RN and Phillip OLMEDO RN.    Right Chest Tunneled Port:  [employer], PowerPort ClearVUE, 8F x 21cm, REF# [id number], LOT# [id number], Exp. Date 10/31/2020.

## 2019-08-28 NOTE — PROGRESS NOTES
Pt did show for chemo ed appt with MD office due to needing to  her daughter from school. Pt confirms she has port placed and ECHO done, results in Epic. Pt is scheduled for chemotherapy education with [facility] on 8/29/19 and she verbalizes understanding of the importance of this appt, and is aware of location of infusion center. Clara HARDY with [facility] will have antiemetics and emla cream called into pt's pharmacy. Pt is aware that she will need to pick these up and should bring to education appt tomorrow. Pt also verbalized understanding of importance of follow-up with [facility] on 9/4 at 1115.

## 2019-08-29 ENCOUNTER — OUTPATIENT INFUSION SERVICES (OUTPATIENT)
Dept: ONCOLOGY | Facility: MEDICAL CENTER | Age: 39
End: 2019-08-29
Attending: INTERNAL MEDICINE
Payer: MEDICAID

## 2019-08-29 VITALS
HEIGHT: 64 IN | WEIGHT: 197.97 LBS | SYSTOLIC BLOOD PRESSURE: 121 MMHG | OXYGEN SATURATION: 97 % | HEART RATE: 106 BPM | RESPIRATION RATE: 18 BRPM | BODY MASS INDEX: 33.8 KG/M2 | DIASTOLIC BLOOD PRESSURE: 64 MMHG | TEMPERATURE: 98.3 F

## 2019-08-29 DIAGNOSIS — C40.22 MALIGNANT NEOPLASM OF LONG BONE OF LEFT LOWER EXTREMITY (HCC): ICD-10-CM

## 2019-08-29 DIAGNOSIS — C79.9 METASTATIC CANCER (HCC): ICD-10-CM

## 2019-08-29 LAB
ABO GROUP BLD: NORMAL
ALBUMIN SERPL BCP-MCNC: 3.5 G/DL (ref 3.2–4.9)
ALBUMIN/GLOB SERPL: 1.1 G/DL
ALP SERPL-CCNC: 82 U/L (ref 30–99)
ALT SERPL-CCNC: 9 U/L (ref 2–50)
ANION GAP SERPL CALC-SCNC: 8 MMOL/L (ref 0–11.9)
ANISOCYTOSIS BLD QL SMEAR: ABNORMAL
AST SERPL-CCNC: 17 U/L (ref 12–45)
BASOPHILS # BLD AUTO: 0.7 % (ref 0–1.8)
BASOPHILS # BLD: 0.06 K/UL (ref 0–0.12)
BILIRUB SERPL-MCNC: 0.9 MG/DL (ref 0.1–1.5)
BLD GP AB SCN SERPL QL: NORMAL
BUN SERPL-MCNC: 13 MG/DL (ref 8–22)
CALCIUM SERPL-MCNC: 8.9 MG/DL (ref 8.5–10.5)
CHLORIDE SERPL-SCNC: 105 MMOL/L (ref 96–112)
CO2 SERPL-SCNC: 26 MMOL/L (ref 20–33)
COMMENT 1642: NORMAL
CREAT SERPL-MCNC: 0.64 MG/DL (ref 0.5–1.4)
EOSINOPHIL # BLD AUTO: 0.24 K/UL (ref 0–0.51)
EOSINOPHIL NFR BLD: 2.8 % (ref 0–6.9)
ERYTHROCYTE [DISTWIDTH] IN BLOOD BY AUTOMATED COUNT: 78.9 FL (ref 35.9–50)
FERRITIN SERPL-MCNC: 62 NG/ML (ref 10–291)
GLOBULIN SER CALC-MCNC: 3.1 G/DL (ref 1.9–3.5)
GLUCOSE SERPL-MCNC: 126 MG/DL (ref 65–99)
HCT VFR BLD AUTO: 24.9 % (ref 37–47)
HGB BLD-MCNC: 6.8 G/DL (ref 12–16)
HGB RETIC QN AUTO: 27.1 PG/CELL (ref 29–35)
HYPOCHROMIA BLD QL SMEAR: ABNORMAL
IMM GRANULOCYTES # BLD AUTO: 0.22 K/UL (ref 0–0.11)
IMM GRANULOCYTES NFR BLD AUTO: 2.5 % (ref 0–0.9)
IMM RETICS NFR: 48.8 % (ref 9.3–17.4)
IRON SATN MFR SERPL: 12 % (ref 15–55)
IRON SERPL-MCNC: 42 UG/DL (ref 40–170)
LYMPHOCYTES # BLD AUTO: 2.62 K/UL (ref 1–4.8)
LYMPHOCYTES NFR BLD: 30.1 % (ref 22–41)
MACROCYTES BLD QL SMEAR: ABNORMAL
MCH RBC QN AUTO: 26.3 PG (ref 27–33)
MCHC RBC AUTO-ENTMCNC: 27.3 G/DL (ref 33.6–35)
MCV RBC AUTO: 96.1 FL (ref 81.4–97.8)
MICROCYTES BLD QL SMEAR: ABNORMAL
MONOCYTES # BLD AUTO: 0.49 K/UL (ref 0–0.85)
MONOCYTES NFR BLD AUTO: 5.6 % (ref 0–13.4)
MORPHOLOGY BLD-IMP: NORMAL
NEUTROPHILS # BLD AUTO: 5.06 K/UL (ref 2–7.15)
NEUTROPHILS NFR BLD: 58.3 % (ref 44–72)
NRBC # BLD AUTO: 0.73 K/UL
NRBC BLD-RTO: 8.4 /100 WBC
PLATELET # BLD AUTO: 162 K/UL (ref 164–446)
PLATELET BLD QL SMEAR: NORMAL
PMV BLD AUTO: 9.3 FL (ref 9–12.9)
POLYCHROMASIA BLD QL SMEAR: NORMAL
POTASSIUM SERPL-SCNC: 3.6 MMOL/L (ref 3.6–5.5)
PROT SERPL-MCNC: 6.6 G/DL (ref 6–8.2)
RBC # BLD AUTO: 2.59 M/UL (ref 4.2–5.4)
RBC BLD AUTO: PRESENT
RETICS # AUTO: 0.19 M/UL (ref 0.04–0.06)
RETICS/RBC NFR: 7.3 % (ref 0.8–2.1)
RH BLD: NORMAL
SODIUM SERPL-SCNC: 139 MMOL/L (ref 135–145)
TIBC SERPL-MCNC: 344 UG/DL (ref 250–450)
WBC # BLD AUTO: 8.7 K/UL (ref 4.8–10.8)

## 2019-08-29 PROCEDURE — 86901 BLOOD TYPING SEROLOGIC RH(D): CPT

## 2019-08-29 PROCEDURE — 86900 BLOOD TYPING SEROLOGIC ABO: CPT

## 2019-08-29 PROCEDURE — 82728 ASSAY OF FERRITIN: CPT

## 2019-08-29 PROCEDURE — 85046 RETICYTE/HGB CONCENTRATE: CPT

## 2019-08-29 PROCEDURE — 306780 HCHG STAT FOR TRANSFUSION PER CASE

## 2019-08-29 PROCEDURE — 86850 RBC ANTIBODY SCREEN: CPT

## 2019-08-29 PROCEDURE — 80053 COMPREHEN METABOLIC PANEL: CPT

## 2019-08-29 PROCEDURE — 83540 ASSAY OF IRON: CPT

## 2019-08-29 PROCEDURE — 36415 COLL VENOUS BLD VENIPUNCTURE: CPT

## 2019-08-29 PROCEDURE — 85025 COMPLETE CBC W/AUTO DIFF WBC: CPT

## 2019-08-29 PROCEDURE — 83550 IRON BINDING TEST: CPT

## 2019-08-29 RX ORDER — ACETAMINOPHEN 325 MG/1
650 TABLET ORAL ONCE
Status: CANCELLED | OUTPATIENT
Start: 2019-08-30

## 2019-08-29 RX ORDER — DIPHENHYDRAMINE HCL 25 MG
25 TABLET ORAL ONCE
Status: CANCELLED | OUTPATIENT
Start: 2019-08-30

## 2019-08-29 RX ORDER — 0.9 % SODIUM CHLORIDE 0.9 %
20 VIAL (ML) INJECTION ONCE
Status: CANCELLED | OUTPATIENT
Start: 2019-08-29

## 2019-08-29 RX ORDER — RANITIDINE 150 MG/1
150 TABLET ORAL 2 TIMES DAILY
COMMUNITY
End: 2019-10-16

## 2019-08-30 ENCOUNTER — OUTPATIENT INFUSION SERVICES (OUTPATIENT)
Dept: ONCOLOGY | Facility: MEDICAL CENTER | Age: 39
End: 2019-08-30
Attending: INTERNAL MEDICINE
Payer: MEDICAID

## 2019-08-30 VITALS
SYSTOLIC BLOOD PRESSURE: 116 MMHG | TEMPERATURE: 98.2 F | DIASTOLIC BLOOD PRESSURE: 72 MMHG | HEIGHT: 64 IN | OXYGEN SATURATION: 95 % | BODY MASS INDEX: 34.48 KG/M2 | HEART RATE: 98 BPM | RESPIRATION RATE: 18 BRPM | WEIGHT: 201.94 LBS

## 2019-08-30 DIAGNOSIS — D64.9 ANEMIA, UNSPECIFIED TYPE: ICD-10-CM

## 2019-08-30 DIAGNOSIS — D63.8 ANEMIA OF CHRONIC DISEASE: ICD-10-CM

## 2019-08-30 LAB
BARCODED ABORH UBTYP: 5100
BARCODED ABORH UBTYP: 5100
BARCODED PRD CODE UBPRD: NORMAL
BARCODED PRD CODE UBPRD: NORMAL
BARCODED UNIT NUM UBUNT: NORMAL
BARCODED UNIT NUM UBUNT: NORMAL
COMPONENT R 8504R: NORMAL
COMPONENT R 8504R: NORMAL
PRODUCT TYPE UPROD: NORMAL
PRODUCT TYPE UPROD: NORMAL
UNIT STATUS USTAT: NORMAL
UNIT STATUS USTAT: NORMAL

## 2019-08-30 PROCEDURE — 306780 HCHG STAT FOR TRANSFUSION PER CASE

## 2019-08-30 PROCEDURE — A9270 NON-COVERED ITEM OR SERVICE: HCPCS | Performed by: INTERNAL MEDICINE

## 2019-08-30 PROCEDURE — 700111 HCHG RX REV CODE 636 W/ 250 OVERRIDE (IP)

## 2019-08-30 PROCEDURE — P9016 RBC LEUKOCYTES REDUCED: HCPCS | Mod: 91

## 2019-08-30 PROCEDURE — 36430 TRANSFUSION BLD/BLD COMPNT: CPT

## 2019-08-30 PROCEDURE — 86923 COMPATIBILITY TEST ELECTRIC: CPT

## 2019-08-30 PROCEDURE — 700102 HCHG RX REV CODE 250 W/ 637 OVERRIDE(OP): Performed by: INTERNAL MEDICINE

## 2019-08-30 PROCEDURE — A4212 NON CORING NEEDLE OR STYLET: HCPCS

## 2019-08-30 RX ORDER — DIPHENHYDRAMINE HCL 25 MG
25 TABLET ORAL ONCE
Status: COMPLETED | OUTPATIENT
Start: 2019-08-30 | End: 2019-08-30

## 2019-08-30 RX ORDER — ACETAMINOPHEN 325 MG/1
650 TABLET ORAL ONCE
Status: CANCELLED | OUTPATIENT
Start: 2019-08-30

## 2019-08-30 RX ORDER — LIDOCAINE HYDROCHLORIDE 10 MG/ML
INJECTION, SOLUTION EPIDURAL; INFILTRATION; INTRACAUDAL; PERINEURAL
Status: COMPLETED
Start: 2019-08-30 | End: 2019-08-30

## 2019-08-30 RX ORDER — DIPHENHYDRAMINE HCL 25 MG
25 TABLET ORAL ONCE
Status: CANCELLED | OUTPATIENT
Start: 2019-08-30

## 2019-08-30 RX ORDER — ACETAMINOPHEN 325 MG/1
650 TABLET ORAL ONCE
Status: COMPLETED | OUTPATIENT
Start: 2019-08-30 | End: 2019-08-30

## 2019-08-30 RX ADMIN — ACETAMINOPHEN 650 MG: 325 TABLET ORAL at 08:10

## 2019-08-30 RX ADMIN — Medication 500 UNITS: at 12:45

## 2019-08-30 RX ADMIN — DIPHENHYDRAMINE HCL 25 MG: 25 TABLET ORAL at 08:10

## 2019-08-30 RX ADMIN — LIDOCAINE HYDROCHLORIDE 5 ML: 10 INJECTION, SOLUTION EPIDURAL; INFILTRATION; INTRACAUDAL at 08:17

## 2019-08-30 NOTE — PROGRESS NOTES
Returns today for 2 U PRBC's. In good spirits. Reports fatigue, also coughing up blood,  aware. Teaching and education reinforcement provided, also emotional support. Consents signed x 2 Treatment well tolerated without complications, no sign or symptoms of reaction observed or expressed. Port flushed per protocol, site d-accessed, needle intact, compression dressing applied. Patient to go to ED if coughing up blood worsen or for any other problem. Discharge home with  to self care in [facility]. Appointment confirm for next treatment.

## 2019-08-30 NOTE — PROGRESS NOTES
"Pt arrives for chemo education class.  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  Presentation given to pt and  on chemotherapy and potential side effects. Pt also given information about resources here at [facility]. Several informational handouts given to pt including \"Chemo and You\" booklet and specific drug information sheets.  Pt given time to ask questions.  Pt then given tour of infusion center and oriented to routine.  Pt DC'd home to self care.  Pt returns Monday, for chemo and tomorrow for blood transfusion, also labs drawn today. Appointment confirm.  "

## 2019-08-31 ENCOUNTER — APPOINTMENT (OUTPATIENT)
Dept: ONCOLOGY | Facility: MEDICAL CENTER | Age: 39
End: 2019-08-31
Attending: INTERNAL MEDICINE
Payer: MEDICAID

## 2019-09-02 ENCOUNTER — OUTPATIENT INFUSION SERVICES (OUTPATIENT)
Dept: ONCOLOGY | Facility: MEDICAL CENTER | Age: 39
End: 2019-09-02
Attending: INTERNAL MEDICINE
Payer: MEDICAID

## 2019-09-02 VITALS
OXYGEN SATURATION: 98 % | SYSTOLIC BLOOD PRESSURE: 100 MMHG | TEMPERATURE: 97.1 F | WEIGHT: 194.45 LBS | RESPIRATION RATE: 18 BRPM | HEART RATE: 117 BPM | DIASTOLIC BLOOD PRESSURE: 57 MMHG | BODY MASS INDEX: 33.2 KG/M2 | HEIGHT: 64 IN

## 2019-09-02 DIAGNOSIS — C40.22 MALIGNANT NEOPLASM OF LONG BONE OF LEFT LOWER EXTREMITY (HCC): Primary | ICD-10-CM

## 2019-09-02 LAB
ALBUMIN SERPL BCP-MCNC: 3.8 G/DL (ref 3.2–4.9)
ALBUMIN/GLOB SERPL: 1.1 G/DL
ALP SERPL-CCNC: 100 U/L (ref 30–99)
ALT SERPL-CCNC: 8 U/L (ref 2–50)
ANION GAP SERPL CALC-SCNC: 10 MMOL/L (ref 0–11.9)
AST SERPL-CCNC: 13 U/L (ref 12–45)
BASOPHILS # BLD AUTO: 0.5 % (ref 0–1.8)
BASOPHILS # BLD: 0.06 K/UL (ref 0–0.12)
BILIRUB SERPL-MCNC: 1.1 MG/DL (ref 0.1–1.5)
BUN SERPL-MCNC: 9 MG/DL (ref 8–22)
CALCIUM SERPL-MCNC: 8.9 MG/DL (ref 8.5–10.5)
CHLORIDE SERPL-SCNC: 100 MMOL/L (ref 96–112)
CO2 SERPL-SCNC: 23 MMOL/L (ref 20–33)
CREAT SERPL-MCNC: 0.5 MG/DL (ref 0.5–1.4)
EOSINOPHIL # BLD AUTO: 0.22 K/UL (ref 0–0.51)
EOSINOPHIL NFR BLD: 1.9 % (ref 0–6.9)
ERYTHROCYTE [DISTWIDTH] IN BLOOD BY AUTOMATED COUNT: 72.8 FL (ref 35.9–50)
GLOBULIN SER CALC-MCNC: 3.6 G/DL (ref 1.9–3.5)
GLUCOSE SERPL-MCNC: 126 MG/DL (ref 65–99)
HCT VFR BLD AUTO: 30.9 % (ref 37–47)
HGB BLD-MCNC: 9.1 G/DL (ref 12–16)
IMM GRANULOCYTES # BLD AUTO: 0.18 K/UL (ref 0–0.11)
IMM GRANULOCYTES NFR BLD AUTO: 1.6 % (ref 0–0.9)
LYMPHOCYTES # BLD AUTO: 2.93 K/UL (ref 1–4.8)
LYMPHOCYTES NFR BLD: 25.5 % (ref 22–41)
MCH RBC QN AUTO: 27.4 PG (ref 27–33)
MCHC RBC AUTO-ENTMCNC: 29.2 G/DL (ref 33.6–35)
MCV RBC AUTO: 94 FL (ref 81.4–97.8)
MONOCYTES # BLD AUTO: 0.79 K/UL (ref 0–0.85)
MONOCYTES NFR BLD AUTO: 6.9 % (ref 0–13.4)
MORPHOLOGY BLD-IMP: NORMAL
NEUTROPHILS NFR BLD: 63.6 % (ref 44–72)
NRBC # BLD AUTO: 0.28 K/UL
NRBC BLD-RTO: 2.4 /100 WBC
PLATELET # BLD AUTO: 134 K/UL (ref 164–446)
PMV BLD AUTO: 9 FL (ref 9–12.9)
POTASSIUM SERPL-SCNC: 3.7 MMOL/L (ref 3.6–5.5)
PROT SERPL-MCNC: 7.4 G/DL (ref 6–8.2)
RBC # BLD AUTO: 3.32 M/UL (ref 4.2–5.4)
SODIUM SERPL-SCNC: 133 MMOL/L (ref 135–145)
WBC # BLD AUTO: 11.5 K/UL (ref 4.8–10.8)

## 2019-09-02 PROCEDURE — 96367 TX/PROPH/DG ADDL SEQ IV INF: CPT

## 2019-09-02 PROCEDURE — 96413 CHEMO IV INFUSION 1 HR: CPT

## 2019-09-02 PROCEDURE — A4212 NON CORING NEEDLE OR STYLET: HCPCS

## 2019-09-02 PROCEDURE — 700111 HCHG RX REV CODE 636 W/ 250 OVERRIDE (IP): Performed by: INTERNAL MEDICINE

## 2019-09-02 PROCEDURE — 80053 COMPREHEN METABOLIC PANEL: CPT

## 2019-09-02 PROCEDURE — 700111 HCHG RX REV CODE 636 W/ 250 OVERRIDE (IP)

## 2019-09-02 PROCEDURE — 85025 COMPLETE CBC W/AUTO DIFF WBC: CPT

## 2019-09-02 PROCEDURE — 700105 HCHG RX REV CODE 258: Performed by: INTERNAL MEDICINE

## 2019-09-02 PROCEDURE — 96375 TX/PRO/DX INJ NEW DRUG ADDON: CPT

## 2019-09-02 RX ADMIN — DEXAMETHASONE SODIUM PHOSPHATE 12 MG: 4 INJECTION, SOLUTION INTRAMUSCULAR; INTRAVENOUS at 16:05

## 2019-09-02 RX ADMIN — DOXORUBICIN HYDROCHLORIDE 148 MG: 2 INJECTION, SOLUTION INTRAVENOUS at 17:32

## 2019-09-02 RX ADMIN — ONDANSETRON HYDROCHLORIDE 16 MG: 2 SOLUTION INTRAMUSCULAR; INTRAVENOUS at 15:47

## 2019-09-02 RX ADMIN — Medication 500 UNITS: at 18:08

## 2019-09-02 RX ADMIN — DEXRAZOXANE FOR INJECTION: 500 INJECTION, POWDER, LYOPHILIZED, FOR SOLUTION INTRAVENOUS at 16:58

## 2019-09-02 RX ADMIN — SODIUM CHLORIDE 150 MG: 9 INJECTION, SOLUTION INTRAVENOUS at 16:21

## 2019-09-02 NOTE — PROGRESS NOTES
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  Sarcoma [place]-analysis Collaboration. Lancet. 1997;350(3158):1644-54.  Tiff N, et al. Cancer. 2008;113(3):573-81.       Allergies: Patient has no known allergies.    /57   Pulse (!) 117   Temp 36.2 °C (97.1 °F) (Temporal)   Resp 18   Ht 1.615 m (5' 3.58\")   Wt 88.2 kg (194 lb 7.1 oz)   SpO2 98%   BMI 33.82 kg/m²  Body surface area is 1.99 meters squared.    Labs 09/02/19:    ANC~ 7300 Plt = 134 k Hgb = 9.1 SCr = 0.5 mg/dL CrCl >125 mL/min  LFT's = WNL except alk phos 100  TBili = 1.1    08/27/19:  ECHO:LVEF ~65%    Dexrazoxane (Zinecard) 750 mg/m² x 1.99 m² = 1492.5 mg   <10% difference, ok to treat with final dose = 1485 mg IV over 30 min     DOXOrubicin (Adriamycin) 75 mg/m² x 1.99 m² = 149.25 mg   <10% difference, ok to treat with final dose = 148 mg IV (within 30 min after Zinecard infusion)      Brendon Brady, PharmD, BCOP           "

## 2019-09-02 NOTE — PROGRESS NOTES
"Pharmacy Chemotherapy Verification    Patient Name: Amanda Bae   Dx: Epithelial hemangioendothelioma with bone metastases  Protocol: DOXOrubicin     *Dosing Reference*  DOXOrubicin 60-75 mg/m2 IV push on Day 1  21 day cycle for 2-4 cycles (prior to local control), 2-4 cycles (post-local control) for a total of 6 cycles OR 6 cycles (unresectable) or until disease progression or unacceptable toxicity including reaching lifetime cumulative anthracycline dose (metastatic)  NCCN Guidelines for Soft Tissue Sarcoma.V.2.2017  Sarcoma [place]-analysis Collaboration, 35 al. Lancet. 1997;350(0779):1649-54  Tiff N, et al. Cancer. 4908946(3):573-81    **Dexrazoxane administered as a 10:1 ratio for prevention of DOXOrubicin cardiomyopathy; dose = 750 mg/m2**    Allergies:  Patient has no known allergies.     /57   Pulse (!) 117   Temp 36.2 °C (97.1 °F) (Temporal)   Resp 18   Ht 1.615 m (5' 3.58\")   Wt 88.2 kg (194 lb 7.1 oz)   SpO2 98%   BMI 33.82 kg/m²  Body surface area is 1.99 meters squared.    Labs 9/2/19  Hgb 9.1 Plt 134k  SCr 0.5 CrCl >125 mL/min  AST/ALT/AP = 13/8/100 Tbili = 1.1    Labs 8/29/19  ANC 5060    ECHO 8/27/19  LVEF 65%     Drug Order   (Drug name, dose, route, IV Fluid & volume, frequency, number of doses) Cycle: 1 **PAPER ORDERS**     Previous treatment: s/p gemcitabine x 7 cycles 5/23/2019     Medication = DOXOrubicin   Base Dose = 75 mg/m2  Calc Dose: Base Dose x 1.99 m2 = 149.25 mg  Final Dose = 148 mg  Route = IV  Fluid & Volume = 2 mg/mL; 74 mLs  Admin Duration = Over 20 minutes           <10% difference, OK to treat with final dose    Medication = Dexrazoxane  Base Dose = 750 mg/m2  Calc Dose: Base Dose x 1.99 m2 = 1492.5 mg  Final Dose = 1485 mg  Route = IV  Fluid & Volume =  mL; concentration = 2.97 mg/mL  Admin Duration = Over 15 minutes           <10% difference, OK to treat with final dose      By my signature below, I confirm this process was performed " independently with the BSA and all final chemotherapy dosing calculations congruent. I have reviewed the above chemotherapy order and that my calculation of the final dose and BSA (when applicable) corroborate those calculations of the  pharmacist. Discrepancies of 5% or greater in the written dose have been addressed and documented within the EPIC Progress notes.    Signature: Brittanie Messina, PharmD, BCOP      .

## 2019-09-02 NOTE — PROGRESS NOTES
Chemotherapy Verification - SECONDARY RN       Height = 63.58in  Weight = 88.2kg  BSA = 1.99m2       Medication: Zinecard  Dose: 750mg/m2  Calculated Dose: 1492.5mg                             (In mg/m2, AUC, mg/kg)     Medication: Doxorubicin  Dose: 75mg/m2  Calculated Dose: 149.25mg                             (In mg/m2, AUC, mg/kg)    I confirm that this process was performed independently.

## 2019-09-02 NOTE — PROGRESS NOTES
Chemotherapy Verification - PRIMARY RN      Height = 161.5 cm  Weight = 88.2 kg  BSA = 1.99 m2       Medication: Dexrazoxane HCL  Dose: 750 mg/m2  Calculated Dose: 1,492 mg                             (In mg/m2, AUC, mg/kg)     Medication: Doxorubicin  Dose: 75 mg/m2  Calculated Dose: 149.2 mg                             (In mg/m2, AUC, mg/kg)      I confirm this process was performed independently with the BSA and all final chemotherapy dosing calculations congruent.  Any discrepancies of 10% or greater have been addressed with the chemotherapy pharmacist. The resolution of the discrepancy has been documented in the EPIC progress notes.

## 2019-09-03 ENCOUNTER — OUTPATIENT INFUSION SERVICES (OUTPATIENT)
Dept: ONCOLOGY | Facility: MEDICAL CENTER | Age: 39
End: 2019-09-03
Attending: INTERNAL MEDICINE
Payer: MEDICAID

## 2019-09-03 VITALS
OXYGEN SATURATION: 98 % | RESPIRATION RATE: 18 BRPM | TEMPERATURE: 98.6 F | HEIGHT: 64 IN | WEIGHT: 200.62 LBS | SYSTOLIC BLOOD PRESSURE: 122 MMHG | HEART RATE: 117 BPM | DIASTOLIC BLOOD PRESSURE: 60 MMHG | BODY MASS INDEX: 34.25 KG/M2

## 2019-09-03 PROCEDURE — 700111 HCHG RX REV CODE 636 W/ 250 OVERRIDE (IP): Performed by: INTERNAL MEDICINE

## 2019-09-03 PROCEDURE — 96372 THER/PROPH/DIAG INJ SC/IM: CPT

## 2019-09-03 RX ADMIN — PEGFILGRASTIM 6 MG: 6 INJECTION SUBCUTANEOUS at 18:00

## 2019-09-03 NOTE — PROGRESS NOTES
Pt arrived to [facility] via wheelchair accompanied by  for first dose Zinecard & Doxorubicin.[facility] handout regarding Doxorubicin given to patient and reviewed. Right chest wall port with steri-strips to incision site, no bleeding noted. Incision appears to be healing well. No bruising noted. Port accessed without difficulty. Flushed easily, khai briskly. Pt received pre-meds as ordered. Zinecard administered over 30 minutes, as rapidly as possible due to volume. Line flushed with NS, blood return verified, then Doxorubicin administered. Pt tolerated well. Blood return verified after infusion. Port flushed with NS and Heparin per protocol, then de-accessed. Pt given appointment card with dates of when her next treatment is due. Instructed to call scheduling tomorrow to have the appointments scheduled. Pt to return tomorrow for Neulasta injection. Verbalized an understanding of instructions. Pt left [facility] in NAD.

## 2019-09-04 ENCOUNTER — HOSPITAL ENCOUNTER (OUTPATIENT)
Dept: LAB | Facility: MEDICAL CENTER | Age: 39
End: 2019-09-04
Attending: INTERNAL MEDICINE
Payer: MEDICAID

## 2019-09-04 PROCEDURE — 80053 COMPREHEN METABOLIC PANEL: CPT

## 2019-09-04 NOTE — PROGRESS NOTES
Here for Neulasta injection today. Denies any complaints. Neulasta injected into left back arm as requested by patient. Tolerated well. Discharged home to self care in no distress. Next appointment confirmed.

## 2019-09-22 ENCOUNTER — OUTPATIENT INFUSION SERVICES (OUTPATIENT)
Dept: ONCOLOGY | Facility: MEDICAL CENTER | Age: 39
End: 2019-09-22
Attending: INTERNAL MEDICINE
Payer: MEDICAID

## 2019-09-22 VITALS
HEART RATE: 117 BPM | OXYGEN SATURATION: 99 % | RESPIRATION RATE: 18 BRPM | DIASTOLIC BLOOD PRESSURE: 73 MMHG | TEMPERATURE: 97.4 F | HEIGHT: 64 IN | WEIGHT: 188.27 LBS | BODY MASS INDEX: 32.14 KG/M2 | SYSTOLIC BLOOD PRESSURE: 121 MMHG

## 2019-09-22 DIAGNOSIS — C79.9 METASTATIC CANCER (HCC): ICD-10-CM

## 2019-09-22 LAB
ABO GROUP BLD: NORMAL
ALBUMIN SERPL BCP-MCNC: 3.9 G/DL (ref 3.2–4.9)
ALBUMIN/GLOB SERPL: 1.2 G/DL
ALP SERPL-CCNC: 118 U/L (ref 30–99)
ALT SERPL-CCNC: 6 U/L (ref 2–50)
ANION GAP SERPL CALC-SCNC: 11 MMOL/L (ref 0–11.9)
ANISOCYTOSIS BLD QL SMEAR: ABNORMAL
AST SERPL-CCNC: 9 U/L (ref 12–45)
BASOPHILS # BLD AUTO: 0.9 % (ref 0–1.8)
BASOPHILS # BLD: 0.1 K/UL (ref 0–0.12)
BILIRUB SERPL-MCNC: 0.7 MG/DL (ref 0.1–1.5)
BLD GP AB SCN SERPL QL: NORMAL
BUN SERPL-MCNC: 8 MG/DL (ref 8–22)
CALCIUM SERPL-MCNC: 8.7 MG/DL (ref 8.5–10.5)
CHLORIDE SERPL-SCNC: 99 MMOL/L (ref 96–112)
CO2 SERPL-SCNC: 24 MMOL/L (ref 20–33)
CREAT SERPL-MCNC: 0.58 MG/DL (ref 0.5–1.4)
EOSINOPHIL # BLD AUTO: 0 K/UL (ref 0–0.51)
EOSINOPHIL NFR BLD: 0 % (ref 0–6.9)
ERYTHROCYTE [DISTWIDTH] IN BLOOD BY AUTOMATED COUNT: 72.9 FL (ref 35.9–50)
GLOBULIN SER CALC-MCNC: 3.3 G/DL (ref 1.9–3.5)
GLUCOSE SERPL-MCNC: 130 MG/DL (ref 65–99)
HCT VFR BLD AUTO: 22.8 % (ref 37–47)
HGB BLD-MCNC: 6.7 G/DL (ref 12–16)
HYPOCHROMIA BLD QL SMEAR: ABNORMAL
LYMPHOCYTES # BLD AUTO: 2.37 K/UL (ref 1–4.8)
LYMPHOCYTES NFR BLD: 20.4 % (ref 22–41)
MACROCYTES BLD QL SMEAR: ABNORMAL
MANUAL DIFF BLD: NORMAL
MCH RBC QN AUTO: 28 PG (ref 27–33)
MCHC RBC AUTO-ENTMCNC: 29.4 G/DL (ref 33.6–35)
MCV RBC AUTO: 95.4 FL (ref 81.4–97.8)
METAMYELOCYTES NFR BLD MANUAL: 1.8 %
MONOCYTES # BLD AUTO: 0.72 K/UL (ref 0–0.85)
MONOCYTES NFR BLD AUTO: 6.2 % (ref 0–13.4)
MORPHOLOGY BLD-IMP: NORMAL
MYELOCYTES NFR BLD MANUAL: 2.7 %
NEUTROPHILS # BLD AUTO: 7.9 K/UL (ref 2–7.15)
NEUTROPHILS NFR BLD: 68.1 % (ref 44–72)
NRBC # BLD AUTO: 2.47 K/UL
NRBC BLD-RTO: 21.4 /100 WBC
PLATELET # BLD AUTO: 189 K/UL (ref 164–446)
PLATELET BLD QL SMEAR: NORMAL
PMV BLD AUTO: 9 FL (ref 9–12.9)
POLYCHROMASIA BLD QL SMEAR: NORMAL
POTASSIUM SERPL-SCNC: 3.3 MMOL/L (ref 3.6–5.5)
PROT SERPL-MCNC: 7.2 G/DL (ref 6–8.2)
RBC # BLD AUTO: 2.39 M/UL (ref 4.2–5.4)
RBC BLD AUTO: PRESENT
RH BLD: NORMAL
SODIUM SERPL-SCNC: 134 MMOL/L (ref 135–145)
WBC # BLD AUTO: 11.6 K/UL (ref 4.8–10.8)

## 2019-09-22 PROCEDURE — A4212 NON CORING NEEDLE OR STYLET: HCPCS

## 2019-09-22 PROCEDURE — 86901 BLOOD TYPING SEROLOGIC RH(D): CPT

## 2019-09-22 PROCEDURE — 85007 BL SMEAR W/DIFF WBC COUNT: CPT

## 2019-09-22 PROCEDURE — 86900 BLOOD TYPING SEROLOGIC ABO: CPT

## 2019-09-22 PROCEDURE — 36591 DRAW BLOOD OFF VENOUS DEVICE: CPT

## 2019-09-22 PROCEDURE — 80053 COMPREHEN METABOLIC PANEL: CPT

## 2019-09-22 PROCEDURE — 86850 RBC ANTIBODY SCREEN: CPT

## 2019-09-22 PROCEDURE — 85027 COMPLETE CBC AUTOMATED: CPT

## 2019-09-22 PROCEDURE — 700111 HCHG RX REV CODE 636 W/ 250 OVERRIDE (IP)

## 2019-09-22 RX ORDER — LIDOCAINE HYDROCHLORIDE 10 MG/ML
20 INJECTION, SOLUTION INFILTRATION; PERINEURAL
Status: CANCELLED | OUTPATIENT
Start: 2019-09-23

## 2019-09-22 RX ORDER — LIDOCAINE HYDROCHLORIDE 10 MG/ML
INJECTION, SOLUTION EPIDURAL; INFILTRATION; INTRACAUDAL; PERINEURAL
Status: COMPLETED
Start: 2019-09-22 | End: 2019-09-22

## 2019-09-22 RX ORDER — FLUMAZENIL 0.1 MG/ML
INJECTION INTRAVENOUS
Status: COMPLETED
Start: 2019-09-22 | End: 2019-09-22

## 2019-09-22 RX ORDER — ACETAMINOPHEN 325 MG/1
650 TABLET ORAL PRN
Status: CANCELLED | OUTPATIENT
Start: 2019-09-23

## 2019-09-22 RX ORDER — ACETAMINOPHEN 325 MG/1
650 TABLET ORAL ONCE
Status: CANCELLED | OUTPATIENT
Start: 2019-09-23

## 2019-09-22 RX ORDER — DIPHENHYDRAMINE HYDROCHLORIDE 50 MG/ML
25 INJECTION INTRAMUSCULAR; INTRAVENOUS PRN
Status: CANCELLED | OUTPATIENT
Start: 2019-09-23

## 2019-09-22 RX ORDER — SODIUM CHLORIDE 9 MG/ML
500 INJECTION, SOLUTION INTRAVENOUS ONCE
Status: CANCELLED | OUTPATIENT
Start: 2019-09-23

## 2019-09-22 RX ORDER — DIPHENHYDRAMINE HCL 25 MG
25 TABLET ORAL ONCE
Status: CANCELLED | OUTPATIENT
Start: 2019-09-23

## 2019-09-22 RX ADMIN — LIDOCAINE HYDROCHLORIDE 5 ML: 10 INJECTION, SOLUTION EPIDURAL; INFILTRATION; INTRACAUDAL at 11:11

## 2019-09-22 RX ADMIN — HEPARIN 500 UNITS: 100 SYRINGE at 11:15

## 2019-09-22 NOTE — PROGRESS NOTES
"Pt arrived to [facility], via wheelchair, for lab draw. Pt states her bones have been \"achy\", voices no other complaints. Port accessed in sterile manner, positive blood return noted. Labs drawn per order. Port flushed and heparin locked per policy, port left accessed for treatment. Pt left [facility] with no s/sx of distress. Follow up appointment confirmed for tomorrow.    1225: Lab called with a result of hemoglobin 6.7, down from 9.1. Call placed to on call MD, orders received to change appointment tomorrow to 2 units PRBCs, and schedule patient for chemotherapy the following day. Transfusion orders placed in beacon and COD ordered. Call placed to patient to inform her of plan. Email sent to schedulers to schedule patient's chemotherapy for Tuesday, 9/24.  "

## 2019-09-23 ENCOUNTER — OUTPATIENT INFUSION SERVICES (OUTPATIENT)
Dept: ONCOLOGY | Facility: MEDICAL CENTER | Age: 39
End: 2019-09-23
Attending: INTERNAL MEDICINE
Payer: MEDICAID

## 2019-09-23 VITALS
TEMPERATURE: 97.7 F | BODY MASS INDEX: 31.88 KG/M2 | WEIGHT: 186.73 LBS | DIASTOLIC BLOOD PRESSURE: 71 MMHG | HEART RATE: 100 BPM | SYSTOLIC BLOOD PRESSURE: 109 MMHG | OXYGEN SATURATION: 99 % | HEIGHT: 64 IN | RESPIRATION RATE: 18 BRPM

## 2019-09-23 DIAGNOSIS — D63.8 ANEMIA OF CHRONIC DISEASE: ICD-10-CM

## 2019-09-23 DIAGNOSIS — C79.9 METASTATIC CANCER (HCC): ICD-10-CM

## 2019-09-23 PROCEDURE — A9270 NON-COVERED ITEM OR SERVICE: HCPCS | Performed by: INTERNAL MEDICINE

## 2019-09-23 PROCEDURE — 36430 TRANSFUSION BLD/BLD COMPNT: CPT

## 2019-09-23 PROCEDURE — 86945 BLOOD PRODUCT/IRRADIATION: CPT

## 2019-09-23 PROCEDURE — 700102 HCHG RX REV CODE 250 W/ 637 OVERRIDE(OP): Performed by: INTERNAL MEDICINE

## 2019-09-23 PROCEDURE — P9016 RBC LEUKOCYTES REDUCED: HCPCS | Mod: 91

## 2019-09-23 PROCEDURE — 96523 IRRIG DRUG DELIVERY DEVICE: CPT

## 2019-09-23 PROCEDURE — 700111 HCHG RX REV CODE 636 W/ 250 OVERRIDE (IP)

## 2019-09-23 PROCEDURE — 306780 HCHG STAT FOR TRANSFUSION PER CASE

## 2019-09-23 PROCEDURE — 86923 COMPATIBILITY TEST ELECTRIC: CPT

## 2019-09-23 RX ORDER — SODIUM CHLORIDE 9 MG/ML
500 INJECTION, SOLUTION INTRAVENOUS ONCE
Status: CANCELLED | OUTPATIENT
Start: 2019-09-23

## 2019-09-23 RX ORDER — ACETAMINOPHEN 325 MG/1
650 TABLET ORAL ONCE
Status: COMPLETED | OUTPATIENT
Start: 2019-09-23 | End: 2019-09-23

## 2019-09-23 RX ORDER — DIPHENHYDRAMINE HCL 25 MG
25 TABLET ORAL ONCE
Status: COMPLETED | OUTPATIENT
Start: 2019-09-23 | End: 2019-09-23

## 2019-09-23 RX ORDER — ACETAMINOPHEN 325 MG/1
650 TABLET ORAL ONCE
Status: CANCELLED | OUTPATIENT
Start: 2019-09-23

## 2019-09-23 RX ORDER — DIPHENHYDRAMINE HCL 25 MG
25 TABLET ORAL ONCE
Status: CANCELLED | OUTPATIENT
Start: 2019-09-23

## 2019-09-23 RX ORDER — POTASSIUM CHLORIDE 20 MEQ/1
40 TABLET, EXTENDED RELEASE ORAL ONCE
Status: COMPLETED | OUTPATIENT
Start: 2019-09-23 | End: 2019-09-23

## 2019-09-23 RX ORDER — LIDOCAINE HYDROCHLORIDE 10 MG/ML
20 INJECTION, SOLUTION INFILTRATION; PERINEURAL
Status: CANCELLED | OUTPATIENT
Start: 2019-09-23

## 2019-09-23 RX ORDER — DIPHENHYDRAMINE HYDROCHLORIDE 50 MG/ML
25 INJECTION INTRAMUSCULAR; INTRAVENOUS PRN
Status: CANCELLED | OUTPATIENT
Start: 2019-09-23

## 2019-09-23 RX ORDER — ACETAMINOPHEN 325 MG/1
650 TABLET ORAL PRN
Status: CANCELLED | OUTPATIENT
Start: 2019-09-23

## 2019-09-23 RX ADMIN — HEPARIN 500 UNITS: 100 SYRINGE at 18:51

## 2019-09-23 RX ADMIN — ACETAMINOPHEN 650 MG: 325 TABLET ORAL at 14:16

## 2019-09-23 RX ADMIN — DIPHENHYDRAMINE HCL 25 MG: 25 TABLET ORAL at 14:17

## 2019-09-23 RX ADMIN — POTASSIUM CHLORIDE 40 MEQ: 1500 TABLET, EXTENDED RELEASE ORAL at 16:30

## 2019-09-23 NOTE — PROGRESS NOTES
RN reviewed pt's status with Dr. Delaney. Orders received for pt to receive weekly CBC and possible transfusion appts. Paper orders and blood therapy plan updated.

## 2019-09-24 ENCOUNTER — OUTPATIENT INFUSION SERVICES (OUTPATIENT)
Dept: ONCOLOGY | Facility: MEDICAL CENTER | Age: 39
End: 2019-09-24
Attending: INTERNAL MEDICINE
Payer: MEDICAID

## 2019-09-24 VITALS
OXYGEN SATURATION: 98 % | BODY MASS INDEX: 32.44 KG/M2 | RESPIRATION RATE: 18 BRPM | WEIGHT: 190.04 LBS | TEMPERATURE: 97.5 F | HEART RATE: 124 BPM | SYSTOLIC BLOOD PRESSURE: 132 MMHG | HEIGHT: 64 IN | DIASTOLIC BLOOD PRESSURE: 88 MMHG

## 2019-09-24 DIAGNOSIS — D63.8 ANEMIA OF CHRONIC DISEASE: ICD-10-CM

## 2019-09-24 DIAGNOSIS — C79.9 METASTATIC CANCER (HCC): ICD-10-CM

## 2019-09-24 LAB
ANISOCYTOSIS BLD QL SMEAR: ABNORMAL
BASOPHILS # BLD AUTO: 1 % (ref 0–1.8)
BASOPHILS # BLD: 0.16 K/UL (ref 0–0.12)
COMMENT 1642: NORMAL
EOSINOPHIL # BLD AUTO: 0 K/UL (ref 0–0.51)
EOSINOPHIL NFR BLD: 0 % (ref 0–6.9)
ERYTHROCYTE [DISTWIDTH] IN BLOOD BY AUTOMATED COUNT: 65.2 FL (ref 35.9–50)
HCT VFR BLD AUTO: 28.6 % (ref 37–47)
HGB BLD-MCNC: 8.6 G/DL (ref 12–16)
HYPOCHROMIA BLD QL SMEAR: ABNORMAL
IMM GRANULOCYTES # BLD AUTO: 0.52 K/UL (ref 0–0.11)
IMM GRANULOCYTES NFR BLD AUTO: 3.3 % (ref 0–0.9)
LYMPHOCYTES # BLD AUTO: 2.05 K/UL (ref 1–4.8)
LYMPHOCYTES NFR BLD: 13.1 % (ref 22–41)
MACROCYTES BLD QL SMEAR: ABNORMAL
MCH RBC QN AUTO: 27.5 PG (ref 27–33)
MCHC RBC AUTO-ENTMCNC: 30.1 G/DL (ref 33.6–35)
MCV RBC AUTO: 91.4 FL (ref 81.4–97.8)
MICROCYTES BLD QL SMEAR: ABNORMAL
MONOCYTES # BLD AUTO: 1.88 K/UL (ref 0–0.85)
MONOCYTES NFR BLD AUTO: 12 % (ref 0–13.4)
MORPHOLOGY BLD-IMP: NORMAL
NEUTROPHILS # BLD AUTO: 11.05 K/UL (ref 2–7.15)
NEUTROPHILS NFR BLD: 70.6 % (ref 44–72)
NRBC # BLD AUTO: 2.45 K/UL
NRBC BLD-RTO: 15.6 /100 WBC
PLATELET # BLD AUTO: 152 K/UL (ref 164–446)
PLATELET BLD QL SMEAR: NORMAL
PMV BLD AUTO: 8.9 FL (ref 9–12.9)
POLYCHROMASIA BLD QL SMEAR: NORMAL
RBC # BLD AUTO: 3.13 M/UL (ref 4.2–5.4)
RBC BLD AUTO: PRESENT
WBC # BLD AUTO: 15.7 K/UL (ref 4.8–10.8)

## 2019-09-24 PROCEDURE — 700111 HCHG RX REV CODE 636 W/ 250 OVERRIDE (IP)

## 2019-09-24 PROCEDURE — 85025 COMPLETE CBC W/AUTO DIFF WBC: CPT

## 2019-09-24 PROCEDURE — 96377 APPLICATON ON-BODY INJECTOR: CPT | Performed by: CLINICAL NURSE SPECIALIST

## 2019-09-24 PROCEDURE — 96375 TX/PRO/DX INJ NEW DRUG ADDON: CPT | Performed by: CLINICAL NURSE SPECIALIST

## 2019-09-24 PROCEDURE — 700105 HCHG RX REV CODE 258

## 2019-09-24 PROCEDURE — 96413 CHEMO IV INFUSION 1 HR: CPT | Performed by: CLINICAL NURSE SPECIALIST

## 2019-09-24 PROCEDURE — 700111 HCHG RX REV CODE 636 W/ 250 OVERRIDE (IP): Performed by: INTERNAL MEDICINE

## 2019-09-24 PROCEDURE — 700105 HCHG RX REV CODE 258: Performed by: INTERNAL MEDICINE

## 2019-09-24 PROCEDURE — 96367 TX/PROPH/DG ADDL SEQ IV INF: CPT | Performed by: CLINICAL NURSE SPECIALIST

## 2019-09-24 RX ADMIN — DEXRAZOXANE FOR INJECTION: 500 INJECTION, POWDER, LYOPHILIZED, FOR SOLUTION INTRAVENOUS at 13:59

## 2019-09-24 RX ADMIN — PEGFILGRASTIM 6 MG: KIT SUBCUTANEOUS at 15:01

## 2019-09-24 RX ADMIN — ONDANSETRON HYDROCHLORIDE 16 MG: 2 SOLUTION INTRAMUSCULAR; INTRAVENOUS at 12:45

## 2019-09-24 RX ADMIN — HEPARIN 500 UNITS: 100 SYRINGE at 15:26

## 2019-09-24 RX ADMIN — SODIUM CHLORIDE 150 MG: 9 INJECTION, SOLUTION INTRAVENOUS at 13:22

## 2019-09-24 RX ADMIN — DOXORUBICIN HYDROCHLORIDE 148 MG: 2 INJECTION, SOLUTION INTRAVENOUS at 14:42

## 2019-09-24 RX ADMIN — DEXAMETHASONE SODIUM PHOSPHATE 12 MG: 4 INJECTION, SOLUTION INTRAMUSCULAR; INTRAVENOUS at 12:33

## 2019-09-24 NOTE — PROGRESS NOTES
"Pharmacy Chemotherapy Verification    Patient Name: Amanda Bae   Dx: Epithelial hemangioendothelioma with bone metastases   Protocol: DOXOrubicin     *Dosing Reference*  DOXOrubicin 60-75 mg/m2 IV push on Day 1  21 day cycle for 2-4 cycles (prior to local control), 2-4 cycles (post-local control) for a total of 6 cycles OR 6 cycles (unresectable) or until disease progression or unacceptable toxicity including reaching lifetime cumulative anthracycline dose (metastatic)  NCCN Guidelines for Soft Tissue Sarcoma.V.2.2017  Sarcoma [place]-analysis Collaboration, 35 al. Lancet. 1997;350(5433):1643-54  Tiff N, et al. Cancer. 3856401(3):573-81    **Dexrazoxane administered as a 10:1 ratio for prevention of DOXOrubicin cardiomyopathy; dose = 750 mg/m2**    Allergies:  Patient has no known allergies.     /88   Pulse (!) 124 Comment: notified nurse  Temp 36.4 °C (97.5 °F) (Temporal)   Resp 18   Ht 1.63 m (5' 4.17\")   Wt 86.2 kg (190 lb 0.6 oz)   SpO2 98%   BMI 32.44 kg/m²  Body surface area is 1.98 meters squared.    Labs 9/24/19  ANC~ 56396 Plt = 152k   Hgb = 8.6       Labs 9/22/19  ANC~ 7900 Plt = 189k   Hgb = 6.7 (received 2 units of PRBC on 9/23/19)     SCr = 0.58 mg/dL CrCl ~ >125 mL/min (minimum SCr of 0.7)   LFT's = 9/6/118 TBili = 0.7     ECHO 8/27/19  LVEF 65%     Drug Order   (Drug name, dose, route, IV Fluid & volume, frequency, number of doses) Cycle: 2 **PAPER ORDERS**     Previous treatment: C1 on 9/2/19; s/p gemcitabine x 7 cycles 5/23/2019     Medication = DOXOrubicin   Base Dose = 75 mg/m2  Calc Dose: Base Dose x 1.98 m2 = 148.5 mg  Final Dose = 148 mg  Route = IV  Conc = 2 mg/mL  Fluid & Volume = in empty bag 74 mL  Admin Duration = Over 20 minutes           <10% difference, OK to treat with final dose    Medication = Dexrazoxane  Base Dose = 750 mg/m2  Calc Dose: Base Dose x 1.98 m2 = 1485 mg  Final Dose = 1485 mg  Route = IV  Fluid & Volume =  mL; concentration = 2.97 " mg/mL  Admin Duration = Over 30 minutes           <10% difference, OK to treat with final dose      By my signature below, I confirm this process was performed independently with the BSA and all final chemotherapy dosing calculations congruent. I have reviewed the above chemotherapy order and that my calculation of the final dose and BSA (when applicable) corroborate those calculations of the  pharmacist. Discrepancies of 10% or greater in the written dose have been addressed and documented within the EPIC Progress notes.    Juliocesar Garcia, PharmD

## 2019-09-24 NOTE — PROGRESS NOTES
Patient to infusion for Doxorubicin.  Arrived via wheelchair. No new concerns.  Fatigue improved.  Numbness and tingling resolved.  Denies mouth sores, nausea.  Mediport accessed previously with brisk blood return.  CBC drawn and resulted Hgb 8.6, ANC 11.05.  Patient denies any s/s of infection.  MD notified of ANC and ordered continue with OnPro as ordered.  Patient premedicated with zofran, dex, Emend and dexrazoxane.  Adriamycin given immediately after completion of dexrazoxane.  Tolerated well.  Blood return checked and brisk before and after Adriamycin.  OnPro applied to left lower abdomen.  Sharps container ordered.  Patient educated on Onpro.  Injection will start at 1800 tomorrow night.  Mediport deaccessed per protocol and patient discharged to home in stable condition.  Printout of future appointments given to patient.

## 2019-09-24 NOTE — PROGRESS NOTES
Chemotherapy Verification - SECONDARY RN       Height = 163 cm  Weight = 86.2 kg  BSA = 1.975 m2       Medication: Adriamycin  Dose: 75 mg/m2  Calculated Dose: 148.16 mg                             (In mg/m2, AUC, mg/kg)       I confirm that this process was performed independently.

## 2019-09-24 NOTE — PROGRESS NOTES
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  Sarcoma [place]-analysis Collaboration. Lancet. 1997;350(6997):1646-63.  Tiff N, et al. Cancer. 2008;113(3):573-81.       Allergies: Patient has no known allergies.    /88   Pulse (!) 124 Comment: notified nurse  Temp 36.4 °C (97.5 °F) (Temporal)   Resp 18   Ht 1.63 m (5' 4.17\")   Wt 86.2 kg (190 lb 0.6 oz)   SpO2 98%   BMI 32.44 kg/m²  Body surface area is 1.98 meters squared.    Labs 09/24/19:    ANC~ 11,000 (WBC 15.7, afebrile no s/sx infection)   Plt = 152k Hgb = 8.6 (s/p 2 units PRBC on 9/23/19)     Labs 09/22/19:  SCr = 0.58 mg/dL CrCl >125 mL/min LFT's = WNL except alk phos 118  TBili = 0.7    08/27/19:  ECHO:LVEF ~65%    Dexrazoxane (Zinecard) 750 mg/m² x 1.98 m² = 1485 mg   <10% difference, ok to treat with final dose = 1485 mg IV over 30 min     DOXOrubicin (Adriamycin) 75 mg/m² x 1.98 m² = 146.3 mg   <10% difference, ok to treat with final dose = 148 mg IV (within 30 min after Zinecard infusion)      Brendno Brady, PharmD, BCOP           "

## 2019-09-24 NOTE — PROGRESS NOTES
Chemotherapy Verification - PRIMARY RN      Height = 1.63m  Weight = 86.2kg  BSA = 1.98m2       Medication: Adriamycin  Dose: 75.5mg/m2  Calculated Dose: 149.49mg                            (In mg/m2, AUC, mg/kg)       I confirm this process was performed independently with the BSA and all final chemotherapy dosing calculations congruent.  Any discrepancies of 10% or greater have been addressed with the chemotherapy pharmacist. The resolution of the discrepancy has been documented in the EPIC progress notes.

## 2019-09-24 NOTE — PROGRESS NOTES
Pt returns to infusion center for scheduled blood transfusion.  Port remains accessed from yesterday, brisk blood return noted.  Premeds given as ordered.  Consents signed.  Pt tolerated both units of PRBC without incident.  Potassium replaced as well.  Port flushed with saline and heparin per policy, kept accessed for tomorrows appointment.  Pt left infusion center via wheelchair and in good condition.  Tomorrow's appointment confirmed.

## 2019-09-25 ENCOUNTER — APPOINTMENT (OUTPATIENT)
Dept: ONCOLOGY | Facility: MEDICAL CENTER | Age: 39
End: 2019-09-25
Attending: INTERNAL MEDICINE
Payer: MEDICAID

## 2019-10-06 ENCOUNTER — OUTPATIENT INFUSION SERVICES (OUTPATIENT)
Dept: ONCOLOGY | Facility: MEDICAL CENTER | Age: 39
End: 2019-10-06
Attending: INTERNAL MEDICINE
Payer: MEDICAID

## 2019-10-06 VITALS
TEMPERATURE: 98.7 F | HEIGHT: 64 IN | RESPIRATION RATE: 18 BRPM | DIASTOLIC BLOOD PRESSURE: 70 MMHG | WEIGHT: 182.54 LBS | HEART RATE: 108 BPM | SYSTOLIC BLOOD PRESSURE: 109 MMHG | OXYGEN SATURATION: 96 % | BODY MASS INDEX: 31.16 KG/M2

## 2019-10-06 DIAGNOSIS — D63.8 ANEMIA OF CHRONIC DISEASE: ICD-10-CM

## 2019-10-06 DIAGNOSIS — C79.9 METASTATIC CANCER (HCC): ICD-10-CM

## 2019-10-06 LAB
ABO GROUP BLD: NORMAL
ANISOCYTOSIS BLD QL SMEAR: ABNORMAL
BARCODED ABORH UBTYP: 5100
BARCODED PRD CODE UBPRD: NORMAL
BARCODED UNIT NUM UBUNT: NORMAL
BASOPHILS # BLD AUTO: 0 % (ref 0–1.8)
BASOPHILS # BLD: 0 K/UL (ref 0–0.12)
BLD GP AB SCN SERPL QL: NORMAL
COMPONENT R 8504R: NORMAL
EOSINOPHIL # BLD AUTO: 0 K/UL (ref 0–0.51)
EOSINOPHIL NFR BLD: 0 % (ref 0–6.9)
ERYTHROCYTE [DISTWIDTH] IN BLOOD BY AUTOMATED COUNT: 62.3 FL (ref 35.9–50)
HCT VFR BLD AUTO: 22.9 % (ref 37–47)
HGB BLD-MCNC: 6.7 G/DL (ref 12–16)
LYMPHOCYTES # BLD AUTO: 3.06 K/UL (ref 1–4.8)
LYMPHOCYTES NFR BLD: 31.9 % (ref 22–41)
MACROCYTES BLD QL SMEAR: ABNORMAL
MANUAL DIFF BLD: NORMAL
MCH RBC QN AUTO: 28.2 PG (ref 27–33)
MCHC RBC AUTO-ENTMCNC: 30.1 G/DL (ref 33.6–35)
MCV RBC AUTO: 93.8 FL (ref 81.4–97.8)
METAMYELOCYTES NFR BLD MANUAL: 3.5 %
MICROCYTES BLD QL SMEAR: ABNORMAL
MONOCYTES # BLD AUTO: 0.93 K/UL (ref 0–0.85)
MONOCYTES NFR BLD AUTO: 9.7 % (ref 0–13.4)
MORPHOLOGY BLD-IMP: NORMAL
MYELOCYTES NFR BLD MANUAL: 3.5 %
NEUTROPHILS # BLD AUTO: 4.93 K/UL (ref 2–7.15)
NEUTROPHILS NFR BLD: 48.7 % (ref 44–72)
NEUTS BAND NFR BLD MANUAL: 2.7 % (ref 0–10)
NRBC # BLD AUTO: 1.84 K/UL
NRBC BLD-RTO: 19.1 /100 WBC
PLATELET # BLD AUTO: 81 K/UL (ref 164–446)
PLATELET BLD QL SMEAR: NORMAL
PMV BLD AUTO: 9.9 FL (ref 9–12.9)
POLYCHROMASIA BLD QL SMEAR: NORMAL
PRODUCT TYPE UPROD: NORMAL
RBC # BLD AUTO: 2.41 M/UL (ref 4.2–5.4)
RBC BLD AUTO: PRESENT
RH BLD: NORMAL
UNIT STATUS USTAT: NORMAL
WBC # BLD AUTO: 9.6 K/UL (ref 4.8–10.8)

## 2019-10-06 PROCEDURE — 86945 BLOOD PRODUCT/IRRADIATION: CPT

## 2019-10-06 PROCEDURE — 86923 COMPATIBILITY TEST ELECTRIC: CPT

## 2019-10-06 PROCEDURE — 86850 RBC ANTIBODY SCREEN: CPT

## 2019-10-06 PROCEDURE — 700102 HCHG RX REV CODE 250 W/ 637 OVERRIDE(OP): Performed by: INTERNAL MEDICINE

## 2019-10-06 PROCEDURE — 700111 HCHG RX REV CODE 636 W/ 250 OVERRIDE (IP)

## 2019-10-06 PROCEDURE — 86900 BLOOD TYPING SEROLOGIC ABO: CPT

## 2019-10-06 PROCEDURE — A9270 NON-COVERED ITEM OR SERVICE: HCPCS | Performed by: INTERNAL MEDICINE

## 2019-10-06 PROCEDURE — 86901 BLOOD TYPING SEROLOGIC RH(D): CPT

## 2019-10-06 PROCEDURE — P9016 RBC LEUKOCYTES REDUCED: HCPCS

## 2019-10-06 PROCEDURE — 36591 DRAW BLOOD OFF VENOUS DEVICE: CPT

## 2019-10-06 PROCEDURE — A4212 NON CORING NEEDLE OR STYLET: HCPCS

## 2019-10-06 PROCEDURE — 85027 COMPLETE CBC AUTOMATED: CPT

## 2019-10-06 PROCEDURE — 36430 TRANSFUSION BLD/BLD COMPNT: CPT

## 2019-10-06 PROCEDURE — 85007 BL SMEAR W/DIFF WBC COUNT: CPT

## 2019-10-06 PROCEDURE — 306780 HCHG STAT FOR TRANSFUSION PER CASE

## 2019-10-06 RX ORDER — DIPHENHYDRAMINE HCL 25 MG
25 TABLET ORAL ONCE
Status: COMPLETED | OUTPATIENT
Start: 2019-10-06 | End: 2019-10-06

## 2019-10-06 RX ORDER — DIPHENHYDRAMINE HCL 25 MG
25 TABLET ORAL ONCE
Status: CANCELLED | OUTPATIENT
Start: 2019-10-06

## 2019-10-06 RX ORDER — LIDOCAINE HYDROCHLORIDE 10 MG/ML
20 INJECTION, SOLUTION INFILTRATION; PERINEURAL
Status: CANCELLED | OUTPATIENT
Start: 2019-10-06

## 2019-10-06 RX ORDER — ACETAMINOPHEN 325 MG/1
650 TABLET ORAL ONCE
Status: CANCELLED | OUTPATIENT
Start: 2019-10-06

## 2019-10-06 RX ORDER — DIPHENHYDRAMINE HYDROCHLORIDE 50 MG/ML
25 INJECTION INTRAMUSCULAR; INTRAVENOUS PRN
Status: CANCELLED | OUTPATIENT
Start: 2019-10-06

## 2019-10-06 RX ORDER — SODIUM CHLORIDE 9 MG/ML
500 INJECTION, SOLUTION INTRAVENOUS ONCE
Status: CANCELLED | OUTPATIENT
Start: 2019-10-06

## 2019-10-06 RX ORDER — ACETAMINOPHEN 325 MG/1
650 TABLET ORAL PRN
Status: CANCELLED | OUTPATIENT
Start: 2019-10-06

## 2019-10-06 RX ORDER — ACETAMINOPHEN 325 MG/1
650 TABLET ORAL ONCE
Status: DISCONTINUED | OUTPATIENT
Start: 2019-10-06 | End: 2019-10-06 | Stop reason: HOSPADM

## 2019-10-06 RX ADMIN — HEPARIN 500 UNITS: 100 SYRINGE at 18:20

## 2019-10-06 RX ADMIN — DIPHENHYDRAMINE HCL 25 MG: 25 TABLET ORAL at 15:22

## 2019-10-07 ENCOUNTER — OUTPATIENT INFUSION SERVICES (OUTPATIENT)
Dept: ONCOLOGY | Facility: MEDICAL CENTER | Age: 39
End: 2019-10-07
Attending: INTERNAL MEDICINE
Payer: MEDICAID

## 2019-10-07 VITALS
SYSTOLIC BLOOD PRESSURE: 111 MMHG | HEIGHT: 64 IN | TEMPERATURE: 98.1 F | RESPIRATION RATE: 18 BRPM | WEIGHT: 182.98 LBS | DIASTOLIC BLOOD PRESSURE: 80 MMHG | HEART RATE: 111 BPM | OXYGEN SATURATION: 100 % | BODY MASS INDEX: 31.24 KG/M2

## 2019-10-07 DIAGNOSIS — D63.8 ANEMIA OF CHRONIC DISEASE: ICD-10-CM

## 2019-10-07 LAB
BARCODED ABORH UBTYP: 5100
BARCODED PRD CODE UBPRD: NORMAL
BARCODED UNIT NUM UBUNT: NORMAL
COMPONENT R 8504R: NORMAL
PRODUCT TYPE UPROD: NORMAL
UNIT STATUS USTAT: NORMAL

## 2019-10-07 PROCEDURE — A9270 NON-COVERED ITEM OR SERVICE: HCPCS | Performed by: INTERNAL MEDICINE

## 2019-10-07 PROCEDURE — 36430 TRANSFUSION BLD/BLD COMPNT: CPT

## 2019-10-07 PROCEDURE — 700102 HCHG RX REV CODE 250 W/ 637 OVERRIDE(OP): Performed by: INTERNAL MEDICINE

## 2019-10-07 PROCEDURE — 86923 COMPATIBILITY TEST ELECTRIC: CPT

## 2019-10-07 PROCEDURE — P9016 RBC LEUKOCYTES REDUCED: HCPCS

## 2019-10-07 PROCEDURE — 86945 BLOOD PRODUCT/IRRADIATION: CPT

## 2019-10-07 PROCEDURE — 700111 HCHG RX REV CODE 636 W/ 250 OVERRIDE (IP)

## 2019-10-07 PROCEDURE — 306780 HCHG STAT FOR TRANSFUSION PER CASE

## 2019-10-07 RX ORDER — ACETAMINOPHEN 325 MG/1
650 TABLET ORAL PRN
Status: CANCELLED | OUTPATIENT
Start: 2019-10-07

## 2019-10-07 RX ORDER — DIPHENHYDRAMINE HCL 25 MG
25 TABLET ORAL ONCE
Status: COMPLETED | OUTPATIENT
Start: 2019-10-07 | End: 2019-10-07

## 2019-10-07 RX ORDER — SODIUM CHLORIDE 9 MG/ML
500 INJECTION, SOLUTION INTRAVENOUS ONCE
Status: CANCELLED | OUTPATIENT
Start: 2019-10-07

## 2019-10-07 RX ORDER — LIDOCAINE HYDROCHLORIDE 10 MG/ML
20 INJECTION, SOLUTION INFILTRATION; PERINEURAL
Status: CANCELLED | OUTPATIENT
Start: 2019-10-07

## 2019-10-07 RX ORDER — ACETAMINOPHEN 325 MG/1
650 TABLET ORAL ONCE
Status: CANCELLED | OUTPATIENT
Start: 2019-10-07

## 2019-10-07 RX ORDER — DIPHENHYDRAMINE HCL 25 MG
25 TABLET ORAL ONCE
Status: CANCELLED | OUTPATIENT
Start: 2019-10-07

## 2019-10-07 RX ORDER — ACETAMINOPHEN 325 MG/1
650 TABLET ORAL ONCE
Status: DISCONTINUED | OUTPATIENT
Start: 2019-10-07 | End: 2019-10-07 | Stop reason: HOSPADM

## 2019-10-07 RX ORDER — DIPHENHYDRAMINE HYDROCHLORIDE 50 MG/ML
25 INJECTION INTRAMUSCULAR; INTRAVENOUS PRN
Status: CANCELLED | OUTPATIENT
Start: 2019-10-07

## 2019-10-07 RX ADMIN — HEPARIN 500 UNITS: 100 SYRINGE at 17:06

## 2019-10-07 RX ADMIN — DIPHENHYDRAMINE HCL 25 MG: 25 TABLET ORAL at 14:28

## 2019-10-07 NOTE — PROGRESS NOTES
Received report from HAYLEY Lee. PRBC infusion complete, no s/s of adverse reaction. Port flushed per protocol, brisk blood return noted. Port kept accessed for tomorrow's transfusion. Pt discharged via wheelchair in stable condition and will return tomorrow for 1 unit of PRBC.

## 2019-10-07 NOTE — PROGRESS NOTES
Patient seen today for CBC, possible blood. Port accessed by HAYLEY Pope. Sample obtained for CBC. Hgb 6.7 today. COD sent. Premeds given followed by 1 unit PRBC's.  Doing well, VSS. Will complete this unit tonight and have patient return tomorrow for her 2nd unit d/t time constraints. Patient comfortable. Call light in reach. End of shift report to HAYLEY Mg for completion of treatment.

## 2019-10-08 NOTE — PROGRESS NOTES
Patient seen today for completion of blood transfusion, 2nd unit. Premeds given as ordered.  PRBC's transfused. Tolerated well and without complaint. Port flushed per protocol then deaccessed. Returns next week, appointment confirmed.

## 2019-10-14 ENCOUNTER — OUTPATIENT INFUSION SERVICES (OUTPATIENT)
Dept: ONCOLOGY | Facility: MEDICAL CENTER | Age: 39
End: 2019-10-14
Attending: INTERNAL MEDICINE
Payer: MEDICAID

## 2019-10-14 VITALS
HEART RATE: 114 BPM | DIASTOLIC BLOOD PRESSURE: 79 MMHG | OXYGEN SATURATION: 100 % | BODY MASS INDEX: 30.56 KG/M2 | HEIGHT: 64 IN | WEIGHT: 179.01 LBS | SYSTOLIC BLOOD PRESSURE: 128 MMHG | RESPIRATION RATE: 18 BRPM | TEMPERATURE: 98 F

## 2019-10-14 DIAGNOSIS — C40.22 MALIGNANT NEOPLASM OF LONG BONE OF LEFT LOWER EXTREMITY (HCC): ICD-10-CM

## 2019-10-14 LAB
25(OH)D3 SERPL-MCNC: 52 NG/ML (ref 30–100)
ALBUMIN SERPL BCP-MCNC: 3.5 G/DL (ref 3.2–4.9)
ALBUMIN/GLOB SERPL: 1 G/DL
ALP SERPL-CCNC: 115 U/L (ref 30–99)
ALT SERPL-CCNC: <5 U/L (ref 2–50)
ANION GAP SERPL CALC-SCNC: 8 MMOL/L (ref 0–11.9)
ANISOCYTOSIS BLD QL SMEAR: ABNORMAL
AST SERPL-CCNC: 7 U/L (ref 12–45)
BASOPHILS # BLD AUTO: 0 % (ref 0–1.8)
BASOPHILS # BLD: 0 K/UL (ref 0–0.12)
BILIRUB SERPL-MCNC: 0.6 MG/DL (ref 0.1–1.5)
BUN SERPL-MCNC: 10 MG/DL (ref 8–22)
CALCIUM SERPL-MCNC: 9 MG/DL (ref 8.5–10.5)
CHLORIDE SERPL-SCNC: 104 MMOL/L (ref 96–112)
CO2 SERPL-SCNC: 26 MMOL/L (ref 20–33)
CREAT SERPL-MCNC: 0.6 MG/DL (ref 0.5–1.4)
EOSINOPHIL # BLD AUTO: 0 K/UL (ref 0–0.51)
EOSINOPHIL NFR BLD: 0 % (ref 0–6.9)
ERYTHROCYTE [DISTWIDTH] IN BLOOD BY AUTOMATED COUNT: 72.5 FL (ref 35.9–50)
GLOBULIN SER CALC-MCNC: 3.5 G/DL (ref 1.9–3.5)
GLUCOSE SERPL-MCNC: 108 MG/DL (ref 65–99)
HCT VFR BLD AUTO: 26.5 % (ref 37–47)
HGB BLD-MCNC: 7.8 G/DL (ref 12–16)
HYPOCHROMIA BLD QL SMEAR: ABNORMAL
LYMPHOCYTES # BLD AUTO: 1.68 K/UL (ref 1–4.8)
LYMPHOCYTES NFR BLD: 18.9 % (ref 22–41)
MACROCYTES BLD QL SMEAR: ABNORMAL
MAGNESIUM SERPL-MCNC: 1.7 MG/DL (ref 1.5–2.5)
MANUAL DIFF BLD: NORMAL
MCH RBC QN AUTO: 28.1 PG (ref 27–33)
MCHC RBC AUTO-ENTMCNC: 29.4 G/DL (ref 33.6–35)
MCV RBC AUTO: 95.3 FL (ref 81.4–97.8)
METAMYELOCYTES NFR BLD MANUAL: 0.9 %
MICROCYTES BLD QL SMEAR: ABNORMAL
MONOCYTES # BLD AUTO: 0.56 K/UL (ref 0–0.85)
MONOCYTES NFR BLD AUTO: 6.3 % (ref 0–13.4)
MORPHOLOGY BLD-IMP: NORMAL
MYELOCYTES NFR BLD MANUAL: 4.5 %
NEUTROPHILS # BLD AUTO: 5.94 K/UL (ref 2–7.15)
NEUTROPHILS NFR BLD: 65.8 % (ref 44–72)
NEUTS BAND NFR BLD MANUAL: 0.9 % (ref 0–10)
NRBC # BLD AUTO: 1.91 K/UL
NRBC BLD-RTO: 21.5 /100 WBC
OVALOCYTES BLD QL SMEAR: NORMAL
PLATELET # BLD AUTO: 183 K/UL (ref 164–446)
PLATELET BLD QL SMEAR: NORMAL
PMV BLD AUTO: 8.6 FL (ref 9–12.9)
POLYCHROMASIA BLD QL SMEAR: NORMAL
POTASSIUM SERPL-SCNC: 3.3 MMOL/L (ref 3.6–5.5)
PROMYELOCYTES NFR BLD MANUAL: 2.7 %
PROT SERPL-MCNC: 7 G/DL (ref 6–8.2)
RBC # BLD AUTO: 2.78 M/UL (ref 4.2–5.4)
RBC BLD AUTO: PRESENT
SODIUM SERPL-SCNC: 138 MMOL/L (ref 135–145)
TARGETS BLD QL SMEAR: NORMAL
WBC # BLD AUTO: 8.9 K/UL (ref 4.8–10.8)

## 2019-10-14 PROCEDURE — 85007 BL SMEAR W/DIFF WBC COUNT: CPT

## 2019-10-14 PROCEDURE — 36591 DRAW BLOOD OFF VENOUS DEVICE: CPT

## 2019-10-14 PROCEDURE — 700111 HCHG RX REV CODE 636 W/ 250 OVERRIDE (IP)

## 2019-10-14 PROCEDURE — 82306 VITAMIN D 25 HYDROXY: CPT

## 2019-10-14 PROCEDURE — 80053 COMPREHEN METABOLIC PANEL: CPT

## 2019-10-14 PROCEDURE — A4212 NON CORING NEEDLE OR STYLET: HCPCS

## 2019-10-14 PROCEDURE — 85027 COMPLETE CBC AUTOMATED: CPT

## 2019-10-14 PROCEDURE — 83735 ASSAY OF MAGNESIUM: CPT

## 2019-10-14 RX ORDER — LIDOCAINE HYDROCHLORIDE 10 MG/ML
INJECTION, SOLUTION EPIDURAL; INFILTRATION; INTRACAUDAL; PERINEURAL
Status: COMPLETED
Start: 2019-10-14 | End: 2019-10-14

## 2019-10-14 RX ADMIN — HEPARIN 500 UNITS: 100 SYRINGE at 16:36

## 2019-10-14 RX ADMIN — LIDOCAINE HYDROCHLORIDE 0.5 ML: 10 INJECTION, SOLUTION EPIDURAL; INFILTRATION; INTRACAUDAL at 16:29

## 2019-10-15 ENCOUNTER — OUTPATIENT INFUSION SERVICES (OUTPATIENT)
Dept: ONCOLOGY | Facility: MEDICAL CENTER | Age: 39
End: 2019-10-15
Attending: INTERNAL MEDICINE
Payer: MEDICAID

## 2019-10-15 VITALS
DIASTOLIC BLOOD PRESSURE: 89 MMHG | BODY MASS INDEX: 30.52 KG/M2 | HEIGHT: 64 IN | HEART RATE: 101 BPM | TEMPERATURE: 97.1 F | RESPIRATION RATE: 18 BRPM | SYSTOLIC BLOOD PRESSURE: 144 MMHG | OXYGEN SATURATION: 99 % | WEIGHT: 178.79 LBS

## 2019-10-15 DIAGNOSIS — D63.8 ANEMIA OF CHRONIC DISEASE: ICD-10-CM

## 2019-10-15 DIAGNOSIS — C40.22 MALIGNANT NEOPLASM OF LONG BONE OF LEFT LOWER EXTREMITY (HCC): ICD-10-CM

## 2019-10-15 LAB
ABO GROUP BLD: NORMAL
BARCODED ABORH UBTYP: 5100
BARCODED PRD CODE UBPRD: NORMAL
BARCODED UNIT NUM UBUNT: NORMAL
BLD GP AB SCN SERPL QL: NORMAL
COMPONENT R 8504R: NORMAL
PRODUCT TYPE UPROD: NORMAL
RH BLD: NORMAL
UNIT STATUS USTAT: NORMAL

## 2019-10-15 PROCEDURE — 306780 HCHG STAT FOR TRANSFUSION PER CASE

## 2019-10-15 PROCEDURE — 96374 THER/PROPH/DIAG INJ IV PUSH: CPT

## 2019-10-15 PROCEDURE — 96375 TX/PRO/DX INJ NEW DRUG ADDON: CPT

## 2019-10-15 PROCEDURE — P9016 RBC LEUKOCYTES REDUCED: HCPCS

## 2019-10-15 PROCEDURE — 700105 HCHG RX REV CODE 258: Performed by: INTERNAL MEDICINE

## 2019-10-15 PROCEDURE — 700111 HCHG RX REV CODE 636 W/ 250 OVERRIDE (IP): Performed by: INTERNAL MEDICINE

## 2019-10-15 PROCEDURE — 86900 BLOOD TYPING SEROLOGIC ABO: CPT

## 2019-10-15 PROCEDURE — 700111 HCHG RX REV CODE 636 W/ 250 OVERRIDE (IP)

## 2019-10-15 PROCEDURE — 36430 TRANSFUSION BLD/BLD COMPNT: CPT

## 2019-10-15 PROCEDURE — 86923 COMPATIBILITY TEST ELECTRIC: CPT

## 2019-10-15 PROCEDURE — 86945 BLOOD PRODUCT/IRRADIATION: CPT

## 2019-10-15 PROCEDURE — 700102 HCHG RX REV CODE 250 W/ 637 OVERRIDE(OP): Performed by: INTERNAL MEDICINE

## 2019-10-15 PROCEDURE — 86901 BLOOD TYPING SEROLOGIC RH(D): CPT

## 2019-10-15 PROCEDURE — 86850 RBC ANTIBODY SCREEN: CPT

## 2019-10-15 RX ORDER — LIDOCAINE HYDROCHLORIDE 10 MG/ML
20 INJECTION, SOLUTION INFILTRATION; PERINEURAL
Status: CANCELLED | OUTPATIENT
Start: 2019-10-15

## 2019-10-15 RX ORDER — LORAZEPAM 2 MG/ML
1 INJECTION INTRAMUSCULAR ONCE
Status: COMPLETED | OUTPATIENT
Start: 2019-10-15 | End: 2019-10-15

## 2019-10-15 RX ORDER — DIPHENHYDRAMINE HYDROCHLORIDE 50 MG/ML
25 INJECTION INTRAMUSCULAR; INTRAVENOUS PRN
Status: CANCELLED | OUTPATIENT
Start: 2019-10-15

## 2019-10-15 RX ORDER — PROCHLORPERAZINE MALEATE 10 MG
10 TABLET ORAL EVERY 6 HOURS PRN
Status: CANCELLED
Start: 2019-10-15

## 2019-10-15 RX ORDER — ONDANSETRON 4 MG/1
4 TABLET, FILM COATED ORAL EVERY 8 HOURS PRN
Refills: 1 | COMMUNITY
Start: 2019-09-22 | End: 2019-10-16

## 2019-10-15 RX ORDER — LIDOCAINE HYDROCHLORIDE 10 MG/ML
INJECTION, SOLUTION EPIDURAL; INFILTRATION; INTRACAUDAL; PERINEURAL
Status: COMPLETED
Start: 2019-10-15 | End: 2019-10-15

## 2019-10-15 RX ORDER — ONDANSETRON 8 MG/1
TABLET, ORALLY DISINTEGRATING ORAL
Refills: 3 | COMMUNITY
Start: 2019-08-29 | End: 2019-12-02

## 2019-10-15 RX ORDER — OXYCODONE HYDROCHLORIDE 30 MG/1
30 TABLET ORAL
Refills: 0 | COMMUNITY
Start: 2019-10-04

## 2019-10-15 RX ORDER — SODIUM CHLORIDE 9 MG/ML
500 INJECTION, SOLUTION INTRAVENOUS ONCE
Status: CANCELLED | OUTPATIENT
Start: 2019-10-15

## 2019-10-15 RX ORDER — ACETAMINOPHEN 325 MG/1
650 TABLET ORAL ONCE
Status: CANCELLED | OUTPATIENT
Start: 2019-10-15

## 2019-10-15 RX ORDER — ACETAMINOPHEN 325 MG/1
650 TABLET ORAL ONCE
Status: DISCONTINUED | OUTPATIENT
Start: 2019-10-15 | End: 2019-10-15 | Stop reason: HOSPADM

## 2019-10-15 RX ORDER — DIPHENHYDRAMINE HYDROCHLORIDE 50 MG/ML
25 INJECTION INTRAMUSCULAR; INTRAVENOUS ONCE
Status: CANCELLED
Start: 2019-10-15

## 2019-10-15 RX ORDER — DIPHENHYDRAMINE HYDROCHLORIDE 50 MG/ML
25 INJECTION INTRAMUSCULAR; INTRAVENOUS ONCE
Status: COMPLETED | OUTPATIENT
Start: 2019-10-15 | End: 2019-10-15

## 2019-10-15 RX ORDER — SODIUM CHLORIDE 9 MG/ML
500 INJECTION, SOLUTION INTRAVENOUS ONCE
Status: COMPLETED | OUTPATIENT
Start: 2019-10-15 | End: 2019-10-15

## 2019-10-15 RX ORDER — PROCHLORPERAZINE MALEATE 10 MG
10 TABLET ORAL EVERY 6 HOURS PRN
Status: DISCONTINUED | OUTPATIENT
Start: 2019-10-15 | End: 2019-10-15 | Stop reason: HOSPADM

## 2019-10-15 RX ORDER — DIPHENHYDRAMINE HCL 25 MG
25 TABLET ORAL ONCE
Status: CANCELLED | OUTPATIENT
Start: 2019-10-15

## 2019-10-15 RX ORDER — ACETAMINOPHEN 325 MG/1
650 TABLET ORAL PRN
Status: CANCELLED | OUTPATIENT
Start: 2019-10-15

## 2019-10-15 RX ORDER — LORAZEPAM 2 MG/ML
1 INJECTION INTRAMUSCULAR ONCE
Status: CANCELLED
Start: 2019-10-15

## 2019-10-15 RX ADMIN — DIPHENHYDRAMINE HYDROCHLORIDE 25 MG: 50 INJECTION INTRAMUSCULAR; INTRAVENOUS at 12:20

## 2019-10-15 RX ADMIN — SODIUM CHLORIDE 500 ML: 9 INJECTION, SOLUTION INTRAVENOUS at 12:11

## 2019-10-15 RX ADMIN — LORAZEPAM 1 MG: 2 INJECTION INTRAMUSCULAR; INTRAVENOUS at 12:12

## 2019-10-15 RX ADMIN — PROCHLORPERAZINE MALEATE 10 MG: 10 TABLET, FILM COATED ORAL at 14:48

## 2019-10-15 RX ADMIN — LIDOCAINE HYDROCHLORIDE 5 ML: 10 INJECTION, SOLUTION EPIDURAL; INFILTRATION; INTRACAUDAL at 11:52

## 2019-10-15 RX ADMIN — HEPARIN 500 UNITS: 100 SYRINGE at 14:36

## 2019-10-15 NOTE — PROGRESS NOTES
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  Sarcoma Emerado-analysis Collaboration. Lancet. 1997;350(8417):1649-54.  Tiff N, et al. Cancer. 2008;113(3):573-81.       Allergies: Patient has no known allergies.    /85   Pulse (!) 117   Temp 36.3 °C (97.4 °F) (Temporal)   Resp 18   Ht 1.63 m (5' 4.17\")   Wt 81.1 kg (178 lb 12.7 oz)   SpO2 100%   BMI 30.52 kg/m²  Body surface area is 1.92 meters squared.    Treatment parameters NOT met 10/15/19      08/27/19:  ECHO:LVEF ~65%     = 0mg IV over 30 min      = 0mg IV (within 30 min after Zinecard infusion)      SHIRA Deluna, Pharm.D.             "

## 2019-10-15 NOTE — PROGRESS NOTES
"Pt reports that she was \"not feeling well this morning\" and missed PET scan. Dr. Delaney updated and pt to have scheduled tx for tomorrow delayed one week until she can have scan. This RN will coordinate with office to have scan rescheduled. Pt and  aware that treatment for tomorrow will be rescheduled to next week, will pt contact when appts are confirmed  "

## 2019-10-15 NOTE — PROGRESS NOTES
Blood products complete. Tolerated well. Vitals stable. Port flushed and heparin locked. De accessed and covered with gauze. Immediately after removal of port patient vomited 500ml of green emesis. Mouth care provided and oral compazine at this time. Denies any other symptoms. Agrees to let us or MD know if she continues to have nausea. Ally HARDY will contact MD today about tomorrow if there is a possibility of chemo tomorrow since patient did not show for her scan this morning.Discharged with family in no distress at this time.

## 2019-10-15 NOTE — PROGRESS NOTES
Late entry for 1636:  Pt to infusion services via wheelchair.  Pt here for scheduled labs prior to chemotherapy scheduled for tomorrow.  Plan of care reviewed.  Pt verbalizes understanding.  Pt with R chest port.  1% lidocaine used to numb port site prior to port access per pt request.  Port site cleansed and port accessed in sterile fashion.  Port flushes easily; +brisk blood return verified.  Labs drawn per MD orders.  Port flushed with normal saline and heparin per policy.  Port de-accessed; needle intact.  Pressure dressing applied.  Pt left infusion services in no apparent distress after completion of visit, via wheelchair.  Pt to return tomorrow; appointment scheduled and confirmed.

## 2019-10-16 ENCOUNTER — HOSPITAL ENCOUNTER (INPATIENT)
Facility: MEDICAL CENTER | Age: 39
LOS: 2 days | DRG: 872 | End: 2019-10-18
Attending: EMERGENCY MEDICINE | Admitting: HOSPITALIST
Payer: MEDICAID

## 2019-10-16 ENCOUNTER — APPOINTMENT (OUTPATIENT)
Dept: ONCOLOGY | Facility: MEDICAL CENTER | Age: 39
End: 2019-10-16
Attending: INTERNAL MEDICINE
Payer: MEDICAID

## 2019-10-16 ENCOUNTER — APPOINTMENT (OUTPATIENT)
Dept: RADIOLOGY | Facility: MEDICAL CENTER | Age: 39
DRG: 872 | End: 2019-10-16
Attending: EMERGENCY MEDICINE
Payer: MEDICAID

## 2019-10-16 DIAGNOSIS — R11.2 NON-INTRACTABLE VOMITING WITH NAUSEA, UNSPECIFIED VOMITING TYPE: ICD-10-CM

## 2019-10-16 DIAGNOSIS — R00.0 TACHYCARDIA: ICD-10-CM

## 2019-10-16 DIAGNOSIS — N39.0 URINARY TRACT INFECTION WITHOUT HEMATURIA, SITE UNSPECIFIED: ICD-10-CM

## 2019-10-16 PROBLEM — A41.9 SEPSIS (HCC): Status: ACTIVE | Noted: 2019-10-16

## 2019-10-16 PROBLEM — N30.00 ACUTE CYSTITIS WITHOUT HEMATURIA: Status: ACTIVE | Noted: 2019-10-16

## 2019-10-16 PROBLEM — E87.20 LACTIC ACIDOSIS: Status: ACTIVE | Noted: 2019-10-16

## 2019-10-16 LAB
ALBUMIN SERPL BCP-MCNC: 4.2 G/DL (ref 3.2–4.9)
ALBUMIN/GLOB SERPL: 1 G/DL
ALP SERPL-CCNC: 129 U/L (ref 30–99)
ALT SERPL-CCNC: <5 U/L (ref 2–50)
ANION GAP SERPL CALC-SCNC: 14 MMOL/L (ref 0–11.9)
ANISOCYTOSIS BLD QL SMEAR: ABNORMAL
APPEARANCE UR: CLEAR
AST SERPL-CCNC: 10 U/L (ref 12–45)
BACTERIA #/AREA URNS HPF: ABNORMAL /HPF
BASOPHILS # BLD AUTO: 0.9 % (ref 0–1.8)
BASOPHILS # BLD: 0.15 K/UL (ref 0–0.12)
BILIRUB SERPL-MCNC: 1 MG/DL (ref 0.1–1.5)
BILIRUB UR QL STRIP.AUTO: NEGATIVE
BUN SERPL-MCNC: 11 MG/DL (ref 8–22)
CALCIUM SERPL-MCNC: 9.6 MG/DL (ref 8.5–10.5)
CHLORIDE SERPL-SCNC: 100 MMOL/L (ref 96–112)
CO2 SERPL-SCNC: 26 MMOL/L (ref 20–33)
COLOR UR: YELLOW
CREAT SERPL-MCNC: 0.66 MG/DL (ref 0.5–1.4)
EKG IMPRESSION: NORMAL
EOSINOPHIL # BLD AUTO: 0 K/UL (ref 0–0.51)
EOSINOPHIL NFR BLD: 0 % (ref 0–6.9)
EPI CELLS #/AREA URNS HPF: ABNORMAL /HPF
ERYTHROCYTE [DISTWIDTH] IN BLOOD BY AUTOMATED COUNT: 65.3 FL (ref 35.9–50)
GLOBULIN SER CALC-MCNC: 4.1 G/DL (ref 1.9–3.5)
GLUCOSE SERPL-MCNC: 140 MG/DL (ref 65–99)
GLUCOSE UR STRIP.AUTO-MCNC: NEGATIVE MG/DL
HCT VFR BLD AUTO: 33.9 % (ref 37–47)
HGB BLD-MCNC: 11 G/DL (ref 12–16)
HYALINE CASTS #/AREA URNS LPF: ABNORMAL /LPF
KETONES UR STRIP.AUTO-MCNC: 15 MG/DL
LACTATE BLD-SCNC: 2.2 MMOL/L (ref 0.5–2)
LACTATE BLD-SCNC: 4.2 MMOL/L (ref 0.5–2)
LEUKOCYTE ESTERASE UR QL STRIP.AUTO: NEGATIVE
LIPASE SERPL-CCNC: 8 U/L (ref 11–82)
LYMPHOCYTES # BLD AUTO: 1.68 K/UL (ref 1–4.8)
LYMPHOCYTES NFR BLD: 10.4 % (ref 22–41)
MACROCYTES BLD QL SMEAR: ABNORMAL
MAGNESIUM SERPL-MCNC: 1.6 MG/DL (ref 1.5–2.5)
MANUAL DIFF BLD: NORMAL
MCH RBC QN AUTO: 29.7 PG (ref 27–33)
MCHC RBC AUTO-ENTMCNC: 32.1 G/DL (ref 33.6–35)
MCV RBC AUTO: 92.7 FL (ref 81.4–97.8)
MICRO URNS: ABNORMAL
MONOCYTES # BLD AUTO: 0.29 K/UL (ref 0–0.85)
MONOCYTES NFR BLD AUTO: 1.8 % (ref 0–13.4)
MORPHOLOGY BLD-IMP: NORMAL
MUCOUS THREADS #/AREA URNS HPF: ABNORMAL /HPF
MYELOCYTES NFR BLD MANUAL: 1.7 %
NEUTROPHILS # BLD AUTO: 13.8 K/UL (ref 2–7.15)
NEUTROPHILS NFR BLD: 85.2 % (ref 44–72)
NITRITE UR QL STRIP.AUTO: POSITIVE
NRBC # BLD AUTO: 1.92 K/UL
NRBC BLD-RTO: 11.9 /100 WBC
PH UR STRIP.AUTO: 7 [PH] (ref 5–8)
PLATELET # BLD AUTO: 173 K/UL (ref 164–446)
PLATELET BLD QL SMEAR: NORMAL
PMV BLD AUTO: 9 FL (ref 9–12.9)
POIKILOCYTOSIS BLD QL SMEAR: NORMAL
POLYCHROMASIA BLD QL SMEAR: NORMAL
POTASSIUM SERPL-SCNC: 3.4 MMOL/L (ref 3.6–5.5)
PROT SERPL-MCNC: 8.3 G/DL (ref 6–8.2)
PROT UR QL STRIP: 30 MG/DL
RBC # BLD AUTO: 3.7 M/UL (ref 4.2–5.4)
RBC # URNS HPF: ABNORMAL /HPF
RBC BLD AUTO: PRESENT
RBC UR QL AUTO: NEGATIVE
SODIUM SERPL-SCNC: 140 MMOL/L (ref 135–145)
SP GR UR STRIP.AUTO: >=1.045
TARGETS BLD QL SMEAR: NORMAL
UROBILINOGEN UR STRIP.AUTO-MCNC: 1 MG/DL
WBC # BLD AUTO: 16.2 K/UL (ref 4.8–10.8)
WBC #/AREA URNS HPF: ABNORMAL /HPF

## 2019-10-16 PROCEDURE — 83735 ASSAY OF MAGNESIUM: CPT

## 2019-10-16 PROCEDURE — 700105 HCHG RX REV CODE 258: Performed by: HOSPITALIST

## 2019-10-16 PROCEDURE — 700111 HCHG RX REV CODE 636 W/ 250 OVERRIDE (IP): Performed by: HOSPITALIST

## 2019-10-16 PROCEDURE — 85007 BL SMEAR W/DIFF WBC COUNT: CPT

## 2019-10-16 PROCEDURE — 74177 CT ABD & PELVIS W/CONTRAST: CPT

## 2019-10-16 PROCEDURE — 700117 HCHG RX CONTRAST REV CODE 255: Performed by: EMERGENCY MEDICINE

## 2019-10-16 PROCEDURE — 80053 COMPREHEN METABOLIC PANEL: CPT

## 2019-10-16 PROCEDURE — 96375 TX/PRO/DX INJ NEW DRUG ADDON: CPT

## 2019-10-16 PROCEDURE — 81001 URINALYSIS AUTO W/SCOPE: CPT

## 2019-10-16 PROCEDURE — A9270 NON-COVERED ITEM OR SERVICE: HCPCS | Performed by: HOSPITALIST

## 2019-10-16 PROCEDURE — 85027 COMPLETE CBC AUTOMATED: CPT

## 2019-10-16 PROCEDURE — 36415 COLL VENOUS BLD VENIPUNCTURE: CPT

## 2019-10-16 PROCEDURE — 93005 ELECTROCARDIOGRAM TRACING: CPT | Performed by: EMERGENCY MEDICINE

## 2019-10-16 PROCEDURE — 770004 HCHG ROOM/CARE - ONCOLOGY PRIVATE *

## 2019-10-16 PROCEDURE — 83605 ASSAY OF LACTIC ACID: CPT

## 2019-10-16 PROCEDURE — 87040 BLOOD CULTURE FOR BACTERIA: CPT | Mod: 91

## 2019-10-16 PROCEDURE — 99223 1ST HOSP IP/OBS HIGH 75: CPT | Performed by: HOSPITALIST

## 2019-10-16 PROCEDURE — 83690 ASSAY OF LIPASE: CPT

## 2019-10-16 PROCEDURE — 700105 HCHG RX REV CODE 258: Performed by: EMERGENCY MEDICINE

## 2019-10-16 PROCEDURE — 700102 HCHG RX REV CODE 250 W/ 637 OVERRIDE(OP): Performed by: HOSPITALIST

## 2019-10-16 PROCEDURE — 96365 THER/PROPH/DIAG IV INF INIT: CPT

## 2019-10-16 PROCEDURE — 700111 HCHG RX REV CODE 636 W/ 250 OVERRIDE (IP): Performed by: EMERGENCY MEDICINE

## 2019-10-16 PROCEDURE — 87086 URINE CULTURE/COLONY COUNT: CPT

## 2019-10-16 PROCEDURE — 99285 EMERGENCY DEPT VISIT HI MDM: CPT

## 2019-10-16 RX ORDER — AMOXICILLIN 250 MG
2 CAPSULE ORAL 2 TIMES DAILY
Status: DISCONTINUED | OUTPATIENT
Start: 2019-10-16 | End: 2019-10-18 | Stop reason: HOSPADM

## 2019-10-16 RX ORDER — SODIUM CHLORIDE, SODIUM LACTATE, POTASSIUM CHLORIDE, CALCIUM CHLORIDE 600; 310; 30; 20 MG/100ML; MG/100ML; MG/100ML; MG/100ML
INJECTION, SOLUTION INTRAVENOUS CONTINUOUS
Status: DISCONTINUED | OUTPATIENT
Start: 2019-10-16 | End: 2019-10-18

## 2019-10-16 RX ORDER — PROCHLORPERAZINE EDISYLATE 5 MG/ML
5-10 INJECTION INTRAMUSCULAR; INTRAVENOUS EVERY 4 HOURS PRN
Status: DISCONTINUED | OUTPATIENT
Start: 2019-10-16 | End: 2019-10-18 | Stop reason: HOSPADM

## 2019-10-16 RX ORDER — ONDANSETRON 4 MG/1
4 TABLET, ORALLY DISINTEGRATING ORAL EVERY 4 HOURS PRN
Status: DISCONTINUED | OUTPATIENT
Start: 2019-10-16 | End: 2019-10-18 | Stop reason: HOSPADM

## 2019-10-16 RX ORDER — BISACODYL 10 MG
10 SUPPOSITORY, RECTAL RECTAL
Status: DISCONTINUED | OUTPATIENT
Start: 2019-10-16 | End: 2019-10-18 | Stop reason: HOSPADM

## 2019-10-16 RX ORDER — PROMETHAZINE HYDROCHLORIDE 25 MG/1
12.5-25 TABLET ORAL EVERY 4 HOURS PRN
Status: DISCONTINUED | OUTPATIENT
Start: 2019-10-16 | End: 2019-10-18 | Stop reason: HOSPADM

## 2019-10-16 RX ORDER — SODIUM CHLORIDE, SODIUM LACTATE, POTASSIUM CHLORIDE, CALCIUM CHLORIDE 600; 310; 30; 20 MG/100ML; MG/100ML; MG/100ML; MG/100ML
3000 INJECTION, SOLUTION INTRAVENOUS ONCE
Status: COMPLETED | OUTPATIENT
Start: 2019-10-16 | End: 2019-10-17

## 2019-10-16 RX ORDER — ACETAMINOPHEN 325 MG/1
650 TABLET ORAL EVERY 6 HOURS PRN
Status: DISCONTINUED | OUTPATIENT
Start: 2019-10-16 | End: 2019-10-18 | Stop reason: HOSPADM

## 2019-10-16 RX ORDER — GABAPENTIN 300 MG/1
300 CAPSULE ORAL 3 TIMES DAILY
Status: DISCONTINUED | OUTPATIENT
Start: 2019-10-16 | End: 2019-10-16

## 2019-10-16 RX ORDER — PROCHLORPERAZINE EDISYLATE 5 MG/ML
10 INJECTION INTRAMUSCULAR; INTRAVENOUS ONCE
Status: COMPLETED | OUTPATIENT
Start: 2019-10-16 | End: 2019-10-16

## 2019-10-16 RX ORDER — SODIUM CHLORIDE, SODIUM LACTATE, POTASSIUM CHLORIDE, AND CALCIUM CHLORIDE .6; .31; .03; .02 G/100ML; G/100ML; G/100ML; G/100ML
30 INJECTION, SOLUTION INTRAVENOUS
Status: DISCONTINUED | OUTPATIENT
Start: 2019-10-16 | End: 2019-10-18 | Stop reason: HOSPADM

## 2019-10-16 RX ORDER — ONDANSETRON 2 MG/ML
4 INJECTION INTRAMUSCULAR; INTRAVENOUS EVERY 4 HOURS PRN
Status: DISCONTINUED | OUTPATIENT
Start: 2019-10-16 | End: 2019-10-18 | Stop reason: HOSPADM

## 2019-10-16 RX ORDER — HYDROMORPHONE HYDROCHLORIDE 1 MG/ML
1 INJECTION, SOLUTION INTRAMUSCULAR; INTRAVENOUS; SUBCUTANEOUS ONCE
Status: COMPLETED | OUTPATIENT
Start: 2019-10-16 | End: 2019-10-16

## 2019-10-16 RX ORDER — OXYCODONE HYDROCHLORIDE 30 MG/1
30 TABLET ORAL EVERY 8 HOURS PRN
Status: DISCONTINUED | OUTPATIENT
Start: 2019-10-16 | End: 2019-10-18 | Stop reason: HOSPADM

## 2019-10-16 RX ORDER — POLYETHYLENE GLYCOL 3350 17 G/17G
1 POWDER, FOR SOLUTION ORAL
Status: DISCONTINUED | OUTPATIENT
Start: 2019-10-16 | End: 2019-10-18 | Stop reason: HOSPADM

## 2019-10-16 RX ORDER — PROMETHAZINE HYDROCHLORIDE 25 MG/1
12.5-25 SUPPOSITORY RECTAL EVERY 4 HOURS PRN
Status: DISCONTINUED | OUTPATIENT
Start: 2019-10-16 | End: 2019-10-18 | Stop reason: HOSPADM

## 2019-10-16 RX ORDER — POTASSIUM CHLORIDE 20 MEQ/1
40 TABLET, EXTENDED RELEASE ORAL ONCE
Status: COMPLETED | OUTPATIENT
Start: 2019-10-16 | End: 2019-10-16

## 2019-10-16 RX ORDER — OXYCODONE HCL 80 MG/1
80 TABLET, FILM COATED, EXTENDED RELEASE ORAL 4 TIMES DAILY
COMMUNITY

## 2019-10-16 RX ORDER — MAGNESIUM SULFATE HEPTAHYDRATE 40 MG/ML
2 INJECTION, SOLUTION INTRAVENOUS ONCE
Status: COMPLETED | OUTPATIENT
Start: 2019-10-16 | End: 2019-10-16

## 2019-10-16 RX ORDER — SODIUM CHLORIDE 9 MG/ML
1000 INJECTION, SOLUTION INTRAVENOUS ONCE
Status: COMPLETED | OUTPATIENT
Start: 2019-10-16 | End: 2019-10-16

## 2019-10-16 RX ADMIN — OXYCODONE HYDROCHLORIDE 60 MG: 40 TABLET, FILM COATED, EXTENDED RELEASE ORAL at 18:19

## 2019-10-16 RX ADMIN — IOHEXOL 100 ML: 350 INJECTION, SOLUTION INTRAVENOUS at 05:21

## 2019-10-16 RX ADMIN — ENOXAPARIN SODIUM 40 MG: 100 INJECTION SUBCUTANEOUS at 13:03

## 2019-10-16 RX ADMIN — MAGNESIUM SULFATE IN WATER 2 G: 40 INJECTION, SOLUTION INTRAVENOUS at 15:38

## 2019-10-16 RX ADMIN — POTASSIUM CHLORIDE 40 MEQ: 1500 TABLET, EXTENDED RELEASE ORAL at 13:02

## 2019-10-16 RX ADMIN — HYDROMORPHONE HYDROCHLORIDE 1 MG: 1 INJECTION, SOLUTION INTRAMUSCULAR; INTRAVENOUS; SUBCUTANEOUS at 07:52

## 2019-10-16 RX ADMIN — CEFTRIAXONE SODIUM 2 G: 2 INJECTION, POWDER, FOR SOLUTION INTRAMUSCULAR; INTRAVENOUS at 09:55

## 2019-10-16 RX ADMIN — SODIUM CHLORIDE, POTASSIUM CHLORIDE, SODIUM LACTATE AND CALCIUM CHLORIDE: 600; 310; 30; 20 INJECTION, SOLUTION INTRAVENOUS at 15:42

## 2019-10-16 RX ADMIN — OXYCODONE HYDROCHLORIDE 30 MG: 30 TABLET ORAL at 13:02

## 2019-10-16 RX ADMIN — NICOTINE 7 MG: 7 PATCH, EXTENDED RELEASE TRANSDERMAL at 13:02

## 2019-10-16 RX ADMIN — PROCHLORPERAZINE EDISYLATE 10 MG: 5 INJECTION INTRAMUSCULAR; INTRAVENOUS at 04:45

## 2019-10-16 RX ADMIN — ACETAMINOPHEN 650 MG: 325 TABLET, FILM COATED ORAL at 21:55

## 2019-10-16 RX ADMIN — SODIUM CHLORIDE 1000 ML: 9 INJECTION, SOLUTION INTRAVENOUS at 04:42

## 2019-10-16 RX ADMIN — SODIUM CHLORIDE, POTASSIUM CHLORIDE, SODIUM LACTATE AND CALCIUM CHLORIDE 3000 ML: 600; 310; 30; 20 INJECTION, SOLUTION INTRAVENOUS at 10:11

## 2019-10-16 RX ADMIN — OXYCODONE HYDROCHLORIDE 60 MG: 40 TABLET, FILM COATED, EXTENDED RELEASE ORAL at 13:03

## 2019-10-16 RX ADMIN — ACETAMINOPHEN 650 MG: 325 TABLET, FILM COATED ORAL at 14:51

## 2019-10-16 ASSESSMENT — LIFESTYLE VARIABLES
EVER_SMOKED: YES
TOTAL SCORE: 0
EVER_SMOKED: YES
ALCOHOL_USE: NO
ON A TYPICAL DAY WHEN YOU DRINK ALCOHOL HOW MANY DRINKS DO YOU HAVE: 0
EVER FELT BAD OR GUILTY ABOUT YOUR DRINKING: NO
HAVE PEOPLE ANNOYED YOU BY CRITICIZING YOUR DRINKING: NO
AVERAGE NUMBER OF DAYS PER WEEK YOU HAVE A DRINK CONTAINING ALCOHOL: 0
CONSUMPTION TOTAL: NEGATIVE
TOTAL SCORE: 0
HAVE YOU EVER FELT YOU SHOULD CUT DOWN ON YOUR DRINKING: NO
HOW MANY TIMES IN THE PAST YEAR HAVE YOU HAD 5 OR MORE DRINKS IN A DAY: 0
EVER HAD A DRINK FIRST THING IN THE MORNING TO STEADY YOUR NERVES TO GET RID OF A HANGOVER: NO
TOTAL SCORE: 0

## 2019-10-16 ASSESSMENT — PATIENT HEALTH QUESTIONNAIRE - PHQ9
2. FEELING DOWN, DEPRESSED, IRRITABLE, OR HOPELESS: NOT AT ALL
1. LITTLE INTEREST OR PLEASURE IN DOING THINGS: NOT AT ALL
SUM OF ALL RESPONSES TO PHQ9 QUESTIONS 1 AND 2: 0

## 2019-10-16 ASSESSMENT — COGNITIVE AND FUNCTIONAL STATUS - GENERAL
CLIMB 3 TO 5 STEPS WITH RAILING: TOTAL
STANDING UP FROM CHAIR USING ARMS: A LITTLE
WALKING IN HOSPITAL ROOM: A LOT
DAILY ACTIVITIY SCORE: 24
MOBILITY SCORE: 18
SUGGESTED CMS G CODE MODIFIER MOBILITY: CK
SUGGESTED CMS G CODE MODIFIER DAILY ACTIVITY: CH

## 2019-10-16 ASSESSMENT — COPD QUESTIONNAIRES
DURING THE PAST 4 WEEKS HOW MUCH DID YOU FEEL SHORT OF BREATH: NONE/LITTLE OF THE TIME
DO YOU EVER COUGH UP ANY MUCUS OR PHLEGM?: NO/ONLY WITH OCCASIONAL COLDS OR INFECTIONS
COPD SCREENING SCORE: 2
HAVE YOU SMOKED AT LEAST 100 CIGARETTES IN YOUR ENTIRE LIFE: YES

## 2019-10-16 ASSESSMENT — ENCOUNTER SYMPTOMS
ABDOMINAL PAIN: 1
VOMITING: 1
NAUSEA: 1

## 2019-10-16 NOTE — ED NOTES
Pt to room via wheel chair, agree with triage notes. Pt states she has had blood transfusion before w/o any difficulties. Pt taking Zofran at home for nausea but ran out last night.

## 2019-10-16 NOTE — ED NOTES
Patient resting in bed, NAD. Respirations even and unlabored. Instructed pt need for UA- pt states she does not feel like she can at this time.  Call light in reach. Will continue to monitor

## 2019-10-16 NOTE — H&P
Hospital Medicine History & Physical Note    Date of Service  10/16/2019    Primary Care Physician  Rosita Delaney M.D.    Consultants  None    Code Status  Full code    Chief Complaint  Nausea vomiting    History of Presenting Illness  39 y.o. female who presented 10/16/2019 with history of stage IV epithelioid hemangioendothelioma currently on chemotherapy her last treatment was 3 weeks ago presenting with intractable nausea and vomiting for the past 2 days to the point where she has been unable to keep herself hydrated.  She denies dysuria or hematuria she has mild urinary urgency, she has abdominal pain crampy generalized mild to moderate which she reports is chronic and unchanged secondary to her metastatic cancer.  No melena no rectal bleeding.  No diarrhea.  She had a port placed in August she denies any pain at the site.  She denies any cough or shortness of breath.  She had chills but no documented fever.    Review of Systems  Review of Systems   Gastrointestinal: Positive for abdominal pain, nausea and vomiting.   Musculoskeletal: Positive for joint pain.   All other systems reviewed and are negative.      Past Medical History   has a past medical history of Cancer (HCC).  With lung bone and spleen metastases    Surgical History   has a past surgical history that includes other orthopedic surgery.  Port placement    Family History  Reviewed and not pertinent to the presenting problem    Social History   reports that she has been smoking cigarettes. She has been smoking about 0.25 packs per day. She has never used smokeless tobacco. She reports that she does not drink alcohol or use drugs.    Allergies  No Known Allergies    Medications  Prior to Admission Medications   Prescriptions Last Dose Informant Patient Reported? Taking?   gabapentin (NEURONTIN) 300 MG Cap last week at stopped Patient No No   Sig: Take 1 Cap by mouth 3 times a day.   nicotine (NICODERM) 7 MG/24HR PATCH 24 HR 10/15/2019 at am  Patient No No   Sig: Apply 1 Patch to skin as directed every 24 hours.   ondansetron (ZOFRAN ODT) 8 MG TABLET DISPERSIBLE 10/15/2019 at am Patient Yes No   Sig: PLACE 1 TABLET ONTOP OF TONGUE AND LET DISSOLVE EVERY 12 HOURS   oxyCODONE (OXY-IR) 30 MG immediate release tablet 10/15/2019 at am Patient Yes No   Sig: Take  by mouth every 8 hours as needed.   oxyCODONE CR (OXYCONTIN) 60 MG Tablet Extended Release 12 hour Abuse-Deterrent tablet 10/15/2019 at am Patient Yes Yes   Sig: Take 60 mg by mouth every 12 hours.      Facility-Administered Medications: None       Physical Exam  Temp:  [36 °C (96.8 °F)-36.2 °C (97.1 °F)] 36 °C (96.8 °F)  Pulse:  [101-137] 133  Resp:  [14-18] 16  BP: ()/() 103/41  SpO2:  [96 %-100 %] 100 %    Physical Exam   Constitutional: She is oriented to person, place, and time. She appears well-developed and well-nourished.   HENT:   Head: Normocephalic and atraumatic.   Right Ear: External ear normal.   Left Ear: External ear normal.   Mouth/Throat: No oropharyngeal exudate.   Eyes: Conjunctivae are normal. Right eye exhibits no discharge. Left eye exhibits no discharge. No scleral icterus.   Neck: Neck supple. No JVD present. No tracheal deviation present.   Cardiovascular: Normal rate and regular rhythm. Exam reveals no gallop and no friction rub.   No murmur heard.  Pulmonary/Chest: Effort normal and breath sounds normal. No stridor. No respiratory distress. She has no wheezes. She has no rales. She exhibits no tenderness.   Right upper chest port with no overlying tenderness or erythema   Abdominal: Soft. Bowel sounds are normal. She exhibits mass. She exhibits no distension. There is tenderness (Mild generalized). There is no rebound and no guarding.   Musculoskeletal: She exhibits no edema or tenderness.   Neurological: She is alert and oriented to person, place, and time. No cranial nerve deficit. She exhibits normal muscle tone.   Skin: Skin is warm and dry. She is not  diaphoretic. No cyanosis. Nails show no clubbing.   Psychiatric: She has a normal mood and affect. Her behavior is normal. Thought content normal.   Nursing note and vitals reviewed.      Laboratory:  Recent Labs     10/14/19  1636 10/16/19  0440   WBC 8.9 16.2*   RBC 2.78* 3.70*   HEMOGLOBIN 7.8* 11.0*   HEMATOCRIT 26.5* 33.9*   MCV 95.3 92.7   MCH 28.1 29.7   MCHC 29.4* 32.1*   RDW 72.5* 65.3*   PLATELETCT 183 173   MPV 8.6* 9.0     Recent Labs     10/14/19  1636 10/16/19  0440   SODIUM 138 140   POTASSIUM 3.3* 3.4*   CHLORIDE 104 100   CO2 26 26   GLUCOSE 108* 140*   BUN 10 11   CREATININE 0.60 0.66   CALCIUM 9.0 9.6     Recent Labs     10/14/19  1636 10/16/19  0440   ALTSGPT <5 <5   ASTSGOT 7* 10*   ALKPHOSPHAT 115* 129*   TBILIRUBIN 0.6 1.0   LIPASE  --  8*   GLUCOSE 108* 140*         No results for input(s): NTPROBNP in the last 72 hours.      No results for input(s): TROPONINT in the last 72 hours.    Urinalysis:    Recent Labs     10/16/19  0832   SPECGRAVITY >=1.045*   GLUCOSEUR Negative   KETONES 15*   NITRITE Positive*   LEUKESTERAS Negative   WBCURINE 10-20*   RBCURINE 0-2   BACTERIA Many*   EPITHELCELL Few        Imaging:  CT-ABDOMEN-PELVIS WITH   Final Result         1.  Bilateral pulmonary infiltrates, greater on the right, could represent tumoral or infectious infiltrate.   2.  Massive splenomegaly with low-density changes throughout the spleen, appearance most compatible with metastatic disease.   3.  Scattered subcentimeter low-density hepatic lesions, too small to further characterize, indeterminate.   4.  Extensive bony metastatic disease. Lytic soft tissue masses are seen in the left iliac bone and bilateral ribs.            Assessment/Plan:  I anticipate this patient will require at least two midnights for appropriate medical management, necessitating inpatient admission.    * Acute cystitis without hematuria  Assessment & Plan  She will be started on IV ceftriaxone and follow-up on  cultures  CT raise possibility of pneumonia however she has no clinical symptoms of pneumonia and her PET scan revealed lung metastases which is likely what progressing on CT    Metastatic cancer (HCC)- (present on admission)  Assessment & Plan  Continue pain management  Monitor CBC  Follow-up with oncology after discharge to resume chemotherapy    Sepsis (HCC)  Assessment & Plan  This is Sepsis Present on admission  SIRS criteria identified on my evaluation include: Tachycardia, with heart rate greater than 90 BPM and Leukocyosis, with WBC greater than 12,000  Source is UTI  Sepsis protocol initiated  Fluid resuscitation ordered per protocol  IV antibiotics as appropriate for source of sepsis  While organ dysfunction may be noted elsewhere in this problem list or in the chart, degree of organ dysfunction does not meet CMS criteria for severe sepsis          Intractable vomiting with nausea  Assessment & Plan  IV fluids for hydration and antiemetics    Epithelioid hemangioendothelioma- (present on admission)  Assessment & Plan  Continue pain management and supportive care  Follow-up with oncology after discharge    Hyperglycemia- (present on admission)  Assessment & Plan  Check HbA1c  Check fasting glucose in a.m.    Hypokalemia- (present on admission)  Assessment & Plan  Hypomagnesemia    Replete and monitor electrolytes    Normocytic anemia- (present on admission)  Assessment & Plan  Secondary to her metastatic cancer and chemotherapy  Improved after recent blood transfusion  Monitor CBC      VTE prophylaxis: Lovenox

## 2019-10-16 NOTE — PROGRESS NOTES
Here for one unit of blood today. C/o nausea no vomiting. Took oral medications with little relief. Provider notified and orders for ativan for nausea and IV benadryl for blood products received. Port accessed using sterile technique with brisk blood return. Labs collected yesterday, magnesium needs replacement and potassium needs replacement. Patient declines this at this time and requests we replace tomorrow at chemo appointment. Since IV potassium would take 2 hours and magnesium would take 1 hour patient cannot wait at this time. Denies any symptoms of hypokalemia or hypomagnesemia. Awaiting blood from blood bank.

## 2019-10-16 NOTE — ASSESSMENT & PLAN NOTE
Continue pain management  Monitor CBC  Follow-up with oncology after discharge to resume chemotherapy

## 2019-10-16 NOTE — ASSESSMENT & PLAN NOTE
Suspect this is related to dehydration rather than sepsis  She will be started on IV fluids we will trend her lactic acid

## 2019-10-16 NOTE — ED NOTES
Medication reconciliation updated and complete per pt at bedside  Allergies have been verified   No oral ABX within the last 14 days  Pt home pharmacy:[facility]

## 2019-10-16 NOTE — PROGRESS NOTES
Per Dr. Delaney pt needs to have scans prior to proceeding with next cycle of treatment. Pt and  updated. Pt is currently admitted, and can not be scheduled for scan until discharge, Dr Delaney updated. Will follow pt and notify MD office once she is d/c'd so that scans may be ordered and rescheduled through [facility].

## 2019-10-16 NOTE — PROGRESS NOTES
Pt arrived on unit approx 1230. Pt is A&Ox4, is a transfer with one assist to her wheelchair.   PIV to right arm patent, LR boluses running. Medicated per MAR. Pt did c/o back pain, rated it 10/10, medicated and heat packs provided.  Skin intact. Right upper chest port, gauze and tape from yesterday hanging off, redressed; no redness or tenderness at site.    on, continues to be tachycardic in 130's. Close monitoring.   Waffle overlay in place, bed alarm in place.

## 2019-10-16 NOTE — ED PROVIDER NOTES
ED Provider Note    CHIEF COMPLAINT  Chief Complaint   Patient presents with   • Vomiting     pt reports constant abdominal pain and vomiting since leaing her blood transfusion yesterday.    • Abdominal Pain     pain rated 8/10         HPI  Amanda Bae is a 39 y.o. female who presents with abdominal pain nausea and vomiting.  Patient felt slightly nauseous yesterday.  She had a transfusion and after that nausea seem to get worse.  She has had abdominal cramping and sharp pain that she localizes in the epigastrium.  This radiates to the back and down into her pelvic area more on the right side than on the left.  Pain is severe rated 8/10.  Nothing in particular makes it better or worse.  Is not taking any nausea medication.  She has had chills without fever.  No dysuria hematuria frequency.  Normal bowel movement no diarrhea.    History of metastatic cancer.  On chemotherapy-doxorubicin.  Non-palliative radiation.  Has frequent transfusions without any difficulty.    REVIEW OF SYSTEMS  As per HPI  All other systems are negative.     PAST MEDICAL HISTORY  Stage IV hemangioendothelioma with bone metastasis    FAMILY HISTORY  History reviewed. No pertinent family history.    SOCIAL HISTORY  Social History     Tobacco Use   • Smoking status: Current Every Day Smoker     Packs/day: 0.25     Types: Cigarettes   • Smokeless tobacco: Never Used   Substance Use Topics   • Alcohol use: No   • Drug use: No       SURGICAL HISTORY  Past Surgical History:   Procedure Laterality Date   • OTHER ORTHOPEDIC SURGERY      left hip, right lower ext yusra       CURRENT MEDICATIONS  Home Medications     Reviewed by Alejandro Wisdom R.N. (Registered Nurse) on 10/16/19 at 3428  Med List Status: Partial   Medication Last Dose Status   gabapentin (NEURONTIN) 300 MG Cap  Active   nicotine (NICODERM) 7 MG/24HR PATCH 24 HR  Active   NON SPECIFIED  Active   ondansetron (ZOFRAN ODT) 8 MG TABLET DISPERSIBLE  Active   ondansetron (ZOFRAN)  "4 MG Tab tablet  Active   oxyCODONE (OXY-IR) 30 MG immediate release tablet  Active   raNITidine (ZANTAC) 150 MG Tab  Active   senna-docusate (PERICOLACE OR SENOKOT S) 8.6-50 MG Tab  Active                ALLERGIES  No Known Allergies    PHYSICAL EXAM  VITAL SIGNS: /103   Pulse (!) 123   Temp 36 °C (96.8 °F) (Temporal)   Resp 14   Ht 1.626 m (5' 4\")   Wt 79.3 kg (174 lb 13.2 oz)   SpO2 98%   BMI 30.01 kg/m²   Constitutional: Awake and alert  HENT: Dry mucous membranes  Eyes: Sclera white  Neck: Normal range of motion  Cardiovascular: Tachycardic  heart rate, Normal rhythm  Thorax & Lungs: Normal breath sounds, No respiratory distress, No wheezing, No chest tenderness.   Abdomen: Mild diffuse vague tenderness.  No peritonitis  Skin: No rash.   Back: No tenderness, No CVA tenderness.   Extremities: Intact, symmetric distal pulses, no edema.  Neurologic: Grossly normal    RADIOLOGY/PROCEDURES  CT-ABDOMEN-PELVIS WITH   Final Result         1.  Bilateral pulmonary infiltrates, greater on the right, could represent tumoral or infectious infiltrate.   2.  Massive splenomegaly with low-density changes throughout the spleen, appearance most compatible with metastatic disease.   3.  Scattered subcentimeter low-density hepatic lesions, too small to further characterize, indeterminate.   4.  Extensive bony metastatic disease. Lytic soft tissue masses are seen in the left iliac bone and bilateral ribs.         Imaging is interpreted by radiologist    Labs:   Results for orders placed or performed during the hospital encounter of 10/16/19   CBC WITH DIFFERENTIAL   Result Value Ref Range    WBC 16.2 (H) 4.8 - 10.8 K/uL    RBC 3.70 (L) 4.20 - 5.40 M/uL    Hemoglobin 11.0 (L) 12.0 - 16.0 g/dL    Hematocrit 33.9 (L) 37.0 - 47.0 %    MCV 92.7 81.4 - 97.8 fL    MCH 29.7 27.0 - 33.0 pg    MCHC 32.1 (L) 33.6 - 35.0 g/dL    RDW 65.3 (H) 35.9 - 50.0 fL    Platelet Count 173 164 - 446 K/uL    MPV 9.0 9.0 - 12.9 fL    " Neutrophils-Polys 85.20 (H) 44.00 - 72.00 %    Lymphocytes 10.40 (L) 22.00 - 41.00 %    Monocytes 1.80 0.00 - 13.40 %    Eosinophils 0.00 0.00 - 6.90 %    Basophils 0.90 0.00 - 1.80 %    Nucleated RBC 11.90 /100 WBC    Neutrophils (Absolute) 13.80 (H) 2.00 - 7.15 K/uL    Lymphs (Absolute) 1.68 1.00 - 4.80 K/uL    Monos (Absolute) 0.29 0.00 - 0.85 K/uL    Eos (Absolute) 0.00 0.00 - 0.51 K/uL    Baso (Absolute) 0.15 (H) 0.00 - 0.12 K/uL    NRBC (Absolute) 1.92 K/uL    Anisocytosis 1+     Macrocytosis 1+    COMP METABOLIC PANEL   Result Value Ref Range    Sodium 140 135 - 145 mmol/L    Potassium 3.4 (L) 3.6 - 5.5 mmol/L    Chloride 100 96 - 112 mmol/L    Co2 26 20 - 33 mmol/L    Anion Gap 14.0 (H) 0.0 - 11.9    Glucose 140 (H) 65 - 99 mg/dL    Bun 11 8 - 22 mg/dL    Creatinine 0.66 0.50 - 1.40 mg/dL    Calcium 9.6 8.5 - 10.5 mg/dL    AST(SGOT) 10 (L) 12 - 45 U/L    ALT(SGPT) <5 2 - 50 U/L    Alkaline Phosphatase 129 (H) 30 - 99 U/L    Total Bilirubin 1.0 0.1 - 1.5 mg/dL    Albumin 4.2 3.2 - 4.9 g/dL    Total Protein 8.3 (H) 6.0 - 8.2 g/dL    Globulin 4.1 (H) 1.9 - 3.5 g/dL    A-G Ratio 1.0 g/dL   LIPASE   Result Value Ref Range    Lipase 8 (L) 11 - 82 U/L   URINALYSIS CULTURE, IF INDICATED   Result Value Ref Range    Color Yellow     Character Clear     Specific Gravity >=1.045 (A) <1.035    Ph 7.0 5.0 - 8.0    Glucose Negative Negative mg/dL    Ketones 15 (A) Negative mg/dL    Protein 30 (A) Negative mg/dL    Bilirubin Negative Negative    Urobilinogen, Urine 1.0 Negative    Nitrite Positive (A) Negative    Leukocyte Esterase Negative Negative    Occult Blood Negative Negative    Micro Urine Req Microscopic    ESTIMATED GFR   Result Value Ref Range    GFR If African American >60 >60 mL/min/1.73 m 2    GFR If Non African American >60 >60 mL/min/1.73 m 2   DIFFERENTIAL MANUAL   Result Value Ref Range    Myelocytes 1.70 %    Manual Diff Status PERFORMED    PERIPHERAL SMEAR REVIEW   Result Value Ref Range    Peripheral  Smear Review see below    PLATELET ESTIMATE   Result Value Ref Range    Plt Estimation Normal    MORPHOLOGY   Result Value Ref Range    RBC Morphology Present     Polychromia 1+     Poikilocytosis 1+     Target Cells 1+    URINE MICROSCOPIC (W/UA)   Result Value Ref Range    WBC 10-20 (A) /hpf    RBC 0-2 /hpf    Bacteria Many (A) None /hpf    Epithelial Cells Few /hpf    Mucous Threads Few /hpf    Hyaline Cast 0-2 /lpf   EKG   Result Value Ref Range    Report       [facility]    Test Date:  2019-10-16  Pt Name:    KAMI SINGH            Department: ER  MRN:        [id number]                      Room:        18  Gender:     Female                       Technician: [id number]  :        [id number]                   Requested By:VAMSI WOOD  Order #:    [id number]                    Reading MD: VAMSI WOOD MD    Measurements  Intervals                                Axis  Rate:       132                          P:          80  MS:         172                          QRS:        10  QRSD:       80                           T:          39  QT:         228  QTc:        338    Interpretive Statements  SINUS TACHYCARDIA  LEFT VENTRICULAR HYPERTROPHY  Compared to ECG 2019 01:35:09  Sinus rhythm no longer present  Right ventricular hypertrophy no longer present    Electronically Signed On 10- 9:51:54 PDT by VAMSI WOOD MD         Medications   cefTRIAXone (ROCEPHIN) 2 g in  mL IVPB (has no administration in time range)   NS infusion 1,000 mL (0 mL Intravenous Stopped 10/16/19 0613)   prochlorperazine (COMPAZINE) injection 10 mg (10 mg Intravenous Given 10/16/19 5785)   iohexol (OMNIPAQUE) 350 mg/mL (100 mL Intravenous Given 10/16/19 6857)   HYDROmorphone pf (DILAUDID) injection 1 mg (1 mg Intravenous Given 10/16/19 0950)       Hydration: Patient was given IV fluids because of tachycardia and dehydration.  Oral fluids were not appropriate because of a  possible surgical problem.  On recheck the patient was stable    COURSE & MEDICAL DECISION MAKING  Patient presents the ER with abdominal pain and vomiting.  And tenderness on examination.  She was tachycardic.  She was given IV fluids and Compazine.  Her nausea is much improved.  She had continued pain and reported that she was due for her chronic pain medication given Dilaudid IV.  CT scan does not show surgical pathology.  She does have leukocytosis.  It was not clear this would be related to infection or not.  The patient was persistently tachycardic with an EKG demonstrated a sinus tachycardia.  Waited for urine for quite some time, but ultimately the patient did provide a urinalysis.  Ultimately UA was given.  And this shows nitrite positive with WBCs consistent with UTI.  She was given ceftriaxone intravenously.  Her blood pressures been stable.  I will do blood cultures and lactic acid.  Lactic returned surprisingly elevated.  I ordered additional IV fluid.  she will be admitted to the hospital for further treatment.  Hospitalist was paged for admission.    FINAL IMPRESSION  1.  Urinary tract infection          This dictation was created using voice recognition software. The accuracy of the dictation is limited to the abilities of the software.  The nursing notes were reviewed and certain aspects of this information were incorporated into this note.    Electronically signed by: Dewayne Langford, 10/16/2019 4:35 AM

## 2019-10-16 NOTE — ED TRIAGE NOTES
"Amanda Berryon  39 y.o. female  Chief Complaint   Patient presents with   • Vomiting     pt reports constant abdominal pain and vomiting since leaing her blood transfusion yesterday.    • Abdominal Pain     pain rated 8/10     Pt wheeled to triage for above complaint.     Pt denies fevers, chills. States she was told to come to the ER if her symptoms did not improve by the infusion center.     Pt back to lobby. Educated to inform staff of any concerns or changes.     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

## 2019-10-16 NOTE — ASSESSMENT & PLAN NOTE
She will be started on IV ceftriaxone and follow-up on cultures  CT raise possibility of pneumonia however she has no clinical symptoms of pneumonia and her PET scan revealed lung metastases which is likely what progressing on CT

## 2019-10-16 NOTE — ASSESSMENT & PLAN NOTE
Secondary to her metastatic cancer and chemotherapy  Improved after recent blood transfusion  Monitor CBC

## 2019-10-16 NOTE — ED NOTES
Call from Lab called with critical result of Lactic Acid at 1007. Critical lab result read back to lab.   Dr. Langford notified of critical lab result at 1007.  Critical lab result read back by Dr. Langford.

## 2019-10-17 ENCOUNTER — APPOINTMENT (OUTPATIENT)
Dept: ONCOLOGY | Facility: MEDICAL CENTER | Age: 39
End: 2019-10-17
Attending: INTERNAL MEDICINE
Payer: MEDICAID

## 2019-10-17 LAB
ANION GAP SERPL CALC-SCNC: 7 MMOL/L (ref 0–11.9)
BASOPHILS # BLD AUTO: 0.6 % (ref 0–1.8)
BASOPHILS # BLD: 0.07 K/UL (ref 0–0.12)
BUN SERPL-MCNC: 9 MG/DL (ref 8–22)
CALCIUM SERPL-MCNC: 8.6 MG/DL (ref 8.5–10.5)
CHLORIDE SERPL-SCNC: 104 MMOL/L (ref 96–112)
CO2 SERPL-SCNC: 28 MMOL/L (ref 20–33)
CREAT SERPL-MCNC: 0.63 MG/DL (ref 0.5–1.4)
EOSINOPHIL # BLD AUTO: 0 K/UL (ref 0–0.51)
EOSINOPHIL NFR BLD: 0 % (ref 0–6.9)
ERYTHROCYTE [DISTWIDTH] IN BLOOD BY AUTOMATED COUNT: 72.6 FL (ref 35.9–50)
EST. AVERAGE GLUCOSE BLD GHB EST-MCNC: 114 MG/DL
GLUCOSE SERPL-MCNC: 131 MG/DL (ref 65–99)
HBA1C MFR BLD: 5.6 % (ref 0–5.6)
HCT VFR BLD AUTO: 27.3 % (ref 37–47)
HGB BLD-MCNC: 8.5 G/DL (ref 12–16)
IMM GRANULOCYTES # BLD AUTO: 0.55 K/UL (ref 0–0.11)
IMM GRANULOCYTES NFR BLD AUTO: 4.9 % (ref 0–0.9)
LACTATE BLD-SCNC: 1.7 MMOL/L (ref 0.5–2)
LYMPHOCYTES # BLD AUTO: 2.14 K/UL (ref 1–4.8)
LYMPHOCYTES NFR BLD: 19.2 % (ref 22–41)
MCH RBC QN AUTO: 29.8 PG (ref 27–33)
MCHC RBC AUTO-ENTMCNC: 31.1 G/DL (ref 33.6–35)
MCV RBC AUTO: 95.8 FL (ref 81.4–97.8)
MONOCYTES # BLD AUTO: 1.64 K/UL (ref 0–0.85)
MONOCYTES NFR BLD AUTO: 14.7 % (ref 0–13.4)
NEUTROPHILS # BLD AUTO: 6.77 K/UL (ref 2–7.15)
NEUTROPHILS NFR BLD: 60.6 % (ref 44–72)
NRBC # BLD AUTO: 1.42 K/UL
NRBC BLD-RTO: 12.7 /100 WBC
PLATELET # BLD AUTO: 146 K/UL (ref 164–446)
PMV BLD AUTO: 9.2 FL (ref 9–12.9)
POTASSIUM SERPL-SCNC: 4.2 MMOL/L (ref 3.6–5.5)
RBC # BLD AUTO: 2.85 M/UL (ref 4.2–5.4)
SODIUM SERPL-SCNC: 139 MMOL/L (ref 135–145)
WBC # BLD AUTO: 11.2 K/UL (ref 4.8–10.8)

## 2019-10-17 PROCEDURE — 36415 COLL VENOUS BLD VENIPUNCTURE: CPT

## 2019-10-17 PROCEDURE — A9270 NON-COVERED ITEM OR SERVICE: HCPCS | Performed by: HOSPITALIST

## 2019-10-17 PROCEDURE — 99233 SBSQ HOSP IP/OBS HIGH 50: CPT | Performed by: INTERNAL MEDICINE

## 2019-10-17 PROCEDURE — 85025 COMPLETE CBC W/AUTO DIFF WBC: CPT

## 2019-10-17 PROCEDURE — 700102 HCHG RX REV CODE 250 W/ 637 OVERRIDE(OP): Performed by: HOSPITALIST

## 2019-10-17 PROCEDURE — 83605 ASSAY OF LACTIC ACID: CPT

## 2019-10-17 PROCEDURE — 80048 BASIC METABOLIC PNL TOTAL CA: CPT

## 2019-10-17 PROCEDURE — 700111 HCHG RX REV CODE 636 W/ 250 OVERRIDE (IP): Performed by: HOSPITALIST

## 2019-10-17 PROCEDURE — 700105 HCHG RX REV CODE 258: Performed by: HOSPITALIST

## 2019-10-17 PROCEDURE — 83036 HEMOGLOBIN GLYCOSYLATED A1C: CPT

## 2019-10-17 PROCEDURE — 770004 HCHG ROOM/CARE - ONCOLOGY PRIVATE *

## 2019-10-17 RX ADMIN — OXYCODONE HYDROCHLORIDE 30 MG: 30 TABLET ORAL at 03:11

## 2019-10-17 RX ADMIN — ACETAMINOPHEN 650 MG: 325 TABLET, FILM COATED ORAL at 11:33

## 2019-10-17 RX ADMIN — SODIUM CHLORIDE, POTASSIUM CHLORIDE, SODIUM LACTATE AND CALCIUM CHLORIDE: 600; 310; 30; 20 INJECTION, SOLUTION INTRAVENOUS at 01:33

## 2019-10-17 RX ADMIN — OXYCODONE HYDROCHLORIDE 30 MG: 30 TABLET ORAL at 22:36

## 2019-10-17 RX ADMIN — NICOTINE 7 MG: 7 PATCH, EXTENDED RELEASE TRANSDERMAL at 05:29

## 2019-10-17 RX ADMIN — ACETAMINOPHEN 650 MG: 325 TABLET, FILM COATED ORAL at 17:29

## 2019-10-17 RX ADMIN — ENOXAPARIN SODIUM 40 MG: 100 INJECTION SUBCUTANEOUS at 05:29

## 2019-10-17 RX ADMIN — SODIUM CHLORIDE, POTASSIUM CHLORIDE, SODIUM LACTATE AND CALCIUM CHLORIDE: 600; 310; 30; 20 INJECTION, SOLUTION INTRAVENOUS at 12:36

## 2019-10-17 RX ADMIN — OXYCODONE HYDROCHLORIDE 60 MG: 40 TABLET, FILM COATED, EXTENDED RELEASE ORAL at 17:24

## 2019-10-17 RX ADMIN — OXYCODONE HYDROCHLORIDE 60 MG: 40 TABLET, FILM COATED, EXTENDED RELEASE ORAL at 05:29

## 2019-10-17 RX ADMIN — ACETAMINOPHEN 650 MG: 325 TABLET, FILM COATED ORAL at 05:29

## 2019-10-17 RX ADMIN — CEFTRIAXONE SODIUM 2 G: 2 INJECTION, POWDER, FOR SOLUTION INTRAMUSCULAR; INTRAVENOUS at 05:29

## 2019-10-17 RX ADMIN — SODIUM CHLORIDE, POTASSIUM CHLORIDE, SODIUM LACTATE AND CALCIUM CHLORIDE: 600; 310; 30; 20 INJECTION, SOLUTION INTRAVENOUS at 22:36

## 2019-10-17 ASSESSMENT — ENCOUNTER SYMPTOMS
FALLS: 0
BACK PAIN: 0
WEIGHT LOSS: 0
PALPITATIONS: 0
DEPRESSION: 0
FEVER: 0
VOMITING: 1
SHORTNESS OF BREATH: 0
WEAKNESS: 1
PND: 0
WHEEZING: 0
SPUTUM PRODUCTION: 0
EYE PAIN: 0
HEARTBURN: 0
CHILLS: 0
COUGH: 0
ABDOMINAL PAIN: 0
HEADACHES: 0
NAUSEA: 1
DIARRHEA: 0
BLURRED VISION: 0
SPEECH CHANGE: 0
NECK PAIN: 0
TREMORS: 0
CONSTIPATION: 0
SEIZURES: 0
DIZZINESS: 0

## 2019-10-17 ASSESSMENT — LIFESTYLE VARIABLES: SUBSTANCE_ABUSE: 0

## 2019-10-17 NOTE — PROGRESS NOTES
Assumed care of patient at 1900. Vitals at shift change were BP 95/57, , respirations 24. Reviewed labs and lactic acid at 0945 was 4.2. Orders for stat lactic and q4hrs in place but not drawn. Notified lab to draw patient stat. Lactic acid at 2012 is 2.2. Vitals now /77, , respirations 17. Pt is A&Ox4, denies pain or nausea. PIV patent and infusing LR at 100ml/hr. R port not accessed. Q24hr antibiotics.  in place. Call light within reach, will continue to round hourly.

## 2019-10-17 NOTE — CARE PLAN
Problem: Communication  Goal: The ability to communicate needs accurately and effectively will improve  Outcome: PROGRESSING AS EXPECTED  Intervention: Howard patient and significant other/support system to call light to alert staff of needs  Note:   Pt admitted this afternoon. Pt oriented to floor/routine.  Pt demonstrates use of call light.     Problem: Safety  Goal: Will remain free from injury  Outcome: PROGRESSING AS EXPECTED  Intervention: Provide assistance with mobility  Note:   Pt let staff know she is able to mobilize with walker. She ambulated to the bathroom with one assist and FWW, was steady.      Problem: Pain Management  Goal: Pain level will decrease to patient's comfort goal  Outcome: PROGRESSING AS EXPECTED  Intervention: Follow pain managment plan developed in collaboration with patient and Interdisciplinary Team  Note:   Pt medicated per MAR. Pt able to get relief and sleep. PT reported pain down from 10/10 to 3/10. PT able to use 0-10 pain scale.

## 2019-10-17 NOTE — DISCHARGE PLANNING
Patient is eligible for Medicaid Meds to Beds at discharge if they have coverage with [place] Medicaid, Medicaid FFS, Medicaid HMO (\[facility]\""), or [place]. This service is provided through the [facility] if orders are received by the pharmacy prior to 4pm Monday through Friday. Preferred pharmacy has been changed to [facility]. Please call [phone] prior to discharge.

## 2019-10-17 NOTE — PROGRESS NOTES
2 RN skin check complete with HAYLEY Ortega.   Devices in place none.  Skin assessed under devices n/a.  Confirmed pressure ulcers found on n/a.  New potential pressure ulcers noted on n/a. Wound consult placed n/a.  The following interventions in place waffle overlay was on bed originally but pt found in uncomfortable.  Waffle removed per pt request. Pt can reposition self.

## 2019-10-17 NOTE — PROGRESS NOTES
[facility] Medicine Daily Progress Note    Date of Service  10/17/2019    Chief Complaint  39 y.o. female admitted 10/16/2019 with nausea vomiting and general fatigue    Hospital Course    Presented with a complaint of nausea vomiting and general fatigue.  Patient was noticed to have significantly elevated lactic acidosis and also dehydration.  Patient was treated with aggressive IV fluid rehydration and also IV Zofran for nausea vomiting.      Interval Problem Update  10/17.  Patient still complains of general fatigue but nausea vomiting improved.  Patient is able to keep things down but still have very poor appetite.  Still require IV fluid.  Patient's lactic acid did come down to normal baseline.  Patient complains of general body achiness. Patient's pain is general, 2-3/10, intermittent and does not radiate to other location, sharp and with some tingling. Can be controlled by pain meds.      Consultants/Specialty  none    Code Status  full    Disposition  TBD    Review of Systems  Review of Systems   Constitutional: Positive for malaise/fatigue. Negative for chills, fever and weight loss.   HENT: Negative for congestion, ear discharge, ear pain, hearing loss and nosebleeds.    Eyes: Negative for blurred vision and pain.   Respiratory: Negative for cough, sputum production, shortness of breath and wheezing.    Cardiovascular: Negative for chest pain, palpitations, leg swelling and PND.   Gastrointestinal: Positive for nausea and vomiting. Negative for abdominal pain, constipation, diarrhea and heartburn.   Genitourinary: Negative for dysuria, frequency and hematuria.   Musculoskeletal: Negative for back pain, falls, joint pain and neck pain.   Skin: Negative for rash.   Neurological: Positive for weakness. Negative for dizziness, tremors, speech change, seizures and headaches.   Psychiatric/Behavioral: Negative for depression, substance abuse and suicidal ideas.        Physical Exam  Temp:  [36.8 °C (98.2 °F)-37.1  °C (98.8 °F)] 36.9 °C (98.5 °F)  Pulse:  [111-126] 123  Resp:  [17-24] 18  BP: ()/(57-80) 117/80  SpO2:  [96 %-98 %] 96 %    Physical Exam   Constitutional: She is oriented to person, place, and time. She appears well-developed and well-nourished.   HENT:   Head: Normocephalic.   Nose: Nose normal.   Mouth/Throat: No oropharyngeal exudate.   Eyes: Pupils are equal, round, and reactive to light. EOM are normal.   Neck: Normal range of motion. Neck supple. No JVD present. No thyromegaly present.   Cardiovascular: Normal rate, regular rhythm and normal heart sounds. Exam reveals no gallop and no friction rub.   No murmur heard.  Pulmonary/Chest: Effort normal and breath sounds normal. No respiratory distress. She has no wheezes. She has no rales.   Abdominal: Soft. Bowel sounds are normal. She exhibits no distension and no mass. There is no tenderness. There is no rebound and no guarding.   Musculoskeletal: Normal range of motion. She exhibits no edema or tenderness.   Lymphadenopathy:     She has no cervical adenopathy.   Neurological: She is alert and oriented to person, place, and time. No cranial nerve deficit.   Skin: Skin is warm. No rash noted.   Psychiatric: Her behavior is normal.       Fluids    Intake/Output Summary (Last 24 hours) at 10/17/2019 1405  Last data filed at 10/17/2019 1300  Gross per 24 hour   Intake 2530 ml   Output --   Net 2530 ml       Laboratory  Recent Labs     10/14/19  1636 10/16/19  0440 10/17/19  0001   WBC 8.9 16.2* 11.2*   RBC 2.78* 3.70* 2.85*   HEMOGLOBIN 7.8* 11.0* 8.5*   HEMATOCRIT 26.5* 33.9* 27.3*   MCV 95.3 92.7 95.8   MCH 28.1 29.7 29.8   MCHC 29.4* 32.1* 31.1*   RDW 72.5* 65.3* 72.6*   PLATELETCT 183 173 146*   MPV 8.6* 9.0 9.2     Recent Labs     10/14/19  1636 10/16/19  0440 10/17/19  0001   SODIUM 138 140 139   POTASSIUM 3.3* 3.4* 4.2   CHLORIDE 104 100 104   CO2 26 26 28   GLUCOSE 108* 140* 131*   BUN 10 11 9   CREATININE 0.60 0.66 0.63   CALCIUM 9.0 9.6 8.6                    Imaging  CT-ABDOMEN-PELVIS WITH   Final Result         1.  Bilateral pulmonary infiltrates, greater on the right, could represent tumoral or infectious infiltrate.   2.  Massive splenomegaly with low-density changes throughout the spleen, appearance most compatible with metastatic disease.   3.  Scattered subcentimeter low-density hepatic lesions, too small to further characterize, indeterminate.   4.  Extensive bony metastatic disease. Lytic soft tissue masses are seen in the left iliac bone and bilateral ribs.           Assessment/Plan  * Acute cystitis without hematuria  Assessment & Plan  She will be started on IV ceftriaxone and follow-up on cultures  CT raise possibility of pneumonia however she has no clinical symptoms of pneumonia and her PET scan revealed lung metastases which is likely what progressing on CT  Continue IV Rocephin for now pending culture result    Sepsis (HCC)  Assessment & Plan  This is Sepsis Present on admission  SIRS criteria identified on my evaluation include: Tachycardia, with heart rate greater than 90 BPM and Leukocyosis, with WBC greater than 12,000  Source is UTI  Sepsis protocol initiated  Fluid resuscitation ordered per protocol  IV antibiotics as appropriate for source of sepsis  While organ dysfunction may be noted elsewhere in this problem list or in the chart, degree of organ dysfunction does not meet CMS criteria for severe sepsis    Simple sepsis, improved, possibly due to UTI nausea vomiting      Metastatic cancer (HCC)- (present on admission)  Assessment & Plan  Continue pain management  Monitor CBC  Follow-up with oncology after discharge to resume chemotherapy    Intractable vomiting with nausea  Assessment & Plan  IV fluids for hydration and antiemetics    Lactic acid trending down after IV fluid rehydration.    Epithelioid hemangioendothelioma- (present on admission)  Assessment & Plan  Continue pain management and supportive care  Follow-up with  oncology after discharge    Hyperglycemia- (present on admission)  Assessment & Plan  Check HbA1c  Check fasting glucose in a.m.    Hypokalemia- (present on admission)  Assessment & Plan  Hypomagnesemia    Replete and monitor electrolytes    Normocytic anemia- (present on admission)  Assessment & Plan  Secondary to her metastatic cancer and chemotherapy  Improved after recent blood transfusion  Monitor CBC         VTE prophylaxis: SCD and Lovenox      Current Facility-Administered Medications:   •  nicotine (NICODERM) 7 MG/24HR 7 mg, 1 Patch, Transdermal, Q24HRS, Roberto Villatoro M.D., 7 mg at 10/17/19 0529  •  oxyCODONE immediate release (ROXICODONE) tablet 30 mg, 30 mg, Oral, Q8HRS PRN, Roberto Villatoro M.D., 30 mg at 10/17/19 0311  •  oxyCODONE CR (OXYCONTIN) tablet 60 mg, 60 mg, Oral, Q12HRS, Roberto Villatoro M.D., 60 mg at 10/17/19 0529  •  senna-docusate (PERICOLACE or SENOKOT S) 8.6-50 MG per tablet 2 Tab, 2 Tab, Oral, BID **AND** polyethylene glycol/lytes (MIRALAX) PACKET 1 Packet, 1 Packet, Oral, QDAY PRN **AND** magnesium hydroxide (MILK OF MAGNESIA) suspension 30 mL, 30 mL, Oral, QDAY PRN **AND** bisacodyl (DULCOLAX) suppository 10 mg, 10 mg, Rectal, QDAY PRN, Roberto Villatoro M.D.  •  Respiratory Care per Protocol, , Nebulization, Continuous RT, Roberto Villatoro M.D.  •  lactated ringers infusion, , Intravenous, Continuous, Roberto Villatoro M.D., Last Rate: 100 mL/hr at 10/17/19 1236  •  lactated ringers infusion (BOLUS): BMI less than or equal to 30, 30 mL/kg, Intravenous, Once PRN, Roberto Villatoro M.D.  •  enoxaparin (LOVENOX) inj 40 mg, 40 mg, Subcutaneous, DAILY, Roberto Villatoro M.D., 40 mg at 10/17/19 0529  •  acetaminophen (TYLENOL) tablet 650 mg, 650 mg, Oral, Q6HRS PRN, Roberto Villatoro M.D., 650 mg at 10/17/19 1133  •  cefTRIAXone (ROCEPHIN) 2 g in  mL IVPB, 2 g, Intravenous, Q24HRS, Roberto Villatoro M.D., Stopped at 10/17/19 0559  •  ondansetron  (ZOFRAN) syringe/vial injection 4 mg, 4 mg, Intravenous, Q4HRS PRN, Roberto Villatoro M.D.  •  ondansetron (ZOFRAN ODT) dispertab 4 mg, 4 mg, Oral, Q4HRS PRN, Roberto Villatoro M.D.  •  promethazine (PHENERGAN) tablet 12.5-25 mg, 12.5-25 mg, Oral, Q4HRS PRN, Roberto Villatoro M.D.  •  promethazine (PHENERGAN) suppository 12.5-25 mg, 12.5-25 mg, Rectal, Q4HRS PRN, Roberto Villatoro M.D.  •  prochlorperazine (COMPAZINE) injection 5-10 mg, 5-10 mg, Intravenous, Q4HRS PRN, Roberto Villatoro M.D.

## 2019-10-18 VITALS
BODY MASS INDEX: 29.85 KG/M2 | TEMPERATURE: 99 F | HEIGHT: 64 IN | WEIGHT: 174.82 LBS | DIASTOLIC BLOOD PRESSURE: 85 MMHG | HEART RATE: 125 BPM | SYSTOLIC BLOOD PRESSURE: 122 MMHG | OXYGEN SATURATION: 90 % | RESPIRATION RATE: 20 BRPM

## 2019-10-18 LAB
BACTERIA UR CULT: NORMAL
SIGNIFICANT IND 70042: NORMAL
SITE SITE: NORMAL
SOURCE SOURCE: NORMAL

## 2019-10-18 PROCEDURE — A9270 NON-COVERED ITEM OR SERVICE: HCPCS | Performed by: HOSPITALIST

## 2019-10-18 PROCEDURE — 700111 HCHG RX REV CODE 636 W/ 250 OVERRIDE (IP): Performed by: HOSPITALIST

## 2019-10-18 PROCEDURE — 700105 HCHG RX REV CODE 258: Performed by: HOSPITALIST

## 2019-10-18 PROCEDURE — 99239 HOSP IP/OBS DSCHRG MGMT >30: CPT | Performed by: INTERNAL MEDICINE

## 2019-10-18 PROCEDURE — 700102 HCHG RX REV CODE 250 W/ 637 OVERRIDE(OP): Performed by: HOSPITALIST

## 2019-10-18 RX ADMIN — ENOXAPARIN SODIUM 40 MG: 100 INJECTION SUBCUTANEOUS at 05:01

## 2019-10-18 RX ADMIN — ACETAMINOPHEN 650 MG: 325 TABLET, FILM COATED ORAL at 00:53

## 2019-10-18 RX ADMIN — OXYCODONE HYDROCHLORIDE 60 MG: 40 TABLET, FILM COATED, EXTENDED RELEASE ORAL at 05:01

## 2019-10-18 RX ADMIN — NICOTINE 7 MG: 7 PATCH, EXTENDED RELEASE TRANSDERMAL at 05:01

## 2019-10-18 RX ADMIN — OXYCODONE HYDROCHLORIDE 30 MG: 30 TABLET ORAL at 06:36

## 2019-10-18 RX ADMIN — CEFTRIAXONE SODIUM 2 G: 2 INJECTION, POWDER, FOR SOLUTION INTRAMUSCULAR; INTRAVENOUS at 05:01

## 2019-10-18 ASSESSMENT — PATIENT HEALTH QUESTIONNAIRE - PHQ9
SUM OF ALL RESPONSES TO PHQ9 QUESTIONS 1 AND 2: 0
2. FEELING DOWN, DEPRESSED, IRRITABLE, OR HOPELESS: NOT AT ALL
1. LITTLE INTEREST OR PLEASURE IN DOING THINGS: NOT AT ALL

## 2019-10-18 NOTE — DISCHARGE INSTRUCTIONS
Discharge Instructions per Valentina Rendon M.D.    Please follow-up with PCP as outpatient.    DIET: As tolerated    ACTIVITY: As tolerated    DIAGNOSIS: Dehydration    Return to ER if symptoms recur     Discharge Instructions    Discharged to home by car with relative. Discharged via wheelchair, hospital escort: Yes.  Special equipment needed: Not Applicable    Be sure to schedule a follow-up appointment with your primary care doctor or any specialists as instructed.     Discharge Plan:   Diet Plan: Discussed  Activity Level: Discussed  Smoking Cessation Offered: Patient Refused  Confirmed Follow up Appointment: Patient to Call and Schedule Appointment  Confirmed Symptoms Management: Discussed  Medication Reconciliation Updated: Yes  Influenza Vaccine Indication: Not indicated: Previously immunized this influenza season and > 8 years of age    I understand that a diet low in cholesterol, fat, and sodium is recommended for good health. Unless I have been given specific instructions below for another diet, I accept this instruction as my diet prescription.   Other diet: Healthy    Special Instructions: None    · Is patient discharged on Warfarin / Coumadin?   No   Sepsis, Adult  Sepsis is a serious infection of your blood or tissues that affects your whole body. The infection that causes sepsis may be bacterial, viral, fungal, or parasitic. Sepsis may be life threatening. Sepsis can cause your blood pressure to drop. This may result in shock. Shock causes your central nervous system and your organs to stop working correctly.  What increases the risk?  Sepsis can happen in anyone, but it is more likely to happen in people who have weakened immune systems.  What are the signs or symptoms?  Symptoms of sepsis can include:  · Fever or low body temperature (hypothermia).  · Rapid breathing (hyperventilation).  · Chills.  · Rapid heartbeat (tachycardia).  · Confusion or light-headedness.  · Trouble breathing.  · Urinating much  less than usual.  · Cool, clammy skin or red, flushed skin.  · Other problems with the heart, kidneys, or brain.  How is this diagnosed?  Your health care provider will likely do tests to look for an infection, to see if the infection has spread to your blood, and to see how serious your condition is. Tests can include:  · Blood tests, including cultures of your blood.  · Cultures of other fluids from your body, such as:  ¨ Urine.  ¨ Pus from wounds.  ¨ Mucus coughed up from your lungs.  · Urine tests other than cultures.  · X-ray exams or other imaging tests.  How is this treated?  Treatment will begin with elimination of the source of infection. If your sepsis is likely caused by a bacterial or fungal infection, you will be given antibiotic or antifungal medicines.  You may also receive:  · Oxygen.  · Fluids through an IV tube.  · Medicines to increase your blood pressure.  · A machine to clean your blood (dialysis) if your kidneys fail.  · A machine to help you breathe if your lungs fail.  Get help right away if:  You get an infection or develop any of the signs and symptoms of sepsis after surgery or a hospitalization.  This information is not intended to replace advice given to you by your health care provider. Make sure you discuss any questions you have with your health care provider.  Document Released: 09/15/2004 Document Revised: 05/25/2017 Document Reviewed: 08/25/2014  [employer] Interactive Patient Education © 2017 [employer]      Depression / Suicide Risk    As you are discharged from this [facility] facility, it is important to learn how to keep safe from harming yourself.    Recognize the warning signs:  · Abrupt changes in personality, positive or negative- including increase in energy   · Giving away possessions  · Change in eating patterns- significant weight changes-  positive or negative  · Change in sleeping patterns- unable to sleep or sleeping all the time   · Unwillingness or inability  to communicate  · Depression  · Unusual sadness, discouragement and loneliness  · Talk of wanting to die  · Neglect of personal appearance   · Rebelliousness- reckless behavior  · Withdrawal from people/activities they love  · Confusion- inability to concentrate     If you or a loved one observes any of these behaviors or has concerns about self-harm, here's what you can do:  · Talk about it- your feelings and reasons for harming yourself  · Remove any means that you might use to hurt yourself (examples: pills, rope, extension cords, firearm)  · Get professional help from the community (Mental Health, Substance Abuse, psychological counseling)  · Do not be alone:Call your Safe Contact- someone whom you trust who will be there for you.  · Call your local CRISIS HOTLINE 120-8262 or 806-096-7613  · Call your local Children's Mobile Crisis Response Team [place] (897) 468-2643 or www.Kofax  · Call the toll free National Suicide Prevention Hotlines   · National Suicide Prevention Lifeline 865-707-NAFE (6925)  · National Hope Line Network 800-SUICIDE (122-2384)

## 2019-10-18 NOTE — DISCHARGE SUMMARY
Discharge Summary    CHIEF COMPLAINT ON ADMISSION  Chief Complaint   Patient presents with   • Vomiting     pt reports constant abdominal pain and vomiting since leaing her blood transfusion yesterday.    • Abdominal Pain     pain rated 8/10       Reason for Admission  Vomiting     Admission Date  10/16/2019    CODE STATUS  Prior    HPI & HOSPITAL COURSE  This is a 39 y.o. female here with Presented with a complaint of nausea vomiting and general fatigue.  Patient was noticed to have significantly elevated lactic acidosis and also dehydration.  Patient was treated with aggressive IV fluid rehydration and also IV Zofran for nausea vomiting.  Patient was also noticed to have signs of urinary tract infection so patient was put on IV Rocephin and patient tolerated treatment very well.  Patient symptom improved and patient sepsis resolved.  Patient currently medically stable and ready to be discharged home.    Therefore, she is discharged in fair and stable condition to home with close outpatient follow-up.    The patient met 2-midnight criteria for an inpatient stay at the time of discharge.    Discharge Date  10/18/2019    FOLLOW UP ITEMS POST DISCHARGE  none    DISCHARGE DIAGNOSES      Acute cystitis without hematuria POA: Yes    Metastatic cancer (HCC) POA: Yes    Sepsis (HCC) POA: Yes    Normocytic anemia POA: Yes    Hypokalemia POA: Yes    Hyperglycemia POA: Yes    Epithelioid hemangioendothelioma POA: Yes    Intractable vomiting with nausea POA: Yes    FOLLOW UP  Future Appointments   Date Time Provider Department Center   10/21/2019  9:30 AM [email or web address]   10/28/2019  9:45 AM [facility]     Rosita Delaney M.D.  [address] [place] [id number] [phone]    Call in 1 day        MEDICATIONS ON DISCHARGE     Medication List      CONTINUE taking these medications      Instructions   gabapentin 300 MG Caps  Commonly known as:  NEURONTIN   Take 1 Cap by  mouth 3 times a day.  Dose:  300 mg     nicotine 7 MG/24HR Pt24  Commonly known as:  NICODERM   Apply 1 Patch to skin as directed every 24 hours.  Dose:  1 Patch     ondansetron 8 MG Tbdp  Commonly known as:  ZOFRAN ODT   PLACE 1 TABLET ONTOP OF TONGUE AND LET DISSOLVE EVERY 12 HOURS     * OXYCONTIN 60 MG T12a tablet  Generic drug:  oxyCODONE CR   Take 60 mg by mouth every 12 hours.  Dose:  60 mg     * oxyCODONE 30 MG immediate release tablet  Commonly known as:  OXY-IR   Take  by mouth every 8 hours as needed.         * This list has 2 medication(s) that are the same as other medications prescribed for you. Read the directions carefully, and ask your doctor or other care provider to review them with you.                Allergies  No Known Allergies    DIET  No orders of the defined types were placed in this encounter.      ACTIVITY  As tolerated.  Weight bearing as tolerated    CONSULTATIONS  none    PROCEDURES  None    CT-ABDOMEN-PELVIS WITH   Final Result         1.  Bilateral pulmonary infiltrates, greater on the right, could represent tumoral or infectious infiltrate.   2.  Massive splenomegaly with low-density changes throughout the spleen, appearance most compatible with metastatic disease.   3.  Scattered subcentimeter low-density hepatic lesions, too small to further characterize, indeterminate.   4.  Extensive bony metastatic disease. Lytic soft tissue masses are seen in the left iliac bone and bilateral ribs.          PE:  Gen: AAOx3, NAD  Eyes: PELLA  Neck: no JVD, no lymphadenopathy  Cardia: RRR, no mrg  Lungs: CTAB, no rales, rhonci or wheezing  Abd: NABS, soft, non extended, no mass  EXT: No C/C/E, peripheral pulse 2+ b/l  Neuro: CN II-XII intact, non focal, reflex 2+ symmetrical  Skin: Intact, no lesion, warm  Psych: Appropriate.      LABORATORY  Lab Results   Component Value Date    SODIUM 139 10/17/2019    POTASSIUM 4.2 10/17/2019    CHLORIDE 104 10/17/2019    CO2 28 10/17/2019    GLUCOSE 131 (H)  10/17/2019    BUN 9 10/17/2019    CREATININE 0.63 10/17/2019        Lab Results   Component Value Date    WBC 11.2 (H) 10/17/2019    HEMOGLOBIN 8.5 (L) 10/17/2019    HEMATOCRIT 27.3 (L) 10/17/2019    PLATELETCT 146 (L) 10/17/2019        Total time of the discharge process exceeds 38 minutes.

## 2019-10-18 NOTE — PROGRESS NOTES
Patient discharged. IV removed. Patient left unit via wheelchair with relative . Personal belongings with patient when leaving unit. Patient given prescriptions and instructions of when to visit with physician. Verbalizes understanding. Copy of discharge instructions with patient and in the chart.

## 2019-10-18 NOTE — PROGRESS NOTES
Hourly rounding on patient and patient fatigued and resting all day. All need met, patient comfortable at this time.

## 2019-10-18 NOTE — PROGRESS NOTES
Bedside report received from night shift RN. Assumed care. Pt is A&Ox4. Pt is in bed. Pt denies pain at this time. Pt was updated on the plan of care for the day. All questions answered.   Pt has call light within reach and bed is in lowest position.  All fall precautions in place. Pt had no other needs at this time, hourly rounding in place.  Patient scheduled for discharge.

## 2019-10-18 NOTE — CARE PLAN
Problem: Communication  Goal: The ability to communicate needs accurately and effectively will improve  Outcome: PROGRESSING AS EXPECTED  Note:   Patient able to express needs appropriately, uses call light.     Problem: Safety  Goal: Will remain free from falls  Outcome: PROGRESSING AS EXPECTED  Note:   Patient has remained free from falling when ambulating to the restroom with walker. Fall precautions in place.

## 2019-10-21 ENCOUNTER — OUTPATIENT INFUSION SERVICES (OUTPATIENT)
Dept: ONCOLOGY | Facility: MEDICAL CENTER | Age: 39
End: 2019-10-21
Attending: INTERNAL MEDICINE
Payer: MEDICAID

## 2019-10-21 VITALS
DIASTOLIC BLOOD PRESSURE: 89 MMHG | BODY MASS INDEX: 30.79 KG/M2 | SYSTOLIC BLOOD PRESSURE: 138 MMHG | HEART RATE: 107 BPM | RESPIRATION RATE: 18 BRPM | TEMPERATURE: 97 F | WEIGHT: 180.34 LBS | OXYGEN SATURATION: 96 % | HEIGHT: 64 IN

## 2019-10-21 DIAGNOSIS — D64.9 NORMOCYTIC ANEMIA: ICD-10-CM

## 2019-10-21 LAB
ANISOCYTOSIS BLD QL SMEAR: ABNORMAL
BACTERIA BLD CULT: NORMAL
BACTERIA BLD CULT: NORMAL
BASOPHILS # BLD AUTO: 3.5 % (ref 0–1.8)
BASOPHILS # BLD: 0.36 K/UL (ref 0–0.12)
EOSINOPHIL # BLD AUTO: 0 K/UL (ref 0–0.51)
EOSINOPHIL NFR BLD: 0 % (ref 0–6.9)
ERYTHROCYTE [DISTWIDTH] IN BLOOD BY AUTOMATED COUNT: 71.9 FL (ref 35.9–50)
HCT VFR BLD AUTO: 28.8 % (ref 37–47)
HGB BLD-MCNC: 8.6 G/DL (ref 12–16)
LYMPHOCYTES # BLD AUTO: 1.61 K/UL (ref 1–4.8)
LYMPHOCYTES NFR BLD: 15.6 % (ref 22–41)
MACROCYTES BLD QL SMEAR: ABNORMAL
MANUAL DIFF BLD: NORMAL
MCH RBC QN AUTO: 28.9 PG (ref 27–33)
MCHC RBC AUTO-ENTMCNC: 29.9 G/DL (ref 33.6–35)
MCV RBC AUTO: 96.6 FL (ref 81.4–97.8)
MONOCYTES # BLD AUTO: 0.36 K/UL (ref 0–0.85)
MONOCYTES NFR BLD AUTO: 3.5 % (ref 0–13.4)
MORPHOLOGY BLD-IMP: NORMAL
MYELOCYTES NFR BLD MANUAL: 3.5 %
NEUTROPHILS # BLD AUTO: 7.61 K/UL (ref 2–7.15)
NEUTROPHILS NFR BLD: 69.6 % (ref 44–72)
NEUTS BAND NFR BLD MANUAL: 4.3 % (ref 0–10)
NRBC # BLD AUTO: 1.11 K/UL
NRBC BLD-RTO: 10.8 /100 WBC
PLATELET # BLD AUTO: 144 K/UL (ref 164–446)
PLATELET BLD QL SMEAR: NORMAL
PMV BLD AUTO: 8.9 FL (ref 9–12.9)
POLYCHROMASIA BLD QL SMEAR: NORMAL
RBC # BLD AUTO: 2.98 M/UL (ref 4.2–5.4)
RBC BLD AUTO: PRESENT
SIGNIFICANT IND 70042: NORMAL
SIGNIFICANT IND 70042: NORMAL
SITE SITE: NORMAL
SITE SITE: NORMAL
SOURCE SOURCE: NORMAL
SOURCE SOURCE: NORMAL
WBC # BLD AUTO: 10.3 K/UL (ref 4.8–10.8)

## 2019-10-21 PROCEDURE — 85027 COMPLETE CBC AUTOMATED: CPT

## 2019-10-21 PROCEDURE — 700111 HCHG RX REV CODE 636 W/ 250 OVERRIDE (IP)

## 2019-10-21 PROCEDURE — A4212 NON CORING NEEDLE OR STYLET: HCPCS

## 2019-10-21 PROCEDURE — 36591 DRAW BLOOD OFF VENOUS DEVICE: CPT

## 2019-10-21 PROCEDURE — 85007 BL SMEAR W/DIFF WBC COUNT: CPT

## 2019-10-21 RX ADMIN — HEPARIN 500 UNITS: 100 SYRINGE at 11:02

## 2019-10-21 NOTE — PROGRESS NOTES
Pt arrives to hospitals for weekly CBC and possible blood products. Discussed plan of care with patient.  Pt has EMLA cream applied to R chest port.  Port accessed in sterile fashion,  Flushed with NS and brisk blood return observed.  CBC drawn. Labs reviewed and hemoglobin is 8.6 and platelets are 144,000 today.  Pt does not meet parameters for transfusion today.  Informed patient of results.  Port flushed with NS and heparin per protocol.  Donahue needle removed intact.  Site covered with gauze.  Confirmed next weeks appt time for possible transfusion with patient.  Pt was unable to get CT scan done due to recent hospital stay.  Pt will need to get scan done and see Dr. Delaney prior to next chemotherapy per MD.  Pt is aware of this POC.  Pt dc home with family.

## 2019-10-25 ENCOUNTER — HOSPITAL ENCOUNTER (OUTPATIENT)
Dept: CARDIOLOGY | Facility: MEDICAL CENTER | Age: 39
End: 2019-10-25
Attending: INTERNAL MEDICINE
Payer: MEDICAID

## 2019-10-25 DIAGNOSIS — C40.22 MALIGNANT NEOPLASM OF LONG BONE OF LEFT LOWER EXTREMITY (HCC): ICD-10-CM

## 2019-10-25 PROCEDURE — 93306 TTE W/DOPPLER COMPLETE: CPT

## 2019-10-26 LAB
LV EJECT FRACT  99904: 55
LV EJECT FRACT MOD 2C 99903: 57.36
LV EJECT FRACT MOD 4C 99902: 51.62
LV EJECT FRACT MOD BP 99901: 52.96

## 2019-10-26 PROCEDURE — 93306 TTE W/DOPPLER COMPLETE: CPT | Mod: 26 | Performed by: INTERNAL MEDICINE

## 2019-10-28 ENCOUNTER — OUTPATIENT INFUSION SERVICES (OUTPATIENT)
Dept: ONCOLOGY | Facility: MEDICAL CENTER | Age: 39
End: 2019-10-28
Attending: INTERNAL MEDICINE
Payer: MEDICAID

## 2019-10-28 VITALS
TEMPERATURE: 97 F | BODY MASS INDEX: 28.98 KG/M2 | HEART RATE: 128 BPM | RESPIRATION RATE: 18 BRPM | DIASTOLIC BLOOD PRESSURE: 88 MMHG | WEIGHT: 169.75 LBS | SYSTOLIC BLOOD PRESSURE: 126 MMHG | OXYGEN SATURATION: 96 % | HEIGHT: 64 IN

## 2019-10-28 DIAGNOSIS — D64.9 NORMOCYTIC ANEMIA: ICD-10-CM

## 2019-10-28 LAB
ANISOCYTOSIS BLD QL SMEAR: ABNORMAL
BASOPHILS # BLD AUTO: 1.2 % (ref 0–1.8)
BASOPHILS # BLD: 0.11 K/UL (ref 0–0.12)
COMMENT 1642: NORMAL
EOSINOPHIL # BLD AUTO: 0.02 K/UL (ref 0–0.51)
EOSINOPHIL NFR BLD: 0.2 % (ref 0–6.9)
ERYTHROCYTE [DISTWIDTH] IN BLOOD BY AUTOMATED COUNT: 74.1 FL (ref 35.9–50)
HCT VFR BLD AUTO: 31.9 % (ref 37–47)
HGB BLD-MCNC: 9.6 G/DL (ref 12–16)
IMM GRANULOCYTES # BLD AUTO: 0.27 K/UL (ref 0–0.11)
IMM GRANULOCYTES NFR BLD AUTO: 2.9 % (ref 0–0.9)
LYMPHOCYTES # BLD AUTO: 2.07 K/UL (ref 1–4.8)
LYMPHOCYTES NFR BLD: 22.5 % (ref 22–41)
MACROCYTES BLD QL SMEAR: ABNORMAL
MCH RBC QN AUTO: 29.1 PG (ref 27–33)
MCHC RBC AUTO-ENTMCNC: 30.1 G/DL (ref 33.6–35)
MCV RBC AUTO: 96.7 FL (ref 81.4–97.8)
MONOCYTES # BLD AUTO: 0.84 K/UL (ref 0–0.85)
MONOCYTES NFR BLD AUTO: 9.1 % (ref 0–13.4)
MORPHOLOGY BLD-IMP: NORMAL
NEUTROPHILS # BLD AUTO: 5.89 K/UL (ref 2–7.15)
NEUTROPHILS NFR BLD: 64.1 % (ref 44–72)
NRBC # BLD AUTO: 0.53 K/UL
NRBC BLD-RTO: 5.8 /100 WBC
PLATELET # BLD AUTO: 110 K/UL (ref 164–446)
PLATELET BLD QL SMEAR: NORMAL
PMV BLD AUTO: 8.7 FL (ref 9–12.9)
POLYCHROMASIA BLD QL SMEAR: NORMAL
RBC # BLD AUTO: 3.3 M/UL (ref 4.2–5.4)
RBC BLD AUTO: PRESENT
WBC # BLD AUTO: 9.2 K/UL (ref 4.8–10.8)

## 2019-10-28 PROCEDURE — A4212 NON CORING NEEDLE OR STYLET: HCPCS

## 2019-10-28 PROCEDURE — 700111 HCHG RX REV CODE 636 W/ 250 OVERRIDE (IP)

## 2019-10-28 PROCEDURE — 36591 DRAW BLOOD OFF VENOUS DEVICE: CPT

## 2019-10-28 PROCEDURE — 85025 COMPLETE CBC W/AUTO DIFF WBC: CPT

## 2019-10-28 RX ORDER — LIDOCAINE HYDROCHLORIDE 10 MG/ML
INJECTION, SOLUTION EPIDURAL; INFILTRATION; INTRACAUDAL; PERINEURAL
Status: COMPLETED
Start: 2019-10-28 | End: 2019-10-28

## 2019-10-28 RX ADMIN — HEPARIN 500 UNITS: 100 SYRINGE at 10:26

## 2019-10-28 RX ADMIN — LIDOCAINE HYDROCHLORIDE 5 ML: 10 INJECTION, SOLUTION EPIDURAL; INFILTRATION; INTRACAUDAL at 09:54

## 2019-10-28 NOTE — PROGRESS NOTES
"Pt arrived to IS, via wheelchair, for labs/possible blood products. Pt tearful upon arrival, stating \"the cancer is eating up my leg.\" Pt states she was hospitalized and is unable to receive chemotherapy until her PET scan and follow up with MD. Emotional support provided. Port accessed in sterile manner, positive blood return noted. Labs drawn and reviewed, pt does not meet parameters for transfusion at this time. Port flushed and heparin locked per policy, port de-accessed. Pt left IS with no s/sx of distress. Follow up appointment confirmed.  "

## 2019-11-04 ENCOUNTER — OUTPATIENT INFUSION SERVICES (OUTPATIENT)
Dept: ONCOLOGY | Facility: MEDICAL CENTER | Age: 39
End: 2019-11-04
Attending: INTERNAL MEDICINE
Payer: MEDICAID

## 2019-11-04 VITALS
HEART RATE: 117 BPM | TEMPERATURE: 97.3 F | RESPIRATION RATE: 18 BRPM | SYSTOLIC BLOOD PRESSURE: 131 MMHG | WEIGHT: 179.01 LBS | DIASTOLIC BLOOD PRESSURE: 83 MMHG | BODY MASS INDEX: 30.56 KG/M2 | HEIGHT: 64 IN | OXYGEN SATURATION: 99 %

## 2019-11-04 DIAGNOSIS — D63.8 ANEMIA OF CHRONIC DISEASE: ICD-10-CM

## 2019-11-04 LAB
ANISOCYTOSIS BLD QL SMEAR: ABNORMAL
BASOPHILS # BLD AUTO: 1.3 % (ref 0–1.8)
BASOPHILS # BLD: 0.1 K/UL (ref 0–0.12)
COMMENT 1642: NORMAL
EOSINOPHIL # BLD AUTO: 0.32 K/UL (ref 0–0.51)
EOSINOPHIL NFR BLD: 4.1 % (ref 0–6.9)
ERYTHROCYTE [DISTWIDTH] IN BLOOD BY AUTOMATED COUNT: 78.4 FL (ref 35.9–50)
HCT VFR BLD AUTO: 27.7 % (ref 37–47)
HGB BLD-MCNC: 8.2 G/DL (ref 12–16)
HYPOCHROMIA BLD QL SMEAR: ABNORMAL
IMM GRANULOCYTES # BLD AUTO: 0.12 K/UL (ref 0–0.11)
IMM GRANULOCYTES NFR BLD AUTO: 1.5 % (ref 0–0.9)
LYMPHOCYTES # BLD AUTO: 1.94 K/UL (ref 1–4.8)
LYMPHOCYTES NFR BLD: 24.8 % (ref 22–41)
MACROCYTES BLD QL SMEAR: ABNORMAL
MCH RBC QN AUTO: 29.6 PG (ref 27–33)
MCHC RBC AUTO-ENTMCNC: 29.6 G/DL (ref 33.6–35)
MCV RBC AUTO: 100 FL (ref 81.4–97.8)
MICROCYTES BLD QL SMEAR: ABNORMAL
MONOCYTES # BLD AUTO: 0.56 K/UL (ref 0–0.85)
MONOCYTES NFR BLD AUTO: 7.2 % (ref 0–13.4)
MORPHOLOGY BLD-IMP: NORMAL
NEUTROPHILS # BLD AUTO: 4.79 K/UL (ref 2–7.15)
NEUTROPHILS NFR BLD: 61.1 % (ref 44–72)
NRBC # BLD AUTO: 0.23 K/UL
NRBC BLD-RTO: 2.9 /100 WBC
PLATELET # BLD AUTO: 100 K/UL (ref 164–446)
PLATELET BLD QL SMEAR: NORMAL
PMV BLD AUTO: 8.8 FL (ref 9–12.9)
POLYCHROMASIA BLD QL SMEAR: NORMAL
RBC # BLD AUTO: 2.77 M/UL (ref 4.2–5.4)
RBC BLD AUTO: PRESENT
WBC # BLD AUTO: 7.8 K/UL (ref 4.8–10.8)

## 2019-11-04 PROCEDURE — 85025 COMPLETE CBC W/AUTO DIFF WBC: CPT

## 2019-11-04 PROCEDURE — A4212 NON CORING NEEDLE OR STYLET: HCPCS

## 2019-11-04 PROCEDURE — 36591 DRAW BLOOD OFF VENOUS DEVICE: CPT

## 2019-11-04 PROCEDURE — 700111 HCHG RX REV CODE 636 W/ 250 OVERRIDE (IP)

## 2019-11-04 RX ORDER — LIDOCAINE HYDROCHLORIDE 10 MG/ML
20 INJECTION, SOLUTION INFILTRATION; PERINEURAL
Status: CANCELLED | OUTPATIENT
Start: 2019-11-04

## 2019-11-04 RX ORDER — DIPHENHYDRAMINE HCL 25 MG
25 TABLET ORAL ONCE
Status: CANCELLED | OUTPATIENT
Start: 2019-11-04

## 2019-11-04 RX ORDER — ACETAMINOPHEN 325 MG/1
650 TABLET ORAL ONCE
Status: CANCELLED | OUTPATIENT
Start: 2019-11-04

## 2019-11-04 RX ORDER — PROCHLORPERAZINE MALEATE 10 MG
10 TABLET ORAL EVERY 6 HOURS PRN
Status: CANCELLED
Start: 2019-11-04

## 2019-11-04 RX ORDER — ACETAMINOPHEN 325 MG/1
650 TABLET ORAL PRN
Status: CANCELLED | OUTPATIENT
Start: 2019-11-04

## 2019-11-04 RX ORDER — DIPHENHYDRAMINE HYDROCHLORIDE 50 MG/ML
25 INJECTION INTRAMUSCULAR; INTRAVENOUS PRN
Status: CANCELLED | OUTPATIENT
Start: 2019-11-04

## 2019-11-04 RX ORDER — LIDOCAINE HYDROCHLORIDE 10 MG/ML
INJECTION, SOLUTION EPIDURAL; INFILTRATION; INTRACAUDAL; PERINEURAL
Status: COMPLETED
Start: 2019-11-04 | End: 2019-11-04

## 2019-11-04 RX ORDER — SODIUM CHLORIDE 9 MG/ML
500 INJECTION, SOLUTION INTRAVENOUS ONCE
Status: CANCELLED | OUTPATIENT
Start: 2019-11-04

## 2019-11-04 RX ADMIN — LIDOCAINE HYDROCHLORIDE 5 ML: 10 INJECTION, SOLUTION EPIDURAL; INFILTRATION; INTRACAUDAL at 11:34

## 2019-11-04 RX ADMIN — HEPARIN 500 UNITS: 100 SYRINGE at 12:11

## 2019-11-04 NOTE — PROGRESS NOTES
Pt arrived via w/c for CBC/possible blood products. Pt states she is feeling good today. POC discussed with pt and she agrees with plan. Port accessed in sterile fashion, brisk blood return noted, labs drawn. Results reviewed, pt does not meet parameters for blood products today. Post de-accessed, brisk blood return noted, flushed with NS then heparin, needle intact. Pt given print out of up coming appts. Pt discharged to self care, NAD.

## 2019-11-05 ENCOUNTER — HOSPITAL ENCOUNTER (OUTPATIENT)
Dept: RADIOLOGY | Facility: MEDICAL CENTER | Age: 39
End: 2019-11-05
Attending: INTERNAL MEDICINE
Payer: MEDICAID

## 2019-11-05 DIAGNOSIS — C40.22 MALIGNANT NEOPLASM OF LONG BONE OF LEFT LOWER EXTREMITY (HCC): ICD-10-CM

## 2019-11-05 PROCEDURE — A9552 F18 FDG: HCPCS

## 2019-11-11 ENCOUNTER — OUTPATIENT INFUSION SERVICES (OUTPATIENT)
Dept: ONCOLOGY | Facility: MEDICAL CENTER | Age: 39
End: 2019-11-11
Attending: INTERNAL MEDICINE
Payer: MEDICAID

## 2019-11-11 VITALS
DIASTOLIC BLOOD PRESSURE: 67 MMHG | WEIGHT: 177.25 LBS | HEART RATE: 99 BPM | TEMPERATURE: 98.5 F | SYSTOLIC BLOOD PRESSURE: 108 MMHG | HEIGHT: 64 IN | RESPIRATION RATE: 16 BRPM | OXYGEN SATURATION: 96 % | BODY MASS INDEX: 30.26 KG/M2

## 2019-11-11 DIAGNOSIS — D63.8 ANEMIA OF CHRONIC DISEASE: ICD-10-CM

## 2019-11-11 LAB
ABO GROUP BLD: NORMAL
ANISOCYTOSIS BLD QL SMEAR: ABNORMAL
BARCODED ABORH UBTYP: 5100
BARCODED PRD CODE UBPRD: NORMAL
BARCODED UNIT NUM UBUNT: NORMAL
BASOPHILS # BLD AUTO: 0.7 % (ref 0–1.8)
BASOPHILS # BLD: 0.04 K/UL (ref 0–0.12)
BLD GP AB SCN SERPL QL: NORMAL
COMMENT 1642: NORMAL
COMPONENT R 8504R: NORMAL
EOSINOPHIL # BLD AUTO: 0.25 K/UL (ref 0–0.51)
EOSINOPHIL NFR BLD: 4.1 % (ref 0–6.9)
ERYTHROCYTE [DISTWIDTH] IN BLOOD BY AUTOMATED COUNT: 75.4 FL (ref 35.9–50)
HCT VFR BLD AUTO: 24.7 % (ref 37–47)
HGB BLD-MCNC: 7.4 G/DL (ref 12–16)
HYPOCHROMIA BLD QL SMEAR: ABNORMAL
IMM GRANULOCYTES # BLD AUTO: 0.08 K/UL (ref 0–0.11)
IMM GRANULOCYTES NFR BLD AUTO: 1.3 % (ref 0–0.9)
LYMPHOCYTES # BLD AUTO: 1.73 K/UL (ref 1–4.8)
LYMPHOCYTES NFR BLD: 28.4 % (ref 22–41)
MACROCYTES BLD QL SMEAR: ABNORMAL
MCH RBC QN AUTO: 30.1 PG (ref 27–33)
MCHC RBC AUTO-ENTMCNC: 30 G/DL (ref 33.6–35)
MCV RBC AUTO: 100.4 FL (ref 81.4–97.8)
MONOCYTES # BLD AUTO: 0.48 K/UL (ref 0–0.85)
MONOCYTES NFR BLD AUTO: 7.9 % (ref 0–13.4)
MORPHOLOGY BLD-IMP: NORMAL
NEUTROPHILS # BLD AUTO: 3.52 K/UL (ref 2–7.15)
NEUTROPHILS NFR BLD: 57.6 % (ref 44–72)
NRBC # BLD AUTO: 0.1 K/UL
NRBC BLD-RTO: 1.6 /100 WBC
PLATELET # BLD AUTO: 77 K/UL (ref 164–446)
PLATELET BLD QL SMEAR: NORMAL
PMV BLD AUTO: 9.2 FL (ref 9–12.9)
POLYCHROMASIA BLD QL SMEAR: NORMAL
PRODUCT TYPE UPROD: NORMAL
RBC # BLD AUTO: 2.46 M/UL (ref 4.2–5.4)
RBC BLD AUTO: PRESENT
RH BLD: NORMAL
UNIT STATUS USTAT: NORMAL
WBC # BLD AUTO: 6.1 K/UL (ref 4.8–10.8)

## 2019-11-11 PROCEDURE — 700102 HCHG RX REV CODE 250 W/ 637 OVERRIDE(OP): Performed by: INTERNAL MEDICINE

## 2019-11-11 PROCEDURE — 85025 COMPLETE CBC W/AUTO DIFF WBC: CPT

## 2019-11-11 PROCEDURE — 306780 HCHG STAT FOR TRANSFUSION PER CASE

## 2019-11-11 PROCEDURE — 86945 BLOOD PRODUCT/IRRADIATION: CPT

## 2019-11-11 PROCEDURE — A4212 NON CORING NEEDLE OR STYLET: HCPCS

## 2019-11-11 PROCEDURE — P9016 RBC LEUKOCYTES REDUCED: HCPCS

## 2019-11-11 PROCEDURE — 36430 TRANSFUSION BLD/BLD COMPNT: CPT

## 2019-11-11 PROCEDURE — 86850 RBC ANTIBODY SCREEN: CPT

## 2019-11-11 PROCEDURE — A9270 NON-COVERED ITEM OR SERVICE: HCPCS | Performed by: INTERNAL MEDICINE

## 2019-11-11 PROCEDURE — 36591 DRAW BLOOD OFF VENOUS DEVICE: CPT

## 2019-11-11 PROCEDURE — 86923 COMPATIBILITY TEST ELECTRIC: CPT

## 2019-11-11 PROCEDURE — 86900 BLOOD TYPING SEROLOGIC ABO: CPT

## 2019-11-11 PROCEDURE — 700111 HCHG RX REV CODE 636 W/ 250 OVERRIDE (IP)

## 2019-11-11 PROCEDURE — 86901 BLOOD TYPING SEROLOGIC RH(D): CPT

## 2019-11-11 RX ORDER — LIDOCAINE HYDROCHLORIDE 10 MG/ML
20 INJECTION, SOLUTION INFILTRATION; PERINEURAL
Status: CANCELLED | OUTPATIENT
Start: 2019-11-11

## 2019-11-11 RX ORDER — DIPHENHYDRAMINE HCL 25 MG
25 TABLET ORAL ONCE
Status: COMPLETED | OUTPATIENT
Start: 2019-11-11 | End: 2019-11-11

## 2019-11-11 RX ORDER — DIPHENHYDRAMINE HCL 25 MG
25 TABLET ORAL ONCE
Status: CANCELLED | OUTPATIENT
Start: 2019-11-11

## 2019-11-11 RX ORDER — ACETAMINOPHEN 325 MG/1
650 TABLET ORAL ONCE
Status: COMPLETED | OUTPATIENT
Start: 2019-11-11 | End: 2019-11-11

## 2019-11-11 RX ORDER — PROCHLORPERAZINE MALEATE 10 MG
10 TABLET ORAL EVERY 8 HOURS PRN
Refills: 2 | COMMUNITY
End: 2020-03-08

## 2019-11-11 RX ORDER — ACETAMINOPHEN 325 MG/1
650 TABLET ORAL ONCE
Status: CANCELLED | OUTPATIENT
Start: 2019-11-11

## 2019-11-11 RX ORDER — SODIUM CHLORIDE 9 MG/ML
500 INJECTION, SOLUTION INTRAVENOUS ONCE
Status: CANCELLED | OUTPATIENT
Start: 2019-11-11

## 2019-11-11 RX ORDER — ERGOCALCIFEROL 1.25 MG/1
CAPSULE ORAL
COMMUNITY
Start: 2019-11-05 | End: 2019-11-25

## 2019-11-11 RX ORDER — PROCHLORPERAZINE MALEATE 10 MG
10 TABLET ORAL EVERY 6 HOURS PRN
Status: CANCELLED
Start: 2019-11-11

## 2019-11-11 RX ORDER — DIPHENHYDRAMINE HYDROCHLORIDE 50 MG/ML
25 INJECTION INTRAMUSCULAR; INTRAVENOUS PRN
Status: CANCELLED | OUTPATIENT
Start: 2019-11-11

## 2019-11-11 RX ORDER — ACETAMINOPHEN 325 MG/1
650 TABLET ORAL PRN
Status: CANCELLED | OUTPATIENT
Start: 2019-11-11

## 2019-11-11 RX ADMIN — ACETAMINOPHEN 650 MG: 325 TABLET ORAL at 12:00

## 2019-11-11 RX ADMIN — Medication 500 UNITS: at 14:35

## 2019-11-11 RX ADMIN — DIPHENHYDRAMINE HCL 25 MG: 25 TABLET ORAL at 12:00

## 2019-11-11 NOTE — PROGRESS NOTES
Pt in wc to [facility] for CBC w/ poss blood transfusion.  Pt w/ no s/s of infection, pt has no comaplints at this time.  Pt PORT accessed using sterile technique, brisk blood return noted, labs drawn per orders, flushed per protocol.  Pt Hgb of 7.4 meets parameters for 1 unit PRBCs today.  Pre-meds administered to pt and VS taken prior to transfusion.  Pt transfused 1 unit irr PRBC per order w/ no adverse reactions.  Pt PORT flushed and heparinized per protocol, de-accessed, gauze and tape bandage applied.  Pt left in wc in care of self in NAD.  Confirmed pt's next appt.

## 2019-11-18 ENCOUNTER — OUTPATIENT INFUSION SERVICES (OUTPATIENT)
Dept: ONCOLOGY | Facility: MEDICAL CENTER | Age: 39
End: 2019-11-18
Attending: INTERNAL MEDICINE
Payer: MEDICAID

## 2019-11-18 VITALS
WEIGHT: 170.64 LBS | OXYGEN SATURATION: 98 % | TEMPERATURE: 96.9 F | HEIGHT: 64 IN | HEART RATE: 108 BPM | SYSTOLIC BLOOD PRESSURE: 156 MMHG | RESPIRATION RATE: 18 BRPM | DIASTOLIC BLOOD PRESSURE: 98 MMHG | BODY MASS INDEX: 29.13 KG/M2

## 2019-11-18 DIAGNOSIS — D63.8 ANEMIA OF CHRONIC DISEASE: ICD-10-CM

## 2019-11-18 LAB
ABO GROUP BLD: NORMAL
ANISOCYTOSIS BLD QL SMEAR: ABNORMAL
BARCODED ABORH UBTYP: 9500
BARCODED PRD CODE UBPRD: NORMAL
BARCODED UNIT NUM UBUNT: NORMAL
BASOPHILS # BLD AUTO: 2.6 % (ref 0–1.8)
BASOPHILS # BLD: 0.17 K/UL (ref 0–0.12)
BLD GP AB SCN SERPL QL: NORMAL
COMPONENT R 8504R: NORMAL
EOSINOPHIL # BLD AUTO: 0.11 K/UL (ref 0–0.51)
EOSINOPHIL NFR BLD: 1.7 % (ref 0–6.9)
ERYTHROCYTE [DISTWIDTH] IN BLOOD BY AUTOMATED COUNT: 77.9 FL (ref 35.9–50)
GIANT PLATELETS BLD QL SMEAR: NORMAL
HCT VFR BLD AUTO: 26.5 % (ref 37–47)
HGB BLD-MCNC: 7.9 G/DL (ref 12–16)
HYPOCHROMIA BLD QL SMEAR: ABNORMAL
LYMPHOCYTES # BLD AUTO: 1.57 K/UL (ref 1–4.8)
LYMPHOCYTES NFR BLD: 23.5 % (ref 22–41)
MACROCYTES BLD QL SMEAR: ABNORMAL
MANUAL DIFF BLD: NORMAL
MCH RBC QN AUTO: 29.4 PG (ref 27–33)
MCHC RBC AUTO-ENTMCNC: 29.8 G/DL (ref 33.6–35)
MCV RBC AUTO: 98.5 FL (ref 81.4–97.8)
METAMYELOCYTES NFR BLD MANUAL: 0.9 %
MICROCYTES BLD QL SMEAR: ABNORMAL
MONOCYTES # BLD AUTO: 0 K/UL (ref 0–0.85)
MONOCYTES NFR BLD AUTO: 0 % (ref 0–13.4)
MORPHOLOGY BLD-IMP: NORMAL
MYELOCYTES NFR BLD MANUAL: 0.9 %
NEUTROPHILS # BLD AUTO: 4.72 K/UL (ref 2–7.15)
NEUTROPHILS NFR BLD: 69.5 % (ref 44–72)
NEUTS BAND NFR BLD MANUAL: 0.9 % (ref 0–10)
NRBC # BLD AUTO: 0.13 K/UL
NRBC BLD-RTO: 1.9 /100 WBC
OVALOCYTES BLD QL SMEAR: NORMAL
PLATELET # BLD AUTO: 108 K/UL (ref 164–446)
PLATELET BLD QL SMEAR: NORMAL
PMV BLD AUTO: 9.2 FL (ref 9–12.9)
POIKILOCYTOSIS BLD QL SMEAR: NORMAL
POLYCHROMASIA BLD QL SMEAR: NORMAL
PRODUCT TYPE UPROD: NORMAL
RBC # BLD AUTO: 2.69 M/UL (ref 4.2–5.4)
RBC BLD AUTO: PRESENT
RH BLD: NORMAL
SMUDGE CELLS BLD QL SMEAR: NORMAL
UNIT STATUS USTAT: NORMAL
WBC # BLD AUTO: 6.7 K/UL (ref 4.8–10.8)

## 2019-11-18 PROCEDURE — 86945 BLOOD PRODUCT/IRRADIATION: CPT

## 2019-11-18 PROCEDURE — 96375 TX/PRO/DX INJ NEW DRUG ADDON: CPT

## 2019-11-18 PROCEDURE — 700111 HCHG RX REV CODE 636 W/ 250 OVERRIDE (IP)

## 2019-11-18 PROCEDURE — 86901 BLOOD TYPING SEROLOGIC RH(D): CPT

## 2019-11-18 PROCEDURE — 85027 COMPLETE CBC AUTOMATED: CPT

## 2019-11-18 PROCEDURE — 306780 HCHG STAT FOR TRANSFUSION PER CASE

## 2019-11-18 PROCEDURE — 96374 THER/PROPH/DIAG INJ IV PUSH: CPT

## 2019-11-18 PROCEDURE — 700111 HCHG RX REV CODE 636 W/ 250 OVERRIDE (IP): Performed by: INTERNAL MEDICINE

## 2019-11-18 PROCEDURE — 36591 DRAW BLOOD OFF VENOUS DEVICE: CPT

## 2019-11-18 PROCEDURE — 86850 RBC ANTIBODY SCREEN: CPT

## 2019-11-18 PROCEDURE — 85007 BL SMEAR W/DIFF WBC COUNT: CPT

## 2019-11-18 PROCEDURE — 86923 COMPATIBILITY TEST ELECTRIC: CPT

## 2019-11-18 PROCEDURE — P9016 RBC LEUKOCYTES REDUCED: HCPCS

## 2019-11-18 PROCEDURE — 700102 HCHG RX REV CODE 250 W/ 637 OVERRIDE(OP): Performed by: INTERNAL MEDICINE

## 2019-11-18 PROCEDURE — A4212 NON CORING NEEDLE OR STYLET: HCPCS

## 2019-11-18 PROCEDURE — A9270 NON-COVERED ITEM OR SERVICE: HCPCS | Performed by: INTERNAL MEDICINE

## 2019-11-18 PROCEDURE — 86900 BLOOD TYPING SEROLOGIC ABO: CPT

## 2019-11-18 PROCEDURE — 36430 TRANSFUSION BLD/BLD COMPNT: CPT

## 2019-11-18 RX ORDER — PROCHLORPERAZINE MALEATE 10 MG
10 TABLET ORAL EVERY 6 HOURS PRN
Status: CANCELLED
Start: 2019-11-18

## 2019-11-18 RX ORDER — ACETAMINOPHEN 325 MG/1
650 TABLET ORAL PRN
Status: CANCELLED | OUTPATIENT
Start: 2019-11-18

## 2019-11-18 RX ORDER — LIDOCAINE HYDROCHLORIDE 10 MG/ML
20 INJECTION, SOLUTION INFILTRATION; PERINEURAL
Status: CANCELLED | OUTPATIENT
Start: 2019-11-18

## 2019-11-18 RX ORDER — SODIUM CHLORIDE 9 MG/ML
500 INJECTION, SOLUTION INTRAVENOUS ONCE
Status: CANCELLED | OUTPATIENT
Start: 2019-11-18

## 2019-11-18 RX ORDER — ACETAMINOPHEN 325 MG/1
650 TABLET ORAL ONCE
Status: COMPLETED | OUTPATIENT
Start: 2019-11-18 | End: 2019-11-18

## 2019-11-18 RX ORDER — DIPHENHYDRAMINE HYDROCHLORIDE 50 MG/ML
25 INJECTION INTRAMUSCULAR; INTRAVENOUS PRN
Status: CANCELLED | OUTPATIENT
Start: 2019-11-18

## 2019-11-18 RX ORDER — PROCHLORPERAZINE MALEATE 10 MG
10 TABLET ORAL EVERY 6 HOURS PRN
Status: DISCONTINUED | OUTPATIENT
Start: 2019-11-18 | End: 2019-11-18 | Stop reason: HOSPADM

## 2019-11-18 RX ORDER — DIPHENHYDRAMINE HCL 25 MG
25 TABLET ORAL ONCE
Status: CANCELLED | OUTPATIENT
Start: 2019-11-18

## 2019-11-18 RX ORDER — ONDANSETRON 2 MG/ML
4 INJECTION INTRAMUSCULAR; INTRAVENOUS ONCE
Status: COMPLETED | OUTPATIENT
Start: 2019-11-18 | End: 2019-11-18

## 2019-11-18 RX ORDER — ACETAMINOPHEN 325 MG/1
650 TABLET ORAL ONCE
Status: CANCELLED | OUTPATIENT
Start: 2019-11-18

## 2019-11-18 RX ORDER — DIPHENHYDRAMINE HCL 25 MG
25 TABLET ORAL ONCE
Status: COMPLETED | OUTPATIENT
Start: 2019-11-18 | End: 2019-11-18

## 2019-11-18 RX ORDER — LIDOCAINE HYDROCHLORIDE 10 MG/ML
INJECTION, SOLUTION EPIDURAL; INFILTRATION; INTRACAUDAL; PERINEURAL
Status: COMPLETED
Start: 2019-11-18 | End: 2019-11-18

## 2019-11-18 RX ADMIN — PROCHLORPERAZINE MALEATE 10 MG: 10 TABLET ORAL at 14:27

## 2019-11-18 RX ADMIN — HEPARIN 500 UNITS: 100 SYRINGE at 17:06

## 2019-11-18 RX ADMIN — DIPHENHYDRAMINE HCL 25 MG: 25 TABLET ORAL at 13:11

## 2019-11-18 RX ADMIN — ACETAMINOPHEN 650 MG: 325 TABLET ORAL at 13:11

## 2019-11-18 RX ADMIN — LIDOCAINE HYDROCHLORIDE 2 ML: 10 INJECTION, SOLUTION EPIDURAL; INFILTRATION; INTRACAUDAL; PERINEURAL at 11:53

## 2019-11-18 RX ADMIN — HYDROCORTISONE SODIUM SUCCINATE 100 MG: 100 INJECTION, POWDER, FOR SOLUTION INTRAMUSCULAR; INTRAVENOUS at 15:47

## 2019-11-18 RX ADMIN — Medication 2 ML: at 11:53

## 2019-11-18 RX ADMIN — ONDANSETRON 4 MG: 2 INJECTION INTRAMUSCULAR; INTRAVENOUS at 15:57

## 2019-11-18 ASSESSMENT — PAIN SCALES - GENERAL: PAINLEVEL: 4=SLIGHT-MODERATE PAIN

## 2019-11-19 NOTE — PROGRESS NOTES
"Patient here for CBC/possible blood products. Port accessed using sterile technique; labs drawn as ordered. Hgb = 7.9. Patient meets parameters for 1 unit of PRBCs. Pre-medications given per MAR. 1 unit of PRBCs started at 1405. At 1421, patient complaining of a \"cold sweat\" and nausea. Patient then vomited. Blood transfusion stopped. Dr. Delaney notified. Post-transfusion specimen obtained. Blood tubing sent to Blood bank. Patient vomited 2 more times. Dr. Delaney notified. Orders received for Zofran. Zofran given per MAR. Vomited again after Zofran administration. Stressed importance of going to ER for further evaluation of nausea/vomiting. Patient declined going to ER. Stated that she didn't want to miss her daughter's birthday in a few days. Reinforced education about going to ER if nausea/vomting continues. Reinforced education about the importance of hydration. Patient verbalized understanding. Heparin instilled prior to de-accessed port. Port de-accessed; gauze/tape applied to site. Patient to return tomorrow for 1 unit of PRBCs. Patient aware of her appointment tomorrow at 1100. Discharged to self care via wheelchair. Vital signs stable upon discharging patient.   "

## 2019-11-20 LAB
ABO GROUP BLD: NORMAL
DAT C3D-SP REAG RBC QL: NORMAL
DAT IGG-SP REAG RBC QL: NORMAL
PATHOLOGIST INTERPRETATION PTRX: NORMAL
RH BLD: NORMAL
STAT TRANS INVEST 8506STI: NORMAL

## 2019-11-20 PROCEDURE — 86901 BLOOD TYPING SEROLOGIC RH(D): CPT

## 2019-11-20 PROCEDURE — 86880 COOMBS TEST DIRECT: CPT

## 2019-11-25 ENCOUNTER — OUTPATIENT INFUSION SERVICES (OUTPATIENT)
Dept: ONCOLOGY | Facility: MEDICAL CENTER | Age: 39
End: 2019-11-25
Attending: INTERNAL MEDICINE
Payer: MEDICAID

## 2019-11-25 VITALS
WEIGHT: 171.96 LBS | HEIGHT: 63 IN | OXYGEN SATURATION: 97 % | BODY MASS INDEX: 30.47 KG/M2 | HEART RATE: 107 BPM | SYSTOLIC BLOOD PRESSURE: 105 MMHG | RESPIRATION RATE: 18 BRPM | TEMPERATURE: 99 F | DIASTOLIC BLOOD PRESSURE: 72 MMHG

## 2019-11-25 DIAGNOSIS — D63.8 ANEMIA OF CHRONIC DISEASE: ICD-10-CM

## 2019-11-25 LAB
ABO GROUP BLD: NORMAL
ANISOCYTOSIS BLD QL SMEAR: ABNORMAL
BARCODED ABORH UBTYP: 9500
BARCODED PRD CODE UBPRD: NORMAL
BARCODED UNIT NUM UBUNT: NORMAL
BASOPHILS # BLD AUTO: 0.5 % (ref 0–1.8)
BASOPHILS # BLD: 0.04 K/UL (ref 0–0.12)
BLD GP AB SCN SERPL QL: NORMAL
COMMENT 1642: NORMAL
COMPONENT R 8504R: NORMAL
EOSINOPHIL # BLD AUTO: 0.15 K/UL (ref 0–0.51)
EOSINOPHIL NFR BLD: 2 % (ref 0–6.9)
ERYTHROCYTE [DISTWIDTH] IN BLOOD BY AUTOMATED COUNT: 78.8 FL (ref 35.9–50)
HCT VFR BLD AUTO: 24.4 % (ref 37–47)
HGB BLD-MCNC: 7.3 G/DL (ref 12–16)
HYPOCHROMIA BLD QL SMEAR: ABNORMAL
IMM GRANULOCYTES # BLD AUTO: 0.27 K/UL (ref 0–0.11)
IMM GRANULOCYTES NFR BLD AUTO: 3.6 % (ref 0–0.9)
LYMPHOCYTES # BLD AUTO: 2.43 K/UL (ref 1–4.8)
LYMPHOCYTES NFR BLD: 32.5 % (ref 22–41)
MACROCYTES BLD QL SMEAR: ABNORMAL
MCH RBC QN AUTO: 30.3 PG (ref 27–33)
MCHC RBC AUTO-ENTMCNC: 29.9 G/DL (ref 33.6–35)
MCV RBC AUTO: 101.2 FL (ref 81.4–97.8)
MICROCYTES BLD QL SMEAR: ABNORMAL
MONOCYTES # BLD AUTO: 0.54 K/UL (ref 0–0.85)
MONOCYTES NFR BLD AUTO: 7.2 % (ref 0–13.4)
MORPHOLOGY BLD-IMP: NORMAL
NEUTROPHILS # BLD AUTO: 4.05 K/UL (ref 2–7.15)
NEUTROPHILS NFR BLD: 54.2 % (ref 44–72)
NRBC # BLD AUTO: 0.42 K/UL
NRBC BLD-RTO: 5.6 /100 WBC
OVALOCYTES BLD QL SMEAR: NORMAL
PLATELET # BLD AUTO: 89 K/UL (ref 164–446)
PLATELET BLD QL SMEAR: NORMAL
PMV BLD AUTO: 9 FL (ref 9–12.9)
POIKILOCYTOSIS BLD QL SMEAR: NORMAL
POLYCHROMASIA BLD QL SMEAR: NORMAL
PRODUCT TYPE UPROD: NORMAL
RBC # BLD AUTO: 2.41 M/UL (ref 4.2–5.4)
RBC BLD AUTO: PRESENT
RH BLD: NORMAL
UNIT STATUS USTAT: NORMAL
WBC # BLD AUTO: 7.5 K/UL (ref 4.8–10.8)

## 2019-11-25 PROCEDURE — 700111 HCHG RX REV CODE 636 W/ 250 OVERRIDE (IP)

## 2019-11-25 PROCEDURE — 86900 BLOOD TYPING SEROLOGIC ABO: CPT

## 2019-11-25 PROCEDURE — A4212 NON CORING NEEDLE OR STYLET: HCPCS

## 2019-11-25 PROCEDURE — 36591 DRAW BLOOD OFF VENOUS DEVICE: CPT

## 2019-11-25 PROCEDURE — 85025 COMPLETE CBC W/AUTO DIFF WBC: CPT

## 2019-11-25 PROCEDURE — 700102 HCHG RX REV CODE 250 W/ 637 OVERRIDE(OP): Performed by: INTERNAL MEDICINE

## 2019-11-25 PROCEDURE — P9016 RBC LEUKOCYTES REDUCED: HCPCS

## 2019-11-25 PROCEDURE — 36430 TRANSFUSION BLD/BLD COMPNT: CPT

## 2019-11-25 PROCEDURE — 86923 COMPATIBILITY TEST ELECTRIC: CPT

## 2019-11-25 PROCEDURE — 86945 BLOOD PRODUCT/IRRADIATION: CPT

## 2019-11-25 PROCEDURE — 306780 HCHG STAT FOR TRANSFUSION PER CASE

## 2019-11-25 PROCEDURE — 86901 BLOOD TYPING SEROLOGIC RH(D): CPT

## 2019-11-25 PROCEDURE — 86850 RBC ANTIBODY SCREEN: CPT

## 2019-11-25 PROCEDURE — A9270 NON-COVERED ITEM OR SERVICE: HCPCS | Performed by: INTERNAL MEDICINE

## 2019-11-25 RX ORDER — SODIUM CHLORIDE 9 MG/ML
500 INJECTION, SOLUTION INTRAVENOUS ONCE
Status: CANCELLED | OUTPATIENT
Start: 2019-11-25

## 2019-11-25 RX ORDER — PROCHLORPERAZINE MALEATE 10 MG
10 TABLET ORAL EVERY 6 HOURS PRN
Status: CANCELLED
Start: 2019-11-25

## 2019-11-25 RX ORDER — ACETAMINOPHEN 325 MG/1
650 TABLET ORAL PRN
Status: CANCELLED | OUTPATIENT
Start: 2019-11-25

## 2019-11-25 RX ORDER — DIPHENHYDRAMINE HCL 25 MG
25 TABLET ORAL ONCE
Status: CANCELLED | OUTPATIENT
Start: 2019-11-25

## 2019-11-25 RX ORDER — DIPHENHYDRAMINE HCL 25 MG
25 TABLET ORAL ONCE
Status: COMPLETED | OUTPATIENT
Start: 2019-11-25 | End: 2019-11-25

## 2019-11-25 RX ORDER — LIDOCAINE HYDROCHLORIDE 10 MG/ML
20 INJECTION, SOLUTION INFILTRATION; PERINEURAL
Status: CANCELLED | OUTPATIENT
Start: 2019-11-25

## 2019-11-25 RX ORDER — DIPHENHYDRAMINE HYDROCHLORIDE 50 MG/ML
25 INJECTION INTRAMUSCULAR; INTRAVENOUS PRN
Status: CANCELLED | OUTPATIENT
Start: 2019-11-25

## 2019-11-25 RX ORDER — ACETAMINOPHEN 325 MG/1
650 TABLET ORAL ONCE
Status: CANCELLED | OUTPATIENT
Start: 2019-11-25

## 2019-11-25 RX ORDER — ACETAMINOPHEN 325 MG/1
650 TABLET ORAL ONCE
Status: COMPLETED | OUTPATIENT
Start: 2019-11-25 | End: 2019-11-25

## 2019-11-25 RX ADMIN — HEPARIN 500 UNITS: 100 SYRINGE at 15:41

## 2019-11-25 RX ADMIN — DIPHENHYDRAMINE HCL 25 MG: 25 TABLET ORAL at 13:08

## 2019-11-25 RX ADMIN — ACETAMINOPHEN 650 MG: 325 TABLET ORAL at 13:08

## 2019-11-25 ASSESSMENT — PAIN SCALES - GENERAL: PAINLEVEL: NO PAIN

## 2019-11-26 NOTE — PROGRESS NOTES
Amanda arrives for CBC/possible blood transfusion today. Port accessed using sterile technique; labs drawn as ordered. CBC done shows Hgb 7.3, Hct 24.4. Patient meets parameters for 1 unit of PRBCs.  Blood transfusion consents previously signed. Pre-medications given per MAR. 1 units PRBCs transfused without incident. Amanda tolerated well. No signs or symptoms of adverse reaction observed or expressed. Heparin instilled prior to de-accessing port. Port de-accessed; gauze/tape applied to site. Next appointment scheduled. Discharged to self care; no apparent distress noted.

## 2019-12-02 ENCOUNTER — APPOINTMENT (OUTPATIENT)
Dept: RADIOLOGY | Facility: MEDICAL CENTER | Age: 39
End: 2019-12-02
Attending: EMERGENCY MEDICINE
Payer: MEDICAID

## 2019-12-02 ENCOUNTER — HOSPITAL ENCOUNTER (OUTPATIENT)
Facility: MEDICAL CENTER | Age: 39
End: 2019-12-06
Attending: EMERGENCY MEDICINE | Admitting: INTERNAL MEDICINE
Payer: MEDICAID

## 2019-12-02 DIAGNOSIS — R00.0 TACHYCARDIA: ICD-10-CM

## 2019-12-02 DIAGNOSIS — E86.0 DEHYDRATION: ICD-10-CM

## 2019-12-02 DIAGNOSIS — R11.2 NON-INTRACTABLE VOMITING WITH NAUSEA, UNSPECIFIED VOMITING TYPE: ICD-10-CM

## 2019-12-02 PROBLEM — E87.29 HIGH ANION GAP METABOLIC ACIDOSIS: Status: ACTIVE | Noted: 2019-12-02

## 2019-12-02 LAB
ALBUMIN SERPL BCP-MCNC: 4.4 G/DL (ref 3.2–4.9)
ALBUMIN/GLOB SERPL: 1 G/DL
ALP SERPL-CCNC: 114 U/L (ref 30–99)
ALT SERPL-CCNC: 6 U/L (ref 2–50)
ANION GAP SERPL CALC-SCNC: 14 MMOL/L (ref 0–11.9)
AST SERPL-CCNC: 10 U/L (ref 12–45)
BASOPHILS # BLD AUTO: 0.4 % (ref 0–1.8)
BASOPHILS # BLD: 0.03 K/UL (ref 0–0.12)
BILIRUB SERPL-MCNC: 1.3 MG/DL (ref 0.1–1.5)
BUN SERPL-MCNC: 12 MG/DL (ref 8–22)
CA-I SERPL-SCNC: 1.1 MMOL/L (ref 1.1–1.3)
CALCIUM SERPL-MCNC: 10 MG/DL (ref 8.5–10.5)
CHLORIDE SERPL-SCNC: 98 MMOL/L (ref 96–112)
CO2 SERPL-SCNC: 29 MMOL/L (ref 20–33)
CREAT SERPL-MCNC: 0.55 MG/DL (ref 0.5–1.4)
EKG IMPRESSION: NORMAL
EOSINOPHIL # BLD AUTO: 0 K/UL (ref 0–0.51)
EOSINOPHIL NFR BLD: 0 % (ref 0–6.9)
ERYTHROCYTE [DISTWIDTH] IN BLOOD BY AUTOMATED COUNT: 75.7 FL (ref 35.9–50)
GLOBULIN SER CALC-MCNC: 4.3 G/DL (ref 1.9–3.5)
GLUCOSE SERPL-MCNC: 158 MG/DL (ref 65–99)
HCT VFR BLD AUTO: 30.1 % (ref 37–47)
HGB BLD-MCNC: 9.2 G/DL (ref 12–16)
IMM GRANULOCYTES # BLD AUTO: 0.09 K/UL (ref 0–0.11)
IMM GRANULOCYTES NFR BLD AUTO: 1.1 % (ref 0–0.9)
LIPASE SERPL-CCNC: 11 U/L (ref 11–82)
LYMPHOCYTES # BLD AUTO: 1.26 K/UL (ref 1–4.8)
LYMPHOCYTES NFR BLD: 15 % (ref 22–41)
MAGNESIUM SERPL-MCNC: 1.8 MG/DL (ref 1.5–2.5)
MCH RBC QN AUTO: 30.5 PG (ref 27–33)
MCHC RBC AUTO-ENTMCNC: 30.6 G/DL (ref 33.6–35)
MCV RBC AUTO: 99.7 FL (ref 81.4–97.8)
MONOCYTES # BLD AUTO: 0.33 K/UL (ref 0–0.85)
MONOCYTES NFR BLD AUTO: 3.9 % (ref 0–13.4)
NEUTROPHILS # BLD AUTO: 6.67 K/UL (ref 2–7.15)
NEUTROPHILS NFR BLD: 79.6 % (ref 44–72)
NRBC # BLD AUTO: 0.2 K/UL
NRBC BLD-RTO: 2.4 /100 WBC
PHOSPHATE SERPL-MCNC: 4.1 MG/DL (ref 2.5–4.5)
PLATELET # BLD AUTO: 112 K/UL (ref 164–446)
PMV BLD AUTO: 9.8 FL (ref 9–12.9)
POTASSIUM SERPL-SCNC: 3.2 MMOL/L (ref 3.6–5.5)
PROT SERPL-MCNC: 8.7 G/DL (ref 6–8.2)
RBC # BLD AUTO: 3.02 M/UL (ref 4.2–5.4)
SODIUM SERPL-SCNC: 141 MMOL/L (ref 135–145)
WBC # BLD AUTO: 8.4 K/UL (ref 4.8–10.8)

## 2019-12-02 PROCEDURE — 99285 EMERGENCY DEPT VISIT HI MDM: CPT

## 2019-12-02 PROCEDURE — 85025 COMPLETE CBC W/AUTO DIFF WBC: CPT

## 2019-12-02 PROCEDURE — 84100 ASSAY OF PHOSPHORUS: CPT

## 2019-12-02 PROCEDURE — 83735 ASSAY OF MAGNESIUM: CPT

## 2019-12-02 PROCEDURE — 94760 N-INVAS EAR/PLS OXIMETRY 1: CPT

## 2019-12-02 PROCEDURE — 700105 HCHG RX REV CODE 258: Performed by: EMERGENCY MEDICINE

## 2019-12-02 PROCEDURE — 99220 PR INITIAL OBSERVATION CARE,LEVL III: CPT | Performed by: INTERNAL MEDICINE

## 2019-12-02 PROCEDURE — 82330 ASSAY OF CALCIUM: CPT

## 2019-12-02 PROCEDURE — 700102 HCHG RX REV CODE 250 W/ 637 OVERRIDE(OP): Performed by: FAMILY MEDICINE

## 2019-12-02 PROCEDURE — G0378 HOSPITAL OBSERVATION PER HR: HCPCS

## 2019-12-02 PROCEDURE — 96376 TX/PRO/DX INJ SAME DRUG ADON: CPT

## 2019-12-02 PROCEDURE — A9270 NON-COVERED ITEM OR SERVICE: HCPCS | Performed by: INTERNAL MEDICINE

## 2019-12-02 PROCEDURE — 93005 ELECTROCARDIOGRAM TRACING: CPT | Performed by: EMERGENCY MEDICINE

## 2019-12-02 PROCEDURE — 700111 HCHG RX REV CODE 636 W/ 250 OVERRIDE (IP): Performed by: EMERGENCY MEDICINE

## 2019-12-02 PROCEDURE — 80053 COMPREHEN METABOLIC PANEL: CPT

## 2019-12-02 PROCEDURE — 700111 HCHG RX REV CODE 636 W/ 250 OVERRIDE (IP): Performed by: INTERNAL MEDICINE

## 2019-12-02 PROCEDURE — 700102 HCHG RX REV CODE 250 W/ 637 OVERRIDE(OP): Performed by: EMERGENCY MEDICINE

## 2019-12-02 PROCEDURE — 96375 TX/PRO/DX INJ NEW DRUG ADDON: CPT

## 2019-12-02 PROCEDURE — 96372 THER/PROPH/DIAG INJ SC/IM: CPT

## 2019-12-02 PROCEDURE — 96374 THER/PROPH/DIAG INJ IV PUSH: CPT

## 2019-12-02 PROCEDURE — 700111 HCHG RX REV CODE 636 W/ 250 OVERRIDE (IP): Performed by: FAMILY MEDICINE

## 2019-12-02 PROCEDURE — 700105 HCHG RX REV CODE 258: Performed by: FAMILY MEDICINE

## 2019-12-02 PROCEDURE — A9270 NON-COVERED ITEM OR SERVICE: HCPCS | Performed by: EMERGENCY MEDICINE

## 2019-12-02 PROCEDURE — 74022 RADEX COMPL AQT ABD SERIES: CPT

## 2019-12-02 PROCEDURE — 83690 ASSAY OF LIPASE: CPT

## 2019-12-02 PROCEDURE — A9270 NON-COVERED ITEM OR SERVICE: HCPCS | Performed by: FAMILY MEDICINE

## 2019-12-02 PROCEDURE — 700102 HCHG RX REV CODE 250 W/ 637 OVERRIDE(OP): Performed by: INTERNAL MEDICINE

## 2019-12-02 RX ORDER — ONDANSETRON 4 MG/1
4 TABLET, ORALLY DISINTEGRATING ORAL EVERY 6 HOURS PRN
COMMUNITY
End: 2019-12-30

## 2019-12-02 RX ORDER — PROMETHAZINE HYDROCHLORIDE 25 MG/1
12.5-25 TABLET ORAL EVERY 4 HOURS PRN
Status: DISCONTINUED | OUTPATIENT
Start: 2019-12-02 | End: 2019-12-06 | Stop reason: HOSPADM

## 2019-12-02 RX ORDER — PROMETHAZINE HYDROCHLORIDE 25 MG/1
12.5-25 SUPPOSITORY RECTAL EVERY 4 HOURS PRN
Status: DISCONTINUED | OUTPATIENT
Start: 2019-12-02 | End: 2019-12-06 | Stop reason: HOSPADM

## 2019-12-02 RX ORDER — OXYCODONE HYDROCHLORIDE 5 MG/1
5 TABLET ORAL
Status: DISCONTINUED | OUTPATIENT
Start: 2019-12-02 | End: 2019-12-02

## 2019-12-02 RX ORDER — SODIUM CHLORIDE, SODIUM LACTATE, POTASSIUM CHLORIDE, CALCIUM CHLORIDE 600; 310; 30; 20 MG/100ML; MG/100ML; MG/100ML; MG/100ML
1000 INJECTION, SOLUTION INTRAVENOUS ONCE
Status: COMPLETED | OUTPATIENT
Start: 2019-12-02 | End: 2019-12-02

## 2019-12-02 RX ORDER — ONDANSETRON 2 MG/ML
4 INJECTION INTRAMUSCULAR; INTRAVENOUS ONCE
Status: COMPLETED | OUTPATIENT
Start: 2019-12-02 | End: 2019-12-02

## 2019-12-02 RX ORDER — ONDANSETRON 4 MG/1
4 TABLET, ORALLY DISINTEGRATING ORAL EVERY 4 HOURS PRN
Status: DISCONTINUED | OUTPATIENT
Start: 2019-12-02 | End: 2019-12-06 | Stop reason: HOSPADM

## 2019-12-02 RX ORDER — OXYCODONE HCL 20 MG/1
60 TABLET, FILM COATED, EXTENDED RELEASE ORAL EVERY 12 HOURS
Status: DISCONTINUED | OUTPATIENT
Start: 2019-12-02 | End: 2019-12-06 | Stop reason: HOSPADM

## 2019-12-02 RX ORDER — ACETAMINOPHEN 325 MG/1
650 TABLET ORAL EVERY 6 HOURS PRN
Status: DISCONTINUED | OUTPATIENT
Start: 2019-12-02 | End: 2019-12-06 | Stop reason: HOSPADM

## 2019-12-02 RX ORDER — OXYCODONE HYDROCHLORIDE 10 MG/1
10 TABLET ORAL
Status: DISCONTINUED | OUTPATIENT
Start: 2019-12-02 | End: 2019-12-02

## 2019-12-02 RX ORDER — MORPHINE SULFATE 4 MG/ML
4 INJECTION, SOLUTION INTRAMUSCULAR; INTRAVENOUS
Status: DISCONTINUED | OUTPATIENT
Start: 2019-12-02 | End: 2019-12-06 | Stop reason: HOSPADM

## 2019-12-02 RX ORDER — AMOXICILLIN 250 MG
2 CAPSULE ORAL 2 TIMES DAILY
Status: DISCONTINUED | OUTPATIENT
Start: 2019-12-02 | End: 2019-12-06 | Stop reason: HOSPADM

## 2019-12-02 RX ORDER — BISACODYL 10 MG
10 SUPPOSITORY, RECTAL RECTAL
Status: DISCONTINUED | OUTPATIENT
Start: 2019-12-02 | End: 2019-12-06 | Stop reason: HOSPADM

## 2019-12-02 RX ORDER — ONDANSETRON 4 MG/1
4 TABLET, ORALLY DISINTEGRATING ORAL EVERY 6 HOURS PRN
Qty: 10 TAB | Refills: 0 | Status: SHIPPED | OUTPATIENT
Start: 2019-12-02 | End: 2020-03-08

## 2019-12-02 RX ORDER — POLYETHYLENE GLYCOL 3350 17 G/17G
1 POWDER, FOR SOLUTION ORAL
Status: DISCONTINUED | OUTPATIENT
Start: 2019-12-02 | End: 2019-12-06 | Stop reason: HOSPADM

## 2019-12-02 RX ORDER — PROCHLORPERAZINE EDISYLATE 5 MG/ML
5-10 INJECTION INTRAMUSCULAR; INTRAVENOUS EVERY 4 HOURS PRN
Status: DISCONTINUED | OUTPATIENT
Start: 2019-12-02 | End: 2019-12-06 | Stop reason: HOSPADM

## 2019-12-02 RX ORDER — ONDANSETRON 2 MG/ML
4 INJECTION INTRAMUSCULAR; INTRAVENOUS EVERY 4 HOURS PRN
Status: DISCONTINUED | OUTPATIENT
Start: 2019-12-02 | End: 2019-12-06 | Stop reason: HOSPADM

## 2019-12-02 RX ORDER — OXYCODONE HYDROCHLORIDE 30 MG/1
30 TABLET ORAL EVERY 8 HOURS PRN
Status: DISCONTINUED | OUTPATIENT
Start: 2019-12-02 | End: 2019-12-06 | Stop reason: HOSPADM

## 2019-12-02 RX ORDER — SODIUM CHLORIDE 9 MG/ML
1000 INJECTION, SOLUTION INTRAVENOUS ONCE
Status: COMPLETED | OUTPATIENT
Start: 2019-12-02 | End: 2019-12-02

## 2019-12-02 RX ORDER — MORPHINE SULFATE 4 MG/ML
4 INJECTION, SOLUTION INTRAMUSCULAR; INTRAVENOUS ONCE
Status: COMPLETED | OUTPATIENT
Start: 2019-12-02 | End: 2019-12-02

## 2019-12-02 RX ADMIN — SODIUM CHLORIDE 1000 ML: 9 INJECTION, SOLUTION INTRAVENOUS at 01:11

## 2019-12-02 RX ADMIN — OXYCODONE HYDROCHLORIDE 30 MG: 30 TABLET ORAL at 15:27

## 2019-12-02 RX ADMIN — MORPHINE SULFATE 4 MG: 4 INJECTION INTRAVENOUS at 14:00

## 2019-12-02 RX ADMIN — MORPHINE SULFATE 4 MG: 4 INJECTION INTRAVENOUS at 07:50

## 2019-12-02 RX ADMIN — OXYCODONE HYDROCHLORIDE 10 MG: 10 TABLET ORAL at 14:38

## 2019-12-02 RX ADMIN — MORPHINE SULFATE 4 MG: 4 INJECTION INTRAVENOUS at 04:15

## 2019-12-02 RX ADMIN — POTASSIUM CHLORIDE: 149 INJECTION, SOLUTION, CONCENTRATE INTRAVENOUS at 11:52

## 2019-12-02 RX ADMIN — ACETAMINOPHEN 650 MG: 325 TABLET, FILM COATED ORAL at 22:36

## 2019-12-02 RX ADMIN — OXYCODONE HYDROCHLORIDE 60 MG: 20 TABLET, FILM COATED, EXTENDED RELEASE ORAL at 18:37

## 2019-12-02 RX ADMIN — LIDOCAINE HYDROCHLORIDE 30 ML: 20 SOLUTION OROPHARYNGEAL at 04:15

## 2019-12-02 RX ADMIN — POTASSIUM CHLORIDE: 149 INJECTION, SOLUTION, CONCENTRATE INTRAVENOUS at 19:56

## 2019-12-02 RX ADMIN — SODIUM CHLORIDE, POTASSIUM CHLORIDE, SODIUM LACTATE AND CALCIUM CHLORIDE 1000 ML: 600; 310; 30; 20 INJECTION, SOLUTION INTRAVENOUS at 02:30

## 2019-12-02 RX ADMIN — ENOXAPARIN SODIUM 40 MG: 100 INJECTION SUBCUTANEOUS at 14:09

## 2019-12-02 RX ADMIN — NICOTINE 7 MG: 7 PATCH TRANSDERMAL at 10:27

## 2019-12-02 RX ADMIN — ONDANSETRON 4 MG: 2 INJECTION INTRAMUSCULAR; INTRAVENOUS at 01:11

## 2019-12-02 RX ADMIN — SODIUM CHLORIDE, POTASSIUM CHLORIDE, SODIUM LACTATE AND CALCIUM CHLORIDE 1000 ML: 600; 310; 30; 20 INJECTION, SOLUTION INTRAVENOUS at 04:30

## 2019-12-02 ASSESSMENT — COGNITIVE AND FUNCTIONAL STATUS - GENERAL
MOVING TO AND FROM BED TO CHAIR: A LITTLE
SUGGESTED CMS G CODE MODIFIER MOBILITY: CK
MOVING FROM LYING ON BACK TO SITTING ON SIDE OF FLAT BED: A LITTLE
TURNING FROM BACK TO SIDE WHILE IN FLAT BAD: A LITTLE
STANDING UP FROM CHAIR USING ARMS: A LITTLE
SUGGESTED CMS G CODE MODIFIER DAILY ACTIVITY: CH
CLIMB 3 TO 5 STEPS WITH RAILING: A LITTLE
MOBILITY SCORE: 19
DAILY ACTIVITIY SCORE: 24

## 2019-12-02 ASSESSMENT — ENCOUNTER SYMPTOMS
ABDOMINAL PAIN: 1
BRUISES/BLEEDS EASILY: 0
SPUTUM PRODUCTION: 0
SORE THROAT: 0
NAUSEA: 1
SHORTNESS OF BREATH: 0
CHILLS: 0
COUGH: 0
BLURRED VISION: 0
MYALGIAS: 0
DIZZINESS: 0
BACK PAIN: 0
DIAPHORESIS: 0
HEADACHES: 0
FOCAL WEAKNESS: 0
DIARRHEA: 0
PALPITATIONS: 0
SEIZURES: 0
BLOOD IN STOOL: 0
WHEEZING: 0
VOMITING: 1
FLANK PAIN: 0
FEVER: 0
NECK PAIN: 0

## 2019-12-02 ASSESSMENT — PATIENT HEALTH QUESTIONNAIRE - PHQ9
5. POOR APPETITE OR OVEREATING: MORE THAN HALF THE DAYS
6. FEELING BAD ABOUT YOURSELF - OR THAT YOU ARE A FAILURE OR HAVE LET YOURSELF OR YOUR FAMILY DOWN: NOT AL ALL
SUM OF ALL RESPONSES TO PHQ QUESTIONS 1-9: 6
9. THOUGHTS THAT YOU WOULD BE BETTER OFF DEAD, OR OF HURTING YOURSELF: NOT AT ALL
4. FEELING TIRED OR HAVING LITTLE ENERGY: NOT AT ALL
2. FEELING DOWN, DEPRESSED, IRRITABLE, OR HOPELESS: MORE THAN HALF THE DAYS
1. LITTLE INTEREST OR PLEASURE IN DOING THINGS: MORE THAN HALF THE DAYS
SUM OF ALL RESPONSES TO PHQ9 QUESTIONS 1 AND 2: 4
8. MOVING OR SPEAKING SO SLOWLY THAT OTHER PEOPLE COULD HAVE NOTICED. OR THE OPPOSITE, BEING SO FIGETY OR RESTLESS THAT YOU HAVE BEEN MOVING AROUND A LOT MORE THAN USUAL: NOT AT ALL
3. TROUBLE FALLING OR STAYING ASLEEP OR SLEEPING TOO MUCH: NOT AT ALL
7. TROUBLE CONCENTRATING ON THINGS, SUCH AS READING THE NEWSPAPER OR WATCHING TELEVISION: NOT AT ALL

## 2019-12-02 ASSESSMENT — LIFESTYLE VARIABLES
HAVE PEOPLE ANNOYED YOU BY CRITICIZING YOUR DRINKING: NO
TOTAL SCORE: 0
TOTAL SCORE: 0
ALCOHOL_USE: NO
EVER HAD A DRINK FIRST THING IN THE MORNING TO STEADY YOUR NERVES TO GET RID OF A HANGOVER: NO
EVER FELT BAD OR GUILTY ABOUT YOUR DRINKING: NO
HAVE YOU EVER FELT YOU SHOULD CUT DOWN ON YOUR DRINKING: NO
EVER_SMOKED: YES
TOTAL SCORE: 0
CONSUMPTION TOTAL: NEGATIVE
HOW MANY TIMES IN THE PAST YEAR HAVE YOU HAD 5 OR MORE DRINKS IN A DAY: 0
EVER_SMOKED: YES
ON A TYPICAL DAY WHEN YOU DRINK ALCOHOL HOW MANY DRINKS DO YOU HAVE: 0
AVERAGE NUMBER OF DAYS PER WEEK YOU HAVE A DRINK CONTAINING ALCOHOL: 0

## 2019-12-02 ASSESSMENT — COPD QUESTIONNAIRES
HAVE YOU SMOKED AT LEAST 100 CIGARETTES IN YOUR ENTIRE LIFE: YES
DURING THE PAST 4 WEEKS HOW MUCH DID YOU FEEL SHORT OF BREATH: NONE/LITTLE OF THE TIME
COPD SCREENING SCORE: 2
DO YOU EVER COUGH UP ANY MUCUS OR PHLEGM?: NO/ONLY WITH OCCASIONAL COLDS OR INFECTIONS

## 2019-12-02 NOTE — ASSESSMENT & PLAN NOTE
Stage IV  Currently undergoing chemotherapy, infusion, unclear medication  -no e/o neutropenia at this time  Follows with Dr. Brown

## 2019-12-02 NOTE — ED PROVIDER NOTES
"ED Provider Note    Scribed for Marc Hui M.D. by Nolvia Ann. 12/2/2019, 12:43 AM.    Primary care provider: Rosita Delaney M.D.  Means of arrival: Walk In  History obtained from: Patient  History limited by: None    CHIEF COMPLAINT  Chief Complaint   Patient presents with   • N/V   • Epigastric Pain     N/V, epigastric \"burning\" since 0600       HPI  Amanda Bae is a 39 y.o. female with a history of Stage 4 cancer and chronic pain who presents to the Emergency Department for evaluation epigastric pain with associated nausea and vomiting onset 6 PM earlier tonight. She describes pain as muscle soreness from dry heaving and vomiting. Patient denies taking any stool softer for alleviating measures, and states she has been passing gas without difficulty.She also complains of chills, but no complaints of fever, diarrhea, or chest pain.   The patient's family member states the patient has had multiple vomiting episodes with generalized body aches today. He states her current Oxycodone medications which she takes every 6-8 hours has provided no relief of symptoms. The patient notes she is prescribed Compazine for nausea, however this has provided no relief today. The patient is currently receiving chemotherapy, and is followed by Dr. Barker (Oncology). Most recent treatment was one month ago, and family member states she is currently \"on her last treatment of her new regimen.\" He also notes the patient has been losing blood and has received transfusions.       REVIEW OF SYSTEMS  See HPI for further details. All other systems are negative.     PAST MEDICAL HISTORY   has a past medical history of Cancer (HCC).    SURGICAL HISTORY   has a past surgical history that includes other orthopedic surgery.    SOCIAL HISTORY  Social History     Tobacco Use   • Smoking status: Current Every Day Smoker     Packs/day: 0.25     Types: Cigarettes   • Smokeless tobacco: Never Used   Substance Use Topics   • " "Alcohol use: No   • Drug use: No      Social History     Substance and Sexual Activity   Drug Use No       FAMILY HISTORY  History reviewed. No pertinent family history.    CURRENT MEDICATIONS  Reviewed.  See Encounter Summary.     ALLERGIES  No Known Allergies    PHYSICAL EXAM  VITAL SIGNS: BP (!) 162/100   Pulse 100   Temp 36.6 °C (97.9 °F) (Temporal)   Resp 18   Ht 1.626 m (5' 4\")   Wt 81.6 kg (180 lb)   LMP 11/27/2019   SpO2 98%   BMI 30.90 kg/m²   Constitutional:Chronically ill appearing. Alert.  HENT: No signs of trauma, Bilateral external ears normal, Nose normal.   Eyes: Pupils are equal and reactive, Conjunctiva normal, Non-icteric.   Neck: Normal range of motion, No tenderness, Supple, No stridor.   Cardiovascular: Regular rate and rhythm, no murmurs.   Thorax & Lungs: Med port in right upper chest wall. Normal breath sounds, No respiratory distress, No wheezing, No chest tenderness.   Abdomen: Hypoactive bowel sounds.  Soft, No tenderness, No masses, No pulsatile masses. No peritoneal signs.  Skin: Slightly pale. Warm, Dry, No erythema, No rash.   Back: No bony tenderness, No CVA tenderness.   Extremities: Intact distal pulses, No edema, No tenderness, No cyanosis  Musculoskeletal: Good range of motion in all major joints. No tenderness to palpation or major deformities noted.   Neurologic: Alert , Normal motor function, Normal sensory function, No focal deficits noted.   Psychiatric: Affect normal, Judgment normal, Mood normal.       DIAGNOSTIC STUDIES / PROCEDURES     EKG: Sinus tach 119, normal axis, PVC, nonspecific ST changes    LABS  Results for orders placed or performed during the hospital encounter of 12/02/19   CBC WITH DIFFERENTIAL   Result Value Ref Range    WBC 8.4 4.8 - 10.8 K/uL    RBC 3.02 (L) 4.20 - 5.40 M/uL    Hemoglobin 9.2 (L) 12.0 - 16.0 g/dL    Hematocrit 30.1 (L) 37.0 - 47.0 %    MCV 99.7 (H) 81.4 - 97.8 fL    MCH 30.5 27.0 - 33.0 pg    MCHC 30.6 (L) 33.6 - 35.0 g/dL    RDW " 75.7 (H) 35.9 - 50.0 fL    Platelet Count 112 (L) 164 - 446 K/uL    MPV 9.8 9.0 - 12.9 fL    Neutrophils-Polys 79.60 (H) 44.00 - 72.00 %    Lymphocytes 15.00 (L) 22.00 - 41.00 %    Monocytes 3.90 0.00 - 13.40 %    Eosinophils 0.00 0.00 - 6.90 %    Basophils 0.40 0.00 - 1.80 %    Immature Granulocytes 1.10 (H) 0.00 - 0.90 %    Nucleated RBC 2.40 /100 WBC    Neutrophils (Absolute) 6.67 2.00 - 7.15 K/uL    Lymphs (Absolute) 1.26 1.00 - 4.80 K/uL    Monos (Absolute) 0.33 0.00 - 0.85 K/uL    Eos (Absolute) 0.00 0.00 - 0.51 K/uL    Baso (Absolute) 0.03 0.00 - 0.12 K/uL    Immature Granulocytes (abs) 0.09 0.00 - 0.11 K/uL    NRBC (Absolute) 0.20 K/uL   COMP METABOLIC PANEL   Result Value Ref Range    Sodium 141 135 - 145 mmol/L    Potassium 3.2 (L) 3.6 - 5.5 mmol/L    Chloride 98 96 - 112 mmol/L    Co2 29 20 - 33 mmol/L    Anion Gap 14.0 (H) 0.0 - 11.9    Glucose 158 (H) 65 - 99 mg/dL    Bun 12 8 - 22 mg/dL    Creatinine 0.55 0.50 - 1.40 mg/dL    Calcium 10.0 8.5 - 10.5 mg/dL    AST(SGOT) 10 (L) 12 - 45 U/L    ALT(SGPT) 6 2 - 50 U/L    Alkaline Phosphatase 114 (H) 30 - 99 U/L    Total Bilirubin 1.3 0.1 - 1.5 mg/dL    Albumin 4.4 3.2 - 4.9 g/dL    Total Protein 8.7 (H) 6.0 - 8.2 g/dL    Globulin 4.3 (H) 1.9 - 3.5 g/dL    A-G Ratio 1.0 g/dL   LIPASE   Result Value Ref Range    Lipase 11 11 - 82 U/L   IONIZED CALCIUM   Result Value Ref Range    Ionized Calcium 1.1 1.1 - 1.3 mmol/L   MAGNESIUM   Result Value Ref Range    Magnesium 1.8 1.5 - 2.5 mg/dL   PHOSPHORUS   Result Value Ref Range    Phosphorus 4.1 2.5 - 4.5 mg/dL   ESTIMATED GFR   Result Value Ref Range    GFR If African American >60 >60 mL/min/1.73 m 2    GFR If Non African American >60 >60 mL/min/1.73 m 2      All labs were reviewed by me.      COURSE & MEDICAL DECISION MAKING  Pertinent Labs & Imaging studies reviewed. (See chart for details)    12:43 AM - Patient seen and examined at bedside. Discussed plan of care with patient. I informed them that labs and  imaging will be ordered to evaluate symptoms. Patient is understanding and agreeable with plan.  Patient will be treated with NS infusion 1 L and Zofran 4 mg. Ordered Ionized Calcium, Magnesium, Phosphorus, UA, CBC with diff, CMP, Lipase to evaluate her symptoms. The patient will be treated with Zofran 4 mg and NS Infusion 1 L.    1:19 AM - Ordered DX Abdomen to further evaluate.     2:11 AM - The patient will be treated with Lactated ringers infusion bolus.     2:43 AM Patient was reevaluated at bedside. The patient is resting comfortably in bed and is feeling improved. Discussed lab and radiology results with the patient and informed them that results are reassuring. She is noted to be slighty tachycardic. Will continue to monitor and reevaluate at a later time for improvement.      4:12 AM Patient was reevaluated at bedside. She is still tachycardic. The patient will be treated with GI cocktail, Lactated ringers infusion bolus, and Morphine 4 mg.    5:55 AM Paged Hospitalist.    0630 I discussed the patient's case and the above findings with Dr. Malagon ([facility]) who will evaluate the patient for hospitalization.     HYDRATION: Based on the patient's presentation of Other nausea and vomiting the patient was given IV fluids. IV Hydration was used because oral hydration was not adequate alone. Upon recheck following hydration, the patient was mildly improved.       Decision Making:  This is a 39 y.o. year old female who presents with above complaint.  Patient with underlying oncologic process.  Now with persistent vomiting.  She is feeling significant better after IV fluids and single dose of Zofran and tolerating large amounts of p.o. intake however she remains persistently tachycardic.  EKG showing sinus tachycardia.  No other significant lead site arrangement.  Given the persistent abnormal vital signs and lack of response to IV fluids other than her subjectively feeling better I will have her brought into  the hospital service for ongoing monitoring until tachycardia has improved.  She notes that her typical heart rate is near 100 at baseline.       DISPOSITION:  Patient will be hospitalized by Dr. Malagon in guarded condition.      FINAL IMPRESSION  1. Dehydration    2. Non-intractable vomiting with nausea, unspecified vomiting type    3. Tachycardia          Nolvia RETANA (Scribe), am scribing for, and in the presence of, Marc Hui M.D..    Electronically signed by: Nolvia Ann (Scribe), 12/2/2019    Marc RETANA M.D. personally performed the services described in this documentation, as scribed by Nolvia Ann in my presence, and it is both accurate and complete.  C    The note accurately reflects work and decisions made by me.  Marc Hui  12/2/2019  6:42 AM

## 2019-12-02 NOTE — ED TRIAGE NOTES
"Chief Complaint   Patient presents with   • N/V   • Epigastric Pain     N/V, epigastric \"burning\" since 0600     BP (!) 162/100   Pulse 100   Temp 36.6 °C (97.9 °F) (Temporal)   Resp 18   Ht 1.626 m (5' 4\")   Wt 81.6 kg (180 lb)   LMP 11/27/2019   SpO2 98%   BMI 30.90 kg/m²     38 y/o female presents to ED with complaint N/V and burning sensation in her epigastric abdomen. Patient states symptoms started around 0600 this am. She reports more than 8 episodes of vomiting throughout day.  She does have hx of cancer, but does not recall name. She is currently receiving chemotherapy. Last treatment one month ago. No diarrhea, no F/C, no chest pain.     Vague historian.    To Red 11 from triage.       "

## 2019-12-02 NOTE — PROGRESS NOTES
Assumed care of patient. Patient transferred to room via zoll and placed on monitor. Patient in . Patient denies N/V/abd pain at this time. Patient complains of pain in right buttocks that shoots down the right leg.  Paitent A/O x4 and ambulated with walker to bed standby. Patient also uses wheelchair baseline. Orders reviewed and assessment completed. Patient oriented to unit and room. Call light and belongings within reach. Patient denies using hearing aids or glasses.

## 2019-12-02 NOTE — H&P
Hospital Medicine History & Physical Note    Date of Service  12/2/2019    Primary Care Physician  Rosita Delaney M.D.    Consultants  None    Code Status  Full Code    Chief Complaint  Nausea and vomiting    History of Presenting Illness  39 y.o. 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 Brown who presented 12/2/2019 with nausea, vomiting and abdominal pain for the past 2 days.  She denies any blood in her vomit.  She reports cramping abdominal pain.  She  has been unable to keep anything down.  States this is similar to her usual pain due to her cancer.  She denies any fevers or chills.  She denies any diarrhea or dysuria.    EKG interpreted by me reveals sinus tachycardia with no ST elevation or ST depression.  PVC is noted  Abdominal x-ray interpreted by me reveals no evidence of distended air-filled bowel loops to suggest obstruction.  No free intraperitoneal air is noted.    Review of Systems  Review of Systems   Constitutional: Positive for malaise/fatigue. Negative for chills, diaphoresis and fever.   HENT: Negative for hearing loss and sore throat.    Eyes: Negative for blurred vision.   Respiratory: Negative for cough, sputum production, shortness of breath and wheezing.    Cardiovascular: Negative for chest pain, palpitations and leg swelling.   Gastrointestinal: Positive for abdominal pain, nausea and vomiting. Negative for blood in stool and diarrhea.   Genitourinary: Negative for dysuria, flank pain and urgency.   Musculoskeletal: Negative for back pain, joint pain, myalgias and neck pain.   Skin: Negative for rash.   Neurological: Negative for dizziness, focal weakness, seizures and headaches.   Endo/Heme/Allergies: Does not bruise/bleed easily.   Psychiatric/Behavioral: Negative for suicidal ideas.   All other systems reviewed and are negative.      Past Medical History   has a past medical history of Cancer  ([facility]).    Surgical History   has a past surgical history that includes other orthopedic surgery.     Family History  No pertinent family history    Social History   reports that she has been smoking cigarettes. She has been smoking about 0.25 packs per day. She has never used smokeless tobacco. She reports that she does not drink alcohol or use drugs.    Allergies  No Known Allergies    Medications  Prior to Admission Medications   Prescriptions Last Dose Informant Patient Reported? Taking?   nicotine (NICODERM) 7 MG/24HR PATCH 24 HR  Patient No No   Sig: Apply 1 Patch to skin as directed every 24 hours.   ondansetron (ZOFRAN ODT) 8 MG TABLET DISPERSIBLE  Patient Yes No   Sig: PLACE 1 TABLET ONTOP OF TONGUE AND LET DISSOLVE EVERY 12 HOURS   oxyCODONE (OXY-IR) 30 MG immediate release tablet  Patient Yes No   Sig: Take  by mouth every 8 hours as needed.   oxyCODONE CR (OXYCONTIN) 60 MG Tablet Extended Release 12 hour Abuse-Deterrent tablet  Patient Yes No   Sig: Take 60 mg by mouth every 12 hours.   prochlorperazine (COMPAZINE) 10 MG Tab   Yes No   Sig: Take 10 mg by mouth. AS NEEDED FOR NAUSEA      Facility-Administered Medications: None       Physical Exam  Temp:  [36.6 °C (97.9 °F)] 36.6 °C (97.9 °F)  Pulse:  [100-121] 113  Resp:  [16-18] 16  BP: (129-162)/() 155/111  SpO2:  [95 %-98 %] 98 %    Physical Exam  Vitals signs and nursing note reviewed.   Constitutional:       General: She is not in acute distress.     Appearance: Normal appearance.   HENT:      Head: Normocephalic and atraumatic.      Nose: Nose normal.      Mouth/Throat:      Mouth: Mucous membranes are dry.   Eyes:      Extraocular Movements: Extraocular movements intact.      Conjunctiva/sclera: Conjunctivae normal.      Pupils: Pupils are equal, round, and reactive to light.   Neck:      Musculoskeletal: Normal range of motion and neck supple.   Cardiovascular:      Rate and Rhythm: Regular rhythm. Tachycardia present.      Pulses: Normal  pulses.      Heart sounds: Normal heart sounds.   Pulmonary:      Effort: Pulmonary effort is normal. No respiratory distress.      Breath sounds: Normal breath sounds. No wheezing, rhonchi or rales.   Abdominal:      General: Bowel sounds are normal. There is no distension.      Palpations: Abdomen is soft.      Tenderness: There is tenderness (Mild).   Musculoskeletal: Normal range of motion.         General: No swelling or tenderness.   Lymphadenopathy:      Cervical: No cervical adenopathy.   Skin:     General: Skin is warm.      Coloration: Skin is not jaundiced.      Findings: No rash.   Neurological:      General: No focal deficit present.      Mental Status: She is alert and oriented to person, place, and time.      Cranial Nerves: No cranial nerve deficit.      Motor: No weakness.   Psychiatric:         Mood and Affect: Mood normal.         Behavior: Behavior normal.         Laboratory:  Recent Labs     12/02/19 0047   WBC 8.4   RBC 3.02*   HEMOGLOBIN 9.2*   HEMATOCRIT 30.1*   MCV 99.7*   MCH 30.5   MCHC 30.6*   RDW 75.7*   PLATELETCT 112*   MPV 9.8     Recent Labs     12/02/19 0047   SODIUM 141   POTASSIUM 3.2*   CHLORIDE 98   CO2 29   GLUCOSE 158*   BUN 12   CREATININE 0.55   CALCIUM 10.0     Recent Labs     12/02/19  0047   ALTSGPT 6   ASTSGOT 10*   ALKPHOSPHAT 114*   TBILIRUBIN 1.3   LIPASE 11   GLUCOSE 158*         No results for input(s): NTPROBNP in the last 72 hours.      No results for input(s): TROPONINT in the last 72 hours.    Urinalysis:    No results found     Imaging:  DX-ABDOMEN COMPLETE WITH AP OR PA CXR   Final Result      1.  UNCHANGED RIGHT LUNG BASE OPACITY   2.  INTERVAL PLACEMENT OF RIGHT CHEST PORT   3.  NO ACUTE ABNORMALITIES ARE NOTED ON ABDOMINAL RADIOGRAPHS.            Assessment/Plan:  I anticipate this patient is appropriate for observation status at this time.    Dehydration- (present on admission)  Assessment & Plan  IV fluid hydration with LR    High anion gap metabolic  acidosis- (present on admission)  Assessment & Plan  IV fluid hydration with LR  Monitor BMP    Nausea and vomiting- (present on admission)  Assessment & Plan  IV Zofran    Epithelioid hemangioendothelioma- (present on admission)  Assessment & Plan  Stage IV  Currently undergoing chemotherapy  Follows with Dr. Brown    Hypokalemia- (present on admission)  Assessment & Plan  Replete with IV KCl and oral K. Dur  Replete magnesium  Monitor BMP    Normocytic anemia- (present on admission)  Assessment & Plan  Chronic  Monitor CBC, transfuse for hemoglobin less than 7        VTE prophylaxis: SCD

## 2019-12-02 NOTE — ASSESSMENT & PLAN NOTE
-resolved with 1 U PRBC's, but then dropped again this AM, so required another 1 U PRBC's. Now with an appropriate response again   -?most likely related to recent chemo and ongoing underlying metastatic cancer

## 2019-12-02 NOTE — ASSESSMENT & PLAN NOTE
IV Zofran, essentially resolved at this point  -?related to viral syndrome versus late chemotherapy effect?

## 2019-12-02 NOTE — PROGRESS NOTES
2 RN skin check with HAYLEY Scanlon:  Ears, heels, sacrum, and elbows blanching  Skin is generalized flay and dry, moisturizer provided  Skin otherwise intact

## 2019-12-03 LAB
ABO GROUP BLD: NORMAL
ANION GAP SERPL CALC-SCNC: 7 MMOL/L (ref 0–11.9)
ANISOCYTOSIS BLD QL SMEAR: ABNORMAL
BARCODED ABORH UBTYP: 5100
BARCODED ABORH UBTYP: 5100
BARCODED PRD CODE UBPRD: NORMAL
BARCODED PRD CODE UBPRD: NORMAL
BARCODED UNIT NUM UBUNT: NORMAL
BARCODED UNIT NUM UBUNT: NORMAL
BASOPHILS # BLD AUTO: 0.3 % (ref 0–1.8)
BASOPHILS # BLD: 0.03 K/UL (ref 0–0.12)
BLD GP AB SCN SERPL QL: NORMAL
BUN SERPL-MCNC: 7 MG/DL (ref 8–22)
CALCIUM SERPL-MCNC: 8.4 MG/DL (ref 8.5–10.5)
CHLORIDE SERPL-SCNC: 103 MMOL/L (ref 96–112)
CO2 SERPL-SCNC: 26 MMOL/L (ref 20–33)
COMMENT 1642: NORMAL
COMPONENT R 8504R: NORMAL
COMPONENT R 8504R: NORMAL
CREAT SERPL-MCNC: 0.6 MG/DL (ref 0.5–1.4)
EOSINOPHIL # BLD AUTO: 0.02 K/UL (ref 0–0.51)
EOSINOPHIL NFR BLD: 0.2 % (ref 0–6.9)
ERYTHROCYTE [DISTWIDTH] IN BLOOD BY AUTOMATED COUNT: 81 FL (ref 35.9–50)
GLUCOSE SERPL-MCNC: 116 MG/DL (ref 65–99)
HCT VFR BLD AUTO: 21.3 % (ref 37–47)
HGB BLD-MCNC: 6.2 G/DL (ref 12–16)
HYPOCHROMIA BLD QL SMEAR: ABNORMAL
IMM GRANULOCYTES # BLD AUTO: 0.11 K/UL (ref 0–0.11)
IMM GRANULOCYTES NFR BLD AUTO: 1.1 % (ref 0–0.9)
LYMPHOCYTES # BLD AUTO: 2.53 K/UL (ref 1–4.8)
LYMPHOCYTES NFR BLD: 24.7 % (ref 22–41)
MACROCYTES BLD QL SMEAR: ABNORMAL
MCH RBC QN AUTO: 31.2 PG (ref 27–33)
MCHC RBC AUTO-ENTMCNC: 29.6 G/DL (ref 33.6–35)
MCV RBC AUTO: 105.4 FL (ref 81.4–97.8)
MONOCYTES # BLD AUTO: 0.72 K/UL (ref 0–0.85)
MONOCYTES NFR BLD AUTO: 7 % (ref 0–13.4)
MORPHOLOGY BLD-IMP: NORMAL
NEUTROPHILS # BLD AUTO: 6.83 K/UL (ref 2–7.15)
NEUTROPHILS NFR BLD: 66.7 % (ref 44–72)
NRBC # BLD AUTO: 0.15 K/UL
NRBC BLD-RTO: 1.5 /100 WBC
PLATELET # BLD AUTO: 81 K/UL (ref 164–446)
PLATELET BLD QL SMEAR: NORMAL
PMV BLD AUTO: 9.6 FL (ref 9–12.9)
POLYCHROMASIA BLD QL SMEAR: NORMAL
POTASSIUM SERPL-SCNC: 4.1 MMOL/L (ref 3.6–5.5)
PRODUCT TYPE UPROD: NORMAL
PRODUCT TYPE UPROD: NORMAL
RBC # BLD AUTO: 2.02 M/UL (ref 4.2–5.4)
RBC BLD AUTO: PRESENT
RH BLD: NORMAL
SODIUM SERPL-SCNC: 136 MMOL/L (ref 135–145)
UNIT STATUS USTAT: NORMAL
UNIT STATUS USTAT: NORMAL
WBC # BLD AUTO: 10.2 K/UL (ref 4.8–10.8)

## 2019-12-03 PROCEDURE — 96372 THER/PROPH/DIAG INJ SC/IM: CPT

## 2019-12-03 PROCEDURE — P9016 RBC LEUKOCYTES REDUCED: HCPCS

## 2019-12-03 PROCEDURE — 700102 HCHG RX REV CODE 250 W/ 637 OVERRIDE(OP): Performed by: FAMILY MEDICINE

## 2019-12-03 PROCEDURE — G0378 HOSPITAL OBSERVATION PER HR: HCPCS

## 2019-12-03 PROCEDURE — 700111 HCHG RX REV CODE 636 W/ 250 OVERRIDE (IP): Performed by: INTERNAL MEDICINE

## 2019-12-03 PROCEDURE — 80048 BASIC METABOLIC PNL TOTAL CA: CPT

## 2019-12-03 PROCEDURE — A9270 NON-COVERED ITEM OR SERVICE: HCPCS | Performed by: INTERNAL MEDICINE

## 2019-12-03 PROCEDURE — 36430 TRANSFUSION BLD/BLD COMPNT: CPT

## 2019-12-03 PROCEDURE — 700105 HCHG RX REV CODE 258: Performed by: FAMILY MEDICINE

## 2019-12-03 PROCEDURE — 700111 HCHG RX REV CODE 636 W/ 250 OVERRIDE (IP): Performed by: FAMILY MEDICINE

## 2019-12-03 PROCEDURE — 86900 BLOOD TYPING SEROLOGIC ABO: CPT

## 2019-12-03 PROCEDURE — 700102 HCHG RX REV CODE 250 W/ 637 OVERRIDE(OP): Performed by: INTERNAL MEDICINE

## 2019-12-03 PROCEDURE — 86945 BLOOD PRODUCT/IRRADIATION: CPT

## 2019-12-03 PROCEDURE — 85025 COMPLETE CBC W/AUTO DIFF WBC: CPT

## 2019-12-03 PROCEDURE — 99226 PR SUBSEQUENT OBSERVATION CARE,LEVEL III: CPT | Performed by: INTERNAL MEDICINE

## 2019-12-03 PROCEDURE — 700105 HCHG RX REV CODE 258

## 2019-12-03 PROCEDURE — 86923 COMPATIBILITY TEST ELECTRIC: CPT

## 2019-12-03 PROCEDURE — 86901 BLOOD TYPING SEROLOGIC RH(D): CPT

## 2019-12-03 PROCEDURE — 700105 HCHG RX REV CODE 258: Performed by: INTERNAL MEDICINE

## 2019-12-03 PROCEDURE — 86850 RBC ANTIBODY SCREEN: CPT

## 2019-12-03 PROCEDURE — A9270 NON-COVERED ITEM OR SERVICE: HCPCS | Performed by: FAMILY MEDICINE

## 2019-12-03 RX ORDER — DIPHENHYDRAMINE HCL 25 MG
50 TABLET ORAL ONCE
Status: COMPLETED | OUTPATIENT
Start: 2019-12-03 | End: 2019-12-03

## 2019-12-03 RX ORDER — ACETAMINOPHEN 500 MG
500 TABLET ORAL ONCE
Status: COMPLETED | OUTPATIENT
Start: 2019-12-03 | End: 2019-12-03

## 2019-12-03 RX ORDER — SODIUM CHLORIDE 9 MG/ML
INJECTION, SOLUTION INTRAVENOUS
Status: COMPLETED
Start: 2019-12-03 | End: 2019-12-03

## 2019-12-03 RX ADMIN — DIPHENHYDRAMINE HYDROCHLORIDE 50 MG: 25 TABLET ORAL at 15:27

## 2019-12-03 RX ADMIN — NICOTINE 7 MG: 7 PATCH TRANSDERMAL at 05:16

## 2019-12-03 RX ADMIN — OXYCODONE HYDROCHLORIDE 60 MG: 20 TABLET, FILM COATED, EXTENDED RELEASE ORAL at 05:16

## 2019-12-03 RX ADMIN — OXYCODONE HYDROCHLORIDE 30 MG: 30 TABLET ORAL at 22:19

## 2019-12-03 RX ADMIN — OXYCODONE HYDROCHLORIDE 60 MG: 20 TABLET, FILM COATED, EXTENDED RELEASE ORAL at 17:52

## 2019-12-03 RX ADMIN — SODIUM CHLORIDE: 9 INJECTION, SOLUTION INTRAVENOUS at 16:24

## 2019-12-03 RX ADMIN — POTASSIUM CHLORIDE: 149 INJECTION, SOLUTION, CONCENTRATE INTRAVENOUS at 05:16

## 2019-12-03 RX ADMIN — ACETAMINOPHEN 500 MG: 500 TABLET ORAL at 15:28

## 2019-12-03 RX ADMIN — OXYCODONE HYDROCHLORIDE 30 MG: 30 TABLET ORAL at 12:31

## 2019-12-03 RX ADMIN — OXYCODONE HYDROCHLORIDE 30 MG: 30 TABLET ORAL at 00:09

## 2019-12-03 RX ADMIN — ENOXAPARIN SODIUM 40 MG: 100 INJECTION SUBCUTANEOUS at 05:16

## 2019-12-03 RX ADMIN — POTASSIUM CHLORIDE: 149 INJECTION, SOLUTION, CONCENTRATE INTRAVENOUS at 19:50

## 2019-12-03 ASSESSMENT — ENCOUNTER SYMPTOMS
DIARRHEA: 0
NAUSEA: 0
MYALGIAS: 1
FEVER: 0
CHILLS: 0
PALPITATIONS: 0
ABDOMINAL PAIN: 1
VOMITING: 0

## 2019-12-03 NOTE — PROGRESS NOTES
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  This resulted in dehydration and electrolyte imbalance.  Started on IV hydration and PRN medications for nausea and vomiting.  Home narcotic regimen is continued.  Able to tolerate p.o. for now.  Further recommendations to follow.

## 2019-12-03 NOTE — PROGRESS NOTES
Hospital Medicine Daily Progress Note    Date of Service  12/3/2019    Chief Complaint  39 y.o. 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     Hospital Course    see below      Interval Problem Update  N/V-doing better today. Having gas and normal bowel movements with slow improvement in oral intake.    Cancer-pain is decently controlled, worse in the L hip where she had prior hip replacement.     Anemia-hb dropped to 6.2 with no clear e/o overt bleeding at this time. Has happened to her in the past with no clear answers either.     Consultants/Specialty  none    Code Status  fcfc    Disposition  TELE    Review of Systems  Review of Systems   Constitutional: Negative for chills and fever.   Cardiovascular: Negative for chest pain and palpitations.   Gastrointestinal: Positive for abdominal pain. Negative for diarrhea, nausea and vomiting.   Musculoskeletal: Positive for joint pain and myalgias.   All other systems reviewed and are negative.       Physical Exam  Temp:  [36 °C (96.8 °F)-37.4 °C (99.4 °F)] 37.3 °C (99.2 °F)  Pulse:  [104-137] 109  Resp:  [16] 16  BP: (103-124)/(70-86) 124/86  SpO2:  [91 %-97 %] 97 %    Physical Exam  Vitals signs and nursing note reviewed.   Constitutional:       General: She is not in acute distress.     Appearance: She is well-developed.      Comments: Alopecia   HENT:      Head: Normocephalic and atraumatic.      Mouth/Throat:      Pharynx: No oropharyngeal exudate.   Eyes:      General: No scleral icterus.     Pupils: Pupils are equal, round, and reactive to light.   Neck:      Musculoskeletal: Normal range of motion and neck supple.      Thyroid: No thyromegaly.   Cardiovascular:      Rate and Rhythm: Normal rate and regular rhythm.      Heart sounds: Normal heart sounds. No murmur.   Pulmonary:      Effort: Pulmonary effort is normal. No respiratory distress.      Breath sounds: Normal  breath sounds. No wheezing.      Comments: Chest wall indwelling port in the R pectoral area, insertion site/overlying area appears non infected  Abdominal:      General: Bowel sounds are normal.      Palpations: Abdomen is soft.      Tenderness: There is tenderness (mild).   Musculoskeletal: Normal range of motion.         General: Tenderness (proximal L hip with no clear deformity present) present.   Skin:     General: Skin is warm and dry.      Findings: No rash.   Neurological:      Mental Status: She is alert and oriented to person, place, and time.      Cranial Nerves: No cranial nerve deficit.         Fluids    Intake/Output Summary (Last 24 hours) at 12/3/2019 1427  Last data filed at 12/3/2019 0516  Gross per 24 hour   Intake 2175 ml   Output --   Net 2175 ml       Laboratory  Recent Labs     12/02/19  0047 12/03/19  0523   WBC 8.4 10.2   RBC 3.02* 2.02*   HEMOGLOBIN 9.2* 6.2*   HEMATOCRIT 30.1* 21.3*   MCV 99.7* 105.4*   MCH 30.5 31.2   MCHC 30.6* 29.6*   RDW 75.7* 81.0*   PLATELETCT 112* 81*   MPV 9.8 9.6     Recent Labs     12/02/19  0047 12/03/19  0523   SODIUM 141 136   POTASSIUM 3.2* 4.1   CHLORIDE 98 103   CO2 29 26   GLUCOSE 158* 116*   BUN 12 7*   CREATININE 0.55 0.60   CALCIUM 10.0 8.4*                   Imaging  DX-ABDOMEN COMPLETE WITH AP OR PA CXR   Final Result      1.  UNCHANGED RIGHT LUNG BASE OPACITY   2.  INTERVAL PLACEMENT OF RIGHT CHEST PORT   3.  NO ACUTE ABNORMALITIES ARE NOTED ON ABDOMINAL RADIOGRAPHS.           Assessment/Plan  Dehydration- (present on admission)  Assessment & Plan  IV fluid hydration with LR, improving today, decrease rate of IVF's    High anion gap metabolic acidosis- (present on admission)  Assessment & Plan  normalized    Nausea and vomiting- (present on admission)  Assessment & Plan  IV Zofran, improving, etiology is not entirely clear, ?related to chemo versus ?viral GI bug    Epithelioid hemangioendothelioma- (present on admission)  Assessment & Plan  Stage  IV  Currently undergoing chemotherapy, infusion, unclear medication  -no e/o neutropenia at this time  Follows with Dr. Brown    Hypokalemia- (present on admission)  Assessment & Plan  Replete with IV KCl and oral K. Dur  Improving nicely    Normocytic anemia- (present on admission)  Assessment & Plan  Acute on chronic, no e/o overt bleeding, Hb is now 6.2  -2/2 chemo and underlying cancer process?  -transfuse 1 U irradiated PRBC's  -daily CBC, check post-Tx CBC         VTE prophylaxis: SCD's

## 2019-12-03 NOTE — DISCHARGE PLANNING
Patient is eligible for Medicaid Meds to Beds at discharge if they have coverage with [place] [employer], Medicaid FFS, Medicaid HMO ([facility]), or [place]. This service is provided through the [facility] if orders are received by the pharmacy prior to 4pm Monday through Friday excluding holidays. Preferred pharmacy has been changed to [facility]. Please call [phone] prior to discharge.

## 2019-12-03 NOTE — CARE PLAN
Problem: Nutritional:  Goal: Achieve adequate nutritional intake  Description  Patient will consume >50% of meals.   Outcome: PROGRESSING AS EXPECTED

## 2019-12-03 NOTE — CARE PLAN
Problem: Safety  Goal: Will remain free from injury  Outcome: PROGRESSING AS EXPECTED  Note:   Pt verbalizes understanding of safety instructions and calls for assistance appropriately.  Goal: Will remain free from falls  Outcome: PROGRESSING AS EXPECTED  Note:   Pt verbalizes understanding of fall precautions and calls for assistance appropriately. Bed in lowest position and locked. Strip alarm in use. Pt wearing non-slip socks, call light within reach.

## 2019-12-03 NOTE — DIETARY
"Nutrition services: Day 0 of admit.  Amanda Bae is a 39 y.o. female with admitting DX of Tachycardia, Dehydration, Nausea and vomiting.    Pt with MST score of 5 per nutrition screen for unplanned weight loss of 34 or more lbs in >1 year with poor PO intake.    Assessment:  Height: 162.6 cm (5' 4\")  Weight: 77.5 kg (170 lb 13.7 oz)  Body mass index is 29.33 kg/m²., BMI classification: Overweight  Diet/Intake: Regular.  PO intake 12/2 lunch <25%, dinner 50-75%.    Evaluation:   1. Pt with history of stage IV epithelioid hemangioendothelioma, on chemo.  Pt admitted due to nausea, vomiting, and abdominal pain x 2 days.  2. Pt states nausea and vomiting have resolved.  She was able to eat half of breakfast today.  3. Pt states she normally eats well at home.  She usually eats 5-6 x day.  Pt would like snacks between meals during admission with a request for milkshakes or chobani smoothie.  4. Pt does report weight loss over the past year which she related to cancer and chemo.  Per chart review, weight 6/6/19 = 104.7 kg on standing scale.  Pt with 25.9% weight loss x 6 months which is significant.    Malnutrition Risk: Pt with significant weight loss, but reports good PO intake on usual basis.  Unable to meet criteria at this time.    Recommendations/Plan:  1. Offer Regular diet with milkshakes or chobani smoothie 3 x day between meals.   2. Encourage continued good intake of meals and snacks.  3. Document intake of all meals and snacks as % taken in ADL's to provide interdisciplinary communication across all shifts.   4. Monitor weight.  5. Nutrition rep will continue to see patient for ongoing meal and snack preferences.     RD following.            "

## 2019-12-04 ENCOUNTER — APPOINTMENT (OUTPATIENT)
Dept: RADIOLOGY | Facility: MEDICAL CENTER | Age: 39
End: 2019-12-04
Attending: INTERNAL MEDICINE
Payer: MEDICAID
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
## 2019-12-04 PROBLEM — J18.9 HAP (HOSPITAL-ACQUIRED PNEUMONIA): Status: ACTIVE | Noted: 2019-12-04

## 2019-12-04 LAB
ANION GAP SERPL CALC-SCNC: 5 MMOL/L (ref 0–11.9)
BASOPHILS # BLD AUTO: 0.3 % (ref 0–1.8)
BASOPHILS # BLD: 0.04 K/UL (ref 0–0.12)
BUN SERPL-MCNC: 9 MG/DL (ref 8–22)
CALCIUM SERPL-MCNC: 8.7 MG/DL (ref 8.5–10.5)
CHLORIDE SERPL-SCNC: 100 MMOL/L (ref 96–112)
CO2 SERPL-SCNC: 30 MMOL/L (ref 20–33)
CREAT SERPL-MCNC: 0.58 MG/DL (ref 0.5–1.4)
EOSINOPHIL # BLD AUTO: 0.11 K/UL (ref 0–0.51)
EOSINOPHIL NFR BLD: 1 % (ref 0–6.9)
ERYTHROCYTE [DISTWIDTH] IN BLOOD BY AUTOMATED COUNT: 74.6 FL (ref 35.9–50)
FERRITIN SERPL-MCNC: 191 NG/ML (ref 10–291)
FLUAV RNA SPEC QL NAA+PROBE: NEGATIVE
FLUBV RNA SPEC QL NAA+PROBE: NEGATIVE
GLUCOSE SERPL-MCNC: 116 MG/DL (ref 65–99)
HCT VFR BLD AUTO: 23.2 % (ref 37–47)
HGB BLD-MCNC: 7.1 G/DL (ref 12–16)
HGB RETIC QN AUTO: 29.3 PG/CELL (ref 29–35)
IMM GRANULOCYTES # BLD AUTO: 0.15 K/UL (ref 0–0.11)
IMM GRANULOCYTES NFR BLD AUTO: 1.3 % (ref 0–0.9)
IMM RETICS NFR: 39.1 % (ref 9.3–17.4)
IRON SATN MFR SERPL: 15 % (ref 15–55)
IRON SERPL-MCNC: 41 UG/DL (ref 40–170)
LYMPHOCYTES # BLD AUTO: 1.66 K/UL (ref 1–4.8)
LYMPHOCYTES NFR BLD: 14.4 % (ref 22–41)
MAGNESIUM SERPL-MCNC: 1.6 MG/DL (ref 1.5–2.5)
MCH RBC QN AUTO: 31 PG (ref 27–33)
MCHC RBC AUTO-ENTMCNC: 30.6 G/DL (ref 33.6–35)
MCV RBC AUTO: 101.3 FL (ref 81.4–97.8)
MONOCYTES # BLD AUTO: 0.79 K/UL (ref 0–0.85)
MONOCYTES NFR BLD AUTO: 6.9 % (ref 0–13.4)
MRSA DNA SPEC QL NAA+PROBE: NORMAL
NEUTROPHILS # BLD AUTO: 8.77 K/UL (ref 2–7.15)
NEUTROPHILS NFR BLD: 76.1 % (ref 44–72)
NRBC # BLD AUTO: 0.17 K/UL
NRBC BLD-RTO: 1.5 /100 WBC
PLATELET # BLD AUTO: 78 K/UL (ref 164–446)
PMV BLD AUTO: 9.5 FL (ref 9–12.9)
POTASSIUM SERPL-SCNC: 3.8 MMOL/L (ref 3.6–5.5)
PROCALCITONIN SERPL-MCNC: 0.3 NG/ML
RBC # BLD AUTO: 2.29 M/UL (ref 4.2–5.4)
RETICS # AUTO: 0.16 M/UL (ref 0.04–0.06)
RETICS/RBC NFR: 7.1 % (ref 0.8–2.1)
SIGNIFICANT IND 70042: NORMAL
SITE SITE: NORMAL
SODIUM SERPL-SCNC: 135 MMOL/L (ref 135–145)
SOURCE SOURCE: NORMAL
TIBC SERPL-MCNC: 274 UG/DL (ref 250–450)
WBC # BLD AUTO: 11.5 K/UL (ref 4.8–10.8)

## 2019-12-04 PROCEDURE — 71046 X-RAY EXAM CHEST 2 VIEWS: CPT

## 2019-12-04 PROCEDURE — 87040 BLOOD CULTURE FOR BACTERIA: CPT

## 2019-12-04 PROCEDURE — 36415 COLL VENOUS BLD VENIPUNCTURE: CPT

## 2019-12-04 PROCEDURE — 99233 SBSQ HOSP IP/OBS HIGH 50: CPT | Performed by: INTERNAL MEDICINE

## 2019-12-04 PROCEDURE — 71045 X-RAY EXAM CHEST 1 VIEW: CPT

## 2019-12-04 PROCEDURE — 82728 ASSAY OF FERRITIN: CPT

## 2019-12-04 PROCEDURE — 84145 PROCALCITONIN (PCT): CPT

## 2019-12-04 PROCEDURE — 96365 THER/PROPH/DIAG IV INF INIT: CPT

## 2019-12-04 PROCEDURE — 87502 INFLUENZA DNA AMP PROBE: CPT

## 2019-12-04 PROCEDURE — 85046 RETICYTE/HGB CONCENTRATE: CPT

## 2019-12-04 PROCEDURE — A9270 NON-COVERED ITEM OR SERVICE: HCPCS | Performed by: INTERNAL MEDICINE

## 2019-12-04 PROCEDURE — 96366 THER/PROPH/DIAG IV INF ADDON: CPT

## 2019-12-04 PROCEDURE — 700102 HCHG RX REV CODE 250 W/ 637 OVERRIDE(OP): Performed by: FAMILY MEDICINE

## 2019-12-04 PROCEDURE — 700105 HCHG RX REV CODE 258

## 2019-12-04 PROCEDURE — 700111 HCHG RX REV CODE 636 W/ 250 OVERRIDE (IP): Performed by: INTERNAL MEDICINE

## 2019-12-04 PROCEDURE — 83540 ASSAY OF IRON: CPT

## 2019-12-04 PROCEDURE — 85025 COMPLETE CBC W/AUTO DIFF WBC: CPT

## 2019-12-04 PROCEDURE — 700102 HCHG RX REV CODE 250 W/ 637 OVERRIDE(OP): Performed by: INTERNAL MEDICINE

## 2019-12-04 PROCEDURE — A9270 NON-COVERED ITEM OR SERVICE: HCPCS | Performed by: FAMILY MEDICINE

## 2019-12-04 PROCEDURE — 700105 HCHG RX REV CODE 258: Performed by: INTERNAL MEDICINE

## 2019-12-04 PROCEDURE — 87641 MR-STAPH DNA AMP PROBE: CPT

## 2019-12-04 PROCEDURE — 96367 TX/PROPH/DG ADDL SEQ IV INF: CPT

## 2019-12-04 PROCEDURE — 83735 ASSAY OF MAGNESIUM: CPT

## 2019-12-04 PROCEDURE — 80048 BASIC METABOLIC PNL TOTAL CA: CPT

## 2019-12-04 PROCEDURE — G0378 HOSPITAL OBSERVATION PER HR: HCPCS

## 2019-12-04 PROCEDURE — 83550 IRON BINDING TEST: CPT

## 2019-12-04 RX ORDER — SODIUM CHLORIDE 9 MG/ML
INJECTION, SOLUTION INTRAVENOUS
Status: COMPLETED
Start: 2019-12-04 | End: 2019-12-05

## 2019-12-04 RX ADMIN — ACETAMINOPHEN 650 MG: 325 TABLET, FILM COATED ORAL at 04:39

## 2019-12-04 RX ADMIN — SODIUM CHLORIDE 500 ML: 9 INJECTION, SOLUTION INTRAVENOUS at 15:39

## 2019-12-04 RX ADMIN — NICOTINE 7 MG: 7 PATCH TRANSDERMAL at 04:39

## 2019-12-04 RX ADMIN — POTASSIUM CHLORIDE: 149 INJECTION, SOLUTION, CONCENTRATE INTRAVENOUS at 09:49

## 2019-12-04 RX ADMIN — VANCOMYCIN HYDROCHLORIDE 1900 MG: 500 INJECTION, POWDER, LYOPHILIZED, FOR SOLUTION INTRAVENOUS at 16:25

## 2019-12-04 RX ADMIN — ACETAMINOPHEN 650 MG: 325 TABLET, FILM COATED ORAL at 15:37

## 2019-12-04 RX ADMIN — OXYCODONE HYDROCHLORIDE 60 MG: 20 TABLET, FILM COATED, EXTENDED RELEASE ORAL at 04:39

## 2019-12-04 RX ADMIN — OXYCODONE HYDROCHLORIDE 30 MG: 30 TABLET ORAL at 08:56

## 2019-12-04 RX ADMIN — PIPERACILLIN AND TAZOBACTAM 3.38 G: 3; .375 INJECTION, POWDER, LYOPHILIZED, FOR SOLUTION INTRAVENOUS; PARENTERAL at 21:38

## 2019-12-04 RX ADMIN — OXYCODONE HYDROCHLORIDE 60 MG: 20 TABLET, FILM COATED, EXTENDED RELEASE ORAL at 17:15

## 2019-12-04 RX ADMIN — PIPERACILLIN AND TAZOBACTAM 3.38 G: 3; .375 INJECTION, POWDER, LYOPHILIZED, FOR SOLUTION INTRAVENOUS; PARENTERAL at 15:37

## 2019-12-04 ASSESSMENT — ENCOUNTER SYMPTOMS
FEVER: 0
ABDOMINAL PAIN: 0
VOMITING: 0
MYALGIAS: 1
SHORTNESS OF BREATH: 0
NAUSEA: 0
SPUTUM PRODUCTION: 1
DIARRHEA: 0
COUGH: 1

## 2019-12-04 NOTE — PROGRESS NOTES
"Pharmacy Kinetics 39 y.o. female on vancomycin day # 1  2019    Starting with Vancomycin 1900 mg iv x 1 then vanco 1300 mg iv q 12 hrs  Provider specified end date: TBD    Indication for Treatment: R/O HAP    Pertinent history per medical record: Admitted on 2019 for intractable N/V and abdominal pain x 2 days. Pt has past medical history of stage IV epithelioid hemangioendothelioma on chemotherapy. Pt has needed increasing O2 requirements and per MD CXR showing infiltrate in R lobe. Broad spectrum abx started    Other antibiotics: Zosyn 3.375 g iv q 8 hrs    Allergies: Patient has no known allergies.     List concerns for renal function: none    Pertinent cultures to date:   19 Influenza A and B = negative  Other micro pending    MRSA nares swab if pneumonia is a concern (ordered/positive/negative/n-a): ordered    Recent Labs     19  0523 19  0700   WBC 8.4 10.2 11.5*   NEUTSPOLYS 79.60* 66.70 76.10*     Recent Labs     197 19  0523 19  0700   BUN 12 7* 9   CREATININE 0.55 0.60 0.58   ALBUMIN 4.4  --   --      No results for input(s): VANCOTROUGH, VANCOPEAK, VANCORANDOM in the last 72 hours.No intake or output data in the 24 hours ending 19 1548   /78   Pulse (!) 114   Temp 35.8 °C (96.5 °F) (Temporal)   Resp 18   Ht 1.626 m (5' 4\")   Wt 77.5 kg (170 lb 13.7 oz)   SpO2 99%  Temp (24hrs), Av.4 °C (97.6 °F), Min:35.8 °C (96.5 °F), Max:36.8 °C (98.3 °F)      A/P   1. Vancomycin dose change: new start  2. Next vancomycin level: after 4th or 5th total dose (not ordered)  3. Goal trough: 16-20 mcg/ml  4. Comments: Pt to have vanco loading dose then will start vanco 17 mg/kg (1300 mg) iv q 12 hrs. Will check a vanco trough level after the 4th or 5th total dose. Await micro results and MRSA nasal swab results.    Brenton Braden, PharmD    "

## 2019-12-04 NOTE — PROGRESS NOTES
Report received from Sully. Pt received 1U blood today. R port from prior cancer diagnosis. Pt awake in bed; no complaints at this time. POC discussed; all questions answered. Bed locked in lowest position; call light in reach.

## 2019-12-04 NOTE — CARE PLAN
Problem: Communication  Goal: The ability to communicate needs accurately and effectively will improve  Outcome: PROGRESSING AS EXPECTED   Pt calls for staff appropriately.    Problem: Safety  Goal: Will remain free from falls  Outcome: PROGRESSING AS EXPECTED   Pt calls prior to ambulating; bed alarm in place.    Problem: Venous Thromboembolism (VTW)/Deep Vein Thrombosis (DVT) Prevention:  Goal: Patient will participate in Venous Thrombosis (VTE)/Deep Vein Thrombosis (DVT)Prevention Measures  Outcome: PROGRESSING AS EXPECTED   Pt participating in SCD use.    Problem: Respiratory:  Goal: Respiratory status will improve  Outcome: PROGRESSING AS EXPECTED   Pt has grunting and shallow breathing pattern.

## 2019-12-04 NOTE — PROGRESS NOTES
Hospital Medicine Daily Progress Note    Date of Service  12/4/2019    Chief Complaint  39 y.o. 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     Hospital Course    see below      Interval Problem Update  N/V-nearly resolved. No diarrhea and having good bowel movements now     Cancer-pain remains tolerable at this time.     Anemia-adequate response to 1 U PRBC's last night.    PNA-now with increasing oxygen requirements. Personally reviewed CXR that shows developing infiltrate in the R lobe. Increased congestion and patient feels a worsening cough coming up. Worsening labs noted as well.      Consultants/Specialty  none    Code Status  fcfc    Disposition  TELE    Review of Systems  Review of Systems   Constitutional: Negative for fever.   Respiratory: Positive for cough and sputum production. Negative for shortness of breath.    Cardiovascular: Negative for chest pain.   Gastrointestinal: Negative for abdominal pain, diarrhea, nausea and vomiting.   Musculoskeletal: Positive for joint pain and myalgias.        Better pain control achieved today   All other systems reviewed and are negative.       Physical Exam  Temp:  [35.8 °C (96.5 °F)-36.8 °C (98.3 °F)] 35.8 °C (96.5 °F)  Pulse:  [110-127] 114  Resp:  [16-18] 18  BP: (105-143)/(71-86) 114/78  SpO2:  [93 %-99 %] 99 %    Physical Exam  Vitals signs and nursing note reviewed.   Constitutional:       General: She is not in acute distress.     Appearance: She is well-developed.      Comments: Alopecia   HENT:      Head: Normocephalic and atraumatic.      Nose:      Comments: NC in place     Mouth/Throat:      Pharynx: No oropharyngeal exudate.   Eyes:      General:         Right eye: No discharge.         Left eye: No discharge.      Pupils: Pupils are equal, round, and reactive to light.   Neck:      Musculoskeletal: Normal range of motion and neck supple.      Thyroid: No  thyromegaly.   Cardiovascular:      Rate and Rhythm: Normal rate and regular rhythm.      Heart sounds: Normal heart sounds. No gallop.    Pulmonary:      Effort: Pulmonary effort is normal. No respiratory distress.      Breath sounds: Wheezing and rales present.      Comments: Chest wall indwelling port in the R pectoral area, insertion site/overlying area appears non infected  Abdominal:      General: Bowel sounds are normal. There is no distension.      Palpations: Abdomen is soft.      Tenderness: There is tenderness (mild).   Musculoskeletal: Normal range of motion.         General: Tenderness (proximal L hip with no clear deformity present) present.   Skin:     General: Skin is warm and dry.      Findings: No rash.   Neurological:      Mental Status: She is alert and oriented to person, place, and time.      Cranial Nerves: No cranial nerve deficit.         Fluids  No intake or output data in the 24 hours ending 12/04/19 1531    Laboratory  Recent Labs     12/02/19  0047 12/03/19  0523 12/04/19  0700   WBC 8.4 10.2 11.5*   RBC 3.02* 2.02* 2.29*   HEMOGLOBIN 9.2* 6.2* 7.1*   HEMATOCRIT 30.1* 21.3* 23.2*   MCV 99.7* 105.4* 101.3*   MCH 30.5 31.2 31.0   MCHC 30.6* 29.6* 30.6*   RDW 75.7* 81.0* 74.6*   PLATELETCT 112* 81* 78*   MPV 9.8 9.6 9.5     Recent Labs     12/02/19  0047 12/03/19  0523 12/04/19  0700   SODIUM 141 136 135   POTASSIUM 3.2* 4.1 3.8   CHLORIDE 98 103 100   CO2 29 26 30   GLUCOSE 158* 116* 116*   BUN 12 7* 9   CREATININE 0.55 0.60 0.58   CALCIUM 10.0 8.4* 8.7                   Imaging  DX-CHEST-LIMITED (1 VIEW)   Final Result      Interval worsening in right basilar opacification is consistent with worsening pneumonia.      DX-ABDOMEN COMPLETE WITH AP OR PA CXR   Final Result      1.  UNCHANGED RIGHT LUNG BASE OPACITY   2.  INTERVAL PLACEMENT OF RIGHT CHEST PORT   3.  NO ACUTE ABNORMALITIES ARE NOTED ON ABDOMINAL RADIOGRAPHS.      DX-CHEST-2 VIEWS    (Results Pending)         Assessment/Plan  Dehydration- (present on admission)  Assessment & Plan  Resolved, stop all IVF's with respiratory issues now    HAP (hospital-acquired pneumonia)  Assessment & Plan  -monitor, see below for treatment plan  -stop IVF's    High anion gap metabolic acidosis- (present on admission)  Assessment & Plan  normalized    Nausea and vomiting- (present on admission)  Assessment & Plan  IV Zofran, improving, etiology is not entirely clear, ?related to chemo versus ?viral GI bug    Epithelioid hemangioendothelioma- (present on admission)  Assessment & Plan  Stage IV  Currently undergoing chemotherapy, infusion, unclear medication  -no e/o neutropenia at this time  Follows with Dr. Brown    Acute respiratory failure with hypoxia (HCC)  Assessment & Plan  -worsening while in the hospital  -presumed HAP  -start empiric IV vancomycin and IV zosyn  -Get sputum cultures and blood cultures  -o2 supplementation    Hypokalemia- (present on admission)  Assessment & Plan  Replete with IV KCl and oral K. Dur  Improving nicely    Normocytic anemia- (present on admission)  Assessment & Plan  -resolved with 1 U PRBC's, daily CBC         VTE prophylaxis: SCD's

## 2019-12-04 NOTE — ASSESSMENT & PLAN NOTE
-improving now  -presumed HAP  -stop IV vancomycin since MRSA nares negative and she clinically is readily improving  -continue empiric IV zosyn day#2, might de-escalate further tomorrow depending on clinical status  -Get sputum cultures and blood cultures-negative thus far  -o2 supplementation-try to wean to RA soon

## 2019-12-05 LAB
ANION GAP SERPL CALC-SCNC: 8 MMOL/L (ref 0–11.9)
APPEARANCE UR: CLEAR
BASOPHILS # BLD AUTO: 0.5 % (ref 0–1.8)
BASOPHILS # BLD: 0.04 K/UL (ref 0–0.12)
BILIRUB UR QL STRIP.AUTO: NEGATIVE
BUN SERPL-MCNC: 11 MG/DL (ref 8–22)
CALCIUM SERPL-MCNC: 8.5 MG/DL (ref 8.5–10.5)
CHLORIDE SERPL-SCNC: 99 MMOL/L (ref 96–112)
CO2 SERPL-SCNC: 29 MMOL/L (ref 20–33)
COLOR UR: YELLOW
CREAT SERPL-MCNC: 0.61 MG/DL (ref 0.5–1.4)
EOSINOPHIL # BLD AUTO: 0.13 K/UL (ref 0–0.51)
EOSINOPHIL NFR BLD: 1.6 % (ref 0–6.9)
ERYTHROCYTE [DISTWIDTH] IN BLOOD BY AUTOMATED COUNT: 71.3 FL (ref 35.9–50)
GLUCOSE SERPL-MCNC: 134 MG/DL (ref 65–99)
GLUCOSE UR STRIP.AUTO-MCNC: NEGATIVE MG/DL
HCT VFR BLD AUTO: 21.6 % (ref 37–47)
HGB BLD-MCNC: 6.6 G/DL (ref 12–16)
HGB BLD-MCNC: 7.9 G/DL (ref 12–16)
IMM GRANULOCYTES # BLD AUTO: 0.09 K/UL (ref 0–0.11)
IMM GRANULOCYTES NFR BLD AUTO: 1.1 % (ref 0–0.9)
KETONES UR STRIP.AUTO-MCNC: NEGATIVE MG/DL
LEUKOCYTE ESTERASE UR QL STRIP.AUTO: NEGATIVE
LYMPHOCYTES # BLD AUTO: 1.29 K/UL (ref 1–4.8)
LYMPHOCYTES NFR BLD: 15.6 % (ref 22–41)
MCH RBC QN AUTO: 31 PG (ref 27–33)
MCHC RBC AUTO-ENTMCNC: 30.6 G/DL (ref 33.6–35)
MCV RBC AUTO: 101.4 FL (ref 81.4–97.8)
MICRO URNS: NORMAL
MONOCYTES # BLD AUTO: 0.51 K/UL (ref 0–0.85)
MONOCYTES NFR BLD AUTO: 6.2 % (ref 0–13.4)
NEUTROPHILS # BLD AUTO: 6.22 K/UL (ref 2–7.15)
NEUTROPHILS NFR BLD: 75 % (ref 44–72)
NITRITE UR QL STRIP.AUTO: NEGATIVE
NRBC # BLD AUTO: 0.14 K/UL
NRBC BLD-RTO: 1.7 /100 WBC
PH UR STRIP.AUTO: 7 [PH] (ref 5–8)
PLATELET # BLD AUTO: 86 K/UL (ref 164–446)
PMV BLD AUTO: 9.1 FL (ref 9–12.9)
POTASSIUM SERPL-SCNC: 3.8 MMOL/L (ref 3.6–5.5)
PROT UR QL STRIP: NEGATIVE MG/DL
RBC # BLD AUTO: 2.13 M/UL (ref 4.2–5.4)
RBC UR QL AUTO: NEGATIVE
SODIUM SERPL-SCNC: 136 MMOL/L (ref 135–145)
SP GR UR STRIP.AUTO: 1.01
UROBILINOGEN UR STRIP.AUTO-MCNC: 0.2 MG/DL
WBC # BLD AUTO: 8.3 K/UL (ref 4.8–10.8)

## 2019-12-05 PROCEDURE — 700102 HCHG RX REV CODE 250 W/ 637 OVERRIDE(OP): Performed by: FAMILY MEDICINE

## 2019-12-05 PROCEDURE — G0378 HOSPITAL OBSERVATION PER HR: HCPCS

## 2019-12-05 PROCEDURE — 700111 HCHG RX REV CODE 636 W/ 250 OVERRIDE (IP): Performed by: INTERNAL MEDICINE

## 2019-12-05 PROCEDURE — 700102 HCHG RX REV CODE 250 W/ 637 OVERRIDE(OP): Performed by: INTERNAL MEDICINE

## 2019-12-05 PROCEDURE — 86945 BLOOD PRODUCT/IRRADIATION: CPT

## 2019-12-05 PROCEDURE — A9270 NON-COVERED ITEM OR SERVICE: HCPCS | Performed by: INTERNAL MEDICINE

## 2019-12-05 PROCEDURE — 85018 HEMOGLOBIN: CPT

## 2019-12-05 PROCEDURE — 86923 COMPATIBILITY TEST ELECTRIC: CPT

## 2019-12-05 PROCEDURE — 81003 URINALYSIS AUTO W/O SCOPE: CPT

## 2019-12-05 PROCEDURE — A9270 NON-COVERED ITEM OR SERVICE: HCPCS | Performed by: FAMILY MEDICINE

## 2019-12-05 PROCEDURE — 700105 HCHG RX REV CODE 258: Performed by: INTERNAL MEDICINE

## 2019-12-05 PROCEDURE — 96376 TX/PRO/DX INJ SAME DRUG ADON: CPT

## 2019-12-05 PROCEDURE — 36430 TRANSFUSION BLD/BLD COMPNT: CPT

## 2019-12-05 PROCEDURE — P9016 RBC LEUKOCYTES REDUCED: HCPCS

## 2019-12-05 PROCEDURE — 99232 SBSQ HOSP IP/OBS MODERATE 35: CPT | Performed by: INTERNAL MEDICINE

## 2019-12-05 PROCEDURE — 85025 COMPLETE CBC W/AUTO DIFF WBC: CPT

## 2019-12-05 PROCEDURE — 80048 BASIC METABOLIC PNL TOTAL CA: CPT

## 2019-12-05 PROCEDURE — 96366 THER/PROPH/DIAG IV INF ADDON: CPT

## 2019-12-05 RX ORDER — SODIUM CHLORIDE 9 MG/ML
INJECTION, SOLUTION INTRAVENOUS
Status: COMPLETED
Start: 2019-12-05 | End: 2019-12-05

## 2019-12-05 RX ADMIN — OXYCODONE HYDROCHLORIDE 30 MG: 30 TABLET ORAL at 18:24

## 2019-12-05 RX ADMIN — PIPERACILLIN AND TAZOBACTAM 3.38 G: 3; .375 INJECTION, POWDER, LYOPHILIZED, FOR SOLUTION INTRAVENOUS; PARENTERAL at 11:47

## 2019-12-05 RX ADMIN — ACETAMINOPHEN 650 MG: 325 TABLET, FILM COATED ORAL at 22:54

## 2019-12-05 RX ADMIN — PIPERACILLIN AND TAZOBACTAM 3.38 G: 3; .375 INJECTION, POWDER, LYOPHILIZED, FOR SOLUTION INTRAVENOUS; PARENTERAL at 19:29

## 2019-12-05 RX ADMIN — ACETAMINOPHEN 650 MG: 325 TABLET, FILM COATED ORAL at 00:44

## 2019-12-05 RX ADMIN — OXYCODONE HYDROCHLORIDE 30 MG: 30 TABLET ORAL at 09:28

## 2019-12-05 RX ADMIN — OXYCODONE HYDROCHLORIDE 30 MG: 30 TABLET ORAL at 00:44

## 2019-12-05 RX ADMIN — OXYCODONE HYDROCHLORIDE 60 MG: 20 TABLET, FILM COATED, EXTENDED RELEASE ORAL at 04:51

## 2019-12-05 RX ADMIN — NICOTINE 7 MG: 7 PATCH TRANSDERMAL at 04:52

## 2019-12-05 RX ADMIN — VANCOMYCIN HYDROCHLORIDE 1300 MG: 500 INJECTION, POWDER, LYOPHILIZED, FOR SOLUTION INTRAVENOUS at 04:05

## 2019-12-05 RX ADMIN — MORPHINE SULFATE 4 MG: 4 INJECTION INTRAVENOUS at 22:54

## 2019-12-05 RX ADMIN — OXYCODONE HYDROCHLORIDE 60 MG: 20 TABLET, FILM COATED, EXTENDED RELEASE ORAL at 16:57

## 2019-12-05 RX ADMIN — ACETAMINOPHEN 650 MG: 325 TABLET, FILM COATED ORAL at 11:44

## 2019-12-05 RX ADMIN — PIPERACILLIN AND TAZOBACTAM 3.38 G: 3; .375 INJECTION, POWDER, LYOPHILIZED, FOR SOLUTION INTRAVENOUS; PARENTERAL at 09:19

## 2019-12-05 ASSESSMENT — ENCOUNTER SYMPTOMS
ABDOMINAL PAIN: 0
SPUTUM PRODUCTION: 1
MYALGIAS: 1
FEVER: 0
SHORTNESS OF BREATH: 0
DIARRHEA: 0
COUGH: 1

## 2019-12-05 NOTE — PROGRESS NOTES
Assumed care of patient and bedside report received from previous RN. Plan of care discussed with patient. All concerns voiced at this time. Patient educated on importance of calling, bed controls on, bed locked and in lowest position, call light with patient.

## 2019-12-05 NOTE — PROGRESS NOTES
Assumed care of patient, bedside report received from Sully HARDY. Updated on POC, call light within reach and fall precautions in place. Bed locked and in lowest position. Patient instructed to call for assistance before getting out of bed. All questions answered, no other needs at this time.

## 2019-12-05 NOTE — PROGRESS NOTES
Hospital Medicine Daily Progress Note    Date of Service  12/5/2019    Chief Complaint  39 y.o. 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     Hospital Course    see below      Interval Problem Update  N/V-no recurrence at this time and eating food decently now.     Cancer-pain control is currently adequate per patient.     Anemia-dropped below 7 again this AM with no e/o overt bleeding at this time.     PNA-oxygen requirements are improving with low grade fevers ongoing. Patient feels better today and less congested. MRSA Nares is negative. See below for abx's changes planned.       Consultants/Specialty  none    Code Status  fcfc    Disposition  TELE    Review of Systems  Review of Systems   Constitutional: Negative for fever.   Respiratory: Positive for cough (diminished, less congested today) and sputum production (minimal). Negative for shortness of breath.    Cardiovascular: Negative for chest pain.   Gastrointestinal: Negative for abdominal pain and diarrhea.   Musculoskeletal: Positive for joint pain and myalgias.        Pain control remains appropriate at this time   All other systems reviewed and are negative.       Physical Exam  Temp:  [36.3 °C (97.3 °F)-37.2 °C (99 °F)] 37.2 °C (99 °F)  Pulse:  [103-122] 108  Resp:  [16-18] 18  BP: (108-126)/(71-86) 123/86  SpO2:  [95 %-99 %] 96 %    Physical Exam  Vitals signs and nursing note reviewed.   Constitutional:       General: She is not in acute distress.     Appearance: She is well-developed.      Comments: Alopecia  Appears more vibrant today   HENT:      Head: Normocephalic and atraumatic.      Nose:      Comments: NC in place     Mouth/Throat:      Pharynx: No oropharyngeal exudate.   Eyes:      General:         Right eye: No discharge.         Left eye: No discharge.      Pupils: Pupils are equal, round, and reactive to light.   Neck:      Musculoskeletal: Normal  range of motion and neck supple.      Thyroid: No thyromegaly.   Cardiovascular:      Rate and Rhythm: Normal rate and regular rhythm.      Heart sounds: Normal heart sounds. No gallop.    Pulmonary:      Effort: Pulmonary effort is normal. No respiratory distress.      Breath sounds: Rales (minimal at the bases) present. No wheezing.      Comments: Chest wall indwelling port in the R pectoral area, insertion site/overlying area appears non infected    Speaking full sentences  Abdominal:      General: Bowel sounds are normal. There is no distension.      Palpations: Abdomen is soft.      Tenderness: There is no tenderness.   Musculoskeletal: Normal range of motion.         General: Tenderness (proximal L hip with no clear deformity present, unchanged) present.   Skin:     General: Skin is warm and dry.      Coloration: Skin is not jaundiced.      Findings: No rash.   Neurological:      Mental Status: She is alert and oriented to person, place, and time.      Cranial Nerves: No cranial nerve deficit.         Fluids    Intake/Output Summary (Last 24 hours) at 12/5/2019 1433  Last data filed at 12/5/2019 0900  Gross per 24 hour   Intake 580 ml   Output --   Net 580 ml       Laboratory  Recent Labs     12/03/19  0523 12/04/19  0700 12/05/19  0221 12/05/19  1000   WBC 10.2 11.5* 8.3  --    RBC 2.02* 2.29* 2.13*  --    HEMOGLOBIN 6.2* 7.1* 6.6* 7.9*   HEMATOCRIT 21.3* 23.2* 21.6*  --    .4* 101.3* 101.4*  --    MCH 31.2 31.0 31.0  --    MCHC 29.6* 30.6* 30.6*  --    RDW 81.0* 74.6* 71.3*  --    PLATELETCT 81* 78* 86*  --    MPV 9.6 9.5 9.1  --      Recent Labs     12/03/19  0523 12/04/19  0700 12/05/19  0221   SODIUM 136 135 136   POTASSIUM 4.1 3.8 3.8   CHLORIDE 103 100 99   CO2 26 30 29   GLUCOSE 116* 116* 134*   BUN 7* 9 11   CREATININE 0.60 0.58 0.61   CALCIUM 8.4* 8.7 8.5                   Imaging  DX-CHEST-2 VIEWS   Final Result      No significant interval change.      DX-CHEST-LIMITED (1 VIEW)   Final  Result      Interval worsening in right basilar opacification is consistent with worsening pneumonia.      DX-ABDOMEN COMPLETE WITH AP OR PA CXR   Final Result      1.  UNCHANGED RIGHT LUNG BASE OPACITY   2.  INTERVAL PLACEMENT OF RIGHT CHEST PORT   3.  NO ACUTE ABNORMALITIES ARE NOTED ON ABDOMINAL RADIOGRAPHS.           Assessment/Plan  Dehydration- (present on admission)  Assessment & Plan  Resolved, stop all IVF's with respiratory issues now    HAP (hospital-acquired pneumonia)  Assessment & Plan  -monitor, see below for treatment plan  -stop IVF's    High anion gap metabolic acidosis- (present on admission)  Assessment & Plan  normalized    Nausea and vomiting- (present on admission)  Assessment & Plan  IV Zofran, essentially resolved at this point  -?related to viral syndrome versus late chemotherapy effect?    Epithelioid hemangioendothelioma- (present on admission)  Assessment & Plan  Stage IV  Currently undergoing chemotherapy, infusion, unclear medication  -no e/o neutropenia at this time  Follows with Dr. Brown    Acute respiratory failure with hypoxia (HCC)  Assessment & Plan  -improving now  -presumed HAP  -stop IV vancomycin since MRSA nares negative and she clinically is readily improving  -continue empiric IV zosyn day#2, might de-escalate further tomorrow depending on clinical status  -Get sputum cultures and blood cultures-negative thus far  -o2 supplementation-try to wean to RA soon    Hypokalemia- (present on admission)  Assessment & Plan  Replete with IV KCl and oral K. Dur  Improving nicely    Normocytic anemia- (present on admission)  Assessment & Plan  -resolved with 1 U PRBC's, but then dropped again this AM, so required another 1 U PRBC's. Now with an appropriate response again   -?most likely related to recent chemo and ongoing underlying metastatic cancer         VTE prophylaxis: SCD's

## 2019-12-05 NOTE — CARE PLAN
Problem: Communication  Goal: The ability to communicate needs accurately and effectively will improve  Outcome: PROGRESSING AS EXPECTED  Patient educated on use of call light. Patient verbalized understanding. Call light within reach. No other needs at this time. Will continue to monitor.       Problem: Safety  Goal: Will remain free from falls  Outcome: PROGRESSING AS EXPECTED  Patient educated on level of risk. Patient verbalized understanding. Bed is locked and in lowest position. Call light within reach. Walker out of sight. Treaded socks on. Will continue to monitor.

## 2019-12-05 NOTE — RESPIRATORY CARE
COPD EDUCATION by COPD CLINICAL EDUCATOR  12/5/2019 at 6:22 AM by Maryam Erwin     Patient reviewed by COPD education team. Patient does not have a history or diagnosis of COPD and does not use any home pulmonary medcations, therefore does not qualify for the COPD program.

## 2019-12-06 VITALS
RESPIRATION RATE: 16 BRPM | DIASTOLIC BLOOD PRESSURE: 89 MMHG | BODY MASS INDEX: 30.6 KG/M2 | OXYGEN SATURATION: 96 % | WEIGHT: 179.23 LBS | HEART RATE: 118 BPM | SYSTOLIC BLOOD PRESSURE: 131 MMHG | TEMPERATURE: 97.5 F | HEIGHT: 64 IN

## 2019-12-06 PROBLEM — J18.9 HAP (HOSPITAL-ACQUIRED PNEUMONIA): Status: RESOLVED | Noted: 2019-12-04 | Resolved: 2019-12-06

## 2019-12-06 PROBLEM — E87.29 HIGH ANION GAP METABOLIC ACIDOSIS: Status: RESOLVED | Noted: 2019-12-02 | Resolved: 2019-12-06

## 2019-12-06 PROBLEM — E86.0 DEHYDRATION: Status: RESOLVED | Noted: 2019-12-02 | Resolved: 2019-12-06

## 2019-12-06 PROBLEM — J96.01 ACUTE RESPIRATORY FAILURE WITH HYPOXIA (HCC): Status: RESOLVED | Noted: 2019-06-22 | Resolved: 2019-12-06

## 2019-12-06 PROBLEM — R11.2 NAUSEA AND VOMITING: Status: RESOLVED | Noted: 2019-10-16 | Resolved: 2019-12-06

## 2019-12-06 PROBLEM — E87.6 HYPOKALEMIA: Status: RESOLVED | Noted: 2019-06-02 | Resolved: 2019-12-06
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
## 2019-12-06 LAB
ANION GAP SERPL CALC-SCNC: 11 MMOL/L (ref 0–11.9)
BASOPHILS # BLD AUTO: 0.4 % (ref 0–1.8)
BASOPHILS # BLD: 0.04 K/UL (ref 0–0.12)
BUN SERPL-MCNC: 8 MG/DL (ref 8–22)
CALCIUM SERPL-MCNC: 9.2 MG/DL (ref 8.5–10.5)
CHLORIDE SERPL-SCNC: 97 MMOL/L (ref 96–112)
CO2 SERPL-SCNC: 26 MMOL/L (ref 20–33)
CREAT SERPL-MCNC: 0.58 MG/DL (ref 0.5–1.4)
EOSINOPHIL # BLD AUTO: 0.04 K/UL (ref 0–0.51)
EOSINOPHIL NFR BLD: 0.4 % (ref 0–6.9)
ERYTHROCYTE [DISTWIDTH] IN BLOOD BY AUTOMATED COUNT: 67.6 FL (ref 35.9–50)
GLUCOSE SERPL-MCNC: 133 MG/DL (ref 65–99)
HCT VFR BLD AUTO: 27.1 % (ref 37–47)
HGB BLD-MCNC: 8.6 G/DL (ref 12–16)
IMM GRANULOCYTES # BLD AUTO: 0.15 K/UL (ref 0–0.11)
IMM GRANULOCYTES NFR BLD AUTO: 1.4 % (ref 0–0.9)
LYMPHOCYTES # BLD AUTO: 1.52 K/UL (ref 1–4.8)
LYMPHOCYTES NFR BLD: 14 % (ref 22–41)
MCH RBC QN AUTO: 30.6 PG (ref 27–33)
MCHC RBC AUTO-ENTMCNC: 31.7 G/DL (ref 33.6–35)
MCV RBC AUTO: 96.4 FL (ref 81.4–97.8)
MONOCYTES # BLD AUTO: 0.77 K/UL (ref 0–0.85)
MONOCYTES NFR BLD AUTO: 7.1 % (ref 0–13.4)
NEUTROPHILS # BLD AUTO: 8.37 K/UL (ref 2–7.15)
NEUTROPHILS NFR BLD: 76.7 % (ref 44–72)
NRBC # BLD AUTO: 0.23 K/UL
NRBC BLD-RTO: 2.1 /100 WBC
PLATELET # BLD AUTO: 103 K/UL (ref 164–446)
PMV BLD AUTO: 9.8 FL (ref 9–12.9)
POTASSIUM SERPL-SCNC: 3.9 MMOL/L (ref 3.6–5.5)
RBC # BLD AUTO: 2.81 M/UL (ref 4.2–5.4)
SODIUM SERPL-SCNC: 134 MMOL/L (ref 135–145)
WBC # BLD AUTO: 10.9 K/UL (ref 4.8–10.8)

## 2019-12-06 PROCEDURE — 700111 HCHG RX REV CODE 636 W/ 250 OVERRIDE (IP): Performed by: INTERNAL MEDICINE

## 2019-12-06 PROCEDURE — G0378 HOSPITAL OBSERVATION PER HR: HCPCS

## 2019-12-06 PROCEDURE — 99239 HOSP IP/OBS DSCHRG MGMT >30: CPT | Performed by: INTERNAL MEDICINE

## 2019-12-06 PROCEDURE — 80048 BASIC METABOLIC PNL TOTAL CA: CPT

## 2019-12-06 PROCEDURE — 700102 HCHG RX REV CODE 250 W/ 637 OVERRIDE(OP): Performed by: FAMILY MEDICINE

## 2019-12-06 PROCEDURE — A9270 NON-COVERED ITEM OR SERVICE: HCPCS | Performed by: INTERNAL MEDICINE

## 2019-12-06 PROCEDURE — 700102 HCHG RX REV CODE 250 W/ 637 OVERRIDE(OP): Performed by: INTERNAL MEDICINE

## 2019-12-06 PROCEDURE — 85025 COMPLETE CBC W/AUTO DIFF WBC: CPT

## 2019-12-06 PROCEDURE — 96366 THER/PROPH/DIAG IV INF ADDON: CPT

## 2019-12-06 PROCEDURE — A9270 NON-COVERED ITEM OR SERVICE: HCPCS | Performed by: FAMILY MEDICINE

## 2019-12-06 PROCEDURE — 700105 HCHG RX REV CODE 258: Performed by: INTERNAL MEDICINE

## 2019-12-06 RX ORDER — DOXYCYCLINE 100 MG/1
100 CAPSULE ORAL 2 TIMES DAILY
Qty: 10 CAP | Refills: 0 | Status: SHIPPED | OUTPATIENT
Start: 2019-12-06 | End: 2019-12-11

## 2019-12-06 RX ORDER — CEFDINIR 300 MG/1
300 CAPSULE ORAL 2 TIMES DAILY
Qty: 10 CAP | Refills: 0 | Status: SHIPPED | OUTPATIENT
Start: 2019-12-06 | End: 2019-12-11

## 2019-12-06 RX ADMIN — OXYCODONE HYDROCHLORIDE 30 MG: 30 TABLET ORAL at 10:14

## 2019-12-06 RX ADMIN — OXYCODONE HYDROCHLORIDE 60 MG: 20 TABLET, FILM COATED, EXTENDED RELEASE ORAL at 04:54

## 2019-12-06 RX ADMIN — ACETAMINOPHEN 650 MG: 325 TABLET, FILM COATED ORAL at 13:04

## 2019-12-06 RX ADMIN — HEPARIN 500 UNITS: 100 SYRINGE at 14:05

## 2019-12-06 RX ADMIN — PIPERACILLIN AND TAZOBACTAM 3.38 G: 3; .375 INJECTION, POWDER, LYOPHILIZED, FOR SOLUTION INTRAVENOUS; PARENTERAL at 04:54

## 2019-12-06 NOTE — DISCHARGE SUMMARY
"Discharge Summary    CHIEF COMPLAINT ON ADMISSION  Chief Complaint   Patient presents with   • N/V   • Epigastric Pain     N/V, epigastric \"burning\" since 0600       Reason for Admission  Vomiting     Admission Date  12/2/2019    CODE STATUS  Full Code    HPI & HOSPITAL COURSE  This is a 39 y.o. female here with above medical issues. Patient presented with above issues and was found to have severe dehydration along with possible viral component. She was admitted to the telemetry floor and her electrolytes were aggressively replaced. Her symptoms improved and she did require 2 units of irradiated PRBC's during this admission and had an adequate response both times. Etiology is likely related to recent chemotherapy and her underlying metastatic malignancy.     Patient developed acute respiratory failure with hypoxia during admission and was noted on CXR to have a new infiltrate and likely has hospital acquired pneumonia. She was placed on broad spectrum empiric IV antibiotics of IV vancomycin and IV zosyn. Her MRSA nares was NGTD and her cultures are all remain NGTD. She was trimmed down to IV zosyn and responded nicely with no fevers, and weaning for room air with good oxygen saturations. Etiology of the pneumonia is unknown. She will complete a course of antibiotics as listed below.    see below     Therefore, she is discharged in fair and stable condition to home with close outpatient follow-up.    The patient met 2-midnight criteria for an inpatient stay at the time of discharge.    Discharge Date  12/6/2019    FOLLOW UP ITEMS POST DISCHARGE  F/U with PCP in 1-2 weeks  F/U with Dr Delaney of [facility] on 12/15/19    DISCHARGE DIAGNOSES  Active Problems:    Normocytic anemia POA: Yes    Epithelioid hemangioendothelioma POA: Yes  Resolved Problems:    Dehydration POA: Yes    Hypokalemia POA: Yes    Acute respiratory failure with hypoxia (HCC) POA: No    Nausea and vomiting POA: Yes    High anion gap metabolic acidosis " POA: Yes    HAP (hospital-acquired pneumonia) POA: No      FOLLOW UP  Future Appointments   Date Time Provider Department Center   12/9/2019  9:45 AM [facility] [address]   12/16/2019  8:00 AM [facility] [address]   12/23/2019  9:30 AM [facility] [address] [place]   [facility]     Rosita Delaney M.D.  [id number] [place] [id number] [phone]      Please call to schedule your hospital follow up . Thank you       MEDICATIONS ON DISCHARGE     Medication List      START taking these medications      Instructions   cefdinir 300 MG Caps  Commonly known as:  OMNICEF   Take 1 Cap by mouth 2 times a day for 5 days.  Dose:  300 mg     doxycycline 100 MG capsule  Commonly known as:  MONODOX   Take 1 Cap by mouth 2 times a day for 5 days.  Dose:  100 mg        CHANGE how you take these medications      Instructions   * ondansetron 4 MG Tbdp  What changed:  Another medication with the same name was added. Make sure you understand how and when to take each.  Commonly known as:  ZOFRAN ODT   Take 4 mg by mouth every 6 hours as needed for Nausea.  Dose:  4 mg     * ondansetron 4 MG Tbdp  What changed:  You were already taking a medication with the same name, and this prescription was added. Make sure you understand how and when to take each.  Commonly known as:  ZOFRAN ODT   Take 1 Tab by mouth every 6 hours as needed.  Dose:  4 mg         * This list has 2 medication(s) that are the same as other medications prescribed for you. Read the directions carefully, and ask your doctor or other care provider to review them with you.            CONTINUE taking these medications      Instructions   nicotine 7 MG/24HR Pt24  Commonly known as:  NICODERM   Apply 1 Patch to skin as directed 1 time daily as needed.  Dose:  1 Patch     * OXYCONTIN 60 MG T12a tablet  Generic drug:  oxyCODONE CR   Take 60 mg by mouth every 12 hours.  Dose:  60 mg     *  oxyCODONE 30 MG immediate release tablet  Commonly known as:  OXY-IR   Take 30 mg by mouth every 8 hours as needed.  Dose:  30 mg     prochlorperazine 10 MG Tabs  Commonly known as:  COMPAZINE   Take 10 mg by mouth every 8 hours as needed for Nausea/Vomiting.  Dose:  10 mg         * This list has 2 medication(s) that are the same as other medications prescribed for you. Read the directions carefully, and ask your doctor or other care provider to review them with you.                Allergies  No Known Allergies    DIET  Orders Placed This Encounter   Procedures   • Diet Order Regular     Standing Status:   Standing     Number of Occurrences:   1     Order Specific Question:   Diet:     Answer:   Regular [1]       ACTIVITY  As tolerated.  Weight bearing as tolerated    CONSULTATIONS  NONE    PROCEDURES  DX-CHEST-2 VIEWS   Final Result      No significant interval change.      DX-CHEST-LIMITED (1 VIEW)   Final Result      Interval worsening in right basilar opacification is consistent with worsening pneumonia.      DX-ABDOMEN COMPLETE WITH AP OR PA CXR   Final Result      1.  UNCHANGED RIGHT LUNG BASE OPACITY   2.  INTERVAL PLACEMENT OF RIGHT CHEST PORT   3.  NO ACUTE ABNORMALITIES ARE NOTED ON ABDOMINAL RADIOGRAPHS.            LABORATORY  Lab Results   Component Value Date    SODIUM 134 (L) 12/06/2019    POTASSIUM 3.9 12/06/2019    CHLORIDE 97 12/06/2019    CO2 26 12/06/2019    GLUCOSE 133 (H) 12/06/2019    BUN 8 12/06/2019    CREATININE 0.58 12/06/2019        Lab Results   Component Value Date    WBC 10.9 (H) 12/06/2019    HEMOGLOBIN 8.6 (L) 12/06/2019    HEMATOCRIT 27.1 (L) 12/06/2019    PLATELETCT 103 (L) 12/06/2019        Total time of the discharge process exceeds 32 minutes.

## 2019-12-06 NOTE — PROGRESS NOTES
Assumed care of patient and bedside report received from previous RN. Plan of care discussed with patient. All concerns voiced at this time. Patient educated on importance of calling, bed controls on, bed locked and in lowest position, call light with patient. Pain controlled at this time

## 2019-12-06 NOTE — DISCHARGE PLANNING
Medicaid Meds-to-Beds: Discharge prescription orders listed below delivered to patient's bedside. RN notified. Patient counseled.       Amanda Bae   Home Medication Instructions KALYANI:[id number]    Printed on:12/06/19 1419   Medication Information                      cefdinir (OMNICEF) 300 MG Cap  Take 1 Cap by mouth 2 times a day for 5 days.             doxycycline (MONODOX) 100 MG capsule  Take 1 Cap by mouth 2 times a day for 5 days.               Jeri Luque, PharmD

## 2019-12-06 NOTE — DISCHARGE INSTRUCTIONS
Discharge Instructions    Discharged to home by car with relative. Discharged via wheelchair, hospital escort: Yes.  Special equipment needed: Not Applicable    Be sure to schedule a follow-up appointment with your primary care doctor or any specialists as instructed.     Discharge Plan:   Diet Plan: Discussed  Activity Level: Discussed  Smoking Cessation Offered: Patient Refused  Confirmed Follow up Appointment: Appointment Scheduled  Confirmed Symptoms Management: Discussed  Medication Reconciliation Updated: Yes  Influenza Vaccine Indication: Not indicated: Previously immunized this influenza season and > 8 years of age    I understand that a diet low in cholesterol, fat, and sodium is recommended for good health. Unless I have been given specific instructions below for another diet, I accept this instruction as my diet prescription.   Other diet: regular    Special Instructions: None    · Is patient discharged on Warfarin / Coumadin?   No     Healthcare-Associated Pneumonia  Healthcare-associated pneumonia is a lung infection that a person can get when in a health care setting or during certain procedures. The infection causes air sacs inside the lungs to fill with pus or fluid.  Healthcare-associated pneumonia is usually caused by bacteria that are common in health care settings. These bacteria may be resistant to some antibiotic medicines.  What are the causes?  This condition is caused by bacteria that get into your lungs. You can get this condition if you:  · Breathe in droplets from an infected person's cough or sneeze.  · Touch something that an infected person coughed or sneezed on and then touch your mouth, nose, or eyes.  · Have a bacterial infection somewhere else in your body, if the bacteria spread to your lungs through your blood.  What increases the risk?  This condition is more likely to develop in people who:  · Have a disease that weakens their body's defense system (immune system) or their  ability to cough out germs.  · Are older than age 65.  · Having trouble swallowing.  · Use a feeding or breathing tube.  · Have a cold or the flu.  · Have an IV tube inserted in a vein.  · Have surgery.  · Have a bed sore.  · Live in a long-term care facility, such as a nursing home.  · Were in the hospital for two or more days in the past 3 months.  · Received hemodialysis in the past 30 days.  What are the signs or symptoms?  Symptoms of this condition include:  · Fever.  · Chills.  · Cough.  · Shortness of breath.  · Wheezing or crackling sounds when breathing.  How is this diagnosed?  This condition may be diagnosed based on:  · Your symptoms.  · A chest X-ray.  · A measurement of the amount of oxygen in your blood.  How is this treated?  This condition is treated with antibiotics. Your health care provider may take a sample of cells (culture) from your throat to determine what type of bacteria is in your lungs and change your antibiotic based on the results. If you have bacteria in your blood, trouble breathing, or a low oxygen level, you may need to be treated at the hospital. At the hospital, you will be given antibiotics through an IV tube. You may also be given oxygen or breathing treatments.  Follow these instructions at home:  Medicine  · Take your antibiotic medicine as told by your health care provider. Do not stop taking the antibiotic even if you start to feel better.  · Take over-the-counter and other prescription medicines only as told by your health care provider.  Activity  · Rest at home until you feel better.  · Return to your normal activities as told by your health care provider. Ask your health care provider what activities are safe for you.  General instructions  · Drink enough fluid to keep your urine clear or pale yellow.  · Do not use any products that contain nicotine or tobacco, such as cigarettes and e-cigarettes. If you need help quitting, ask your health care provider.  · Limit  alcohol intake to no more than 1 drink per day for nonpregnant women and 2 drinks per day for men. One drink equals 12 oz of beer, 5 oz of wine, or 1½ oz of hard liquor.  · Keep all follow-up visits as told by your health care provider. This is important.  How is this prevented?  Actions that I can take  To lower your risk of getting this condition again:  · Do not smoke. This includes e-cigarettes.  · Do not drink too much alcohol.  · Keep your immune system healthy by eating well and getting enough sleep.  · Get a flu shot every year (annually).  · Get a pneumonia vaccination if:  ¨ You are older than age 65.  ¨ You smoke.  ¨ You have a long-lasting condition like lung disease.  · Exercise your lungs by taking deep breaths, walking, and using an incentive spirometer as directed.  · Wash your hands often with soap and water. If you cannot get to a sink to wash your hands, use an alcohol-based hand .  · Make sure your health care providers are washing their hands. If you do not see them wash their hands, ask them to do so.  · When you are in a health care facility, avoid touching your eyes, nose, and mouth.  · Avoid touching any surface near where people have coughed or sneezed.  · Stand away from sick people when they are coughing or sneezing.  · Wear a mask if you cannot avoid exposure to people who are sick.  · Clean all surfaces often with a disinfectant , especially if someone is sick at home or work.  Precautions of my health care team  Hospitals, nursing homes, and other health care facilities take special care to try to prevent healthcare-associated pneumonia. To do this, your health care team may:  · Clean their hands with soap and water or with alcohol-based hand  before and after seeing patients.  · Wear gloves or masks during treatment.  · Sanitize medical instruments, tubes, other equipment, and surfaces in patient rooms.  · Raise (elevate) the head of your hospital bed so you are  not lying flat. The head of the bed may be elevated 30 degrees or more.  · Have you sit up and move around as soon as possible after surgery.  · Only insert a breathing tube if needed.  · Do these things for you if you have a breathing tube:  ¨ Clean the inside of your mouth regularly.  ¨ Remove the breathing tube as soon as it is no longer needed.  Contact a health care provider if:  · Your symptoms do not get better or they get worse.  · Your symptoms come back after you have finished taking your antibiotics.  Get help right away if:  · You have trouble breathing.  · You have confusion or difficulty thinking.  This information is not intended to replace advice given to you by your health care provider. Make sure you discuss any questions you have with your health care provider.  Document Released: 05/09/2017 Document Revised: 10/03/2017 Document Reviewed: 09/15/2017  [employer] Patient Education © 2017 [employer]       Dehydration, Adult  Dehydration is when there is not enough fluid or water in your body. This happens when you lose more fluids than you take in. Dehydration can range from mild to very bad. It should be treated right away to keep it from getting very bad.  Symptoms of mild dehydration may include:  · Thirst.  · Dry lips.  · Slightly dry mouth.  · Dry, warm skin.  · Dizziness.  Symptoms of moderate dehydration may include:  · Very dry mouth.  · Muscle cramps.  · Dark pee (urine). Pee may be the color of tea.  · Your body making less pee.  · Your eyes making fewer tears.  · Heartbeat that is uneven or faster than normal (palpitations).  · Headache.  · Light-headedness, especially when you stand up from sitting.  · Fainting (syncope).  Symptoms of very bad dehydration may include:  · Changes in skin, such as:  ¨ Cold and clammy skin.  ¨ Blotchy (mottled) or pale skin.  ¨ Skin that does not quickly return to normal after being lightly pinched and let go (poor skin turgor).  · Changes in  body fluids, such as:  ¨ Feeling very thirsty.  ¨ Your eyes making fewer tears.  ¨ Not sweating when body temperature is high, such as in hot weather.  ¨ Your body making very little pee.  · Changes in vital signs, such as:  ¨ Weak pulse.  ¨ Pulse that is more than 100 beats a minute when you are sitting still.  ¨ Fast breathing.  ¨ Low blood pressure.  · Other changes, such as:  ¨ Sunken eyes.  ¨ Cold hands and feet.  ¨ Confusion.  ¨ Lack of energy (lethargy).  ¨ Trouble waking up from sleep.  ¨ Short-term weight loss.  ¨ Unconsciousness.  Follow these instructions at home:  · If told by your doctor, drink an ORS:  ¨ Make an ORS by using instructions on the package.  ¨ Start by drinking small amounts, about ½ cup (120 mL) every 5-10 minutes.  ¨ Slowly drink more until you have had the amount that your doctor said to have.  · Drink enough clear fluid to keep your pee clear or pale yellow. If you were told to drink an ORS, finish the ORS first, then start slowly drinking clear fluids. Drink fluids such as:  ¨ Water. Do not drink only water by itself. Doing that can make the salt (sodium) level in your body get too low (hyponatremia).  ¨ Ice chips.  ¨ Fruit juice that you have added water to (diluted).  ¨ Low-calorie sports drinks.  · Avoid:  ¨ Alcohol.  ¨ Drinks that have a lot of sugar. These include high-calorie sports drinks, fruit juice that does not have water added, and soda.  ¨ Caffeine.  ¨ Foods that are greasy or have a lot of fat or sugar.  · Take over-the-counter and prescription medicines only as told by your doctor.  · Do not take salt tablets. Doing that can make the salt level in your body get too high (hypernatremia).  · Eat foods that have minerals (electrolytes). Examples include bananas, oranges, potatoes, tomatoes, and spinach.  · Keep all follow-up visits as told by your doctor. This is important.  Contact a doctor if:  · You have belly (abdominal) pain that:  ¨ Gets worse.  ¨ Stays in one area  (localizes).  · You have a rash.  · You have a stiff neck.  · You get angry or annoyed more easily than normal (irritability).  · You are more sleepy than normal.  · You have a harder time waking up than normal.  · You feel:  ¨ Weak.  ¨ Dizzy.  ¨ Very thirsty.  · You have peed (urinated) only a small amount of very dark pee during 6-8 hours.  Get help right away if:  · You have symptoms of very bad dehydration.  · You cannot drink fluids without throwing up (vomiting).  · Your symptoms get worse with treatment.  · You have a fever.  · You have a very bad headache.  · You are throwing up or having watery poop (diarrhea) and it:  ¨ Gets worse.  ¨ Does not go away.  · You have blood or something green (bile) in your throw-up.  · You have blood in your poop (stool). This may cause poop to look black and tarry.  · You have not peed in 6-8 hours.  · You pass out (faint).  · Your heart rate when you are sitting still is more than 100 beats a minute.  · You have trouble breathing.  This information is not intended to replace advice given to you by your health care provider. Make sure you discuss any questions you have with your health care provider.  Document Released: 10/14/2010 Document Revised: 07/07/2017 Document Reviewed: 02/10/2017  [employer] Interactive Patient Education © 2017 [employer]    Depression / Suicide Risk    As you are discharged from this [facility] facility, it is important to learn how to keep safe from harming yourself.    Recognize the warning signs:  · Abrupt changes in personality, positive or negative- including increase in energy   · Giving away possessions  · Change in eating patterns- significant weight changes-  positive or negative  · Change in sleeping patterns- unable to sleep or sleeping all the time   · Unwillingness or inability to communicate  · Depression  · Unusual sadness, discouragement and loneliness  · Talk of wanting to die  · Neglect of personal  appearance   · Rebelliousness- reckless behavior  · Withdrawal from people/activities they love  · Confusion- inability to concentrate     If you or a loved one observes any of these behaviors or has concerns about self-harm, here's what you can do:  · Talk about it- your feelings and reasons for harming yourself  · Remove any means that you might use to hurt yourself (examples: pills, rope, extension cords, firearm)  · Get professional help from the community (Mental Health, Substance Abuse, psychological counseling)  · Do not be alone:Call your Safe Contact- someone whom you trust who will be there for you.  · Call your local CRISIS HOTLINE 200-3275 or 202-251-3606  · Call your local Children's Mobile Crisis Response Team [place] (152) 382-9313 or www.Chrends  · Call the toll free National Suicide Prevention Hotlines   · National Suicide Prevention Lifeline 966-236-DGMK (7342)  · National Hope Line Network 800-SUICIDE (040-2761)

## 2019-12-06 NOTE — CARE PLAN
Problem: Nutritional:  Goal: Achieve adequate nutritional intake  Description  Patient will consume >50% of meals.   Outcome: MET     PO % x2. Attempted visit, pt sleeping. Observed breakfast tray - 100% consumed. Weight stable. RD to follow weekly.

## 2019-12-06 NOTE — CARE PLAN
Problem: Bowel/Gastric:  Goal: Normal bowel function is maintained or improved  Outcome: PROGRESSING AS EXPECTED     Problem: Fluid Volume:  Goal: Will maintain balanced intake and output  Outcome: PROGRESSING AS EXPECTED     Problem: Infection  Goal: Will remain free from infection  Outcome: PROGRESSING SLOWER THAN EXPECTED     Problem: Pain Management  Goal: Pain level will decrease to patient's comfort goal  Outcome: PROGRESSING SLOWER THAN EXPECTED

## 2019-12-06 NOTE — PROGRESS NOTES
Patient discharged from hospital to home. Tele monitor and IV removed with no signs or symptoms of bleeding or infection. Discussed prescriptions, discharge education, and symptom management/signs of worsening symptoms with patient. Patient verbalized understanding. Patient aware of follow-up appointment. Patient stable and vitals are within normal limits. Transported with family via car.

## 2019-12-09 ENCOUNTER — OUTPATIENT INFUSION SERVICES (OUTPATIENT)
Dept: ONCOLOGY | Facility: MEDICAL CENTER | Age: 39
End: 2019-12-09
Attending: INTERNAL MEDICINE
Payer: MEDICAID

## 2019-12-09 VITALS
HEIGHT: 63 IN | DIASTOLIC BLOOD PRESSURE: 72 MMHG | OXYGEN SATURATION: 97 % | BODY MASS INDEX: 30.55 KG/M2 | HEART RATE: 124 BPM | TEMPERATURE: 97.7 F | WEIGHT: 172.4 LBS | SYSTOLIC BLOOD PRESSURE: 137 MMHG | RESPIRATION RATE: 18 BRPM

## 2019-12-09 DIAGNOSIS — C79.9 METASTATIC CANCER (HCC): ICD-10-CM

## 2019-12-09 DIAGNOSIS — D64.9 NORMOCYTIC ANEMIA: ICD-10-CM

## 2019-12-09 LAB
ANISOCYTOSIS BLD QL SMEAR: ABNORMAL
BACTERIA BLD CULT: NORMAL
BACTERIA BLD CULT: NORMAL
BASOPHILS # BLD AUTO: 0.6 % (ref 0–1.8)
BASOPHILS # BLD: 0.05 K/UL (ref 0–0.12)
COMMENT 1642: NORMAL
EOSINOPHIL # BLD AUTO: 0.15 K/UL (ref 0–0.51)
EOSINOPHIL NFR BLD: 1.8 % (ref 0–6.9)
ERYTHROCYTE [DISTWIDTH] IN BLOOD BY AUTOMATED COUNT: 70.8 FL (ref 35.9–50)
HCT VFR BLD AUTO: 26.9 % (ref 37–47)
HGB BLD-MCNC: 8 G/DL (ref 12–16)
IMM GRANULOCYTES # BLD AUTO: 0.15 K/UL (ref 0–0.11)
IMM GRANULOCYTES NFR BLD AUTO: 1.8 % (ref 0–0.9)
LYMPHOCYTES # BLD AUTO: 2.38 K/UL (ref 1–4.8)
LYMPHOCYTES NFR BLD: 28.1 % (ref 22–41)
MACROCYTES BLD QL SMEAR: ABNORMAL
MCH RBC QN AUTO: 30.3 PG (ref 27–33)
MCHC RBC AUTO-ENTMCNC: 29.7 G/DL (ref 33.6–35)
MCV RBC AUTO: 101.9 FL (ref 81.4–97.8)
MONOCYTES # BLD AUTO: 0.44 K/UL (ref 0–0.85)
MONOCYTES NFR BLD AUTO: 5.2 % (ref 0–13.4)
MORPHOLOGY BLD-IMP: NORMAL
NEUTROPHILS # BLD AUTO: 5.3 K/UL (ref 2–7.15)
NEUTROPHILS NFR BLD: 62.5 % (ref 44–72)
NRBC # BLD AUTO: 0.4 K/UL
NRBC BLD-RTO: 4.7 /100 WBC
PLATELET # BLD AUTO: 121 K/UL (ref 164–446)
PLATELET BLD QL SMEAR: NORMAL
PMV BLD AUTO: 9.1 FL (ref 9–12.9)
POLYCHROMASIA BLD QL SMEAR: NORMAL
RBC # BLD AUTO: 2.64 M/UL (ref 4.2–5.4)
RBC BLD AUTO: PRESENT
SIGNIFICANT IND 70042: NORMAL
SIGNIFICANT IND 70042: NORMAL
SITE SITE: NORMAL
SITE SITE: NORMAL
SOURCE SOURCE: NORMAL
SOURCE SOURCE: NORMAL
WBC # BLD AUTO: 8.5 K/UL (ref 4.8–10.8)

## 2019-12-09 PROCEDURE — 36415 COLL VENOUS BLD VENIPUNCTURE: CPT

## 2019-12-09 PROCEDURE — 85025 COMPLETE CBC W/AUTO DIFF WBC: CPT

## 2019-12-09 NOTE — PROGRESS NOTES
"Patient hemoglobin 8. Patient states she \"feels good\" and wants to just return next week for re-check as planned. Patient agrees to be seen if she becomes symptomatic. Discharged home to self care in no distress at this time. Next appointment in place.   "

## 2019-12-09 NOTE — PROGRESS NOTES
Patient here for CBC and possible blood products today. Denies any complaints. Is concerned because she has no chemotherapy appointments scheduled and she was told by  that she would. Ally HARDY called MD and will be seeing patient tomorrow. Agrees with this plan.  Blood obtained from [facility] using 23ga butterfly needle. Awaiting results. Continue to monitor.

## 2019-12-23 ENCOUNTER — OUTPATIENT INFUSION SERVICES (OUTPATIENT)
Dept: ONCOLOGY | Facility: MEDICAL CENTER | Age: 39
End: 2019-12-23
Attending: INTERNAL MEDICINE
Payer: MEDICAID

## 2019-12-23 VITALS
TEMPERATURE: 99 F | SYSTOLIC BLOOD PRESSURE: 127 MMHG | HEIGHT: 64 IN | HEART RATE: 106 BPM | BODY MASS INDEX: 29.55 KG/M2 | WEIGHT: 173.06 LBS | DIASTOLIC BLOOD PRESSURE: 79 MMHG | OXYGEN SATURATION: 91 % | RESPIRATION RATE: 18 BRPM

## 2019-12-23 DIAGNOSIS — D64.9 ANEMIA, UNSPECIFIED TYPE: ICD-10-CM

## 2019-12-23 DIAGNOSIS — D63.8 ANEMIA OF CHRONIC DISEASE: ICD-10-CM

## 2019-12-23 LAB
ABO GROUP BLD: NORMAL
ANISOCYTOSIS BLD QL SMEAR: ABNORMAL
BARCODED ABORH UBTYP: 5100
BARCODED ABORH UBTYP: 9500
BARCODED PRD CODE UBPRD: NORMAL
BARCODED PRD CODE UBPRD: NORMAL
BARCODED UNIT NUM UBUNT: NORMAL
BARCODED UNIT NUM UBUNT: NORMAL
BASOPHILS # BLD AUTO: 0.3 % (ref 0–1.8)
BASOPHILS # BLD: 0.02 K/UL (ref 0–0.12)
BLD GP AB SCN SERPL QL: NORMAL
COMMENT 1642: NORMAL
COMPONENT R 8504R: NORMAL
COMPONENT R 8504R: NORMAL
EOSINOPHIL # BLD AUTO: 0.11 K/UL (ref 0–0.51)
EOSINOPHIL NFR BLD: 1.6 % (ref 0–6.9)
ERYTHROCYTE [DISTWIDTH] IN BLOOD BY AUTOMATED COUNT: 74.4 FL (ref 35.9–50)
HCT VFR BLD AUTO: 20 % (ref 37–47)
HGB BLD-MCNC: 5.8 G/DL (ref 12–16)
HYPOCHROMIA BLD QL SMEAR: ABNORMAL
IMM GRANULOCYTES # BLD AUTO: 0.25 K/UL (ref 0–0.11)
IMM GRANULOCYTES NFR BLD AUTO: 3.6 % (ref 0–0.9)
LYMPHOCYTES # BLD AUTO: 2.05 K/UL (ref 1–4.8)
LYMPHOCYTES NFR BLD: 29.6 % (ref 22–41)
MACROCYTES BLD QL SMEAR: ABNORMAL
MCH RBC QN AUTO: 31.1 PG (ref 27–33)
MCHC RBC AUTO-ENTMCNC: 29.8 G/DL (ref 33.6–35)
MCV RBC AUTO: 104.2 FL (ref 81.4–97.8)
MONOCYTES # BLD AUTO: 0.41 K/UL (ref 0–0.85)
MONOCYTES NFR BLD AUTO: 5.9 % (ref 0–13.4)
MORPHOLOGY BLD-IMP: NORMAL
NEUTROPHILS # BLD AUTO: 4.09 K/UL (ref 2–7.15)
NEUTROPHILS NFR BLD: 59 % (ref 44–72)
NRBC # BLD AUTO: 1 K/UL
NRBC BLD-RTO: 14.4 /100 WBC
PLATELET # BLD AUTO: 93 K/UL (ref 164–446)
PLATELET BLD QL SMEAR: NORMAL
PMV BLD AUTO: 9.8 FL (ref 9–12.9)
POIKILOCYTOSIS BLD QL SMEAR: NORMAL
POLYCHROMASIA BLD QL SMEAR: NORMAL
PRODUCT TYPE UPROD: NORMAL
PRODUCT TYPE UPROD: NORMAL
RBC # BLD AUTO: 1.9 M/UL (ref 4.2–5.4)
RBC BLD AUTO: PRESENT
RH BLD: NORMAL
STOMATOCYTES BLD QL SMEAR: NORMAL
UNIT STATUS USTAT: NORMAL
UNIT STATUS USTAT: NORMAL
WBC # BLD AUTO: 6.9 K/UL (ref 4.8–10.8)

## 2019-12-23 PROCEDURE — P9016 RBC LEUKOCYTES REDUCED: HCPCS

## 2019-12-23 PROCEDURE — 36591 DRAW BLOOD OFF VENOUS DEVICE: CPT

## 2019-12-23 PROCEDURE — 36430 TRANSFUSION BLD/BLD COMPNT: CPT

## 2019-12-23 PROCEDURE — 700111 HCHG RX REV CODE 636 W/ 250 OVERRIDE (IP)

## 2019-12-23 PROCEDURE — A9270 NON-COVERED ITEM OR SERVICE: HCPCS | Performed by: INTERNAL MEDICINE

## 2019-12-23 PROCEDURE — 85025 COMPLETE CBC W/AUTO DIFF WBC: CPT

## 2019-12-23 PROCEDURE — 86850 RBC ANTIBODY SCREEN: CPT

## 2019-12-23 PROCEDURE — 306780 HCHG STAT FOR TRANSFUSION PER CASE

## 2019-12-23 PROCEDURE — 86923 COMPATIBILITY TEST ELECTRIC: CPT | Mod: 91

## 2019-12-23 PROCEDURE — 700102 HCHG RX REV CODE 250 W/ 637 OVERRIDE(OP): Performed by: INTERNAL MEDICINE

## 2019-12-23 PROCEDURE — A4212 NON CORING NEEDLE OR STYLET: HCPCS

## 2019-12-23 PROCEDURE — 86900 BLOOD TYPING SEROLOGIC ABO: CPT

## 2019-12-23 PROCEDURE — 86901 BLOOD TYPING SEROLOGIC RH(D): CPT

## 2019-12-23 PROCEDURE — 86945 BLOOD PRODUCT/IRRADIATION: CPT

## 2019-12-23 RX ORDER — SODIUM CHLORIDE 9 MG/ML
500 INJECTION, SOLUTION INTRAVENOUS ONCE
Status: CANCELLED | OUTPATIENT
Start: 2019-12-23

## 2019-12-23 RX ORDER — ACETAMINOPHEN 325 MG/1
650 TABLET ORAL ONCE
Status: CANCELLED | OUTPATIENT
Start: 2019-12-23

## 2019-12-23 RX ORDER — LIDOCAINE HYDROCHLORIDE 10 MG/ML
INJECTION, SOLUTION EPIDURAL; INFILTRATION; INTRACAUDAL; PERINEURAL
Status: COMPLETED
Start: 2019-12-23 | End: 2019-12-23

## 2019-12-23 RX ORDER — GABAPENTIN 100 MG/1
300 CAPSULE ORAL 3 TIMES DAILY
COMMUNITY
End: 2020-01-13

## 2019-12-23 RX ORDER — DIPHENHYDRAMINE HCL 25 MG
25 TABLET ORAL ONCE
Status: CANCELLED | OUTPATIENT
Start: 2019-12-23

## 2019-12-23 RX ORDER — DIPHENHYDRAMINE HYDROCHLORIDE 50 MG/ML
25 INJECTION INTRAMUSCULAR; INTRAVENOUS PRN
Status: CANCELLED | OUTPATIENT
Start: 2019-12-23

## 2019-12-23 RX ORDER — ACETAMINOPHEN 325 MG/1
650 TABLET ORAL PRN
Status: CANCELLED | OUTPATIENT
Start: 2019-12-23

## 2019-12-23 RX ORDER — PROCHLORPERAZINE MALEATE 10 MG
10 TABLET ORAL EVERY 6 HOURS PRN
Status: CANCELLED
Start: 2019-12-23

## 2019-12-23 RX ORDER — LIDOCAINE HYDROCHLORIDE 10 MG/ML
20 INJECTION, SOLUTION INFILTRATION; PERINEURAL
Status: CANCELLED | OUTPATIENT
Start: 2019-12-23

## 2019-12-23 RX ORDER — ACETAMINOPHEN 325 MG/1
650 TABLET ORAL ONCE
Status: COMPLETED | OUTPATIENT
Start: 2019-12-23 | End: 2019-12-23

## 2019-12-23 RX ORDER — PROCHLORPERAZINE MALEATE 10 MG
10 TABLET ORAL EVERY 6 HOURS PRN
Status: DISCONTINUED | OUTPATIENT
Start: 2019-12-23 | End: 2019-12-23 | Stop reason: HOSPADM

## 2019-12-23 RX ORDER — DIPHENHYDRAMINE HCL 25 MG
25 TABLET ORAL ONCE
Status: COMPLETED | OUTPATIENT
Start: 2019-12-23 | End: 2019-12-23

## 2019-12-23 RX ADMIN — HEPARIN 500 UNITS: 100 SYRINGE at 16:05

## 2019-12-23 RX ADMIN — DIPHENHYDRAMINE HCL 25 MG: 25 TABLET ORAL at 11:30

## 2019-12-23 RX ADMIN — ACETAMINOPHEN 650 MG: 325 TABLET ORAL at 11:21

## 2019-12-23 RX ADMIN — LIDOCAINE HYDROCHLORIDE 5 ML: 10 INJECTION, SOLUTION EPIDURAL; INFILTRATION; INTRACAUDAL at 10:05

## 2019-12-23 NOTE — PROGRESS NOTES
Returns for lab and blood.  Feels fatigued, but states still cooking for family and doing laundry.  Reports uses walker at home.  Wheelchair in use while here.  Port accessed using sterile technique and labs meet parameters for 2 u.  Premeds and blood given without rx.  Port saline and hep lock flush before de access.  DC to care of family.

## 2019-12-30 ENCOUNTER — OUTPATIENT INFUSION SERVICES (OUTPATIENT)
Dept: ONCOLOGY | Facility: MEDICAL CENTER | Age: 39
End: 2019-12-30
Attending: INTERNAL MEDICINE
Payer: MEDICAID

## 2019-12-30 VITALS
HEART RATE: 100 BPM | HEIGHT: 64 IN | SYSTOLIC BLOOD PRESSURE: 103 MMHG | DIASTOLIC BLOOD PRESSURE: 55 MMHG | BODY MASS INDEX: 27.36 KG/M2 | TEMPERATURE: 98.3 F | RESPIRATION RATE: 16 BRPM | WEIGHT: 160.27 LBS | OXYGEN SATURATION: 98 %

## 2019-12-30 DIAGNOSIS — D63.8 ANEMIA OF CHRONIC DISEASE: ICD-10-CM

## 2019-12-30 LAB
ABO GROUP BLD: NORMAL
ANISOCYTOSIS BLD QL SMEAR: ABNORMAL
BARCODED ABORH UBTYP: 5100
BARCODED PRD CODE UBPRD: NORMAL
BARCODED UNIT NUM UBUNT: NORMAL
BASOPHILS # BLD AUTO: 0.6 % (ref 0–1.8)
BASOPHILS # BLD: 0.04 K/UL (ref 0–0.12)
BLD GP AB SCN SERPL QL: NORMAL
COMMENT 1642: NORMAL
COMPONENT R 8504R: NORMAL
EOSINOPHIL # BLD AUTO: 0.12 K/UL (ref 0–0.51)
EOSINOPHIL NFR BLD: 1.7 % (ref 0–6.9)
ERYTHROCYTE [DISTWIDTH] IN BLOOD BY AUTOMATED COUNT: 75.4 FL (ref 35.9–50)
HCT VFR BLD AUTO: 24.9 % (ref 37–47)
HGB BLD-MCNC: 7.4 G/DL (ref 12–16)
IMM GRANULOCYTES # BLD AUTO: 0.08 K/UL (ref 0–0.11)
IMM GRANULOCYTES NFR BLD AUTO: 1.1 % (ref 0–0.9)
LYMPHOCYTES # BLD AUTO: 2.4 K/UL (ref 1–4.8)
LYMPHOCYTES NFR BLD: 34 % (ref 22–41)
MACROCYTES BLD QL SMEAR: ABNORMAL
MCH RBC QN AUTO: 31.1 PG (ref 27–33)
MCHC RBC AUTO-ENTMCNC: 30.1 G/DL (ref 33.6–35)
MCV RBC AUTO: 103.3 FL (ref 81.4–97.8)
MONOCYTES # BLD AUTO: 0.52 K/UL (ref 0–0.85)
MONOCYTES NFR BLD AUTO: 7.4 % (ref 0–13.4)
MORPHOLOGY BLD-IMP: NORMAL
NEUTROPHILS # BLD AUTO: 3.9 K/UL (ref 2–7.15)
NEUTROPHILS NFR BLD: 55.2 % (ref 44–72)
NRBC # BLD AUTO: 0.24 K/UL
NRBC BLD-RTO: 3.4 /100 WBC
PLATELET # BLD AUTO: 143 K/UL (ref 164–446)
PLATELET BLD QL SMEAR: NORMAL
PMV BLD AUTO: 9.7 FL (ref 9–12.9)
POLYCHROMASIA BLD QL SMEAR: NORMAL
PRODUCT TYPE UPROD: NORMAL
RBC # BLD AUTO: 2.41 M/UL (ref 4.2–5.4)
RBC BLD AUTO: PRESENT
RH BLD: NORMAL
UNIT STATUS USTAT: NORMAL
WBC # BLD AUTO: 7.1 K/UL (ref 4.8–10.8)

## 2019-12-30 PROCEDURE — 700102 HCHG RX REV CODE 250 W/ 637 OVERRIDE(OP): Performed by: INTERNAL MEDICINE

## 2019-12-30 PROCEDURE — 86900 BLOOD TYPING SEROLOGIC ABO: CPT

## 2019-12-30 PROCEDURE — 86923 COMPATIBILITY TEST ELECTRIC: CPT

## 2019-12-30 PROCEDURE — 36430 TRANSFUSION BLD/BLD COMPNT: CPT

## 2019-12-30 PROCEDURE — 85025 COMPLETE CBC W/AUTO DIFF WBC: CPT

## 2019-12-30 PROCEDURE — 36591 DRAW BLOOD OFF VENOUS DEVICE: CPT

## 2019-12-30 PROCEDURE — A9270 NON-COVERED ITEM OR SERVICE: HCPCS | Performed by: INTERNAL MEDICINE

## 2019-12-30 PROCEDURE — 86850 RBC ANTIBODY SCREEN: CPT

## 2019-12-30 PROCEDURE — P9016 RBC LEUKOCYTES REDUCED: HCPCS

## 2019-12-30 PROCEDURE — 306780 HCHG STAT FOR TRANSFUSION PER CASE

## 2019-12-30 PROCEDURE — 86945 BLOOD PRODUCT/IRRADIATION: CPT

## 2019-12-30 PROCEDURE — 700111 HCHG RX REV CODE 636 W/ 250 OVERRIDE (IP)

## 2019-12-30 PROCEDURE — A4212 NON CORING NEEDLE OR STYLET: HCPCS

## 2019-12-30 PROCEDURE — 86901 BLOOD TYPING SEROLOGIC RH(D): CPT

## 2019-12-30 RX ORDER — PROCHLORPERAZINE MALEATE 10 MG
10 TABLET ORAL EVERY 6 HOURS PRN
Status: DISCONTINUED | OUTPATIENT
Start: 2019-12-30 | End: 2019-12-30 | Stop reason: HOSPADM

## 2019-12-30 RX ORDER — LIDOCAINE HYDROCHLORIDE 10 MG/ML
INJECTION, SOLUTION EPIDURAL; INFILTRATION; INTRACAUDAL; PERINEURAL
Status: COMPLETED
Start: 2019-12-30 | End: 2019-12-30

## 2019-12-30 RX ORDER — LIDOCAINE HYDROCHLORIDE 10 MG/ML
20 INJECTION, SOLUTION INFILTRATION; PERINEURAL
Status: CANCELLED | OUTPATIENT
Start: 2019-12-30

## 2019-12-30 RX ORDER — SODIUM CHLORIDE 9 MG/ML
500 INJECTION, SOLUTION INTRAVENOUS ONCE
Status: CANCELLED | OUTPATIENT
Start: 2019-12-30

## 2019-12-30 RX ORDER — ACETAMINOPHEN 325 MG/1
650 TABLET ORAL PRN
Status: CANCELLED | OUTPATIENT
Start: 2019-12-30

## 2019-12-30 RX ORDER — ACETAMINOPHEN 325 MG/1
650 TABLET ORAL ONCE
Status: COMPLETED | OUTPATIENT
Start: 2019-12-30 | End: 2019-12-30

## 2019-12-30 RX ORDER — ACETAMINOPHEN 325 MG/1
650 TABLET ORAL ONCE
Status: CANCELLED | OUTPATIENT
Start: 2019-12-30

## 2019-12-30 RX ORDER — DIPHENHYDRAMINE HYDROCHLORIDE 50 MG/ML
25 INJECTION INTRAMUSCULAR; INTRAVENOUS PRN
Status: CANCELLED | OUTPATIENT
Start: 2019-12-30

## 2019-12-30 RX ORDER — DIPHENHYDRAMINE HCL 25 MG
25 TABLET ORAL ONCE
Status: CANCELLED | OUTPATIENT
Start: 2019-12-30

## 2019-12-30 RX ORDER — PROCHLORPERAZINE MALEATE 10 MG
10 TABLET ORAL EVERY 6 HOURS PRN
Status: CANCELLED
Start: 2019-12-30

## 2019-12-30 RX ORDER — DIPHENHYDRAMINE HCL 25 MG
25 TABLET ORAL ONCE
Status: COMPLETED | OUTPATIENT
Start: 2019-12-30 | End: 2019-12-30

## 2019-12-30 RX ADMIN — HEPARIN 500 UNITS: 100 SYRINGE at 14:30

## 2019-12-30 RX ADMIN — ACETAMINOPHEN 650 MG: 325 TABLET ORAL at 11:54

## 2019-12-30 RX ADMIN — DIPHENHYDRAMINE HCL 25 MG: 25 TABLET ORAL at 11:54

## 2019-12-30 RX ADMIN — LIDOCAINE HYDROCHLORIDE 5 ML: 10 INJECTION, SOLUTION EPIDURAL; INFILTRATION; INTRACAUDAL at 10:10

## 2019-12-30 NOTE — PROGRESS NOTES
[facility] Pt here for CBC/possible blood products. Pt transfers self to recliner. Pt states she uses walker at home and a w/c while out r/t general weakness. POC discussed with pt and she agrees with plan. Port accessed in sterile fashion, brisk blood return noted, CBC sent to lab. Results reviewed pt meets parameters for 1 unit PRBC's (Hgb 7.4).  Pt medicated per MAR. Pt tolerated transfusion without s/s adverse reaction. Port with brisk blood return, flushed with NS then heparin. Port de-accessed, needle intact, gauze dressing applied. Pt discharged to self care, NAD. Pt aware of next appt.

## 2020-01-10 ENCOUNTER — PATIENT OUTREACH (OUTPATIENT)
Dept: OTHER | Facility: MEDICAL CENTER | Age: 40
End: 2020-01-10

## 2020-01-10 NOTE — PROGRESS NOTES
On January 10, 2020, Oncology Social Worker Merary Still attempted telephone call with pt.  OSW Cheko left voicemail message requesting for pt. to call back at earliest convenience.

## 2020-01-13 ENCOUNTER — OUTPATIENT INFUSION SERVICES (OUTPATIENT)
Dept: ONCOLOGY | Facility: MEDICAL CENTER | Age: 40
End: 2020-01-13
Attending: INTERNAL MEDICINE
Payer: MEDICAID

## 2020-01-13 VITALS
RESPIRATION RATE: 18 BRPM | SYSTOLIC BLOOD PRESSURE: 110 MMHG | TEMPERATURE: 99.1 F | BODY MASS INDEX: 27.89 KG/M2 | HEIGHT: 63 IN | WEIGHT: 157.41 LBS | HEART RATE: 95 BPM | OXYGEN SATURATION: 94 % | DIASTOLIC BLOOD PRESSURE: 64 MMHG

## 2020-01-13 DIAGNOSIS — D63.8 ANEMIA OF CHRONIC DISEASE: ICD-10-CM

## 2020-01-13 DIAGNOSIS — C79.9 METASTATIC CANCER (HCC): ICD-10-CM

## 2020-01-13 LAB
ABO GROUP BLD: NORMAL
ALBUMIN SERPL BCP-MCNC: 3.4 G/DL (ref 3.2–4.9)
ALBUMIN/GLOB SERPL: 0.9 G/DL
ALP SERPL-CCNC: 162 U/L (ref 30–99)
ALT SERPL-CCNC: 13 U/L (ref 2–50)
ANION GAP SERPL CALC-SCNC: 12 MMOL/L (ref 0–11.9)
ANISOCYTOSIS BLD QL SMEAR: ABNORMAL
AST SERPL-CCNC: 28 U/L (ref 12–45)
BARCODED ABORH UBTYP: 5100
BARCODED ABORH UBTYP: 5100
BARCODED PRD CODE UBPRD: NORMAL
BARCODED PRD CODE UBPRD: NORMAL
BARCODED UNIT NUM UBUNT: NORMAL
BARCODED UNIT NUM UBUNT: NORMAL
BASOPHILS # BLD AUTO: 0.3 % (ref 0–1.8)
BASOPHILS # BLD: 0.02 K/UL (ref 0–0.12)
BILIRUB SERPL-MCNC: 1.9 MG/DL (ref 0.1–1.5)
BLD GP AB SCN SERPL QL: NORMAL
BUN SERPL-MCNC: 16 MG/DL (ref 8–22)
CALCIUM SERPL-MCNC: 8.4 MG/DL (ref 8.5–10.5)
CHLORIDE SERPL-SCNC: 92 MMOL/L (ref 96–112)
CO2 SERPL-SCNC: 25 MMOL/L (ref 20–33)
COMMENT 1642: NORMAL
COMPONENT R 8504R: NORMAL
COMPONENT R 8504R: NORMAL
CREAT SERPL-MCNC: 0.59 MG/DL (ref 0.5–1.4)
EOSINOPHIL # BLD AUTO: 0.06 K/UL (ref 0–0.51)
EOSINOPHIL NFR BLD: 0.8 % (ref 0–6.9)
ERYTHROCYTE [DISTWIDTH] IN BLOOD BY AUTOMATED COUNT: 73.8 FL (ref 35.9–50)
GLOBULIN SER CALC-MCNC: 4 G/DL (ref 1.9–3.5)
GLUCOSE SERPL-MCNC: 136 MG/DL (ref 65–99)
HCT VFR BLD AUTO: 22 % (ref 37–47)
HGB BLD-MCNC: 6.7 G/DL (ref 12–16)
HYPOCHROMIA BLD QL SMEAR: ABNORMAL
IMM GRANULOCYTES # BLD AUTO: 0.27 K/UL (ref 0–0.11)
IMM GRANULOCYTES NFR BLD AUTO: 3.5 % (ref 0–0.9)
LYMPHOCYTES # BLD AUTO: 1.51 K/UL (ref 1–4.8)
LYMPHOCYTES NFR BLD: 19.5 % (ref 22–41)
MACROCYTES BLD QL SMEAR: ABNORMAL
MCH RBC QN AUTO: 30.7 PG (ref 27–33)
MCHC RBC AUTO-ENTMCNC: 30.5 G/DL (ref 33.6–35)
MCV RBC AUTO: 100.9 FL (ref 81.4–97.8)
MONOCYTES # BLD AUTO: 0.82 K/UL (ref 0–0.85)
MONOCYTES NFR BLD AUTO: 10.6 % (ref 0–13.4)
MORPHOLOGY BLD-IMP: NORMAL
NEUTROPHILS # BLD AUTO: 5.07 K/UL (ref 2–7.15)
NEUTROPHILS NFR BLD: 65.3 % (ref 44–72)
NRBC # BLD AUTO: 1.52 K/UL
NRBC BLD-RTO: 19.6 /100 WBC
PLATELET # BLD AUTO: 83 K/UL (ref 164–446)
PLATELET BLD QL SMEAR: NORMAL
PMV BLD AUTO: 9.8 FL (ref 9–12.9)
POLYCHROMASIA BLD QL SMEAR: NORMAL
POTASSIUM SERPL-SCNC: 3.6 MMOL/L (ref 3.6–5.5)
PRODUCT TYPE UPROD: NORMAL
PRODUCT TYPE UPROD: NORMAL
PROT SERPL-MCNC: 7.4 G/DL (ref 6–8.2)
RBC # BLD AUTO: 2.18 M/UL (ref 4.2–5.4)
RBC BLD AUTO: PRESENT
RH BLD: NORMAL
SODIUM SERPL-SCNC: 129 MMOL/L (ref 135–145)
UNIT STATUS USTAT: NORMAL
UNIT STATUS USTAT: NORMAL
WBC # BLD AUTO: 7.8 K/UL (ref 4.8–10.8)

## 2020-01-13 PROCEDURE — A9270 NON-COVERED ITEM OR SERVICE: HCPCS | Performed by: INTERNAL MEDICINE

## 2020-01-13 PROCEDURE — 36591 DRAW BLOOD OFF VENOUS DEVICE: CPT

## 2020-01-13 PROCEDURE — 700111 HCHG RX REV CODE 636 W/ 250 OVERRIDE (IP)

## 2020-01-13 PROCEDURE — 86850 RBC ANTIBODY SCREEN: CPT

## 2020-01-13 PROCEDURE — 86901 BLOOD TYPING SEROLOGIC RH(D): CPT

## 2020-01-13 PROCEDURE — 700102 HCHG RX REV CODE 250 W/ 637 OVERRIDE(OP): Performed by: INTERNAL MEDICINE

## 2020-01-13 PROCEDURE — 36430 TRANSFUSION BLD/BLD COMPNT: CPT

## 2020-01-13 PROCEDURE — 86923 COMPATIBILITY TEST ELECTRIC: CPT | Mod: 91

## 2020-01-13 PROCEDURE — 86945 BLOOD PRODUCT/IRRADIATION: CPT | Mod: 91

## 2020-01-13 PROCEDURE — 85025 COMPLETE CBC W/AUTO DIFF WBC: CPT

## 2020-01-13 PROCEDURE — 306780 HCHG STAT FOR TRANSFUSION PER CASE

## 2020-01-13 PROCEDURE — A4212 NON CORING NEEDLE OR STYLET: HCPCS

## 2020-01-13 PROCEDURE — 86900 BLOOD TYPING SEROLOGIC ABO: CPT

## 2020-01-13 PROCEDURE — 700111 HCHG RX REV CODE 636 W/ 250 OVERRIDE (IP): Performed by: INTERNAL MEDICINE

## 2020-01-13 PROCEDURE — P9016 RBC LEUKOCYTES REDUCED: HCPCS | Mod: 91

## 2020-01-13 PROCEDURE — 80053 COMPREHEN METABOLIC PANEL: CPT

## 2020-01-13 RX ORDER — DIPHENHYDRAMINE HCL 25 MG
25 TABLET ORAL ONCE
Status: CANCELLED | OUTPATIENT
Start: 2020-01-13

## 2020-01-13 RX ORDER — CLONAZEPAM 1 MG/1
TABLET ORAL
COMMUNITY
Start: 2020-01-02

## 2020-01-13 RX ORDER — SODIUM CHLORIDE 9 MG/ML
500 INJECTION, SOLUTION INTRAVENOUS ONCE
Status: CANCELLED | OUTPATIENT
Start: 2020-01-13

## 2020-01-13 RX ORDER — DIPHENHYDRAMINE HCL 25 MG
25 TABLET ORAL ONCE
Status: COMPLETED | OUTPATIENT
Start: 2020-01-13 | End: 2020-01-13

## 2020-01-13 RX ORDER — 0.9 % SODIUM CHLORIDE 0.9 %
20 VIAL (ML) INJECTION ONCE
Status: CANCELLED | OUTPATIENT
Start: 2020-01-13

## 2020-01-13 RX ORDER — HEPARIN SODIUM (PORCINE) LOCK FLUSH IV SOLN 100 UNIT/ML 100 UNIT/ML
500 SOLUTION INTRAVENOUS PRN
Status: CANCELLED | OUTPATIENT
Start: 2020-01-13

## 2020-01-13 RX ORDER — TOPIRAMATE 25 MG/1
TABLET ORAL
COMMUNITY
End: 2020-01-30

## 2020-01-13 RX ORDER — LIDOCAINE HYDROCHLORIDE 10 MG/ML
20 INJECTION, SOLUTION INFILTRATION; PERINEURAL
Status: CANCELLED | OUTPATIENT
Start: 2020-01-13

## 2020-01-13 RX ORDER — ACETAMINOPHEN 325 MG/1
650 TABLET ORAL ONCE
Status: CANCELLED | OUTPATIENT
Start: 2020-01-13

## 2020-01-13 RX ORDER — DIPHENHYDRAMINE HYDROCHLORIDE 50 MG/ML
25 INJECTION INTRAMUSCULAR; INTRAVENOUS PRN
Status: CANCELLED | OUTPATIENT
Start: 2020-01-13

## 2020-01-13 RX ORDER — ACETAMINOPHEN 325 MG/1
650 TABLET ORAL PRN
Status: CANCELLED | OUTPATIENT
Start: 2020-01-13

## 2020-01-13 RX ORDER — LIDOCAINE HYDROCHLORIDE 10 MG/ML
INJECTION, SOLUTION EPIDURAL; INFILTRATION; INTRACAUDAL; PERINEURAL
Status: COMPLETED
Start: 2020-01-13 | End: 2020-01-13

## 2020-01-13 RX ORDER — PROCHLORPERAZINE MALEATE 10 MG
10 TABLET ORAL EVERY 6 HOURS PRN
Status: CANCELLED
Start: 2020-01-13

## 2020-01-13 RX ORDER — ACETAMINOPHEN 325 MG/1
650 TABLET ORAL ONCE
Status: COMPLETED | OUTPATIENT
Start: 2020-01-13 | End: 2020-01-13

## 2020-01-13 RX ADMIN — DIPHENHYDRAMINE HCL 25 MG: 25 TABLET ORAL at 12:52

## 2020-01-13 RX ADMIN — ACETAMINOPHEN 650 MG: 325 TABLET ORAL at 12:52

## 2020-01-13 RX ADMIN — HEPARIN 500 UNITS: 100 SYRINGE at 17:56

## 2020-01-13 RX ADMIN — LIDOCAINE HYDROCHLORIDE 5 ML: 10 INJECTION, SOLUTION EPIDURAL; INFILTRATION; INTRACAUDAL; PERINEURAL at 11:02

## 2020-01-13 NOTE — PROGRESS NOTES
Labs resulted. Call placed to Dr. Delaney and orders received to hold chemo today and have pt come back in 2 days for recheck of labs. Platelets and Hgb below threshold for chemo and t-bili slightly elevated (pharmacy requested recheck of CMP to determine trend of t-bili). 2 units of blood to be transfused today, pt informed of plan of care. RN providing care aware of plan of care.

## 2020-01-13 NOTE — PROGRESS NOTES
Pt arrived via WC, 45 min late for appt. Pt counseled on tardiness, pt reported they could not find parking. Discussed with pt utilizing [email or web address] for convenience, pt reported she did not know about the [employer] service but is aware now. Pt with R chest port, requesting lidocaine to numb site. Port site numbed and accessed in sterile field. Plan of care is for pt to receive chemo however no orders in place. Triage RN contacted and supportive plan entered by Triage RN to draw CMP and blood plan contains CBC. Labs drawn as ordered. Pt resting in chair at this time. Marci RN to assume care of pt for remaining treatment. Labs pending.

## 2020-01-13 NOTE — PROGRESS NOTES
Pt arrived to infusion appt over 30 minutes late.  Discussed plan of care with patient.  Port accessed by HAYLEY Young and CBC/CMP were sent to the lab.  Pt will come back on 1/15/2020 for lab re-check with possible chemotherapy.  Hemoglobin is 6.7 and platelets are 83,000 today and pt will receive 2 units of PRBC.  Informed blood bank to process COD.  Pre-medications given.  Pt tolerated blood transfusion without adverse reaction.  VS remained stable throughout transfusion.  Port flushed with NS and heparin per protocol.  Donahue needle removed intact and site covered with gauze.  Gave pt a copy of schedule.  Pt dc home with spouse.

## 2020-01-15 ENCOUNTER — OUTPATIENT INFUSION SERVICES (OUTPATIENT)
Dept: ONCOLOGY | Facility: MEDICAL CENTER | Age: 40
End: 2020-01-15
Attending: INTERNAL MEDICINE
Payer: MEDICAID

## 2020-01-15 VITALS
RESPIRATION RATE: 18 BRPM | WEIGHT: 159.39 LBS | SYSTOLIC BLOOD PRESSURE: 114 MMHG | OXYGEN SATURATION: 96 % | HEART RATE: 112 BPM | HEIGHT: 63 IN | BODY MASS INDEX: 28.24 KG/M2 | DIASTOLIC BLOOD PRESSURE: 71 MMHG | TEMPERATURE: 98.3 F

## 2020-01-15 DIAGNOSIS — C79.9 METASTATIC CANCER (HCC): Primary | ICD-10-CM

## 2020-01-15 LAB
ALBUMIN SERPL BCP-MCNC: 3.4 G/DL (ref 3.2–4.9)
ALBUMIN/GLOB SERPL: 1 G/DL
ALP SERPL-CCNC: 151 U/L (ref 30–99)
ALT SERPL-CCNC: 13 U/L (ref 2–50)
ANION GAP SERPL CALC-SCNC: 10 MMOL/L (ref 0–11.9)
ANISOCYTOSIS BLD QL SMEAR: ABNORMAL
AST SERPL-CCNC: 18 U/L (ref 12–45)
BASOPHILS # BLD AUTO: 1.8 % (ref 0–1.8)
BASOPHILS # BLD: 0.17 K/UL (ref 0–0.12)
BILIRUB SERPL-MCNC: 1.4 MG/DL (ref 0.1–1.5)
BUN SERPL-MCNC: 12 MG/DL (ref 8–22)
CALCIUM SERPL-MCNC: 9.3 MG/DL (ref 8.5–10.5)
CHLORIDE SERPL-SCNC: 96 MMOL/L (ref 96–112)
CO2 SERPL-SCNC: 26 MMOL/L (ref 20–33)
CREAT SERPL-MCNC: 0.54 MG/DL (ref 0.5–1.4)
EOSINOPHIL # BLD AUTO: 0 K/UL (ref 0–0.51)
EOSINOPHIL NFR BLD: 0 % (ref 0–6.9)
ERYTHROCYTE [DISTWIDTH] IN BLOOD BY AUTOMATED COUNT: 69.2 FL (ref 35.9–50)
GLOBULIN SER CALC-MCNC: 3.5 G/DL (ref 1.9–3.5)
GLUCOSE SERPL-MCNC: 113 MG/DL (ref 65–99)
HCT VFR BLD AUTO: 28.2 % (ref 37–47)
HGB BLD-MCNC: 8.6 G/DL (ref 12–16)
LYMPHOCYTES # BLD AUTO: 2.79 K/UL (ref 1–4.8)
LYMPHOCYTES NFR BLD: 29.7 % (ref 22–41)
MACROCYTES BLD QL SMEAR: ABNORMAL
MANUAL DIFF BLD: NORMAL
MCH RBC QN AUTO: 29.4 PG (ref 27–33)
MCHC RBC AUTO-ENTMCNC: 30.5 G/DL (ref 33.6–35)
MCV RBC AUTO: 96.2 FL (ref 81.4–97.8)
METAMYELOCYTES NFR BLD MANUAL: 1.8 %
MICROCYTES BLD QL SMEAR: ABNORMAL
MONOCYTES # BLD AUTO: 0.25 K/UL (ref 0–0.85)
MONOCYTES NFR BLD AUTO: 2.7 % (ref 0–13.4)
MORPHOLOGY BLD-IMP: NORMAL
MYELOCYTES NFR BLD MANUAL: 1.8 %
NEUTROPHILS # BLD AUTO: 5.85 K/UL (ref 2–7.15)
NEUTROPHILS NFR BLD: 62.2 % (ref 44–72)
NRBC # BLD AUTO: 0.98 K/UL
NRBC BLD-RTO: 10.4 /100 WBC
PLATELET # BLD AUTO: 96 K/UL (ref 164–446)
PLATELET BLD QL SMEAR: NORMAL
PMV BLD AUTO: 9.9 FL (ref 9–12.9)
POIKILOCYTOSIS BLD QL SMEAR: NORMAL
POLYCHROMASIA BLD QL SMEAR: NORMAL
POTASSIUM SERPL-SCNC: 3.6 MMOL/L (ref 3.6–5.5)
PROT SERPL-MCNC: 6.9 G/DL (ref 6–8.2)
RBC # BLD AUTO: 2.93 M/UL (ref 4.2–5.4)
RBC BLD AUTO: PRESENT
SODIUM SERPL-SCNC: 132 MMOL/L (ref 135–145)
STOMATOCYTES BLD QL SMEAR: NORMAL
TOXIC GRANULES BLD QL SMEAR: NORMAL
VARIANT LYMPHS BLD QL SMEAR: NORMAL
WBC # BLD AUTO: 9.4 K/UL (ref 4.8–10.8)

## 2020-01-15 PROCEDURE — 80053 COMPREHEN METABOLIC PANEL: CPT

## 2020-01-15 PROCEDURE — 36591 DRAW BLOOD OFF VENOUS DEVICE: CPT

## 2020-01-15 PROCEDURE — 700111 HCHG RX REV CODE 636 W/ 250 OVERRIDE (IP)

## 2020-01-15 PROCEDURE — 85007 BL SMEAR W/DIFF WBC COUNT: CPT

## 2020-01-15 PROCEDURE — 85027 COMPLETE CBC AUTOMATED: CPT

## 2020-01-15 PROCEDURE — A4212 NON CORING NEEDLE OR STYLET: HCPCS

## 2020-01-15 RX ORDER — LIDOCAINE HYDROCHLORIDE 10 MG/ML
INJECTION, SOLUTION EPIDURAL; INFILTRATION; INTRACAUDAL; PERINEURAL
Status: COMPLETED
Start: 2020-01-15 | End: 2020-01-15

## 2020-01-15 RX ADMIN — LIDOCAINE HYDROCHLORIDE 0.2 ML: 10 INJECTION, SOLUTION EPIDURAL; INFILTRATION; INTRACAUDAL; PERINEURAL at 15:10

## 2020-01-15 RX ADMIN — HEPARIN 500 UNITS: 100 SYRINGE at 16:49

## 2020-01-15 NOTE — PROGRESS NOTES
"Pharmacy Chemotherapy Calculation:    CHEMOTHERAPY HELD FOR THROMBOCYTOPENIA AND GENERAL UNWELL APPEARANCE.    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  Sarcoma [place]-analysis Collaboration. Lancet. 1997;350(0754):1643-54.  Tiff N, et al. Cancer. 2008;113(3):573-81.       Allergies: Patient has no known allergies.    /71   Pulse (!) 112   Temp 36.8 °C (98.3 °F) (Temporal)   Resp 18   Ht 1.61 m (5' 3.39\")   Wt 72.3 kg (159 lb 6.3 oz)   SpO2 96%   BMI 27.89 kg/m²  Body surface area is 1.8 meters squared.    Treatment parameters NOT met      10/25/19:  ECHO:LVEF ~55%   = 0mg IV over 30 min      = 0mg IV (within 30 min after Zinecard infusion)      SHIRA Deluna, Pharm.D.        "

## 2020-01-16 NOTE — PROGRESS NOTES
"Pt arrived via wheelchair to [facility] for Doxorubicin infusion.  Pt's mood low, very quiet. When asked if she was feeling well \"I feel okay. I have a lot of things on my mind.\" Pt generally very weak, with low energy. Lidocaine to right chest wall port per patient's request. Port accessed without difficulty, flushed easily, khai briskly. CBC & CMP drawn and sent to lab. One hour after arrival pt in severe pain. Pt attempted to take her own pain medication but didn't have the strength to open the childproof cap. Lab results reviewed, platelet count 96,000, did not meet parameters to treat. Dr. Delaney notified. She wanted treatment deferred for a week. Pt to go to the ER.  If patient refuses, then is to be seen at [facility] tomorrow at 11am . Plan explained to patient, who refused to go to the ER.  Appointment time written on paper for the patient as a reminder.  Pt left [facility] in NAD, accompanied by .     "

## 2020-01-30 ENCOUNTER — APPOINTMENT (OUTPATIENT)
Dept: RADIOLOGY | Facility: MEDICAL CENTER | Age: 40
DRG: 853 | End: 2020-01-30
Attending: EMERGENCY MEDICINE
Payer: MEDICAID

## 2020-01-30 ENCOUNTER — HOSPITAL ENCOUNTER (INPATIENT)
Facility: MEDICAL CENTER | Age: 40
LOS: 13 days | DRG: 853 | End: 2020-02-12
Attending: EMERGENCY MEDICINE | Admitting: INTERNAL MEDICINE
Payer: MEDICAID

## 2020-01-30 DIAGNOSIS — L08.9 INFECTED DECUBITUS ULCER, STAGE III (HCC): ICD-10-CM

## 2020-01-30 DIAGNOSIS — L89.153 PRESSURE INJURY OF SACRAL REGION, STAGE 3 (HCC): ICD-10-CM

## 2020-01-30 DIAGNOSIS — D48.9 EPITHELIOID HEMANGIOENDOTHELIOMA: ICD-10-CM

## 2020-01-30 DIAGNOSIS — J18.9 MULTIFOCAL PNEUMONIA: ICD-10-CM

## 2020-01-30 DIAGNOSIS — C79.9 METASTATIC MALIGNANT NEOPLASM, UNSPECIFIED SITE (HCC): ICD-10-CM

## 2020-01-30 DIAGNOSIS — L89.93 INFECTED DECUBITUS ULCER, STAGE III (HCC): ICD-10-CM

## 2020-01-30 DIAGNOSIS — D64.9 ANEMIA, UNSPECIFIED TYPE: ICD-10-CM

## 2020-01-30 PROBLEM — L89.152 DECUBITUS ULCER OF SACRAL REGION, STAGE 2 (HCC): Status: ACTIVE | Noted: 2020-01-30

## 2020-01-30 PROBLEM — E87.6 HYPOKALEMIA: Status: ACTIVE | Noted: 2020-01-30

## 2020-01-30 LAB
ABO GROUP BLD: NORMAL
ALBUMIN SERPL BCP-MCNC: 3 G/DL (ref 3.2–4.9)
ALBUMIN/GLOB SERPL: 0.8 G/DL
ALP SERPL-CCNC: 77 U/L (ref 30–99)
ALT SERPL-CCNC: <5 U/L (ref 2–50)
ANION GAP SERPL CALC-SCNC: 10 MMOL/L (ref 0–11.9)
ANISOCYTOSIS BLD QL SMEAR: ABNORMAL
APPEARANCE UR: CLEAR
AST SERPL-CCNC: 10 U/L (ref 12–45)
BACTERIA #/AREA URNS HPF: NEGATIVE /HPF
BARCODED ABORH UBTYP: 5100
BARCODED PRD CODE UBPRD: NORMAL
BARCODED UNIT NUM UBUNT: NORMAL
BASOPHILS # BLD AUTO: 0 % (ref 0–1.8)
BASOPHILS # BLD: 0 K/UL (ref 0–0.12)
BILIRUB SERPL-MCNC: 1.2 MG/DL (ref 0.1–1.5)
BILIRUB UR QL STRIP.AUTO: NEGATIVE
BLD GP AB SCN SERPL QL: NORMAL
BUN SERPL-MCNC: 11 MG/DL (ref 8–22)
CALCIUM SERPL-MCNC: 8.6 MG/DL (ref 8.5–10.5)
CHLORIDE SERPL-SCNC: 105 MMOL/L (ref 96–112)
CO2 SERPL-SCNC: 22 MMOL/L (ref 20–33)
COLOR UR: YELLOW
COMPONENT R 8504R: NORMAL
CREAT SERPL-MCNC: 0.54 MG/DL (ref 0.5–1.4)
CRP SERPL HS-MCNC: 1.68 MG/DL (ref 0–0.75)
EOSINOPHIL # BLD AUTO: 0 K/UL (ref 0–0.51)
EOSINOPHIL NFR BLD: 0 % (ref 0–6.9)
EPI CELLS #/AREA URNS HPF: NEGATIVE /HPF
ERYTHROCYTE [DISTWIDTH] IN BLOOD BY AUTOMATED COUNT: 78.5 FL (ref 35.9–50)
ERYTHROCYTE [SEDIMENTATION RATE] IN BLOOD BY WESTERGREN METHOD: 27 MM/HOUR (ref 0–20)
GLOBULIN SER CALC-MCNC: 3.8 G/DL (ref 1.9–3.5)
GLUCOSE SERPL-MCNC: 118 MG/DL (ref 65–99)
GLUCOSE UR STRIP.AUTO-MCNC: NEGATIVE MG/DL
GRAM STN SPEC: NORMAL
HCT VFR BLD AUTO: 21 % (ref 37–47)
HGB BLD-MCNC: 6.1 G/DL (ref 12–16)
HGB BLD-MCNC: 6.3 G/DL (ref 12–16)
HYALINE CASTS #/AREA URNS LPF: ABNORMAL /LPF
HYPOCHROMIA BLD QL SMEAR: ABNORMAL
INR PPP: 1.36 (ref 0.87–1.13)
INR PPP: 1.42 (ref 0.87–1.13)
KETONES UR STRIP.AUTO-MCNC: NEGATIVE MG/DL
LACTATE BLD-SCNC: 1.7 MMOL/L (ref 0.5–2)
LACTATE BLD-SCNC: 2.1 MMOL/L (ref 0.5–2)
LACTATE BLD-SCNC: 2.3 MMOL/L (ref 0.5–2)
LACTATE BLD-SCNC: 2.5 MMOL/L (ref 0.5–2)
LEUKOCYTE ESTERASE UR QL STRIP.AUTO: ABNORMAL
LYMPHOCYTES # BLD AUTO: 2.16 K/UL (ref 1–4.8)
LYMPHOCYTES NFR BLD: 18 % (ref 22–41)
MACROCYTES BLD QL SMEAR: ABNORMAL
MAGNESIUM SERPL-MCNC: 1.5 MG/DL (ref 1.5–2.5)
MANUAL DIFF BLD: NORMAL
MCH RBC QN AUTO: 29.9 PG (ref 27–33)
MCHC RBC AUTO-ENTMCNC: 29 G/DL (ref 33.6–35)
MCV RBC AUTO: 102.9 FL (ref 81.4–97.8)
METAMYELOCYTES NFR BLD MANUAL: 0.9 %
MICRO URNS: ABNORMAL
MICROCYTES BLD QL SMEAR: ABNORMAL
MONOCYTES # BLD AUTO: 0.32 K/UL (ref 0–0.85)
MONOCYTES NFR BLD AUTO: 2.7 % (ref 0–13.4)
MORPHOLOGY BLD-IMP: NORMAL
MYELOCYTES NFR BLD MANUAL: 3.6 %
NEUTROPHILS # BLD AUTO: 8.98 K/UL (ref 2–7.15)
NEUTROPHILS NFR BLD: 74.8 % (ref 44–72)
NITRITE UR QL STRIP.AUTO: NEGATIVE
NRBC # BLD AUTO: 1.71 K/UL
NRBC BLD-RTO: 14.2 /100 WBC
PH UR STRIP.AUTO: 7.5 [PH] (ref 5–8)
PLATELET # BLD AUTO: 137 K/UL (ref 164–446)
PLATELET BLD QL SMEAR: NORMAL
PMV BLD AUTO: 9.5 FL (ref 9–12.9)
POIKILOCYTOSIS BLD QL SMEAR: NORMAL
POLYCHROMASIA BLD QL SMEAR: NORMAL
POTASSIUM SERPL-SCNC: 3 MMOL/L (ref 3.6–5.5)
PRODUCT TYPE UPROD: NORMAL
PROT SERPL-MCNC: 6.8 G/DL (ref 6–8.2)
PROT UR QL STRIP: NEGATIVE MG/DL
PROTHROMBIN TIME: 17.1 SEC (ref 12–14.6)
PROTHROMBIN TIME: 17.8 SEC (ref 12–14.6)
RBC # BLD AUTO: 2.04 M/UL (ref 4.2–5.4)
RBC # URNS HPF: ABNORMAL /HPF
RBC BLD AUTO: PRESENT
RBC UR QL AUTO: NEGATIVE
RH BLD: NORMAL
SIGNIFICANT IND 70042: NORMAL
SITE SITE: NORMAL
SODIUM SERPL-SCNC: 137 MMOL/L (ref 135–145)
SOURCE SOURCE: NORMAL
SP GR UR STRIP.AUTO: 1.01
STOMATOCYTES BLD QL SMEAR: NORMAL
UNIT STATUS USTAT: NORMAL
UROBILINOGEN UR STRIP.AUTO-MCNC: 1 MG/DL
WBC # BLD AUTO: 12 K/UL (ref 4.8–10.8)
WBC #/AREA URNS HPF: ABNORMAL /HPF

## 2020-01-30 PROCEDURE — 700111 HCHG RX REV CODE 636 W/ 250 OVERRIDE (IP): Performed by: EMERGENCY MEDICINE

## 2020-01-30 PROCEDURE — 85027 COMPLETE CBC AUTOMATED: CPT

## 2020-01-30 PROCEDURE — 87070 CULTURE OTHR SPECIMN AEROBIC: CPT

## 2020-01-30 PROCEDURE — 86850 RBC ANTIBODY SCREEN: CPT

## 2020-01-30 PROCEDURE — 96375 TX/PRO/DX INJ NEW DRUG ADDON: CPT

## 2020-01-30 PROCEDURE — 86140 C-REACTIVE PROTEIN: CPT

## 2020-01-30 PROCEDURE — 85652 RBC SED RATE AUTOMATED: CPT

## 2020-01-30 PROCEDURE — 99285 EMERGENCY DEPT VISIT HI MDM: CPT

## 2020-01-30 PROCEDURE — 71045 X-RAY EXAM CHEST 1 VIEW: CPT

## 2020-01-30 PROCEDURE — 86945 BLOOD PRODUCT/IRRADIATION: CPT

## 2020-01-30 PROCEDURE — 700111 HCHG RX REV CODE 636 W/ 250 OVERRIDE (IP): Performed by: INTERNAL MEDICINE

## 2020-01-30 PROCEDURE — 72220 X-RAY EXAM SACRUM TAILBONE: CPT

## 2020-01-30 PROCEDURE — 96365 THER/PROPH/DIAG IV INF INIT: CPT

## 2020-01-30 PROCEDURE — 85018 HEMOGLOBIN: CPT

## 2020-01-30 PROCEDURE — 80053 COMPREHEN METABOLIC PANEL: CPT

## 2020-01-30 PROCEDURE — A9270 NON-COVERED ITEM OR SERVICE: HCPCS | Performed by: INTERNAL MEDICINE

## 2020-01-30 PROCEDURE — 36415 COLL VENOUS BLD VENIPUNCTURE: CPT

## 2020-01-30 PROCEDURE — 700105 HCHG RX REV CODE 258: Performed by: EMERGENCY MEDICINE

## 2020-01-30 PROCEDURE — 99223 1ST HOSP IP/OBS HIGH 75: CPT | Performed by: INTERNAL MEDICINE

## 2020-01-30 PROCEDURE — 36430 TRANSFUSION BLD/BLD COMPNT: CPT

## 2020-01-30 PROCEDURE — 700102 HCHG RX REV CODE 250 W/ 637 OVERRIDE(OP): Performed by: INTERNAL MEDICINE

## 2020-01-30 PROCEDURE — 96368 THER/DIAG CONCURRENT INF: CPT

## 2020-01-30 PROCEDURE — 700105 HCHG RX REV CODE 258: Performed by: INTERNAL MEDICINE

## 2020-01-30 PROCEDURE — P9016 RBC LEUKOCYTES REDUCED: HCPCS

## 2020-01-30 PROCEDURE — 87040 BLOOD CULTURE FOR BACTERIA: CPT

## 2020-01-30 PROCEDURE — 87205 SMEAR GRAM STAIN: CPT

## 2020-01-30 PROCEDURE — 51798 US URINE CAPACITY MEASURE: CPT

## 2020-01-30 PROCEDURE — 86923 COMPATIBILITY TEST ELECTRIC: CPT

## 2020-01-30 PROCEDURE — 85007 BL SMEAR W/DIFF WBC COUNT: CPT

## 2020-01-30 PROCEDURE — 83605 ASSAY OF LACTIC ACID: CPT | Mod: 91

## 2020-01-30 PROCEDURE — 85610 PROTHROMBIN TIME: CPT

## 2020-01-30 PROCEDURE — 86900 BLOOD TYPING SEROLOGIC ABO: CPT

## 2020-01-30 PROCEDURE — 770020 HCHG ROOM/CARE - TELE (206)

## 2020-01-30 PROCEDURE — 81001 URINALYSIS AUTO W/SCOPE: CPT

## 2020-01-30 PROCEDURE — 83735 ASSAY OF MAGNESIUM: CPT

## 2020-01-30 PROCEDURE — 86901 BLOOD TYPING SEROLOGIC RH(D): CPT

## 2020-01-30 RX ORDER — ONDANSETRON 2 MG/ML
4 INJECTION INTRAMUSCULAR; INTRAVENOUS ONCE
Status: COMPLETED | OUTPATIENT
Start: 2020-01-30 | End: 2020-01-30

## 2020-01-30 RX ORDER — ONDANSETRON 2 MG/ML
4 INJECTION INTRAMUSCULAR; INTRAVENOUS EVERY 4 HOURS PRN
Status: DISCONTINUED | OUTPATIENT
Start: 2020-01-30 | End: 2020-02-12 | Stop reason: HOSPADM

## 2020-01-30 RX ORDER — OXYCODONE HYDROCHLORIDE 5 MG/1
5 TABLET ORAL
Status: DISCONTINUED | OUTPATIENT
Start: 2020-01-30 | End: 2020-02-12 | Stop reason: HOSPADM

## 2020-01-30 RX ORDER — PROCHLORPERAZINE EDISYLATE 5 MG/ML
5-10 INJECTION INTRAMUSCULAR; INTRAVENOUS EVERY 4 HOURS PRN
Status: DISCONTINUED | OUTPATIENT
Start: 2020-01-30 | End: 2020-02-12 | Stop reason: HOSPADM

## 2020-01-30 RX ORDER — AMOXICILLIN 250 MG
2 CAPSULE ORAL 2 TIMES DAILY
Status: DISCONTINUED | OUTPATIENT
Start: 2020-01-30 | End: 2020-02-12 | Stop reason: HOSPADM

## 2020-01-30 RX ORDER — OXYCODONE HCL 20 MG/1
80 TABLET, FILM COATED, EXTENDED RELEASE ORAL EVERY 8 HOURS
Status: DISCONTINUED | OUTPATIENT
Start: 2020-01-30 | End: 2020-02-12 | Stop reason: HOSPADM

## 2020-01-30 RX ORDER — PROMETHAZINE HYDROCHLORIDE 25 MG/1
12.5-25 TABLET ORAL EVERY 4 HOURS PRN
Status: DISCONTINUED | OUTPATIENT
Start: 2020-01-30 | End: 2020-02-12 | Stop reason: HOSPADM

## 2020-01-30 RX ORDER — POTASSIUM CHLORIDE 20 MEQ/1
60 TABLET, EXTENDED RELEASE ORAL ONCE
Status: COMPLETED | OUTPATIENT
Start: 2020-01-30 | End: 2020-01-30

## 2020-01-30 RX ORDER — LABETALOL HYDROCHLORIDE 5 MG/ML
10 INJECTION, SOLUTION INTRAVENOUS EVERY 4 HOURS PRN
Status: DISCONTINUED | OUTPATIENT
Start: 2020-01-30 | End: 2020-02-12 | Stop reason: HOSPADM

## 2020-01-30 RX ORDER — SODIUM CHLORIDE 9 MG/ML
INJECTION, SOLUTION INTRAVENOUS CONTINUOUS
Status: DISPENSED | OUTPATIENT
Start: 2020-01-30 | End: 2020-01-31

## 2020-01-30 RX ORDER — POLYETHYLENE GLYCOL 3350 17 G/17G
1 POWDER, FOR SOLUTION ORAL
Status: DISCONTINUED | OUTPATIENT
Start: 2020-01-30 | End: 2020-02-12 | Stop reason: HOSPADM

## 2020-01-30 RX ORDER — SODIUM CHLORIDE, SODIUM LACTATE, POTASSIUM CHLORIDE, AND CALCIUM CHLORIDE .6; .31; .03; .02 G/100ML; G/100ML; G/100ML; G/100ML
1000 INJECTION, SOLUTION INTRAVENOUS
Status: DISCONTINUED | OUTPATIENT
Start: 2020-01-30 | End: 2020-02-12 | Stop reason: HOSPADM

## 2020-01-30 RX ORDER — PROMETHAZINE HYDROCHLORIDE 25 MG/1
12.5-25 SUPPOSITORY RECTAL EVERY 4 HOURS PRN
Status: DISCONTINUED | OUTPATIENT
Start: 2020-01-30 | End: 2020-02-12 | Stop reason: HOSPADM

## 2020-01-30 RX ORDER — ONDANSETRON 4 MG/1
4 TABLET, ORALLY DISINTEGRATING ORAL EVERY 4 HOURS PRN
Status: DISCONTINUED | OUTPATIENT
Start: 2020-01-30 | End: 2020-02-12 | Stop reason: HOSPADM

## 2020-01-30 RX ORDER — OXYCODONE HYDROCHLORIDE 10 MG/1
10 TABLET ORAL
Status: DISCONTINUED | OUTPATIENT
Start: 2020-01-30 | End: 2020-02-12 | Stop reason: HOSPADM

## 2020-01-30 RX ORDER — HYDROMORPHONE HYDROCHLORIDE 1 MG/ML
1 INJECTION, SOLUTION INTRAMUSCULAR; INTRAVENOUS; SUBCUTANEOUS ONCE
Status: COMPLETED | OUTPATIENT
Start: 2020-01-30 | End: 2020-01-30

## 2020-01-30 RX ORDER — ENALAPRILAT 1.25 MG/ML
1.25 INJECTION INTRAVENOUS EVERY 6 HOURS PRN
Status: DISCONTINUED | OUTPATIENT
Start: 2020-01-30 | End: 2020-02-12 | Stop reason: HOSPADM

## 2020-01-30 RX ORDER — CLONAZEPAM 1 MG/1
1 TABLET ORAL NIGHTLY PRN
Status: DISCONTINUED | OUTPATIENT
Start: 2020-01-30 | End: 2020-02-02

## 2020-01-30 RX ORDER — SODIUM CHLORIDE 9 MG/ML
2000 INJECTION, SOLUTION INTRAVENOUS CONTINUOUS
Status: DISPENSED | OUTPATIENT
Start: 2020-01-30 | End: 2020-01-30

## 2020-01-30 RX ORDER — SODIUM CHLORIDE, SODIUM LACTATE, POTASSIUM CHLORIDE, AND CALCIUM CHLORIDE .6; .31; .03; .02 G/100ML; G/100ML; G/100ML; G/100ML
30 INJECTION, SOLUTION INTRAVENOUS
Status: DISCONTINUED | OUTPATIENT
Start: 2020-01-30 | End: 2020-02-12 | Stop reason: HOSPADM

## 2020-01-30 RX ORDER — SODIUM CHLORIDE, SODIUM LACTATE, POTASSIUM CHLORIDE, CALCIUM CHLORIDE 600; 310; 30; 20 MG/100ML; MG/100ML; MG/100ML; MG/100ML
INJECTION, SOLUTION INTRAVENOUS CONTINUOUS
Status: DISCONTINUED | OUTPATIENT
Start: 2020-01-30 | End: 2020-02-06

## 2020-01-30 RX ORDER — MORPHINE SULFATE 4 MG/ML
4 INJECTION, SOLUTION INTRAMUSCULAR; INTRAVENOUS
Status: DISCONTINUED | OUTPATIENT
Start: 2020-01-30 | End: 2020-02-09

## 2020-01-30 RX ORDER — ACETAMINOPHEN 325 MG/1
650 TABLET ORAL EVERY 6 HOURS PRN
Status: DISCONTINUED | OUTPATIENT
Start: 2020-01-30 | End: 2020-02-12 | Stop reason: HOSPADM

## 2020-01-30 RX ORDER — CLONIDINE HYDROCHLORIDE 0.1 MG/1
0.1 TABLET ORAL EVERY 6 HOURS PRN
Status: DISCONTINUED | OUTPATIENT
Start: 2020-01-30 | End: 2020-02-12 | Stop reason: HOSPADM

## 2020-01-30 RX ORDER — BISACODYL 10 MG
10 SUPPOSITORY, RECTAL RECTAL
Status: DISCONTINUED | OUTPATIENT
Start: 2020-01-30 | End: 2020-02-12 | Stop reason: HOSPADM

## 2020-01-30 RX ADMIN — HYDROMORPHONE HYDROCHLORIDE 1 MG: 1 INJECTION, SOLUTION INTRAMUSCULAR; INTRAVENOUS; SUBCUTANEOUS at 07:01

## 2020-01-30 RX ADMIN — PIPERACILLIN AND TAZOBACTAM 3.38 G: 3; .375 INJECTION, POWDER, LYOPHILIZED, FOR SOLUTION INTRAVENOUS; PARENTERAL at 13:12

## 2020-01-30 RX ADMIN — OXYCODONE HYDROCHLORIDE 5 MG: 5 TABLET ORAL at 17:31

## 2020-01-30 RX ADMIN — ENOXAPARIN SODIUM 40 MG: 100 INJECTION SUBCUTANEOUS at 11:16

## 2020-01-30 RX ADMIN — SODIUM CHLORIDE: 9 INJECTION, SOLUTION INTRAVENOUS at 21:41

## 2020-01-30 RX ADMIN — OXYCODONE HYDROCHLORIDE 80 MG: 20 TABLET, FILM COATED, EXTENDED RELEASE ORAL at 22:43

## 2020-01-30 RX ADMIN — PIPERACILLIN AND TAZOBACTAM 3.38 G: 3; .375 INJECTION, POWDER, LYOPHILIZED, FOR SOLUTION INTRAVENOUS; PARENTERAL at 21:25

## 2020-01-30 RX ADMIN — SODIUM CHLORIDE 2000 ML: 9 INJECTION, SOLUTION INTRAVENOUS at 08:47

## 2020-01-30 RX ADMIN — SODIUM CHLORIDE, POTASSIUM CHLORIDE, SODIUM LACTATE AND CALCIUM CHLORIDE: 600; 310; 30; 20 INJECTION, SOLUTION INTRAVENOUS at 09:45

## 2020-01-30 RX ADMIN — ONDANSETRON 4 MG: 2 INJECTION INTRAMUSCULAR; INTRAVENOUS at 07:00

## 2020-01-30 RX ADMIN — MORPHINE SULFATE 4 MG: 4 INJECTION INTRAVENOUS at 09:37

## 2020-01-30 RX ADMIN — SENNOSIDES AND DOCUSATE SODIUM 2 TABLET: 8.6; 5 TABLET ORAL at 11:11

## 2020-01-30 RX ADMIN — OXYCODONE HYDROCHLORIDE 80 MG: 20 TABLET, FILM COATED, EXTENDED RELEASE ORAL at 11:11

## 2020-01-30 RX ADMIN — ACETAMINOPHEN 650 MG: 325 TABLET, FILM COATED ORAL at 20:08

## 2020-01-30 RX ADMIN — VANCOMYCIN HYDROCHLORIDE 1300 MG: 500 INJECTION, POWDER, LYOPHILIZED, FOR SOLUTION INTRAVENOUS at 21:26

## 2020-01-30 RX ADMIN — OXYCODONE HYDROCHLORIDE 10 MG: 10 TABLET ORAL at 15:38

## 2020-01-30 RX ADMIN — VANCOMYCIN HYDROCHLORIDE 2000 MG: 500 INJECTION, POWDER, LYOPHILIZED, FOR SOLUTION INTRAVENOUS at 09:03

## 2020-01-30 RX ADMIN — PIPERACILLIN AND TAZOBACTAM 3.38 G: 3; .375 INJECTION, POWDER, LYOPHILIZED, FOR SOLUTION INTRAVENOUS; PARENTERAL at 08:52

## 2020-01-30 RX ADMIN — SODIUM CHLORIDE, POTASSIUM CHLORIDE, SODIUM LACTATE AND CALCIUM CHLORIDE: 600; 310; 30; 20 INJECTION, SOLUTION INTRAVENOUS at 12:02

## 2020-01-30 RX ADMIN — POTASSIUM CHLORIDE 60 MEQ: 1500 TABLET, EXTENDED RELEASE ORAL at 11:11

## 2020-01-30 ASSESSMENT — COPD QUESTIONNAIRES
DO YOU EVER COUGH UP ANY MUCUS OR PHLEGM?: NO/ONLY WITH OCCASIONAL COLDS OR INFECTIONS
COPD SCREENING SCORE: 2
HAVE YOU SMOKED AT LEAST 100 CIGARETTES IN YOUR ENTIRE LIFE: YES
DURING THE PAST 4 WEEKS HOW MUCH DID YOU FEEL SHORT OF BREATH: NONE/LITTLE OF THE TIME

## 2020-01-30 ASSESSMENT — PAIN SCALES - WONG BAKER: WONGBAKER_NUMERICALRESPONSE: HURTS EVEN MORE

## 2020-01-30 ASSESSMENT — LIFESTYLE VARIABLES: EVER_SMOKED: YES

## 2020-01-30 ASSESSMENT — PATIENT HEALTH QUESTIONNAIRE - PHQ9
9. THOUGHTS THAT YOU WOULD BE BETTER OFF DEAD, OR OF HURTING YOURSELF: NOT AT ALL
1. LITTLE INTEREST OR PLEASURE IN DOING THINGS: NOT AT ALL
SUM OF ALL RESPONSES TO PHQ9 QUESTIONS 1 AND 2: 2
7. TROUBLE CONCENTRATING ON THINGS, SUCH AS READING THE NEWSPAPER OR WATCHING TELEVISION: NOT AT ALL
2. FEELING DOWN, DEPRESSED, IRRITABLE, OR HOPELESS: MORE THAN HALF THE DAYS
8. MOVING OR SPEAKING SO SLOWLY THAT OTHER PEOPLE COULD HAVE NOTICED. OR THE OPPOSITE, BEING SO FIGETY OR RESTLESS THAT YOU HAVE BEEN MOVING AROUND A LOT MORE THAN USUAL: NOT AT ALL
4. FEELING TIRED OR HAVING LITTLE ENERGY: SEVERAL DAYS
3. TROUBLE FALLING OR STAYING ASLEEP OR SLEEPING TOO MUCH: NOT AT ALL
5. POOR APPETITE OR OVEREATING: NOT AT ALL
6. FEELING BAD ABOUT YOURSELF - OR THAT YOU ARE A FAILURE OR HAVE LET YOURSELF OR YOUR FAMILY DOWN: NOT AL ALL
SUM OF ALL RESPONSES TO PHQ QUESTIONS 1-9: 3

## 2020-01-30 NOTE — ASSESSMENT & PLAN NOTE
Secondary to multifocal pneumonia.  The patient also has a decubital ulcer that at this point has been debrided and cultures are pending on.  S/p antibiotics  RT per protocol  Monitor cultures

## 2020-01-30 NOTE — H&P
"Hospital Medicine History & Physical Note    Date of Service  1/30/2020    Primary Care Physician  Rosita Delaney M.D.    Consultants  none    Code Status  full    Chief Complaint  Tailbone ulcer, sepsis    History of Presenting Illness  39 y.o. female with stage IV epithelioid hemangioendothelioma on chemotherapy, last treatment was last month, and follows Dr. Brown of [facility]-on[facility], who presented 1/30/2020 with tailbone ulcer which was discovered by her  2 days ago.  Patient states it bothers her with pain rated 6 out of 10 in severity, described as sharp and \"bad\", causing her to unable to sit up on her buttocks.  It was noted to have purulent drainage/discharge with pus.  She also states that makes her feel weaker than usual.  She denied any fever or chills, nausea or vomiting, chest pain or shortness of breath, or any GI complaints such as diarrhea.  She denied any dysuria.  This prompted them to go to the ED today.    ED course:  The patient was initially evaluated.  Her initial blood pressure was soft, which improved with IV fluids.  She was tachycardic.  Initial blood work-up showed WBC of 12,000 with no bandemia, and hemoglobin of 6.1.  Potassium was 3.0.  Lactate was 2.5.  Urinalysis showed no pyuria.  Chest x-ray showed stable dense right basilar consolidation and reticular opacities, with stable right lateral seventh rib bony destruction.  X-ray of the sacrum showed no radiologic evidence of osteomyelitis.  Patient was given Zosyn and vancomycin, along with 2 L of IV fluids.  She was subsequently admitted to the hospital service for further management.    Review of Systems  ROS     Pertinent positives/negatives as mentioned above.     A complete review of systems was personally done by me. All other systems were negative.       Past Medical History   has a past medical history of Cancer (HCC).    Surgical History   has a past surgical history that includes other orthopedic surgery.     Family " History  Reviewed and not pertinent.       Social History   reports that she has been smoking cigarettes. She has been smoking about 0.25 packs per day. She has never used smokeless tobacco. She reports that she does not drink alcohol or use drugs.    Allergies  No Known Allergies    Medications  Prior to Admission Medications   Prescriptions Last Dose Informant Patient Reported? Taking?   clonazePAM (KLONOPIN) 1 MG Tab 1/29/2020 at PM Patient Yes No   Sig: TAKE 1 TABLET BY MOUTH AT BEDTIME MAY REPEAT 1 TAB IN 2 HRS FOR SLEEP   nicotine (NICODERM) 7 MG/24HR PATCH 24 HR PRN at PRN Patient Yes No   Sig: Apply 1 Patch to skin as directed 1 time daily as needed.   ondansetron (ZOFRAN ODT) 4 MG TABLET DISPERSIBLE 1/29/2020 at PRN Patient No No   Sig: Take 1 Tab by mouth every 6 hours as needed.   oxyCODONE (OXY-IR) 30 MG immediate release tablet 1/29/2020 at 2000 Patient Yes No   Sig: Take 30 mg by mouth every 6 hours as needed.   oxyCODONE CR (OXYCONTIN) 80 MG Tablet Extended Release 12 hour Abuse-Deterrent 1/29/2020 at AFTERNOON Patient Yes No   Sig: Take 60 mg by mouth every 8 hours.   prochlorperazine (COMPAZINE) 10 MG Tab PRN at PRN Patient Yes No   Sig: Take 10 mg by mouth every 8 hours as needed for Nausea/Vomiting.      Facility-Administered Medications: None       Physical Exam  Temp:  [36.2 °C (97.2 °F)] 36.2 °C (97.2 °F)  Pulse:  [103-110] 110  Resp:  [15-16] 15  BP: ()/(55-84) 128/84  SpO2:  [97 %-100 %] 98 %    Physical Exam  Vitals signs reviewed.   Constitutional:       General: She is not in acute distress.     Appearance: She is not ill-appearing or diaphoretic.      Comments: Weak looking   HENT:      Head: Normocephalic and atraumatic.      Right Ear: External ear normal.      Left Ear: External ear normal.      Mouth/Throat:      Mouth: Mucous membranes are moist.      Pharynx: No oropharyngeal exudate or posterior oropharyngeal erythema.   Eyes:      General: No scleral icterus.     Extraocular  Movements: Extraocular movements intact.      Conjunctiva/sclera: Conjunctivae normal.      Pupils: Pupils are equal, round, and reactive to light.   Neck:      Musculoskeletal: Normal range of motion and neck supple. No neck rigidity or muscular tenderness.   Cardiovascular:      Rate and Rhythm: Regular rhythm. Tachycardia present.      Heart sounds: Normal heart sounds. No murmur.   Pulmonary:      Effort: Pulmonary effort is normal. No respiratory distress.      Breath sounds: Normal breath sounds. No stridor. No wheezing, rhonchi or rales.   Chest:      Chest wall: No tenderness.   Abdominal:      General: Bowel sounds are normal. There is no distension.      Palpations: Abdomen is soft. There is no mass.      Tenderness: There is no tenderness. There is no guarding or rebound.   Musculoskeletal: Normal range of motion.         General: No swelling.      Right lower leg: No edema.      Left lower leg: No edema.      Comments: 2 x 4 cm sacral decubitus ulcer more to the right side, with purulent ulcer base   Lymphadenopathy:      Cervical: No cervical adenopathy.   Skin:     General: Skin is warm and dry.      Coloration: Skin is not jaundiced.      Findings: No rash.   Neurological:      General: No focal deficit present.      Mental Status: She is alert and oriented to person, place, and time. Mental status is at baseline.      Cranial Nerves: No cranial nerve deficit.   Psychiatric:         Mood and Affect: Mood normal.         Behavior: Behavior normal.         Thought Content: Thought content normal.         Judgment: Judgment normal.         Laboratory:  Recent Labs     01/30/20  0650   WBC 12.0*   RBC 2.04*   HEMOGLOBIN 6.1*   HEMATOCRIT 21.0*   .9*   MCH 29.9   MCHC 29.0*   RDW 78.5*   PLATELETCT 137*   MPV 9.5     Recent Labs     01/30/20  0650   SODIUM 137   POTASSIUM 3.0*   CHLORIDE 105   CO2 22   GLUCOSE 118*   BUN 11   CREATININE 0.54   CALCIUM 8.6     Recent Labs     01/30/20  0650   ALTSGPT  <5   ASTSGOT 10*   ALKPHOSPHAT 77   TBILIRUBIN 1.2   GLUCOSE 118*     Recent Labs     01/30/20  0705   INR 1.36*     No results for input(s): NTPROBNP in the last 72 hours.      No results for input(s): TROPONINT in the last 72 hours.    Urinalysis:    Recent Labs     01/30/20  0730   SPECGRAVITY 1.012   GLUCOSEUR Negative   KETONES Negative   NITRITE Negative   LEUKESTERAS Trace*   WBCURINE 0-2   RBCURINE 0-2   BACTERIA Negative   EPITHELCELL Negative        Imaging:  DX-SACRUM AND COCCYX 2+   Final Result      No radiographic evidence of osteomyelitis. MRI could be performed to further evaluate if greater sensitivity is required      Osteoblastic metastases      DX-CHEST-PORTABLE (1 VIEW)   Final Result      Stable dense right basilar consolidation and reticular opacity. Mass may be present      Stable right lateral seventh rib bony destruction/presumed pathologic fracture and osteoblastic metastases            Assessment/Plan:  I anticipate this patient will require at least two midnights for appropriate medical management, necessitating inpatient admission.    * Sepsis (HCC)- (present on admission)  Assessment & Plan  -This is Sepsis Present on admission  SIRS criteria identified on my evaluation include: Tachycardia, with heart rate greater than 90 BPM and Leukocyosis, with WBC greater than 12,000  Source is infected decubitus ulcer  Sepsis protocol initiated  Fluid resuscitation ordered per protocol.  Start IV LR at 125 cc/h.  Trend lactate levels.  IV antibiotics as appropriate for source of sepsis  While organ dysfunction may be noted elsewhere in this problem list or in the chart, degree of organ dysfunction does not meet CMS criteria for severe sepsis.      Infected decubitus ulcer of sacral region, stage 2 (HCC)- (present on admission)  Assessment & Plan  -No radiologic evidence of osteomyelitis on x-ray, but will get an ESR and CRP.  If inflammatory markers are elevated, will get an MRI to rule out  osteomyelitis.  -Start Zosyn and vancomycin.  Superficial cultures have been sent, will follow.  Follow blood cultures as well.  I will have wound culture see her as may need wound debridement.  -Pressure offloading strategies.    Hypokalemia- (present on admission)  Assessment & Plan  -Replace with 60 mEq of oral K-Dur.  Check magnesium level and replace if low.  BMP in the morning.    Anemia of chronic disease- (present on admission)  Assessment & Plan  -Hemoglobin 6.1.  -Transfuse 1 unit packed RBC.  Trend hemoglobin, restrictive transfusion strategy transfuse if remains below 7.    Epithelioid hemangioendothelioma- (present on admission)  Assessment & Plan  -Stage IV. Had chemotherapy.  Follows Dr. Brown as outpatient.      VTE prophylaxis: lovenox SQ

## 2020-01-30 NOTE — ASSESSMENT & PLAN NOTE
Patient established with Dr. Delaney  Pain management  Discussed end of life situation and she is aiming at DNR but would like to discuss it further with her family and get more information from oncology

## 2020-01-30 NOTE — ED PROVIDER NOTES
ED Provider Note    CHIEF COMPLAINT  Chief Complaint   Patient presents with   • Open Wound     Pt states she has a pressure ulcer on her tailbone. Pt is actively limited d/t diaggnosis of stage 4 cancer.  discovered ulcer 2 days ago, pt reports exudate, inability to sleep, pain rated 10/10.       HPI  Amanda Bae is a 39 y.o. female who presents to emergency room today by ambulance with severe pain and possible infected wound to her back.  Patient states the wound is been present for 2 months but now open and draining with severe pain to the area she does have a history of Epithelioid hemangioedothelioma cancer with metastasis to him has been receiving chemotherapy last dose 1 month ago.  She is on pain management because the pain is hard to manage and that has been getting worse over the last several days currently rated a 10/10 mostly over the sacral area.  Chills but no fever no changes to bowel or bladder.  While patient does not appear septic she is in severe distress secondary to her intractable pain with metastatic carcinoma also she is anemic with hemoglobin 6.1 and will need transfusion severe anemia.    REVIEW OF SYSTEMS  See HPI for further details. All other systems are negative.     PAST MEDICAL HISTORY  Past Medical History:   Diagnosis Date   • Cancer (HCC)        FAMILY HISTORY  [unfilled]    SOCIAL HISTORY  Social History     Socioeconomic History   • Marital status:      Spouse name: Not on file   • Number of children: Not on file   • Years of education: Not on file   • Highest education level: Not on file   Occupational History   • Not on file   Social Needs   • Financial resource strain: Not on file   • Food insecurity:     Worry: Not on file     Inability: Not on file   • Transportation needs:     Medical: Not on file     Non-medical: Not on file   Tobacco Use   • Smoking status: Current Every Day Smoker     Packs/day: 0.25     Types: Cigarettes   • Smokeless tobacco:  Never Used   Substance and Sexual Activity   • Alcohol use: No   • Drug use: No   • Sexual activity: Not on file     Comment:     Lifestyle   • Physical activity:     Days per week: Not on file     Minutes per session: Not on file   • Stress: Not on file   Relationships   • Social connections:     Talks on phone: Not on file     Gets together: Not on file     Attends [employer] service: Not on file     Active member of club or organization: Not on file     Attends meetings of clubs or organizations: Not on file     Relationship status: Not on file   • Intimate partner violence:     Fear of current or ex partner: Not on file     Emotionally abused: Not on file     Physically abused: Not on file     Forced sexual activity: Not on file   Other Topics Concern   • Primary/coprimary nurse & associates Not Asked   • Family contact information Not Asked   • OK to release patient information to the following Not Asked   • Patient preferred routine/Privacy concerns Not Asked   • Patient likes and dislikes Not Asked   • Participating In Research Study Not Asked   • Miscellaneous Not Asked   Social History Narrative   • Not on file       SURGICAL HISTORY  Past Surgical History:   Procedure Laterality Date   • OTHER ORTHOPEDIC SURGERY      left hip, right lower ext yusra       CURRENT MEDICATIONS  Home Medications     Reviewed by Leobardo Suarez (Pharmacy Tech) on 01/30/20 at 0853  Med List Status: Complete   Medication Last Dose Status   clonazePAM (KLONOPIN) 1 MG Tab 1/29/2020 Active   nicotine (NICODERM) 7 MG/24HR PATCH 24 HR PRN Active   ondansetron (ZOFRAN ODT) 4 MG TABLET DISPERSIBLE 1/29/2020 Active   oxyCODONE (OXY-IR) 30 MG immediate release tablet 1/29/2020 Active   oxyCODONE CR (OXYCONTIN) 80 MG Tablet Extended Release 12 hour Abuse-Deterrent 1/29/2020 Active   prochlorperazine (COMPAZINE) 10 MG Tab PRN Active                ALLERGIES  No Known Allergies    PHYSICAL EXAM  VITAL SIGNS: /73   Pulse (!) 105  "  Temp 36.7 °C (98.1 °F) (Temporal)   Resp 16   Ht 1.626 m (5' 4\")   Wt 73.7 kg (162 lb 7.7 oz)   LMP 01/01/2020 (Approximate)   SpO2 100%   BMI 27.89 kg/m²       Constitutional: Well developed, Well nourished, severe acute distress, Non-toxic appearance.   HENT: Normocephalic, Atraumatic, Bilateral external ears normal, Oropharynx moist, No oral exudates, Nose normal.   Eyes: PERRLA, EOMI, Conjunctiva normal, No discharge.   Neck: Normal range of motion, No tenderness, Supple, No stridor.   Lymphatic: No lymphadenopathy noted.   Cardiovascular: Normal heart rate, Normal rhythm, No murmurs, No rubs, No gallops.   Thorax & Lungs: Normal breath sounds, No respiratory distress, No wheezing, No chest tenderness.   Abdomen: Bowel sounds normal, Soft, No tenderness, No masses, No pulsatile masses.   Skin: Warm, Dry, No erythema, No rash.  4 to 5 cm sacral decubitus stage III with drainage and erythema cultures obtained and pending most likely infected.  Back: No tenderness, No CVA tenderness.  Stage III infected decubitus ulceration over the sacral area noted.  Culture obtained    Extremities: Intact distal pulses, No edema, No tenderness, No cyanosis, No clubbing.   Musculoskeletal: Generalized joint and body pain secondary to patient's metastatic disease.  Neurologic: Alert & oriented x 3, Normal motor function, Normal sensory function, No focal deficits noted.   Psychiatric: Affect normal, Judgment normal, Mood normal.         RADIOLOGY/PROCEDURES  DX-SACRUM AND COCCYX 2+   Final Result      No radiographic evidence of osteomyelitis. MRI could be performed to further evaluate if greater sensitivity is required      Osteoblastic metastases      DX-CHEST-PORTABLE (1 VIEW)   Final Result      Stable dense right basilar consolidation and reticular opacity. Mass may be present      Stable right lateral seventh rib bony destruction/presumed pathologic fracture and osteoblastic metastases            COURSE & MEDICAL " DECISION MAKING  Pertinent Labs & Imaging studies reviewed. (See chart for details)  Patient has extensive metastasis now developed a stage III decubitus which appears infected with white blood cell count elevated at 12 she is immunocompromise on chemotherapy last dose 1 month ago lactic acid is normal.  Cultures obtained and pending she started vancomycin and Zosyn per recommendations of pharmacy discussed case with hospitalist patient be admitted to the hospital discussed with oncologist advises and apparently talked to family about possible hospice.  Severe anemia appears in severe distress secondary intractable pain with metastatic carcinoma and need for transfusion should be transfused blood started here in the emergency room given pain medication Dilaudid IV push with Zofran start on IV antibiotics of vancomycin and Zosyn as stated above admitted to the hospital again in severe distress as above.    FINAL IMPRESSION  1.  Acute infected stage III sacral decubitus ulcer  2.  Intractable pain  3.  History of Epitheliod hemangioedothelioma cancer with metastasis.  4.  Severe anemia  5.  Critical care time of 41 minutes; this includes multiple discussions with hospitalist, discussions with oncologist, review records and discussion treatment plan, interventions as discussed above.  This did not include procedures.           Electronically signed by: Greg Oneal D.O., 1/30/2020 2:46 PM

## 2020-01-30 NOTE — ED TRIAGE NOTES
Amanda Bae  39 y.o. female  Chief Complaint   Patient presents with   • Open Wound     Pt states she has a pressure ulcer on her tailbone. Pt is actively limited d/t diaggnosis of stage 4 cancer.  discovered ulcer 2 days ago, pt reports exudate, inability to sleep, pain rated 10/10.       Pt is alert and oriented, speaking in full sentences, follows commands and responds appropriately to questions. Resp are even and unlabored. No behavioral indicators of pain.   Notified charge RN of possible neutropenia.

## 2020-01-30 NOTE — ASSESSMENT & PLAN NOTE
Likely secondary to her malignancy and intermittent hemoptysis  Monitor CBC and transfuse as needed

## 2020-01-30 NOTE — ED NOTES
Pt to room via wheel chair, agree with triage notes. Chart up for ERP.   Pt has approximately 5cm open wound at sacrum, bandage covering wound, discharge noted.

## 2020-01-30 NOTE — ASSESSMENT & PLAN NOTE
Status post surgical irrigation debridement by Dr. Dietz  Wound cultures coag negative staph Bacteroides and Candida  S/p antibiotics and diflucan  Continue wound care with wound VAC changes on Monday Wednesday Friday

## 2020-01-30 NOTE — ED NOTES
Assumed care of pt, pt in RAD at this time. Agree with previous assessment. Urine and second blood culture remain outstanding and will be collected.

## 2020-01-31 ENCOUNTER — APPOINTMENT (OUTPATIENT)
Dept: RADIOLOGY | Facility: MEDICAL CENTER | Age: 40
DRG: 853 | End: 2020-01-31
Attending: INTERNAL MEDICINE
Payer: MEDICAID

## 2020-01-31 PROBLEM — L89.153 PRESSURE INJURY OF SACRAL REGION, STAGE 3 (HCC): Status: ACTIVE | Noted: 2020-01-30

## 2020-01-31 PROBLEM — R78.81 POSITIVE BLOOD CULTURE: Status: RESOLVED | Noted: 2019-06-24 | Resolved: 2020-01-31

## 2020-01-31 PROBLEM — R73.9 HYPERGLYCEMIA: Status: RESOLVED | Noted: 2019-06-02 | Resolved: 2020-01-31

## 2020-01-31 PROBLEM — N17.9 AKI (ACUTE KIDNEY INJURY) (HCC): Status: RESOLVED | Noted: 2019-06-05 | Resolved: 2020-01-31

## 2020-01-31 PROBLEM — J18.9 HCAP (HEALTHCARE-ASSOCIATED PNEUMONIA): Status: RESOLVED | Noted: 2019-06-02 | Resolved: 2020-01-31

## 2020-01-31 PROBLEM — N30.00 ACUTE CYSTITIS WITHOUT HEMATURIA: Status: RESOLVED | Noted: 2019-10-16 | Resolved: 2020-01-31

## 2020-01-31 PROBLEM — R33.9 URINARY RETENTION: Status: ACTIVE | Noted: 2020-01-31

## 2020-01-31 LAB
ANION GAP SERPL CALC-SCNC: 9 MMOL/L (ref 0–11.9)
ANISOCYTOSIS BLD QL SMEAR: ABNORMAL
BASOPHILS # BLD AUTO: 0 % (ref 0–1.8)
BASOPHILS # BLD: 0 K/UL (ref 0–0.12)
BUN SERPL-MCNC: 9 MG/DL (ref 8–22)
CALCIUM SERPL-MCNC: 7.7 MG/DL (ref 8.5–10.5)
CHLORIDE SERPL-SCNC: 108 MMOL/L (ref 96–112)
CO2 SERPL-SCNC: 23 MMOL/L (ref 20–33)
CREAT SERPL-MCNC: 0.62 MG/DL (ref 0.5–1.4)
EOSINOPHIL # BLD AUTO: 0 K/UL (ref 0–0.51)
EOSINOPHIL NFR BLD: 0 % (ref 0–6.9)
ERYTHROCYTE [DISTWIDTH] IN BLOOD BY AUTOMATED COUNT: 63.6 FL (ref 35.9–50)
GLUCOSE SERPL-MCNC: 112 MG/DL (ref 65–99)
HCT VFR BLD AUTO: 24.3 % (ref 37–47)
HGB BLD-MCNC: 7.5 G/DL (ref 12–16)
LACTATE BLD-SCNC: 0.8 MMOL/L (ref 0.5–2)
LYMPHOCYTES # BLD AUTO: 2.18 K/UL (ref 1–4.8)
LYMPHOCYTES NFR BLD: 21 % (ref 22–41)
MACROCYTES BLD QL SMEAR: ABNORMAL
MANUAL DIFF BLD: NORMAL
MCH RBC QN AUTO: 30.6 PG (ref 27–33)
MCHC RBC AUTO-ENTMCNC: 30.9 G/DL (ref 33.6–35)
MCV RBC AUTO: 99.2 FL (ref 81.4–97.8)
METAMYELOCYTES NFR BLD MANUAL: 3.5 %
MONOCYTES # BLD AUTO: 0.19 K/UL (ref 0–0.85)
MONOCYTES NFR BLD AUTO: 1.8 % (ref 0–13.4)
MORPHOLOGY BLD-IMP: NORMAL
MYELOCYTES NFR BLD MANUAL: 3.5 %
NEUTROPHILS # BLD AUTO: 7.3 K/UL (ref 2–7.15)
NEUTROPHILS NFR BLD: 68.4 % (ref 44–72)
NEUTS BAND NFR BLD MANUAL: 1.8 % (ref 0–10)
NRBC # BLD AUTO: 1.32 K/UL
NRBC BLD-RTO: 12.7 /100 WBC
PLATELET # BLD AUTO: 105 K/UL (ref 164–446)
PLATELET BLD QL SMEAR: NORMAL
PMV BLD AUTO: 9.1 FL (ref 9–12.9)
POLYCHROMASIA BLD QL SMEAR: NORMAL
POTASSIUM SERPL-SCNC: 3.4 MMOL/L (ref 3.6–5.5)
RBC # BLD AUTO: 2.45 M/UL (ref 4.2–5.4)
RBC BLD AUTO: PRESENT
SODIUM SERPL-SCNC: 140 MMOL/L (ref 135–145)
WBC # BLD AUTO: 10.4 K/UL (ref 4.8–10.8)

## 2020-01-31 PROCEDURE — 306782 CATH,FOLEY 16FR LATEX FREE: Performed by: INTERNAL MEDICINE

## 2020-01-31 PROCEDURE — 700111 HCHG RX REV CODE 636 W/ 250 OVERRIDE (IP): Performed by: INTERNAL MEDICINE

## 2020-01-31 PROCEDURE — 80048 BASIC METABOLIC PNL TOTAL CA: CPT

## 2020-01-31 PROCEDURE — 700105 HCHG RX REV CODE 258

## 2020-01-31 PROCEDURE — 51798 US URINE CAPACITY MEASURE: CPT

## 2020-01-31 PROCEDURE — 99233 SBSQ HOSP IP/OBS HIGH 50: CPT | Performed by: INTERNAL MEDICINE

## 2020-01-31 PROCEDURE — 770004 HCHG ROOM/CARE - ONCOLOGY PRIVATE *

## 2020-01-31 PROCEDURE — 83605 ASSAY OF LACTIC ACID: CPT

## 2020-01-31 PROCEDURE — 85007 BL SMEAR W/DIFF WBC COUNT: CPT

## 2020-01-31 PROCEDURE — 700105 HCHG RX REV CODE 258: Performed by: INTERNAL MEDICINE

## 2020-01-31 PROCEDURE — A9270 NON-COVERED ITEM OR SERVICE: HCPCS | Performed by: INTERNAL MEDICINE

## 2020-01-31 PROCEDURE — 700102 HCHG RX REV CODE 250 W/ 637 OVERRIDE(OP): Performed by: INTERNAL MEDICINE

## 2020-01-31 PROCEDURE — 85027 COMPLETE CBC AUTOMATED: CPT

## 2020-01-31 RX ORDER — SODIUM CHLORIDE 9 MG/ML
500 INJECTION, SOLUTION INTRAVENOUS ONCE
Status: COMPLETED | OUTPATIENT
Start: 2020-01-31 | End: 2020-01-31

## 2020-01-31 RX ORDER — SODIUM CHLORIDE 9 MG/ML
INJECTION, SOLUTION INTRAVENOUS
Status: COMPLETED
Start: 2020-01-31 | End: 2020-01-31

## 2020-01-31 RX ADMIN — ACETAMINOPHEN 650 MG: 325 TABLET, FILM COATED ORAL at 18:30

## 2020-01-31 RX ADMIN — PIPERACILLIN AND TAZOBACTAM 3.38 G: 3; .375 INJECTION, POWDER, LYOPHILIZED, FOR SOLUTION INTRAVENOUS; PARENTERAL at 14:12

## 2020-01-31 RX ADMIN — SODIUM CHLORIDE 500 ML: 9 INJECTION, SOLUTION INTRAVENOUS at 14:16

## 2020-01-31 RX ADMIN — MORPHINE SULFATE 4 MG: 4 INJECTION INTRAVENOUS at 18:53

## 2020-01-31 RX ADMIN — SODIUM CHLORIDE, POTASSIUM CHLORIDE, SODIUM LACTATE AND CALCIUM CHLORIDE: 600; 310; 30; 20 INJECTION, SOLUTION INTRAVENOUS at 05:16

## 2020-01-31 RX ADMIN — OXYCODONE HYDROCHLORIDE 80 MG: 20 TABLET, FILM COATED, EXTENDED RELEASE ORAL at 05:09

## 2020-01-31 RX ADMIN — PIPERACILLIN AND TAZOBACTAM 3.38 G: 3; .375 INJECTION, POWDER, LYOPHILIZED, FOR SOLUTION INTRAVENOUS; PARENTERAL at 20:30

## 2020-01-31 RX ADMIN — VANCOMYCIN HYDROCHLORIDE 1300 MG: 500 INJECTION, POWDER, LYOPHILIZED, FOR SOLUTION INTRAVENOUS at 09:10

## 2020-01-31 RX ADMIN — SODIUM CHLORIDE 500 ML: 9 INJECTION, SOLUTION INTRAVENOUS at 18:41

## 2020-01-31 RX ADMIN — OXYCODONE HYDROCHLORIDE 80 MG: 20 TABLET, FILM COATED, EXTENDED RELEASE ORAL at 22:22

## 2020-01-31 RX ADMIN — VANCOMYCIN HYDROCHLORIDE 1300 MG: 500 INJECTION, POWDER, LYOPHILIZED, FOR SOLUTION INTRAVENOUS at 22:59

## 2020-01-31 RX ADMIN — MORPHINE SULFATE 4 MG: 4 INJECTION INTRAVENOUS at 22:23

## 2020-01-31 RX ADMIN — PIPERACILLIN AND TAZOBACTAM 3.38 G: 3; .375 INJECTION, POWDER, LYOPHILIZED, FOR SOLUTION INTRAVENOUS; PARENTERAL at 05:10

## 2020-01-31 RX ADMIN — MORPHINE SULFATE 4 MG: 4 INJECTION INTRAVENOUS at 08:51

## 2020-01-31 RX ADMIN — MORPHINE SULFATE 4 MG: 4 INJECTION INTRAVENOUS at 16:00

## 2020-01-31 RX ADMIN — SODIUM CHLORIDE, POTASSIUM CHLORIDE, SODIUM LACTATE AND CALCIUM CHLORIDE: 600; 310; 30; 20 INJECTION, SOLUTION INTRAVENOUS at 22:27

## 2020-01-31 RX ADMIN — SODIUM CHLORIDE, POTASSIUM CHLORIDE, SODIUM LACTATE AND CALCIUM CHLORIDE: 600; 310; 30; 20 INJECTION, SOLUTION INTRAVENOUS at 14:15

## 2020-01-31 RX ADMIN — OXYCODONE HYDROCHLORIDE 10 MG: 10 TABLET ORAL at 20:30

## 2020-01-31 RX ADMIN — OXYCODONE HYDROCHLORIDE 10 MG: 10 TABLET ORAL at 16:54

## 2020-01-31 RX ADMIN — OXYCODONE HYDROCHLORIDE 80 MG: 20 TABLET, FILM COATED, EXTENDED RELEASE ORAL at 14:15

## 2020-01-31 RX ADMIN — OXYCODONE HYDROCHLORIDE 10 MG: 10 TABLET ORAL at 11:56

## 2020-01-31 ASSESSMENT — COGNITIVE AND FUNCTIONAL STATUS - GENERAL
CLIMB 3 TO 5 STEPS WITH RAILING: TOTAL
SUGGESTED CMS G CODE MODIFIER DAILY ACTIVITY: CK
WALKING IN HOSPITAL ROOM: TOTAL
DRESSING REGULAR LOWER BODY CLOTHING: A LOT
DRESSING REGULAR LOWER BODY CLOTHING: A LITTLE
HELP NEEDED FOR BATHING: A LOT
TURNING FROM BACK TO SIDE WHILE IN FLAT BAD: A LOT
MOVING TO AND FROM BED TO CHAIR: A LOT
PERSONAL GROOMING: A LITTLE
MOVING FROM LYING ON BACK TO SITTING ON SIDE OF FLAT BED: UNABLE
STANDING UP FROM CHAIR USING ARMS: A LOT
STANDING UP FROM CHAIR USING ARMS: A LOT
CLIMB 3 TO 5 STEPS WITH RAILING: TOTAL
DAILY ACTIVITIY SCORE: 19
MOVING FROM LYING ON BACK TO SITTING ON SIDE OF FLAT BED: A LOT
DAILY ACTIVITIY SCORE: 12
WALKING IN HOSPITAL ROOM: A LOT
HELP NEEDED FOR BATHING: A LITTLE
PERSONAL GROOMING: A LOT
MOVING TO AND FROM BED TO CHAIR: UNABLE
EATING MEALS: A LOT
DRESSING REGULAR UPPER BODY CLOTHING: A LITTLE
MOBILITY SCORE: 8
SUGGESTED CMS G CODE MODIFIER MOBILITY: CM
TOILETING: A LITTLE
DRESSING REGULAR UPPER BODY CLOTHING: A LOT
SUGGESTED CMS G CODE MODIFIER MOBILITY: CL
SUGGESTED CMS G CODE MODIFIER DAILY ACTIVITY: CL
TOILETING: A LOT
MOBILITY SCORE: 10
TURNING FROM BACK TO SIDE WHILE IN FLAT BAD: UNABLE

## 2020-01-31 ASSESSMENT — LIFESTYLE VARIABLES
HAVE YOU EVER FELT YOU SHOULD CUT DOWN ON YOUR DRINKING: NO
AVERAGE NUMBER OF DAYS PER WEEK YOU HAVE A DRINK CONTAINING ALCOHOL: 0
TOTAL SCORE: 0
DOES PATIENT WANT TO STOP DRINKING: NO
EVER_SMOKED: YES
EVER HAD A DRINK FIRST THING IN THE MORNING TO STEADY YOUR NERVES TO GET RID OF A HANGOVER: NO
HAVE PEOPLE ANNOYED YOU BY CRITICIZING YOUR DRINKING: NO
ON A TYPICAL DAY WHEN YOU DRINK ALCOHOL HOW MANY DRINKS DO YOU HAVE: 0
EVER FELT BAD OR GUILTY ABOUT YOUR DRINKING: NO
CONSUMPTION TOTAL: NEGATIVE
HOW MANY TIMES IN THE PAST YEAR HAVE YOU HAD 5 OR MORE DRINKS IN A DAY: 0
ALCOHOL_USE: NO
TOTAL SCORE: 0
TOTAL SCORE: 0

## 2020-01-31 ASSESSMENT — ENCOUNTER SYMPTOMS
DIZZINESS: 0
PALPITATIONS: 0
FEVER: 0
BLOOD IN STOOL: 0
NAUSEA: 0
CONSTIPATION: 0
CHILLS: 1
VOMITING: 0
HEMOPTYSIS: 0
BLURRED VISION: 0
SHORTNESS OF BREATH: 0
DIARRHEA: 0
HEARTBURN: 0
ABDOMINAL PAIN: 0
COUGH: 0

## 2020-01-31 NOTE — PROGRESS NOTES
Pt declining to be turned throughout night, does not want lay on wound but also does not want to lay on left side due to severe arm pain. Continue to educate pt the need to turn off right side to prevent pressure injury, pt continues to want to lay on side. Will continue to monitor

## 2020-01-31 NOTE — PROGRESS NOTES
Bedside report received. Assumed care of pt. Pt resting in bed, family at bedside A&O X4. No signs of distress, no complaints of pain at this time. Tele box on.Call light and belongings within reach. Bed alarm on, bed locked and in lowest position.

## 2020-01-31 NOTE — PROGRESS NOTES
Bedside report received, pt care assumed, tele box on. VSS, pt assessment complete. Pt aaox4, no signs of distress noted at this time. POC discussed with pt and verbalizes no questions. Pt denies any additional needs at this time. Bed in lowest position, bed alarm on, pt educated on fall risk and verbalized understanding, call light within reach.

## 2020-01-31 NOTE — CARE PLAN
Problem: Nutritional:  Goal: Achieve adequate nutritional intake  Description  Patient will consume 50% of meals   Outcome: PROGRESSING AS EXPECTED    See RD note.

## 2020-01-31 NOTE — DIETARY
"Nutrition services: Day 1 of admit.  Amanda Bae is a 39 y.o. female with admitting DX of sepsis.    Consult received for MST 4 with weight loss and poor PO intake.    Pt seen at bedside, states good PO intake PTA and denies weight loss at first. When I told pt she currently weighs 162 lbs, she did report weight loss with usual weight of 170-180 lbs in 1 month, with 5-10% weight loss. Pt does have cancer and is on chemo, discussed keeping smoothie/nutrition shakes at home if needed.    Assessment:  Height: 162.6 cm (5' 4\")  Weight: 73.7 kg (162 lb 7.7 oz) - bed scale  Body mass index is 27.89 kg/m²., BMI classification: overweight  Diet/Intake: regular, 1 meal of 25-50%    Evaluation:   1. PMH of cancer (epithelioid hemangioendothelioma stage IV).  2. RN notes stage 3 wound of sacrum and wound consult pending. Wound notes plan for pt to go OR tomorrow for I&D and possible wound vac placement. No staging done.  3. Pt reports good PO intake PTA, 5-10% weight loss in 1 month (moderate to severe weight loss).  4. Pt consented to nutrition focused physical exam showing normal muscle and normal fat mass.  5. Labs: K 3.4, glucose 112  6. Meds: colace, zofran prn, bowel protocol, LR  7. Last BM: 1/30 large brown loose watery    Malnutrition Risk: Pt is at risk due to weight loss, however no other malnutrition criteria met at this time.    Recommendations/Plan:  1. Regular diet as tolerated.  2. Encourage intake of meals.  3. Document intake of all meals as % taken in ADL's to provide interdisciplinary communication across all shifts.   4. Monitor weight.  5. Nutrition rep will continue to see patient for ongoing meal and snack preferences.     RD to follow.            "

## 2020-01-31 NOTE — PROGRESS NOTES
"Pharmacy Kinetics 39 y.o. female on vancomycin day # 2 2020    Currently on Vancomycin 1300 mg iv q12hr   (0900, 2100)  Provider specified end date: TBD    Indication for Treatment: SSTI; OM r/o     Pertinent history per medical record: Admitted on 2020. 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 Brown of heme-onc, who presented 2020 with tailbone ulcer which was discovered by her  2 days ago.  Patient states it bothers her with pain rated 6 out of 10 in severity, described as sharp and \"bad\", causing her to unable to sit up on her buttocks.  It was noted to have purulent drainage/discharge with pus.  She also states that makes her feel weaker than usual.      Other antibiotics: Zosyn 3.375g IV q8h     Allergies: Patient has no known allergies.      List concerns for renal function: malnutrition/low albumin, recent chemotherapy     Pertinent cultures to date:   20: Blood cultures x2 - NGTD   20: wound culture - in process     MRSA nares swab if pneumonia is a concern: N/A    Recent Labs     20  0650 20  0300   WBC 12.0* 10.4   NEUTSPOLYS 74.80* 68.40   BANDSSTABS  --  1.80     Recent Labs     20  0650 20  0300   BUN 11 9   CREATININE 0.54 0.62   ALBUMIN 3.0*  --      No results for input(s): VANCOTROUGH, VANCOPEAK, VANCORANDOM in the last 72 hours.    Intake/Output Summary (Last 24 hours) at 2020 1146  Last data filed at 2020 0045  Gross per 24 hour   Intake 680 ml   Output 400 ml   Net 280 ml      /57   Pulse 98   Temp 36.6 °C (97.9 °F) (Temporal)   Resp 18   Ht 1.626 m (5' 4\")   Wt 73.7 kg (162 lb 7.7 oz)   SpO2 98%  Temp (24hrs), Av.7 °C (98.1 °F), Min:36.2 °C (97.1 °F), Max:37.1 °C (98.8 °F)      A/P   1. Vancomycin dose change: none  2. Next vancomycin level: tomorrow, 20, at 0830   (ordered)  3. Goal trough: 12-16 mcg/mL  4. Comments: No leukocytosis " and remains afebrile. Cultures with no growth at this time. Continue current dose with trough level ordered for prior to 5th dose to assess accumulation and dose appropriateness. Pharmacy to monitor cultures for possible de-escalation.      Flavia Doshi, PharmD., BCPS

## 2020-01-31 NOTE — PROGRESS NOTES
[facility] Medicine Daily Progress Note    Date of Service  1/31/2020    Chief Complaint  39 y.o. 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 Delaney presented 1/30/2010 with tailbone ulcer which was discovered 2 days ago by her .  Patient is significant pain.  Note purulent drainage.  On arrival blood pressure on low side, tachycardic, white blood cell about 12,000.  X-ray negative for osteomyelitis.  She was started on Zosyn and vancomycin, resuscitated  with IV fluids. Also on admission hgb 6.1, 1U of transfusion given. Following day surgery was consulted, for debridement.       Interval Problem Update  Significant pain, pt look very uncomfortable. Wound look deep, with discharge. Afebrile otherwise. I did review rescucitation code with pt and she asked for DNAR/DNI (stating, please let me go)     Consultants/Specialty  General surgery     Code Status  DNAR/DNI     Disposition  inpatient     Review of Systems  Review of Systems   Constitutional: Positive for chills and malaise/fatigue. Negative for fever.   Eyes: Negative for blurred vision.   Respiratory: Negative for cough, hemoptysis and shortness of breath.    Cardiovascular: Negative for chest pain, palpitations and leg swelling.   Gastrointestinal: Negative for abdominal pain, blood in stool, constipation, diarrhea, heartburn, melena, nausea and vomiting.   Genitourinary: Negative for dysuria and urgency.        Urinary retention    Musculoskeletal:        Tailbone pain, at ulcer side    Skin: Positive for rash.   Neurological: Negative for dizziness.        Physical Exam  Temp:  [36.2 °C (97.1 °F)-37.1 °C (98.8 °F)] 36.6 °C (97.9 °F)  Pulse:  [] 98  Resp:  [16-20] 18  BP: ()/(57-70) 102/57  SpO2:  [92 %-99 %] 98 %    Physical Exam  Vitals signs and nursing note reviewed.   Constitutional:        General: She is in acute distress.      Appearance: She is obese. She is ill-appearing.   HENT:      Head: Normocephalic and atraumatic.   Eyes:      General: No scleral icterus.  Neck:      Musculoskeletal: No neck rigidity.   Cardiovascular:      Rate and Rhythm: Normal rate.      Heart sounds: No murmur.   Pulmonary:      Effort: Pulmonary effort is normal. No respiratory distress.      Breath sounds: No wheezing or rales.   Abdominal:      General: There is no distension.      Palpations: Abdomen is soft.      Tenderness: There is no tenderness. There is no guarding.   Musculoskeletal: Normal range of motion.         General: No swelling or deformity.   Skin:     General: Skin is warm.      Comments: Stage 3-4 sacral wound. With discharge    Neurological:      General: No focal deficit present.      Mental Status: She is alert and oriented to person, place, and time.      Cranial Nerves: No cranial nerve deficit.   Psychiatric:         Mood and Affect: Mood normal.         Behavior: Behavior normal.         Thought Content: Thought content normal.         Judgment: Judgment normal.         Fluids    Intake/Output Summary (Last 24 hours) at 1/31/2020 1459  Last data filed at 1/31/2020 1415  Gross per 24 hour   Intake 1400 ml   Output 1300 ml   Net 100 ml       Laboratory  Recent Labs     01/30/20  0650 01/30/20  2005 01/31/20  0300   WBC 12.0*  --  10.4   RBC 2.04*  --  2.45*   HEMOGLOBIN 6.1* 6.3* 7.5*   HEMATOCRIT 21.0*  --  24.3*   .9*  --  99.2*   MCH 29.9  --  30.6   MCHC 29.0*  --  30.9*   RDW 78.5*  --  63.6*   PLATELETCT 137*  --  105*   MPV 9.5  --  9.1     Recent Labs     01/30/20  0650 01/31/20  0300   SODIUM 137 140   POTASSIUM 3.0* 3.4*   CHLORIDE 105 108   CO2 22 23   GLUCOSE 118* 112*   BUN 11 9   CREATININE 0.54 0.62   CALCIUM 8.6 7.7*     Recent Labs     01/30/20  0705 01/30/20  1339   INR 1.36* 1.42*               Imaging  DX-SACRUM AND COCCYX 2+   Final Result      No radiographic  evidence of osteomyelitis. MRI could be performed to further evaluate if greater sensitivity is required      Osteoblastic metastases      DX-CHEST-PORTABLE (1 VIEW)   Final Result      Stable dense right basilar consolidation and reticular opacity. Mass may be present      Stable right lateral seventh rib bony destruction/presumed pathologic fracture and osteoblastic metastases           Assessment/Plan  * Sepsis (HCC)- (present on admission)  Assessment & Plan  -This is Sepsis Present on admission  SIRS criteria identified on my evaluation include: Tachycardia, with heart rate greater than 90 BPM and Leukocyosis, with WBC greater than 12,000  Source is infected decubitus ulcer  Sepsis protocol initiated  Fluid resuscitation ordered per protocol.  Start IV LR at 125 cc/h.  Trend lactate levels.  IV antibiotics as appropriate for source of sepsis  While organ dysfunction may be noted elsewhere in this problem list or in the chart, degree of organ dysfunction does not meet CMS criteria for severe sepsis.  Wound culture gram positive cocci. Pt immunocompromised.   Continue zosyn/ vanco        Pressure injury of sacral region, stage 3 (HCC)- (present on admission)  Assessment & Plan  -No radiologic evidence of osteomyelitis on x-ray,   ESR/CRP elevated .   I will order MRI to rule out osteomyelitis.   -continue Zosyn and vancomycin.    Superficial cultures have been sent, will follow.  Follow blood cultures as well.   -I did consult surgery, since her wound looks almost 3-4 staging.   Wound care team will follow .   Pt might need a vac   Will consider ID consult     Hypokalemia- (present on admission)  Assessment & Plan  -Replace with 60 mEq of oral K-Dur.  Check magnesium level and replace if low.  BMP in the morning.    Anemia of chronic disease- (present on admission)  Assessment & Plan  -Hemoglobin 6.1.  -Transfuse 1 unit packed RBC.  Trend hemoglobin, restrictive transfusion strategy transfuse if remains below  7.    Urinary retention  Assessment & Plan  Ok to place finch     Epithelioid hemangioendothelioma- (present on admission)  Assessment & Plan  -Stage IV. Had chemotherapy.  Follows Dr. Brown as outpatient.       VTE prophylaxis: lovenox

## 2020-01-31 NOTE — CARE PLAN
Problem: Communication  Goal: The ability to communicate needs accurately and effectively will improve  Intervention: Educate patient and significant other/support system about the plan of care, procedures, treatments, medications and allow for questions  Note:   Pt educated on plan of care and treatments for the day.     Problem: Skin Integrity  Goal: Risk for impaired skin integrity will decrease  Intervention: Implement precautions to protect skin integrity in collaboration with the interdisciplinary team  Note:   Precautions in place, pt refusing to be turned every two hours, continuous education being provided to pt about importance of turning.

## 2020-01-31 NOTE — CARE PLAN
Problem: Communication  Goal: The ability to communicate needs accurately and effectively will improve  Outcome: PROGRESSING AS EXPECTED  Intervention: [place] patient and significant other/support system to call light to alert staff of needs  Flowsheets (Taken 1/31/2020 9158)  Oriented to:: All of the Following : Location of Bathroom, Visiting Policy, Unit Routine, Call Light and Bedside Controls, Bedside Rail Policy, Smoking Policy, Rights and Responsibilities, Bedside Report, and Patient Education Notebook     Problem: Safety  Goal: Will remain free from injury  Outcome: PROGRESSING AS EXPECTED  Note:   Fall risk assessed, fall precautions in place, pt verbalizes understanding of fall risk.

## 2020-01-31 NOTE — WOUND TEAM
Wound consult received. Per chart review, Dr Alcazar is taking patient to OR tomorrow for debridement and possible vac placement of sacral pressure ulcer. Wound team will await for further orders at this time.

## 2020-01-31 NOTE — PROGRESS NOTES
Paged on call hospitalist regarding pt not having repeat hemoglobin after blood transfusion. Dr. Sher returned call, new orders placed.     2005: Paged on call hospitalist regarding pts hemoglobin at 6.3. Dr. Akbar returned call, new transfusion orders placed.

## 2020-01-31 NOTE — PROGRESS NOTES
"Pharmacy Kinetics 39 y.o. female on vancomycin day # 1 2020    Received vancomycin loading dose    Indication for Treatment: SSTI    Pertinent history per medical record: Admitted on 2020. 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 Brown of heme-onc, who presented 2020 with tailbone ulcer which was discovered by her  2 days ago.  Patient states it bothers her with pain rated 6 out of 10 in severity, described as sharp and \"bad\", causing her to unable to sit up on her buttocks.  It was noted to have purulent drainage/discharge with pus.  She also states that makes her feel weaker than usual.     Other antibiotics: Zosyn    Allergies: Patient has no known allergies.     List concerns for renal function: malnutrition/low albumin, recent chemotherapy    Pertinent cultures to date:   Blood cultures x2, wound culture - in process     Recent Labs     20  0650   WBC 12.0*   NEUTSPOLYS 74.80*     Recent Labs     20  0650   BUN 11   CREATININE 0.54   ALBUMIN 3.0*     No results for input(s): VANCOTROUGH, VANCOPEAK, VANCORANDOM in the last 72 hours.    Intake/Output Summary (Last 24 hours) at 2020 1636  Last data filed at 2020 0924  Gross per 24 hour   Intake 100 ml   Output --   Net 100 ml      /62   Pulse (!) 102   Temp 36.8 °C (98.2 °F) (Temporal)   Resp 18   Ht 1.626 m (5' 4\")   Wt 73.7 kg (162 lb 7.7 oz)   SpO2 94%  Temp (24hrs), Av.7 °C (98 °F), Min:36.2 °C (97.2 °F), Max:36.8 °C (98.3 °F)      A/P   1. Vancomycin dose change: Start vancomycin (17 mg/kg) 1300 mg q12hr  2. Next vancomycin level: Prior to the 4th or 5th dose or sooner if clinically indicated (not ordered)  3. Goal trough: 12-16 mcg/ml  4. Comments: Dosing as above. Monitor renal closely. Pharmacy will follow. Narrow therapy as able.    Miguel Michelle, PharmD, BCPS, BCCCP    "

## 2020-01-31 NOTE — DISCHARGE PLANNING
Patient is eligible for Medicaid Meds to Beds at discharge if they have coverage with [place] Medicaid, Medicaid FFS, Medicaid HMO (hospitals), or [place]. This service is provided through the [facility] if orders are received prior to 4 p.m. Monday through Friday, excluding holidays. Please call [phone] prior to discharge.

## 2020-01-31 NOTE — CONSULTS
Surgery General History & Physical Note    Date  1/31/2020    Primary Care Physician  Rosita Delaney M.D.    CC  Sacral decubitus ulcer    HPI  This is a 39 y.o. female who presented with a sacral decubitus ulcer which she thinks has been there for about two months. She notes that she's had some drainage from the area, but can't really give me further details since she can't see it herself. She says that the pain started before the wound appeared. She hasn't noticed fevers or chills but does feel weaker. She has been on chemotherapy for epithelioid hemangioendothelioma, last does last month.     Past Medical History:   Diagnosis Date   • Cancer (HCC)        Past Surgical History:   Procedure Laterality Date   • OTHER ORTHOPEDIC SURGERY      left hip, right lower ext yusra       Current Facility-Administered Medications   Medication Dose Route Frequency Provider Last Rate Last Dose   • MD Alert...Vancomycin per Pharmacy   Other PHARMACY TO DOSE Jabari Chinchilla M.D.       • piperacillin-tazobactam (ZOSYN) 3.375 g in  mL IVPB  3.375 g Intravenous Q8HRS Jabari Chinchilla M.D.   Stopped at 01/31/20 0910   • clonazePAM (KLONOPIN) tablet 1 mg  1 mg Oral HS PRN Jabari Chinchilla M.D.       • oxyCODONE CR (OXYCONTIN) tablet 80 mg  80 mg Oral Q8HRS Jabari Chinchilla M.D.   80 mg at 01/31/20 0509   • Respiratory Care per Protocol   Nebulization Continuous RT Jabari Chinchilla M.D.       • acetaminophen (TYLENOL) tablet 650 mg  650 mg Oral Q6HRS PRN Jabari Chinchilla M.D.   650 mg at 01/30/20 2008   • cloNIDine (CATAPRES) tablet 0.1 mg  0.1 mg Oral Q6HRS PRN Jabari Chinchilla M.D.       • enalaprilat (VASOTEC) injection 1.25 mg  1.25 mg Intravenous Q6HRS PRN Jabari Chinchilla M.D.       • labetalol (NORMODYNE/TRANDATE) injection 10 mg  10 mg Intravenous Q4HRS PRN Jabari Chinchilla M.D.       • ondansetron (ZOFRAN) syringe/vial injection 4 mg  4 mg Intravenous Q4HRS PRN Jabari Chinchilla M.D.       • ondansetron (ZOFRAN ODT) dispertab 4 mg   4 mg Oral Q4HRS PRN Jabari Chinchilla M.D.       • promethazine (PHENERGAN) tablet 12.5-25 mg  12.5-25 mg Oral Q4HRS PRN Jabari Chinchilla M.D.       • promethazine (PHENERGAN) suppository 12.5-25 mg  12.5-25 mg Rectal Q4HRS PRN Jabari Chinchilla M.D.       • prochlorperazine (COMPAZINE) injection 5-10 mg  5-10 mg Intravenous Q4HRS PRN Jabari Chinchilla M.D.       • senna-docusate (PERICOLACE or SENOKOT S) 8.6-50 MG per tablet 2 Tab  2 Tab Oral BID Jabari Chinchilla M.D.   Stopped at 01/30/20 1800    And   • polyethylene glycol/lytes (MIRALAX) PACKET 1 Packet  1 Packet Oral QDAY PRN Jabari Chinchilla M.D.        And   • magnesium hydroxide (MILK OF MAGNESIA) suspension 30 mL  30 mL Oral QDAY PRN Jabari Chinchilla M.D.        And   • bisacodyl (DULCOLAX) suppository 10 mg  10 mg Rectal QDAY PRN Jabari Chinchilla M.D.       • lactated ringers infusion (BOLUS)  1,000 mL Intravenous Once PRN Jabari Chinchilla M.D.       • lactated ringers infusion   Intravenous Continuous Jabari Chinchilla M.D. 125 mL/hr at 01/31/20 0516     • lactated ringers infusion (BOLUS): BMI less than or equal to 30  30 mL/kg Intravenous Once PRN Jabari Chinchilla M.D.       • enoxaparin (LOVENOX) inj 40 mg  40 mg Subcutaneous DAILY Jabari Chinchilla M.D.   40 mg at 01/30/20 1116   • Pharmacy Consult Request ...Pain Management Review 1 Each  1 Each Other PHARMACY TO DOSE Jabari Chinchilla M.D.        And   • oxyCODONE immediate-release (ROXICODONE) tablet 5 mg  5 mg Oral Q3HRS PRN Jabari Chinchilla M.D.   5 mg at 01/30/20 1731    And   • oxyCODONE immediate-release (ROXICODONE) tablet 10 mg  10 mg Oral Q3HRS PRN Jabari Chinchilla M.D.   10 mg at 01/31/20 1156    And   • morphine (pf) 4 MG/ML injection 4 mg  4 mg Intravenous Q3HRS PRN Jabari Chinchilla M.D.   4 mg at 01/31/20 0851   • vancomycin (VANCOCIN) 1,300 mg in  mL IVPB  17 mg/kg Intravenous Q12HR Jabari Chinchilla M.D.   Stopped at 01/31/20 1110       Social History     Socioeconomic History   • Marital status:       Spouse name: Not on file   • Number of children: Not on file   • Years of education: Not on file   • Highest education level: Not on file   Occupational History   • Not on file   Social Needs   • Financial resource strain: Not on file   • Food insecurity:     Worry: Not on file     Inability: Not on file   • Transportation needs:     Medical: Not on file     Non-medical: Not on file   Tobacco Use   • Smoking status: Current Every Day Smoker     Packs/day: 0.25     Types: Cigarettes   • Smokeless tobacco: Never Used   Substance and Sexual Activity   • Alcohol use: No   • Drug use: No   • Sexual activity: Not on file     Comment:     Lifestyle   • Physical activity:     Days per week: Not on file     Minutes per session: Not on file   • Stress: Not on file   Relationships   • Social connections:     Talks on phone: Not on file     Gets together: Not on file     Attends [employer] service: Not on file     Active member of club or organization: Not on file     Attends meetings of clubs or organizations: Not on file     Relationship status: Not on file   • Intimate partner violence:     Fear of current or ex partner: Not on file     Emotionally abused: Not on file     Physically abused: Not on file     Forced sexual activity: Not on file   Other Topics Concern   • Primary/coprimary nurse & associates Not Asked   • Family contact information Not Asked   • OK to release patient information to the following Not Asked   • Patient preferred routine/Privacy concerns Not Asked   • Patient likes and dislikes Not Asked   • Participating In Research Study Not Asked   • Miscellaneous Not Asked   Social History Narrative   • Not on file       History reviewed. No pertinent family history.    Allergies  Patient has no known allergies.    Review of Systems  General: No weight changes or fatigue   HEENT: No changes in vision or trouble swallowing  CV: No chest pain or palpitations  Pulm: No shortness of breath or  cough  GI: As above in HPI, no melena or hematochezia, no hematemesis  : No dysuria or hematuria  MSK: Stable bone and joint pain 2/2 metastatic cancer  Skin: No new rashes or lesions  Lymph/Heme: No swelling or easy bruising, no lymphadenopathy  Neuro: No headaches or seizures  Psych: No SI/HI    Physical Exam   Constitutional: She is oriented to person, place, and time. She appears well-developed and well-nourished. No distress.   HENT:   Head: Normocephalic and atraumatic.   Nose: Nose normal.   Mouth/Throat: Oropharynx is clear and moist. No oropharyngeal exudate.   Eyes: Conjunctivae are normal.   Neck: Normal range of motion. No JVD present. No thyromegaly present.   Cardiovascular: Normal rate and intact distal pulses.   Pulmonary/Chest: Effort normal. No stridor. No respiratory distress. She has no wheezes.   Abdominal: Soft. She exhibits no distension. There is no tenderness.   Musculoskeletal: Normal range of motion.   Lymphadenopathy:     She has no cervical adenopathy.   Neurological: She is alert and oriented to person, place, and time.   Skin: Skin is warm and dry. No rash noted. She is not diaphoretic. No erythema.   Sacral area has a Stage III ulcer with eschar, no surrounding edema, erythema. There is fibrinous exudate in and necrotic fat filling the wound defect. No malodor or purulent drainage.    Psychiatric: She has a normal mood and affect. Her behavior is normal.       Vital Signs  Blood Pressure: 102/57   Temperature: 36.6 °C (97.9 °F)   Pulse: 98   Respiration: 18   Pulse Oximetry: 98 %       Labs:  Recent Labs     01/30/20  0650 01/30/20 2005 01/31/20  0300   WBC 12.0*  --  10.4   RBC 2.04*  --  2.45*   HEMOGLOBIN 6.1* 6.3* 7.5*   HEMATOCRIT 21.0*  --  24.3*   .9*  --  99.2*   MCH 29.9  --  30.6   MCHC 29.0*  --  30.9*   RDW 78.5*  --  63.6*   PLATELETCT 137*  --  105*   MPV 9.5  --  9.1     Recent Labs     01/30/20  0650 01/31/20  0300   SODIUM 137 140   POTASSIUM 3.0* 3.4*    CHLORIDE 105 108   CO2 22 23   GLUCOSE 118* 112*   BUN 11 9   CREATININE 0.54 0.62   CALCIUM 8.6 7.7*     Recent Labs     01/30/20  0705 01/30/20  1339   INR 1.36* 1.42*     Recent Labs     01/30/20  0650 01/30/20  0705 01/30/20  1339   ASTSGOT 10*  --   --    ALTSGPT <5  --   --    TBILIRUBIN 1.2  --   --    ALKPHOSPHAT 77  --   --    GLOBULIN 3.8*  --   --    INR  --  1.36* 1.42*       Radiology:  DX-SACRUM AND COCCYX 2+   Final Result      No radiographic evidence of osteomyelitis. MRI could be performed to further evaluate if greater sensitivity is required      Osteoblastic metastases      DX-CHEST-PORTABLE (1 VIEW)   Final Result      Stable dense right basilar consolidation and reticular opacity. Mass may be present      Stable right lateral seventh rib bony destruction/presumed pathologic fracture and osteoblastic metastases            Assessment/Plan:  Sacral decubitus ulcer, Stage III.     I explored the wound in a limited fashion at bedside. The patient has significant pain with it. The wound surface gram stain only shows gram positive cocci at this time. There is a large burden of fibrinous exudate and necrotic fat in the base of the wound which will need to be debrided in the operating room and further cultures can be sent at that time. I discussed with her that she may need a wound vac placed to help facilitate healing. I will schedule this for tomorrow morning so she will need to be NPO after midnight (orders placed). The patient is agreeable to this plan.

## 2020-02-01 ENCOUNTER — APPOINTMENT (OUTPATIENT)
Dept: RADIOLOGY | Facility: MEDICAL CENTER | Age: 40
DRG: 853 | End: 2020-02-01
Attending: HOSPITALIST
Payer: MEDICAID

## 2020-02-01 ENCOUNTER — APPOINTMENT (OUTPATIENT)
Dept: RADIOLOGY | Facility: MEDICAL CENTER | Age: 40
DRG: 853 | End: 2020-02-01
Attending: INTERNAL MEDICINE
Payer: MEDICAID

## 2020-02-01 ENCOUNTER — ANESTHESIA (OUTPATIENT)
Dept: SURGERY | Facility: MEDICAL CENTER | Age: 40
DRG: 853 | End: 2020-02-01
Payer: MEDICAID

## 2020-02-01 ENCOUNTER — ANESTHESIA EVENT (OUTPATIENT)
Dept: SURGERY | Facility: MEDICAL CENTER | Age: 40
DRG: 853 | End: 2020-02-01
Payer: MEDICAID

## 2020-02-01 PROBLEM — R04.2 HEMOPTYSIS: Status: ACTIVE | Noted: 2020-02-01

## 2020-02-01 LAB
GRAM STN SPEC: NORMAL
HCG UR QL: NEGATIVE
LACTATE BLD-SCNC: 0.8 MMOL/L (ref 0.5–2)
LACTATE BLD-SCNC: 1 MMOL/L (ref 0.5–2)
SIGNIFICANT IND 70042: NORMAL
SITE SITE: NORMAL
SOURCE SOURCE: NORMAL
VANCOMYCIN TROUGH SERPL-MCNC: 16.9 UG/ML (ref 10–20)

## 2020-02-01 PROCEDURE — 160048 HCHG OR STATISTICAL LEVEL 1-5: Performed by: SURGERY

## 2020-02-01 PROCEDURE — 700117 HCHG RX CONTRAST REV CODE 255: Performed by: HOSPITALIST

## 2020-02-01 PROCEDURE — 160035 HCHG PACU - 1ST 60 MINS PHASE I: Performed by: SURGERY

## 2020-02-01 PROCEDURE — 160039 HCHG SURGERY MINUTES - EA ADDL 1 MIN LEVEL 3: Performed by: SURGERY

## 2020-02-01 PROCEDURE — 700102 HCHG RX REV CODE 250 W/ 637 OVERRIDE(OP): Performed by: INTERNAL MEDICINE

## 2020-02-01 PROCEDURE — 87077 CULTURE AEROBIC IDENTIFY: CPT

## 2020-02-01 PROCEDURE — 501838 HCHG SUTURE GENERAL: Performed by: SURGERY

## 2020-02-01 PROCEDURE — A9270 NON-COVERED ITEM OR SERVICE: HCPCS

## 2020-02-01 PROCEDURE — 87015 SPECIMEN INFECT AGNT CONCNTJ: CPT

## 2020-02-01 PROCEDURE — A6550 NEG PRES WOUND THER DRSG SET: HCPCS | Performed by: SURGERY

## 2020-02-01 PROCEDURE — 87186 SC STD MICRODIL/AGAR DIL: CPT

## 2020-02-01 PROCEDURE — 160009 HCHG ANES TIME/MIN: Performed by: SURGERY

## 2020-02-01 PROCEDURE — 83605 ASSAY OF LACTIC ACID: CPT | Mod: 91

## 2020-02-01 PROCEDURE — 501488 HCHG SUCTION CANN, WOUNDVAC TRAC: Performed by: SURGERY

## 2020-02-01 PROCEDURE — 87205 SMEAR GRAM STAIN: CPT

## 2020-02-01 PROCEDURE — 160028 HCHG SURGERY MINUTES - 1ST 30 MINS LEVEL 3: Performed by: SURGERY

## 2020-02-01 PROCEDURE — 700111 HCHG RX REV CODE 636 W/ 250 OVERRIDE (IP)

## 2020-02-01 PROCEDURE — 87070 CULTURE OTHR SPECIMN AEROBIC: CPT

## 2020-02-01 PROCEDURE — 87075 CULTR BACTERIA EXCEPT BLOOD: CPT

## 2020-02-01 PROCEDURE — 160036 HCHG PACU - EA ADDL 30 MINS PHASE I: Performed by: SURGERY

## 2020-02-01 PROCEDURE — 81025 URINE PREGNANCY TEST: CPT

## 2020-02-01 PROCEDURE — 71275 CT ANGIOGRAPHY CHEST: CPT

## 2020-02-01 PROCEDURE — 700105 HCHG RX REV CODE 258: Performed by: HOSPITALIST

## 2020-02-01 PROCEDURE — 99233 SBSQ HOSP IP/OBS HIGH 50: CPT | Performed by: HOSPITALIST

## 2020-02-01 PROCEDURE — 700111 HCHG RX REV CODE 636 W/ 250 OVERRIDE (IP): Performed by: ANESTHESIOLOGY

## 2020-02-01 PROCEDURE — 501445 HCHG STAPLER, SKIN DISP: Performed by: SURGERY

## 2020-02-01 PROCEDURE — 700105 HCHG RX REV CODE 258: Performed by: INTERNAL MEDICINE

## 2020-02-01 PROCEDURE — 160002 HCHG RECOVERY MINUTES (STAT): Performed by: SURGERY

## 2020-02-01 PROCEDURE — 700102 HCHG RX REV CODE 250 W/ 637 OVERRIDE(OP)

## 2020-02-01 PROCEDURE — 700101 HCHG RX REV CODE 250: Performed by: ANESTHESIOLOGY

## 2020-02-01 PROCEDURE — A9270 NON-COVERED ITEM OR SERVICE: HCPCS | Performed by: INTERNAL MEDICINE

## 2020-02-01 PROCEDURE — 0JB70ZZ EXCISION OF BACK SUBCUTANEOUS TISSUE AND FASCIA, OPEN APPROACH: ICD-10-PCS | Performed by: SURGERY

## 2020-02-01 PROCEDURE — 700111 HCHG RX REV CODE 636 W/ 250 OVERRIDE (IP): Performed by: INTERNAL MEDICINE

## 2020-02-01 PROCEDURE — 770004 HCHG ROOM/CARE - ONCOLOGY PRIVATE *

## 2020-02-01 PROCEDURE — 700111 HCHG RX REV CODE 636 W/ 250 OVERRIDE (IP): Performed by: HOSPITALIST

## 2020-02-01 PROCEDURE — 80202 ASSAY OF VANCOMYCIN: CPT

## 2020-02-01 RX ORDER — MEPERIDINE HYDROCHLORIDE 25 MG/ML
12.5 INJECTION INTRAMUSCULAR; INTRAVENOUS; SUBCUTANEOUS
Status: DISCONTINUED | OUTPATIENT
Start: 2020-02-01 | End: 2020-02-01 | Stop reason: HOSPADM

## 2020-02-01 RX ORDER — IPRATROPIUM BROMIDE AND ALBUTEROL SULFATE 2.5; .5 MG/3ML; MG/3ML
3 SOLUTION RESPIRATORY (INHALATION)
Status: DISCONTINUED | OUTPATIENT
Start: 2020-02-01 | End: 2020-02-01 | Stop reason: HOSPADM

## 2020-02-01 RX ORDER — HYDROMORPHONE HYDROCHLORIDE 1 MG/ML
1 INJECTION, SOLUTION INTRAMUSCULAR; INTRAVENOUS; SUBCUTANEOUS
Status: DISCONTINUED | OUTPATIENT
Start: 2020-02-01 | End: 2020-02-01 | Stop reason: HOSPADM

## 2020-02-01 RX ORDER — ROCURONIUM BROMIDE 10 MG/ML
INJECTION, SOLUTION INTRAVENOUS PRN
Status: DISCONTINUED | OUTPATIENT
Start: 2020-02-01 | End: 2020-02-01 | Stop reason: SURG

## 2020-02-01 RX ORDER — LIDOCAINE HYDROCHLORIDE 40 MG/ML
SOLUTION TOPICAL PRN
Status: DISCONTINUED | OUTPATIENT
Start: 2020-02-01 | End: 2020-02-01 | Stop reason: SURG

## 2020-02-01 RX ORDER — SODIUM CHLORIDE, SODIUM GLUCONATE, SODIUM ACETATE, POTASSIUM CHLORIDE AND MAGNESIUM CHLORIDE 526; 502; 368; 37; 30 MG/100ML; MG/100ML; MG/100ML; MG/100ML; MG/100ML
500 INJECTION, SOLUTION INTRAVENOUS CONTINUOUS
Status: DISCONTINUED | OUTPATIENT
Start: 2020-02-01 | End: 2020-02-01 | Stop reason: HOSPADM

## 2020-02-01 RX ORDER — OXYCODONE HCL 5 MG/5 ML
5 SOLUTION, ORAL ORAL
Status: COMPLETED | OUTPATIENT
Start: 2020-02-01 | End: 2020-02-01

## 2020-02-01 RX ORDER — ONDANSETRON 2 MG/ML
4 INJECTION INTRAMUSCULAR; INTRAVENOUS
Status: DISCONTINUED | OUTPATIENT
Start: 2020-02-01 | End: 2020-02-01 | Stop reason: HOSPADM

## 2020-02-01 RX ORDER — DEXAMETHASONE SODIUM PHOSPHATE 4 MG/ML
INJECTION, SOLUTION INTRA-ARTICULAR; INTRALESIONAL; INTRAMUSCULAR; INTRAVENOUS; SOFT TISSUE PRN
Status: DISCONTINUED | OUTPATIENT
Start: 2020-02-01 | End: 2020-02-01 | Stop reason: SURG

## 2020-02-01 RX ORDER — LIDOCAINE HYDROCHLORIDE 20 MG/ML
INJECTION, SOLUTION EPIDURAL; INFILTRATION; INTRACAUDAL; PERINEURAL PRN
Status: DISCONTINUED | OUTPATIENT
Start: 2020-02-01 | End: 2020-02-01 | Stop reason: SURG

## 2020-02-01 RX ORDER — OXYCODONE HCL 5 MG/5 ML
SOLUTION, ORAL ORAL
Status: COMPLETED
Start: 2020-02-01 | End: 2020-02-01

## 2020-02-01 RX ORDER — DIPHENHYDRAMINE HYDROCHLORIDE 50 MG/ML
12.5 INJECTION INTRAMUSCULAR; INTRAVENOUS
Status: DISCONTINUED | OUTPATIENT
Start: 2020-02-01 | End: 2020-02-01 | Stop reason: HOSPADM

## 2020-02-01 RX ORDER — HALOPERIDOL 5 MG/ML
1 INJECTION INTRAMUSCULAR
Status: DISCONTINUED | OUTPATIENT
Start: 2020-02-01 | End: 2020-02-01 | Stop reason: HOSPADM

## 2020-02-01 RX ORDER — MAGNESIUM SULFATE HEPTAHYDRATE 40 MG/ML
2 INJECTION, SOLUTION INTRAVENOUS ONCE
Status: COMPLETED | OUTPATIENT
Start: 2020-02-01 | End: 2020-02-01

## 2020-02-01 RX ORDER — MIDAZOLAM HYDROCHLORIDE 1 MG/ML
1 INJECTION INTRAMUSCULAR; INTRAVENOUS
Status: DISCONTINUED | OUTPATIENT
Start: 2020-02-01 | End: 2020-02-01 | Stop reason: HOSPADM

## 2020-02-01 RX ORDER — OXYCODONE HCL 5 MG/5 ML
10 SOLUTION, ORAL ORAL
Status: COMPLETED | OUTPATIENT
Start: 2020-02-01 | End: 2020-02-01

## 2020-02-01 RX ORDER — HYDROMORPHONE HYDROCHLORIDE 2 MG/ML
2 INJECTION, SOLUTION INTRAMUSCULAR; INTRAVENOUS; SUBCUTANEOUS ONCE
Status: COMPLETED | OUTPATIENT
Start: 2020-02-01 | End: 2020-02-01

## 2020-02-01 RX ORDER — HYDROMORPHONE HYDROCHLORIDE 1 MG/ML
0.4 INJECTION, SOLUTION INTRAMUSCULAR; INTRAVENOUS; SUBCUTANEOUS
Status: DISCONTINUED | OUTPATIENT
Start: 2020-02-01 | End: 2020-02-01 | Stop reason: HOSPADM

## 2020-02-01 RX ORDER — LORAZEPAM 2 MG/ML
1 INJECTION INTRAMUSCULAR ONCE
Status: COMPLETED | OUTPATIENT
Start: 2020-02-01 | End: 2020-02-01

## 2020-02-01 RX ORDER — CEFAZOLIN SODIUM 1 G/3ML
INJECTION, POWDER, FOR SOLUTION INTRAMUSCULAR; INTRAVENOUS PRN
Status: DISCONTINUED | OUTPATIENT
Start: 2020-02-01 | End: 2020-02-01 | Stop reason: SURG

## 2020-02-01 RX ORDER — HYDROMORPHONE HYDROCHLORIDE 1 MG/ML
0.2 INJECTION, SOLUTION INTRAMUSCULAR; INTRAVENOUS; SUBCUTANEOUS
Status: DISCONTINUED | OUTPATIENT
Start: 2020-02-01 | End: 2020-02-01 | Stop reason: HOSPADM

## 2020-02-01 RX ORDER — MIDAZOLAM HYDROCHLORIDE 1 MG/ML
INJECTION INTRAMUSCULAR; INTRAVENOUS PRN
Status: DISCONTINUED | OUTPATIENT
Start: 2020-02-01 | End: 2020-02-01 | Stop reason: SURG

## 2020-02-01 RX ADMIN — IOHEXOL 58 ML: 350 INJECTION, SOLUTION INTRAVENOUS at 12:14

## 2020-02-01 RX ADMIN — ROCURONIUM BROMIDE 70 MG: 10 INJECTION, SOLUTION INTRAVENOUS at 07:45

## 2020-02-01 RX ADMIN — MORPHINE SULFATE 4 MG: 4 INJECTION INTRAVENOUS at 19:21

## 2020-02-01 RX ADMIN — VANCOMYCIN HYDROCHLORIDE 1100 MG: 500 INJECTION, POWDER, LYOPHILIZED, FOR SOLUTION INTRAVENOUS at 22:15

## 2020-02-01 RX ADMIN — Medication 5 MG: at 08:43

## 2020-02-01 RX ADMIN — PROPOFOL 20 MG: 10 INJECTION, EMULSION INTRAVENOUS at 07:45

## 2020-02-01 RX ADMIN — FENTANYL CITRATE 25 MCG: 0.05 INJECTION, SOLUTION INTRAMUSCULAR; INTRAVENOUS at 08:45

## 2020-02-01 RX ADMIN — MIDAZOLAM HYDROCHLORIDE 2 MG: 1 INJECTION, SOLUTION INTRAMUSCULAR; INTRAVENOUS at 07:41

## 2020-02-01 RX ADMIN — OXYCODONE HYDROCHLORIDE 5 MG: 5 SOLUTION ORAL at 08:43

## 2020-02-01 RX ADMIN — OXYCODONE HYDROCHLORIDE 10 MG: 10 TABLET ORAL at 10:55

## 2020-02-01 RX ADMIN — PIPERACILLIN AND TAZOBACTAM 3.38 G: 3; .375 INJECTION, POWDER, LYOPHILIZED, FOR SOLUTION INTRAVENOUS; PARENTERAL at 04:50

## 2020-02-01 RX ADMIN — OXYCODONE HYDROCHLORIDE 80 MG: 20 TABLET, FILM COATED, EXTENDED RELEASE ORAL at 15:07

## 2020-02-01 RX ADMIN — HYDROMORPHONE HYDROCHLORIDE 2 MG: 2 INJECTION, SOLUTION INTRAMUSCULAR; INTRAVENOUS; SUBCUTANEOUS at 11:35

## 2020-02-01 RX ADMIN — MORPHINE SULFATE 4 MG: 4 INJECTION INTRAVENOUS at 22:25

## 2020-02-01 RX ADMIN — MORPHINE SULFATE 4 MG: 4 INJECTION INTRAVENOUS at 10:04

## 2020-02-01 RX ADMIN — VANCOMYCIN HYDROCHLORIDE 1300 MG: 500 INJECTION, POWDER, LYOPHILIZED, FOR SOLUTION INTRAVENOUS at 10:23

## 2020-02-01 RX ADMIN — PIPERACILLIN AND TAZOBACTAM 3.38 G: 3; .375 INJECTION, POWDER, LYOPHILIZED, FOR SOLUTION INTRAVENOUS; PARENTERAL at 20:30

## 2020-02-01 RX ADMIN — OXYCODONE HYDROCHLORIDE 10 MG: 10 TABLET ORAL at 20:29

## 2020-02-01 RX ADMIN — OXYCODONE HYDROCHLORIDE 80 MG: 20 TABLET, FILM COATED, EXTENDED RELEASE ORAL at 22:25

## 2020-02-01 RX ADMIN — MORPHINE SULFATE 4 MG: 4 INJECTION INTRAVENOUS at 16:30

## 2020-02-01 RX ADMIN — OXYCODONE HYDROCHLORIDE 10 MG: 10 TABLET ORAL at 00:44

## 2020-02-01 RX ADMIN — OXYCODONE HYDROCHLORIDE 80 MG: 20 TABLET, FILM COATED, EXTENDED RELEASE ORAL at 04:49

## 2020-02-01 RX ADMIN — PIPERACILLIN AND TAZOBACTAM 3.38 G: 3; .375 INJECTION, POWDER, LYOPHILIZED, FOR SOLUTION INTRAVENOUS; PARENTERAL at 12:32

## 2020-02-01 RX ADMIN — FENTANYL CITRATE 100 MCG: 50 INJECTION, SOLUTION INTRAMUSCULAR; INTRAVENOUS at 07:41

## 2020-02-01 RX ADMIN — OXYCODONE HYDROCHLORIDE 10 MG: 10 TABLET ORAL at 17:20

## 2020-02-01 RX ADMIN — DEXAMETHASONE SODIUM PHOSPHATE 8 MG: 4 INJECTION, SOLUTION INTRA-ARTICULAR; INTRALESIONAL; INTRAMUSCULAR; INTRAVENOUS; SOFT TISSUE at 07:49

## 2020-02-01 RX ADMIN — MAGNESIUM SULFATE 2 G: 2 INJECTION INTRAVENOUS at 14:47

## 2020-02-01 RX ADMIN — CEFAZOLIN 2 G: 330 INJECTION, POWDER, FOR SOLUTION INTRAMUSCULAR; INTRAVENOUS at 07:40

## 2020-02-01 RX ADMIN — LIDOCAINE HYDROCHLORIDE 40 MG: 20 INJECTION, SOLUTION EPIDURAL; INFILTRATION; INTRACAUDAL at 07:45

## 2020-02-01 RX ADMIN — LORAZEPAM 1 MG: 2 INJECTION INTRAMUSCULAR; INTRAVENOUS at 11:35

## 2020-02-01 RX ADMIN — LIDOCAINE HYDROCHLORIDE 4 ML: 40 SOLUTION TOPICAL at 07:45

## 2020-02-01 RX ADMIN — MORPHINE SULFATE 4 MG: 4 INJECTION INTRAVENOUS at 04:47

## 2020-02-01 ASSESSMENT — ENCOUNTER SYMPTOMS
HEMOPTYSIS: 1
PALPITATIONS: 0
NAUSEA: 0
EYES NEGATIVE: 1
ABDOMINAL PAIN: 0
MYALGIAS: 1
NEUROLOGICAL NEGATIVE: 1
GASTROINTESTINAL NEGATIVE: 1
LOSS OF CONSCIOUSNESS: 0
HEARTBURN: 0
COUGH: 0
DOUBLE VISION: 0
DIARRHEA: 0
NERVOUS/ANXIOUS: 0
CHILLS: 1
CONSTIPATION: 0
BRUISES/BLEEDS EASILY: 0
CARDIOVASCULAR NEGATIVE: 1
DIZZINESS: 0
HEADACHES: 0
FEVER: 1
BLOOD IN STOOL: 0
FEVER: 0
DIAPHORESIS: 0
PSYCHIATRIC NEGATIVE: 1
SEIZURES: 0
FOCAL WEAKNESS: 0
VOMITING: 0
CHILLS: 0
WHEEZING: 0

## 2020-02-01 ASSESSMENT — PAIN SCALES - GENERAL: PAIN_LEVEL: 4

## 2020-02-01 NOTE — ANESTHESIA PREPROCEDURE EVALUATION
Relevant Problems   CARDIAC   (+) HTN (hypertension)       Physical Exam    Airway   Mallampati: III  TM distance: >3 FB  Neck ROM: limited       Cardiovascular - normal exam  Rhythm: regular  Rate: normal  (-) murmur     Dental - normal exam         Pulmonary - normal exam  Breath sounds clear to auscultation     Abdominal    Neurological - normal exam         Other findings: Sepsis, infected sacral decubitus wound, mod to severe anemia            Anesthesia Plan    ASA 3- EMERGENT       Plan - general       Airway plan will be ETT  (Sepsis, fever chills infected acutelsacral decubitus wound)      Induction: intravenous    Postoperative Plan: Postoperative administration of opioids is intended.    Pertinent diagnostic labs and testing reviewed    Informed Consent:    Anesthetic plan and risks discussed with patient.    Use of blood products discussed with: patient whom consented to blood products.

## 2020-02-01 NOTE — PROGRESS NOTES
[facility] Medicine Daily Progress Note    Date of Service  2/1/2020    Chief Complaint  39 y.o. female admitted 1/30/2020 with worsening decubital ulcer.    Hospital Course    Patient was hospitalized because of the decubital ulcer.  The patient at this point has been evaluated and debrided.  The patient's cultures show mixed gram-positive cocci which is most likely a skin jessica.  Patient remains on vancomycin and Zosyn.  Continue with pain management.  Patient is also complaining of hemoptysis and thus a CT of the chest will be evaluated.      Interval Problem Update  Obtain CT of the chest   Continue wound care  Continue pain management  IV Vanco Zosyn  Monitor H&H transfuse below 7 and 21    Consultants/Specialty  Surgery    Code Status  DNR/DNI    Disposition  To be determined    Review of Systems  Review of Systems   Constitutional: Positive for malaise/fatigue. Negative for chills, diaphoresis and fever.   HENT: Negative.    Eyes: Negative.  Negative for double vision.   Respiratory: Positive for hemoptysis. Negative for cough and wheezing.    Cardiovascular: Negative.  Negative for chest pain, palpitations and leg swelling.   Gastrointestinal: Negative.  Negative for abdominal pain, blood in stool, constipation, diarrhea, heartburn, nausea and vomiting.   Genitourinary: Negative.  Negative for frequency, hematuria and urgency.   Musculoskeletal: Positive for myalgias. Negative for joint pain.   Skin: Positive for rash.        Pain in the gluteal region   Neurological: Negative.  Negative for dizziness, focal weakness, seizures, loss of consciousness and headaches.   Endo/Heme/Allergies: Negative.  Does not bruise/bleed easily.   Psychiatric/Behavioral: Negative.  Negative for suicidal ideas. The patient is not nervous/anxious.    All other systems reviewed and are negative.       Physical Exam  Temp:  [36.3 °C (97.4 °F)-38.7 °C (101.7 °F)] 36.7 °C (98 °F)  Pulse:  [106-130] 110  Resp:  [18-32] 20  BP:  ()/(56-90) 103/72  SpO2:  [81 %-100 %] 99 %    Physical Exam  Vitals signs and nursing note reviewed.   Constitutional:       General: She is not in acute distress.     Appearance: Normal appearance. She is well-developed and normal weight. She is not ill-appearing.   HENT:      Head: Normocephalic and atraumatic.      Right Ear: External ear normal.      Left Ear: External ear normal.      Nose: Nose normal.      Mouth/Throat:      Mouth: Mucous membranes are moist.      Pharynx: Oropharynx is clear.   Eyes:      Conjunctiva/sclera: Conjunctivae normal.      Pupils: Pupils are equal, round, and reactive to light.   Neck:      Musculoskeletal: Normal range of motion and neck supple.      Thyroid: No thyromegaly.      Vascular: No JVD.   Cardiovascular:      Rate and Rhythm: Normal rate and regular rhythm.      Pulses: Normal pulses.      Heart sounds: No murmur.   Pulmonary:      Effort: Accessory muscle usage present.      Breath sounds: Decreased air movement present. Examination of the right-upper field reveals decreased breath sounds. Examination of the right-middle field reveals decreased breath sounds. Examination of the right-lower field reveals decreased breath sounds and rales. Examination of the left-lower field reveals decreased breath sounds. Decreased breath sounds, wheezing and rales present.   Chest:      Chest wall: No tenderness.   Abdominal:      General: Abdomen is flat. Bowel sounds are normal. There is no distension.      Palpations: Abdomen is soft. There is no mass.      Tenderness: There is no tenderness. There is no guarding or rebound.   Genitourinary:     Comments: Moore catheter  Musculoskeletal: Normal range of motion.         General: No tenderness.   Lymphadenopathy:      Cervical: No cervical adenopathy.   Skin:     General: Skin is warm and dry.      Capillary Refill: Capillary refill takes 2 to 3 seconds.      Findings: No erythema or rash.          Neurological:      General:  No focal deficit present.      Mental Status: She is alert and oriented to person, place, and time. Mental status is at baseline.      Cranial Nerves: No cranial nerve deficit.      Deep Tendon Reflexes: Reflexes are normal and symmetric.   Psychiatric:         Mood and Affect: Mood normal.         Behavior: Behavior normal.         Thought Content: Thought content normal.         Judgment: Judgment normal.         Fluids    Intake/Output Summary (Last 24 hours) at 2/1/2020 1424  Last data filed at 2/1/2020 0916  Gross per 24 hour   Intake 700 ml   Output 2310 ml   Net -1610 ml       Laboratory  Recent Labs     01/30/20  0650 01/30/20 2005 01/31/20  0300   WBC 12.0*  --  10.4   RBC 2.04*  --  2.45*   HEMOGLOBIN 6.1* 6.3* 7.5*   HEMATOCRIT 21.0*  --  24.3*   .9*  --  99.2*   MCH 29.9  --  30.6   MCHC 29.0*  --  30.9*   RDW 78.5*  --  63.6*   PLATELETCT 137*  --  105*   MPV 9.5  --  9.1     Recent Labs     01/30/20  0650 01/31/20  0300   SODIUM 137 140   POTASSIUM 3.0* 3.4*   CHLORIDE 105 108   CO2 22 23   GLUCOSE 118* 112*   BUN 11 9   CREATININE 0.54 0.62   CALCIUM 8.6 7.7*     Recent Labs     01/30/20  0705 01/30/20  1339   INR 1.36* 1.42*               Imaging  CT-CTA CHEST PULMONARY ARTERY W/ RECONS   Final Result         1. No CT evidence of pulmonary embolism.      2. Extensive airspace opacities throughout both lungs, most in the middle lobe, concerning for multifocal pneumonia.      3. Small bilateral pleural effusions.      4. Significantly enlarged heterogeneous spleen with multiple lesions.      DX-SACRUM AND COCCYX 2+   Final Result      No radiographic evidence of osteomyelitis. MRI could be performed to further evaluate if greater sensitivity is required      Osteoblastic metastases      DX-CHEST-PORTABLE (1 VIEW)   Final Result      Stable dense right basilar consolidation and reticular opacity. Mass may be present      Stable right lateral seventh rib bony destruction/presumed pathologic  fracture and osteoblastic metastases      MR-LUMBAR SPINE-W/O    (Results Pending)        Assessment/Plan  * Sepsis (HCC)- (present on admission)  Assessment & Plan  Patient's initial sepsis was a result of decubital ulcer infection.  Patient's white blood cell count now is come down  Patient has been debrided  Cultures at this point show negative blood cultures but moderate growth of mixed skin jessica in the wound.  Continue antibiotics with Zosyn and vancomycin day #3.      Hemoptysis  Assessment & Plan  Patient tells me that for the past several weeks she has been having some hemoptysis.  Today the hemoptysis was much worse.  Reviewed previous imaging studies which does show infiltrative process of the ribs as well as possible infiltration into the lung.  Patient will have a CT of the chest done to rule out PE and to evaluate for possible infiltrative process into the bones and tissue.    Pressure injury of sacral region, stage 3 (HCC)- (present on admission)  Assessment & Plan  Status post debridement  Continue with vancomycin and Zosyn  Pain management    Hypokalemia- (present on admission)  Assessment & Plan  Give potassium supplementation most recent potassium is up to 3.4.  Most recent magnesium was low at 1.5 I will give her magnesium supplementation.    Anemia of chronic disease- (present on admission)  Assessment & Plan  Patient received 1 unit of packed red blood cells hemoglobin is up to 7.5 now.  Continue to monitor and transfuse below 7 and 21.    Urinary retention  Assessment & Plan  Patient may need to be straight cathed for urinary retention.    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  Follows with Dr. Brown as an outpatient       VTE prophylaxis: SCDs

## 2020-02-01 NOTE — PROGRESS NOTES
2 RN's skin check with Mansi RN. Wound to sacrum. Unable to assess R side( shoulder, hip); pt refuses turn because uncontrolled pain.

## 2020-02-01 NOTE — ANESTHESIA PROCEDURE NOTES
Airway  Date/Time: 2/1/2020 7:45 AM  Performed by: Christopher Giron III, M.D.  Authorized by: Christopher Giron III, M.D.     Location:  OR  Urgency:  Elective  Difficult Airway: No    Indications for Airway Management:  Anesthesia  Spontaneous Ventilation: absent    Sedation Level:  Deep  Preoxygenated: Yes    Patient Position:  Sniffing  Final Airway Type:  Endotracheal airway  Final Endotracheal Airway:  ETT  Cuffed: Yes    Technique Used for Successful ETT Placement:  Direct laryngoscopy  Insertion Site:  Oral  Blade Type:  Doran  Laryngoscope Blade/Videolaryngoscope Blade Size:  2  ETT Size (mm):  7.5  Measured from:  Lips  ETT to Lips (cm):  22  Placement Verified by: auscultation and capnometry    Cormack-Lehane Classification:  Grade III - view of epiglottis only  Number of Attempts at Approach:  1   Blood in post pharynx and on vocal cords without preop documentation of hemoptysis prior to intubation.

## 2020-02-01 NOTE — OR SURGEON
Immediate Post OP Note    PreOp Diagnosis: Sacral decubitus ulcer stage 3    PostOp Diagnosis: Sacral decubitus ulcer stage 3    Procedure(s):  IRRIGATION AND DEBRIDEMENT, WOUND - Wound Class: Dirty or Infected    Surgeon(s):  Ally Alcazar M.D.    Anesthesiologist/Type of Anesthesia:  Anesthesiologist: Christopher Giron III, M.D./General    Surgical Staff:  Circulator: Doretha Werner R.N.  Scrub Person: Colt AGUILA Donor    Specimens removed if any:  ID Type Source Tests Collected by Time Destination   1 : sacral decubitus Tissue Back AEROBIC/ANAEROBIC CULTURE (SURGERY) Ally Alcazar M.D. 2/1/2020  8:11 AM        Estimated Blood Loss: 15mL    Findings: Necrotic tissue with underlying healthy appearing granulation tissue. Total size of wound is 7cm craniocaudal, 3cm transverse, 2.5cm deep. Tracking superiorly about 3cm.     Complications: none apparent    Dictation: [id number]      2/1/2020 8:29 AM Ally Alcazar M.D.

## 2020-02-01 NOTE — ANESTHESIA TIME REPORT
Anesthesia Start and Stop Event Times     Date Time Event    2/1/2020 0720 Ready for Procedure     0740 Anesthesia Start     0837 Anesthesia Stop        Responsible Staff  02/01/20    Name Role Begin End    Christopher Giron III, M.D. Anesth 0740 0837        Preop Diagnosis (Free Text):  Pre-op Diagnosis     sacral decubitus ulcer        Preop Diagnosis (Codes):    Post op Diagnosis  Sepsis (HCC)  acute sacral wound abscess    Premium Reason  E. Weekend    Comments: emergency

## 2020-02-01 NOTE — OP REPORT
DATE OF SERVICE:  02/01/2020    PREOPERATIVE DIAGNOSIS:  Sacral decubitus ulcer, stage III.    POSTOPERATIVE DIAGNOSIS:  Sacral decubitus ulcer, stage III.    PROCEDURE:  Irrigation and debridement of sacral decubitus ulcer.    WOUND CLASS:  Dirty or infected.    SURGEON:  Ally Alcazar MD    ASSISTANT:  None.    ANESTHESIOLOGIST:  Christopher Giron MD    ANESTHESIA:  General endotracheal anesthesia.    ESTIMATED BLOOD LOSS:  15 mL    SPECIMENS:  Tissue culture for aerobic and anaerobic organisms.    FINDINGS:  Necrotic tissue with underlying healthy-appearing granulation   tissue.  Total size was 7 cm craniocaudal, 3 cm transverse and 2.5 cm deep   tracking superiorly about 3 cm.    INDICATIONS:  This is a 39-year-old woman with stage IV epithelioid   hemangioendothelioma who presented with an apparently infected sacral ulcer.    This required debridement, but she could not tolerate it at bedside and so she   was brought to the operating room today.  We discussed risks, benefits, and   alternatives with risks including but not limited to bleeding, infection,   damage to surrounding structures, prolonged wound healing and need for further   procedures.  The patient understood and agreed to proceed.    DESCRIPTION OF PROCEDURE:  The patient was brought to the operating room.  She   was placed in comfortable supine position on the operating room table with   all appropriate points padded.  General anesthesia was induced without   complication.  She was placed in a comfortable prone position with all   appropriate points padded.  A timeout was held and completed confirming   correct patient, correct site, correct procedure and SCDs on and functioning.    She received antibiotics prior to incision per protocol.  Her sacral area was   prepped with Betadine scrub and paint and draped in the usual sterile   fashion.  I sharply debrided a large plug of necrotic tissue with eschar from   the surrounding tissue using  scissors.  I then inspected and measured the   cavity with measurements as above.  There was a pale pink granulation tissue   in the base throughout the wound except for over the coccyx itself, which had   healthy-appearing ligament present in the wound.  I used a rongeur to sharply   debride a small amount of remaining fibrinous exudate, but did not disturb the   ligament or the surrounding granulation tissue.  I debrided down to, but not   including the bone.  I then used a Pulsavac to irrigate the cavity using 3   liters of warm saline.  I confirmed excellent hemostasis and then placed a   wound VAC with a small sponge in the cavity using edge of an ostomy appliance   to facilitate sticking at the gluteal cleft.  This had satisfactory suction   with only a tiny leak on the wound VAC and so she was placed back into the   supine position, extubated and brought to the postanesthesia care unit, having   tolerated the procedure well.  All counts were correct at the end of the case   x2.       ____________________________________     MD MURTAZA Aldridge / NTS    DD:  02/01/2020 08:36:52  DT:  02/01/2020 08:52:25    D#:  [id number]  Job#:  [id number]

## 2020-02-01 NOTE — ANESTHESIA POSTPROCEDURE EVALUATION
Patient: Amanda Bae    Procedure Summary     Date:  02/01/20 Room / Location:  [place] [address] / SURGERY [facility]    Anesthesia Start:  0740 Anesthesia Stop:  0837    Procedure:  IRRIGATION AND DEBRIDEMENT, WOUND (N/A Back) Diagnosis:  (sacral decubitus ulcer)    Surgeon:  Ally Alcazar M.D. Responsible Provider:  Christopher Giron III, M.D.    Anesthesia Type:  general ASA Status:  3 - Emergent          Final Anesthesia Type: general  Last vitals  BP   Blood Pressure: 112/63    Temp   36.8 °C (98.2 °F)    Pulse   Pulse: (!) 110   Resp   18    SpO2   94 %      Anesthesia Post Evaluation    Patient location during evaluation: PACU  Patient participation: complete - patient participated  Level of consciousness: awake and alert  Pain score: 4    Airway patency: patent  Anesthetic complications: no  Cardiovascular status: hemodynamically stable  Respiratory status: acceptable  Hydration status: euvolemic    PONV: none           Nurse Pain Score: 4 (NPRS)

## 2020-02-01 NOTE — PROGRESS NOTES
Progress Note               Author: Ally Alcazar M.D. Date & Time created: 2020  7:23 AM     Interval History:  Febrile overnight. Significant pain.     Review of Systems:  Review of Systems   Constitutional: Positive for chills and fever.       Physical Exam:  Physical Exam  Constitutional:       Appearance: Normal appearance. She is obese. She is not ill-appearing or diaphoretic.   HENT:      Mouth/Throat:      Mouth: Mucous membranes are dry.   Eyes:      Extraocular Movements: Extraocular movements intact.   Skin:     General: Skin is warm and dry.      Coloration: Skin is not jaundiced.   Neurological:      General: No focal deficit present.      Mental Status: She is alert and oriented to person, place, and time.   Psychiatric:         Mood and Affect: Mood normal.         Behavior: Behavior normal.         Labs:          Recent Labs     20  0650 20  0300   SODIUM 137  --  140   POTASSIUM 3.0*  --  3.4*   CHLORIDE 105  --  108   CO2 22  --  23   BUN 11  --  9   CREATININE 0.54  --  0.62   MAGNESIUM  --  1.5  --    CALCIUM 8.6  --  7.7*     Recent Labs     20  0650 20  0300   ALTSGPT <5  --    ASTSGOT 10*  --    ALKPHOSPHAT 77  --    TBILIRUBIN 1.2  --    GLUCOSE 118* 112*     Recent Labs     20  0650 20  0705 20  0300   RBC 2.04*  --   --   --  2.45*   HEMOGLOBIN 6.1*  --   --  6.3* 7.5*   HEMATOCRIT 21.0*  --   --   --  24.3*   PLATELETCT 137*  --   --   --  105*   PROTHROMBTM  --  17.1* 17.8*  --   --    INR  --  1.36* 1.42*  --   --      Recent Labs     20  0650 20  0300   WBC 12.0* 10.4   NEUTSPOLYS 74.80* 68.40   LYMPHOCYTES 18.00* 21.00*   MONOCYTES 2.70 1.80   EOSINOPHILS 0.00 0.00   BASOPHILS 0.00 0.00   ASTSGOT 10*  --    ALTSGPT <5  --    ALKPHOSPHAT 77  --    TBILIRUBIN 1.2  --      Hemodynamics:  Temp (24hrs), Av.2 °C (99 °F), Min:36.3 °C (97.4 °F), Max:38.7 °C (101.7 °F)  Temperature:  36.3 °C (97.4 °F)  Pulse  Av.9  Min: 98  Max: 130   Blood Pressure: 127/77     Respiratory:    Respiration: 18, Pulse Oximetry: 96 %        RUL Breath Sounds: Clear, RML Breath Sounds: Diminished, RLL Breath Sounds: Diminished, MARKOS Breath Sounds: Clear, LLL Breath Sounds: Diminished  Fluids:    Intake/Output Summary (Last 24 hours) at 2020 0723  Last data filed at 2020 0450  Gross per 24 hour   Intake 720 ml   Output 3200 ml   Net -2480 ml        GI/Nutrition:  Orders Placed This Encounter   Procedures   • Diet NPO     Standing Status:   Standing     Number of Occurrences:   8     Order Specific Question:   Restrict to:     Answer:   Sips with Medications [3]     Medical Decision Making, by Problem:  Active Hospital Problems    Diagnosis   • *Sepsis (HCC) [A41.9]   • Pressure injury of sacral region, stage 3 (HCC) [L89.153]   • Hypokalemia [E87.6]   • Anemia of chronic disease [D63.8]   • Epithelioid hemangioendothelioma [D48.9]   • Urinary retention [R33.9]       Plan:  To OR today for debridement of sacral decubitus ulcer. All questions answered.     Quality-Core Measures

## 2020-02-01 NOTE — CARE PLAN
Problem: Safety  Goal: Will remain free from falls  Outcome: PROGRESSING AS EXPECTED  Note:   Bed alarm in place, patient educated on calling for assistance.      Problem: Pain Management  Goal: Pain level will decrease to patient's comfort goal  Outcome: PROGRESSING SLOWER THAN EXPECTED  Note:   Pain control less than home medication.

## 2020-02-01 NOTE — PROGRESS NOTES
VS /65, , RR 32, temp 101.7 F oral, 93% on 3L ( now requires oxygen). MD notified. Bolus 500 ml and lactic acid ordered.

## 2020-02-01 NOTE — PROGRESS NOTES
"Pharmacy Kinetics 39 y.o. female on vancomycin day # 3 2020    Currently on Vancomycin 1300 mg iv q12hr  Provider specified end date: TBD    Indication for Treatment: SSTI; OM r/o     Pertinent history per medical record: Admitted on 2020. 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 Brown of heme-onc, who presented 2020 with tailbone ulcer which was discovered by her  2 days ago.  Patient states it bothers her with pain rated 6 out of 10 in severity, described as sharp and \"bad\", causing her to unable to sit up on her buttocks.  It was noted to have purulent drainage/discharge with pus.  She also states that makes her feel weaker than usual.      Other antibiotics: Zosyn 3.375g IV q8h    Allergies: Patient has no known allergies.     List concerns for renal function: Albumin 3; CT w/contrast     Pertinent cultures to date:   20 - blood (peripheral) x 2: NGTD  20 - wound (back) x 2: Few GPC    MRSA nares swab if pneumonia is a concern (ordered/positive/negative/n-a): N/A    Recent Labs     20  0650 20  0300   WBC 12.0* 10.4   NEUTSPOLYS 74.80* 68.40   BANDSSTABS  --  1.80     Recent Labs     20  0650 20  0300   BUN 11 9   CREATININE 0.54 0.62   ALBUMIN 3.0*  --      Recent Labs     20  1000   VANCOTROUGH 16.9     Intake/Output Summary (Last 24 hours) at 2020 1202  Last data filed at 2020 0916  Gross per 24 hour   Intake 940 ml   Output 2310 ml   Net -1370 ml      /72   Pulse (!) 110   Temp 36.7 °C (98 °F) (Temporal)   Resp 20   Ht 1.626 m (5' 4\")   Wt 73.7 kg (162 lb 7.7 oz)   SpO2 99%  Temp (24hrs), Av.1 °C (98.8 °F), Min:36.3 °C (97.4 °F), Max:38.7 °C (101.7 °F)    A/P   1. Vancomycin dose change: Start vancomycin 1100 mg IV Q12h (~15 mg/kg; due @ 10, 20)  2. Next vancomycin level: 2/3 @ 0930  3. Goal trough: 12-16 mcg/mL  4. Comments: Tmax 101.7 without " leukocytosis; lactic acid WNL. Renal indices within range of baseline compared to patient controls. No significant vancomycin dosing history to assess. Concerns for vancomycin accumulation/clearance outlined above. Preliminary wound cultures growing few GPC. I&D performed today, cultures obtained currently in process. MRI not yet performed. Steady state vancomycin trough slightly above goal at 16.9 mcg/mL. Will reduce to vancomycin ~15 mg/kg IV Q12h as outlined above and check a steady state level in 2 days. Pharmacy to monitor and adjust as needed.     Brittanie Messina, PharmD, BCOP

## 2020-02-01 NOTE — PROGRESS NOTES
Received report and assumed care of patient at 1900. Reviewed chart and completed assessment. Patient is A&O x4. C/O pain 8-10/10. Pressure injury to buttocks, patient scheduled to have surgical debridement in morning. Patient has been NPO since midnight. Medicated per MAR (IV abx and pain medication). Bed alarm in place. Patient laying on right side, refusing turns due to pain. Bed alarm on and active. All needs met at this time.

## 2020-02-02 LAB
ANION GAP SERPL CALC-SCNC: 7 MMOL/L (ref 0–11.9)
BACTERIA WND AEROBE CULT: ABNORMAL
BUN SERPL-MCNC: 9 MG/DL (ref 8–22)
CALCIUM SERPL-MCNC: 8.1 MG/DL (ref 8.5–10.5)
CHLORIDE SERPL-SCNC: 105 MMOL/L (ref 96–112)
CO2 SERPL-SCNC: 27 MMOL/L (ref 20–33)
CREAT SERPL-MCNC: 0.68 MG/DL (ref 0.5–1.4)
ERYTHROCYTE [DISTWIDTH] IN BLOOD BY AUTOMATED COUNT: 70 FL (ref 35.9–50)
GLUCOSE SERPL-MCNC: 142 MG/DL (ref 65–99)
GRAM STN SPEC: ABNORMAL
HCT VFR BLD AUTO: 21.5 % (ref 37–47)
HGB BLD-MCNC: 6.6 G/DL (ref 12–16)
MAGNESIUM SERPL-MCNC: 1.8 MG/DL (ref 1.5–2.5)
MCH RBC QN AUTO: 30.8 PG (ref 27–33)
MCHC RBC AUTO-ENTMCNC: 30.7 G/DL (ref 33.6–35)
MCV RBC AUTO: 100.5 FL (ref 81.4–97.8)
PLATELET # BLD AUTO: 112 K/UL (ref 164–446)
PMV BLD AUTO: 9.7 FL (ref 9–12.9)
POTASSIUM SERPL-SCNC: 3.3 MMOL/L (ref 3.6–5.5)
RBC # BLD AUTO: 2.14 M/UL (ref 4.2–5.4)
SIGNIFICANT IND 70042: ABNORMAL
SITE SITE: ABNORMAL
SODIUM SERPL-SCNC: 139 MMOL/L (ref 135–145)
SOURCE SOURCE: ABNORMAL
WBC # BLD AUTO: 9.5 K/UL (ref 4.8–10.8)

## 2020-02-02 PROCEDURE — 770004 HCHG ROOM/CARE - ONCOLOGY PRIVATE *

## 2020-02-02 PROCEDURE — 700111 HCHG RX REV CODE 636 W/ 250 OVERRIDE (IP): Performed by: INTERNAL MEDICINE

## 2020-02-02 PROCEDURE — A9270 NON-COVERED ITEM OR SERVICE: HCPCS | Performed by: INTERNAL MEDICINE

## 2020-02-02 PROCEDURE — 700102 HCHG RX REV CODE 250 W/ 637 OVERRIDE(OP): Performed by: INTERNAL MEDICINE

## 2020-02-02 PROCEDURE — 36430 TRANSFUSION BLD/BLD COMPNT: CPT

## 2020-02-02 PROCEDURE — 80048 BASIC METABOLIC PNL TOTAL CA: CPT

## 2020-02-02 PROCEDURE — 700105 HCHG RX REV CODE 258: Performed by: HOSPITALIST

## 2020-02-02 PROCEDURE — 86945 BLOOD PRODUCT/IRRADIATION: CPT

## 2020-02-02 PROCEDURE — 85027 COMPLETE CBC AUTOMATED: CPT

## 2020-02-02 PROCEDURE — 86923 COMPATIBILITY TEST ELECTRIC: CPT

## 2020-02-02 PROCEDURE — 700111 HCHG RX REV CODE 636 W/ 250 OVERRIDE (IP): Performed by: HOSPITALIST

## 2020-02-02 PROCEDURE — 99233 SBSQ HOSP IP/OBS HIGH 50: CPT | Performed by: HOSPITALIST

## 2020-02-02 PROCEDURE — 700105 HCHG RX REV CODE 258: Performed by: INTERNAL MEDICINE

## 2020-02-02 PROCEDURE — 83735 ASSAY OF MAGNESIUM: CPT

## 2020-02-02 PROCEDURE — P9016 RBC LEUKOCYTES REDUCED: HCPCS

## 2020-02-02 RX ORDER — ALPRAZOLAM 0.25 MG/1
0.25 TABLET ORAL 4 TIMES DAILY PRN
Status: DISCONTINUED | OUTPATIENT
Start: 2020-02-02 | End: 2020-02-12 | Stop reason: HOSPADM

## 2020-02-02 RX ORDER — POTASSIUM CHLORIDE 7.45 MG/ML
10 INJECTION INTRAVENOUS
Status: COMPLETED | OUTPATIENT
Start: 2020-02-02 | End: 2020-02-02

## 2020-02-02 RX ADMIN — MORPHINE SULFATE 4 MG: 4 INJECTION INTRAVENOUS at 03:13

## 2020-02-02 RX ADMIN — POTASSIUM CHLORIDE 10 MEQ: 7.46 INJECTION, SOLUTION INTRAVENOUS at 09:38

## 2020-02-02 RX ADMIN — VANCOMYCIN HYDROCHLORIDE 1100 MG: 500 INJECTION, POWDER, LYOPHILIZED, FOR SOLUTION INTRAVENOUS at 10:22

## 2020-02-02 RX ADMIN — MORPHINE SULFATE 4 MG: 4 INJECTION INTRAVENOUS at 09:04

## 2020-02-02 RX ADMIN — OXYCODONE HYDROCHLORIDE 10 MG: 10 TABLET ORAL at 10:36

## 2020-02-02 RX ADMIN — PIPERACILLIN AND TAZOBACTAM 3.38 G: 3; .375 INJECTION, POWDER, LYOPHILIZED, FOR SOLUTION INTRAVENOUS; PARENTERAL at 20:53

## 2020-02-02 RX ADMIN — OXYCODONE HYDROCHLORIDE 80 MG: 20 TABLET, FILM COATED, EXTENDED RELEASE ORAL at 05:44

## 2020-02-02 RX ADMIN — OXYCODONE HYDROCHLORIDE 80 MG: 20 TABLET, FILM COATED, EXTENDED RELEASE ORAL at 20:46

## 2020-02-02 RX ADMIN — PIPERACILLIN AND TAZOBACTAM 3.38 G: 3; .375 INJECTION, POWDER, LYOPHILIZED, FOR SOLUTION INTRAVENOUS; PARENTERAL at 13:18

## 2020-02-02 RX ADMIN — MORPHINE SULFATE 4 MG: 4 INJECTION INTRAVENOUS at 20:46

## 2020-02-02 RX ADMIN — SODIUM CHLORIDE, POTASSIUM CHLORIDE, SODIUM LACTATE AND CALCIUM CHLORIDE: 600; 310; 30; 20 INJECTION, SOLUTION INTRAVENOUS at 15:45

## 2020-02-02 RX ADMIN — OXYCODONE HYDROCHLORIDE 10 MG: 10 TABLET ORAL at 22:25

## 2020-02-02 RX ADMIN — OXYCODONE HYDROCHLORIDE 10 MG: 10 TABLET ORAL at 00:25

## 2020-02-02 RX ADMIN — POTASSIUM CHLORIDE 10 MEQ: 7.46 INJECTION, SOLUTION INTRAVENOUS at 12:08

## 2020-02-02 RX ADMIN — ALPRAZOLAM 0.25 MG: 0.25 TABLET ORAL at 21:10

## 2020-02-02 RX ADMIN — VANCOMYCIN HYDROCHLORIDE 1100 MG: 500 INJECTION, POWDER, LYOPHILIZED, FOR SOLUTION INTRAVENOUS at 22:44

## 2020-02-02 RX ADMIN — POTASSIUM CHLORIDE 10 MEQ: 7.46 INJECTION, SOLUTION INTRAVENOUS at 13:15

## 2020-02-02 RX ADMIN — OXYCODONE HYDROCHLORIDE 80 MG: 20 TABLET, FILM COATED, EXTENDED RELEASE ORAL at 15:41

## 2020-02-02 RX ADMIN — MORPHINE SULFATE 4 MG: 4 INJECTION INTRAVENOUS at 17:54

## 2020-02-02 RX ADMIN — POTASSIUM CHLORIDE 10 MEQ: 7.46 INJECTION, SOLUTION INTRAVENOUS at 11:02

## 2020-02-02 RX ADMIN — MORPHINE SULFATE 4 MG: 4 INJECTION INTRAVENOUS at 13:26

## 2020-02-02 RX ADMIN — SODIUM CHLORIDE, POTASSIUM CHLORIDE, SODIUM LACTATE AND CALCIUM CHLORIDE: 600; 310; 30; 20 INJECTION, SOLUTION INTRAVENOUS at 03:40

## 2020-02-02 RX ADMIN — PIPERACILLIN AND TAZOBACTAM 3.38 G: 3; .375 INJECTION, POWDER, LYOPHILIZED, FOR SOLUTION INTRAVENOUS; PARENTERAL at 05:44

## 2020-02-02 ASSESSMENT — LIFESTYLE VARIABLES: SUBSTANCE_ABUSE: 0

## 2020-02-02 ASSESSMENT — ENCOUNTER SYMPTOMS
BRUISES/BLEEDS EASILY: 0
EYE REDNESS: 0
SINUS PAIN: 0
VOMITING: 0
TINGLING: 0
FEVER: 0
CHILLS: 0
DEPRESSION: 0
SENSORY CHANGE: 0
SHORTNESS OF BREATH: 0
EYE DISCHARGE: 0
ORTHOPNEA: 0
STRIDOR: 0
PHOTOPHOBIA: 0
DIAPHORESIS: 0
SPUTUM PRODUCTION: 0
ABDOMINAL PAIN: 0
EYES NEGATIVE: 1
DIZZINESS: 0
HEARTBURN: 0
FALLS: 0
HEADACHES: 1
RESPIRATORY NEGATIVE: 1
COUGH: 0
FLANK PAIN: 0
POLYDIPSIA: 0
CONSTITUTIONAL NEGATIVE: 1
GASTROINTESTINAL NEGATIVE: 1
WHEEZING: 0
NAUSEA: 0
INSOMNIA: 0
DOUBLE VISION: 0
PND: 0
BACK PAIN: 0
MYALGIAS: 1
SEIZURES: 0
PSYCHIATRIC NEGATIVE: 1
FOCAL WEAKNESS: 0
BLOOD IN STOOL: 0
CARDIOVASCULAR NEGATIVE: 1

## 2020-02-02 NOTE — PROGRESS NOTES
Progress Note               Author: Ally Alcazar M.D. Date & Time created: 2020  7:57 AM     Interval History:  Did well overnight. CT scan yesterday for hemoptysis showed infiltrates in lungs bilaterally. No significant pain in tailbone, overall feeling well.     Review of Systems:  Review of Systems   Constitutional: Negative for chills and fever.   Gastrointestinal: Negative for nausea and vomiting.       Physical Exam:  Physical Exam  Constitutional:       Appearance: Normal appearance.   HENT:      Mouth/Throat:      Mouth: Mucous membranes are moist.   Eyes:      Extraocular Movements: Extraocular movements intact.   Pulmonary:      Effort: Pulmonary effort is normal.   Skin:     General: Skin is warm and dry.      Comments: Wound vac seal intact, no edema or erythema of skin around dressing. Minimal serosanguinous drainage in wound vac.    Neurological:      General: No focal deficit present.      Mental Status: She is alert and oriented to person, place, and time.   Psychiatric:         Mood and Affect: Mood normal.         Labs:          Recent Labs     20  0030   SODIUM  --  140 139   POTASSIUM  --  3.4* 3.3*   CHLORIDE  --  108 105   CO2  --  23 27   BUN  --  9 9   CREATININE  --  0.62 0.68   MAGNESIUM 1.5  --  1.8   CALCIUM  --  7.7* 8.1*     Recent Labs     20  0030   GLUCOSE 112* 142*     Recent Labs     20  0030   RBC  --   --  2.45* 2.14*   HEMOGLOBIN  --  6.3* 7.5* 6.6*   HEMATOCRIT  --   --  24.3* 21.5*   PLATELETCT  --   --  105* 112*   PROTHROMBTM 17.8*  --   --   --    INR 1.42*  --   --   --      Recent Labs     20  0030   WBC 10.4 9.5   NEUTSPOLYS 68.40  --    LYMPHOCYTES 21.00*  --    MONOCYTES 1.80  --    EOSINOPHILS 0.00  --    BASOPHILS 0.00  --      Hemodynamics:  Temp (24hrs), Av.6 °C (97.9 °F), Min:36.2 °C (97.2 °F), Max:37.2 °C (98.9  °F)  Temperature: 37.2 °C (98.9 °F)  Pulse  Av.1  Min: 92  Max: 130   Blood Pressure: 111/70     Respiratory:    Respiration: 18, Pulse Oximetry: 98 %        RUL Breath Sounds: Clear, RML Breath Sounds: Clear, RLL Breath Sounds: Diminished, MARKOS Breath Sounds: Clear, LLL Breath Sounds: Diminished  Fluids:    Intake/Output Summary (Last 24 hours) at 2020 0757  Last data filed at 2020 1720  Gross per 24 hour   Intake 700 ml   Output 1010 ml   Net -310 ml        GI/Nutrition:  Orders Placed This Encounter   Procedures   • Diet Order Regular     Standing Status:   Standing     Number of Occurrences:   1     Order Specific Question:   Diet:     Answer:   Regular [1]     Medical Decision Making, by Problem:  Active Hospital Problems    Diagnosis   • *Sepsis (HCC) [A41.9]   • Hemoptysis [R04.2]   • Pressure injury of sacral region, stage 3 (HCC) [L89.153]   • Hypokalemia [E87.6]   • Anemia of chronic disease [D63.8]   • 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   Tissue culture from surgical specimen pending.   Wound bed healthy, bone completely covered by healthy ligament, no clinical suspicion for osteomyelitis.   Wound care to change wound vac tomorrow. Recommend she stay in house for 1-2 wound vac changes and then likely discharge to post acute therapy for further wound care.   D/w Dr. Huston.     Quality-Core Measures

## 2020-02-02 NOTE — PROGRESS NOTES
Assumed care of pt @0700. Bedside report received. Pt AOX 4. Pt anxious. Pain controled  By Roxicodone 10 mg and Morphine 4mg IV. Surgery today. Wound vac to sacrum. Pt refuses turns despite education. Pt was coughing blood after returning from surgery and this afternoon. MD notified. Port patent with positive blood return running IV fluids. 2 PIV patent. Last Bm 01/31. Moore draining to gravity. 2 p assist. Fall precaution in place. POC discussed with pt, all questions answered at this time. Pt makes needs known, call light within reach, hourly rounding in place.

## 2020-02-02 NOTE — CARE PLAN
Problem: Safety  Goal: Will remain free from falls  Outcome: PROGRESSING AS EXPECTED  Note:   Bed alarm on, A&O x4, slipper socks, bed locked and in low position, call light and personal belongings with reach, report given to CNA, appropriate signs outside door, hourly rounding in place.    Intervention: Implement fall precautions  Flowsheets (Taken 2/1/2020 1002)  Environmental Precautions: Treaded Slipper Socks on Patient;Personal Belongings, Wastebasket, Call Bell etc. in Easy Reach;Report Given to Other Health Care Providers Regarding Fall Risk;Transferred to Stronger Side;Bed in Low Position;Communication Sign for Patients & Families;Mobility Assessed & Appropriate Sign Placed  Note:   Bed alarm refused, A&O x4, slipper socks, bed locked and in low position, call light and personal belongings with reach, report given to CNA, appropriate signs outside door, hourly rounding in place.       Problem: Infection  Goal: Will remain free from infection  Outcome: PROGRESSING AS EXPECTED  Intervention: Assess signs and symptoms of infection  Note:   Pt afebrile. IV abx. Wound vac to sacrum.

## 2020-02-02 NOTE — PROGRESS NOTES
"Pharmacy Kinetics 39 y.o. female on vancomycin day # 4 2020    Currently on Vancomycin 1100 mg iv q12hr  Provider specified end date: TBD     Indication for Treatment: SSTI; OM r/o     Pertinent history per medical record: Admitted on 2020. 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 Brown of heme-onc, who presented 2020 with tailbone ulcer which was discovered by her  2 days ago.  Patient states it bothers her with pain rated 6 out of 10 in severity, described as sharp and \"bad\", causing her to unable to sit up on her buttocks.  It was noted to have purulent drainage/discharge with pus.  She also states that makes her feel weaker than usual.      Other antibiotics: Zosyn 3.375g IV q8h     Allergies: Patient has no known allergies.      List concerns for renal function: Albumin 3; CT w/contrast      Pertinent cultures to date:   20 - blood (peripheral) x 2: NGTD  20 - wound (back) x 2: Few GPC  20 - tissue (sacral decubitus): NGTD    Recent Labs     20  0300 20  0030   WBC 10.4 9.5   NEUTSPOLYS 68.40  --    BANDSSTABS 1.80  --      Recent Labs     20  0300 20  0030   BUN 9 9   CREATININE 0.62 0.68     Recent Labs     20  1000   VANCOTROUGH 16.9     Intake/Output Summary (Last 24 hours) at 2020 0919  Last data filed at 2020 1720  Gross per 24 hour   Intake --   Output 1000 ml   Net -1000 ml      /68   Pulse (!) 113   Temp 36.9 °C (98.4 °F) (Temporal)   Resp 18   Ht 1.626 m (5' 4\")   Wt 73.7 kg (162 lb 7.7 oz)   SpO2 92%  Temp (24hrs), Av.7 °C (98 °F), Min:36.2 °C (97.2 °F), Max:37.2 °C (98.9 °F)    A/P   1. Vancomycin dose change: Continue vancomycin 1100 mg IV Q12h (~15 mg/kg; due @ 1000, )  2. Next vancomycin level: 2/3 @ 0930  3. Goal trough: 12-16 mcg/mL  4. Comments: Tmax 98.9 without leukocytosis. Patient tachycardic, otherwise vital signs " stable. Renal indices within range of baseline compared to patient controls. Preliminary wound cultures growing skin jessica and few GPC; tissue cultures from I&D yesterday pending. Steady state vanco trough resulted supratherapeutic yesterday, subsequently vanco dose reduced. Will continue vancomycin ~15 mg/kg IV Q12h as outlined above and check another level tomorrow. Pharmacy to monitor and adjust as needed.     Brittanie Messina, PharmD, BCOP

## 2020-02-02 NOTE — PROGRESS NOTES
Assumed care of patient at 1900. Pt is A&Ox4, x2 assist with wheelchair. On 1L O2 via NC,  in place. Reports 7/10 pain, medicated per MAR. Denies nausea or hemoptysis. R port and x2 PIV, patent and infusing abx per MAR. Wound vac in place to sacrum. Moore draining to gravity. Bed alarm on for safety. Call light within reach, hourly rounding in place.

## 2020-02-02 NOTE — PROGRESS NOTES
Received report from [facility], assumed care of pt 0700.  Pt is A&Ox4, she states she does not ambulate, said had hip replacement about 2 years ago and hasn't really walked since, states can stand and has walked with walker but tells me mostly wheelchair is how she gets around.   Q-2 turns today and pain control is goal set with pt.  She has a port and 2 pivs. Fluids and IV ABX running, will get iv potassium and 1 unit PRBCs.  Wound vac to sacrum, vac running, no air leak.   Medicated for pain.  is on. Sequentials on. Will continue to monitor closely today. Treaded socks on, bed alarm on.

## 2020-02-02 NOTE — PROGRESS NOTES
[facility] Medicine Daily Progress Note    Date of Service  2/2/2020    Chief Complaint  39 y.o. female admitted 1/30/2020 with worsening decubital ulcer.    Hospital Course   2/1/2020-patient was hospitalized because of the decubital ulcer.  The patient at this point has been evaluated and debrided.  The patient's cultures show mixed gram-positive cocci which is most likely a skin jessica.  Patient remains on vancomycin and Zosyn.  Continue with pain management.  Patient is also complaining of hemoptysis and thus a CT of the chest will be evaluated.  2/2/2020- continue at this point with antibiotic management.  PE is negative on CT scan.  Patient has completed chemotherapy for her primary cancer.  No cancer invasion noted on CT scan of the chest.  Continue with wound management after ulcer debridement.  No osteomyelitis noted in the imaging studies or during surgery.  Continue with pain management wound care and antibiotic management follow cultures.      Interval Problem Update  Negative for PE  Continue with antibiotics using Vanco and Zosyn  Follow blood culture and sputum culture  Optimize RT protocol  Continue with wound care  May need placement  Monitor H&H and transfuse below 7 and 21.      Consultants/Specialty  Surgery    Code Status  DNR/DNI    Disposition  To be determined    Review of Systems  Review of Systems   Constitutional: Negative.  Negative for chills, diaphoresis and fever.   HENT: Negative.  Negative for nosebleeds and sinus pain.    Eyes: Negative.  Negative for double vision, photophobia, discharge and redness.   Respiratory: Negative.  Negative for cough, sputum production, shortness of breath, wheezing and stridor.    Cardiovascular: Negative.  Negative for chest pain, orthopnea, leg swelling and PND.   Gastrointestinal: Negative.  Negative for abdominal pain, blood in stool and heartburn.   Genitourinary: Negative.  Negative for dysuria, flank pain and frequency.   Musculoskeletal: Positive  for myalgias. Negative for back pain and falls.   Skin: Positive for rash. Negative for itching.        Pain in gluteal region   Neurological: Positive for headaches. Negative for dizziness, tingling, sensory change, focal weakness and seizures.   Endo/Heme/Allergies: Negative.  Negative for polydipsia. Does not bruise/bleed easily.   Psychiatric/Behavioral: Negative.  Negative for depression, substance abuse and suicidal ideas. The patient does not have insomnia.    All other systems reviewed and are negative.       Physical Exam  Temp:  [36.2 °C (97.2 °F)-37.2 °C (98.9 °F)] 36.9 °C (98.4 °F)  Pulse:  [101-113] 113  Resp:  [18-21] 18  BP: (101-112)/(56-72) 105/68  SpO2:  [92 %-100 %] 92 %    Physical Exam  Vitals signs and nursing note reviewed. Exam conducted with a chaperone present.   Constitutional:       General: She is not in acute distress.     Appearance: Normal appearance. She is well-developed and normal weight. She is not ill-appearing.   HENT:      Head: Normocephalic and atraumatic.      Right Ear: External ear normal.      Left Ear: External ear normal.      Nose: Nose normal.      Mouth/Throat:      Mouth: Mucous membranes are moist.      Pharynx: Oropharynx is clear.   Eyes:      Extraocular Movements: Extraocular movements intact.      Conjunctiva/sclera: Conjunctivae normal.      Pupils: Pupils are equal, round, and reactive to light.   Neck:      Musculoskeletal: Normal range of motion and neck supple.      Thyroid: No thyromegaly.      Vascular: No JVD.   Cardiovascular:      Rate and Rhythm: Normal rate and regular rhythm.      Pulses: Normal pulses.      Heart sounds: Normal heart sounds. No murmur.   Pulmonary:      Effort: Pulmonary effort is normal. Prolonged expiration present.      Breath sounds: Decreased air movement present. Examination of the right-upper field reveals decreased breath sounds. Examination of the left-upper field reveals decreased breath sounds. Examination of the  right-middle field reveals decreased breath sounds. Examination of the left-middle field reveals decreased breath sounds. Examination of the right-lower field reveals decreased breath sounds and rhonchi. Examination of the left-lower field reveals decreased breath sounds and rhonchi. Decreased breath sounds and rhonchi present.   Chest:      Chest wall: No tenderness.   Abdominal:      General: Abdomen is flat. Bowel sounds are normal. There is no distension.      Palpations: Abdomen is soft. There is splenomegaly. There is no mass.      Tenderness: There is no tenderness. There is no guarding or rebound.   Genitourinary:     Comments: Moore catheter  Musculoskeletal: Normal range of motion.         General: No tenderness.   Lymphadenopathy:      Cervical: No cervical adenopathy.   Skin:     General: Skin is warm and dry.      Capillary Refill: Capillary refill takes 2 to 3 seconds.      Findings: No erythema or rash.          Neurological:      General: No focal deficit present.      Mental Status: She is alert and oriented to person, place, and time. Mental status is at baseline.      Cranial Nerves: No cranial nerve deficit.      Deep Tendon Reflexes: Reflexes are normal and symmetric.   Psychiatric:         Mood and Affect: Mood normal.         Behavior: Behavior normal.         Thought Content: Thought content normal.         Judgment: Judgment normal.         Fluids    Intake/Output Summary (Last 24 hours) at 2/2/2020 1019  Last data filed at 2/1/2020 1720  Gross per 24 hour   Intake --   Output 1000 ml   Net -1000 ml       Laboratory  Recent Labs     01/30/20 2005 01/31/20 0300 02/02/20  0030   WBC  --  10.4 9.5   RBC  --  2.45* 2.14*   HEMOGLOBIN 6.3* 7.5* 6.6*   HEMATOCRIT  --  24.3* 21.5*   MCV  --  99.2* 100.5*   MCH  --  30.6 30.8   MCHC  --  30.9* 30.7*   RDW  --  63.6* 70.0*   PLATELETCT  --  105* 112*   MPV  --  9.1 9.7     Recent Labs     01/31/20 0300 02/02/20  0030   SODIUM 140 139   POTASSIUM  3.4* 3.3*   CHLORIDE 108 105   CO2 23 27   GLUCOSE 112* 142*   BUN 9 9   CREATININE 0.62 0.68   CALCIUM 7.7* 8.1*     Recent Labs     01/30/20  1339   INR 1.42*               Imaging  CT-CTA CHEST PULMONARY ARTERY W/ RECONS   Final Result         1. No CT evidence of pulmonary embolism.      2. Extensive airspace opacities throughout both lungs, most in the middle lobe, concerning for multifocal pneumonia.      3. Small bilateral pleural effusions.      4. Significantly enlarged heterogeneous spleen with multiple lesions.      DX-SACRUM AND COCCYX 2+   Final Result      No radiographic evidence of osteomyelitis. MRI could be performed to further evaluate if greater sensitivity is required      Osteoblastic metastases      DX-CHEST-PORTABLE (1 VIEW)   Final Result      Stable dense right basilar consolidation and reticular opacity. Mass may be present      Stable right lateral seventh rib bony destruction/presumed pathologic fracture and osteoblastic metastases      MR-LUMBAR SPINE-WITH & W/O    (Results Pending)        Assessment/Plan  * Sepsis (HCC)- (present on admission)  Assessment & Plan  Secondary to multifocal pneumonia.  The patient also has a decubital ulcer that at this point has been debrided and cultures are pending on.  Continue with IV Zosyn vancomycin day #4  Continue to monitor lactic acid and procalcitonin level.  Continue to give fluid resuscitation        Hemoptysis  Assessment & Plan  Most likely secondary to the multifocal pneumonia.  CT of the chest does not show any pulmonary embolism    Pressure injury of sacral region, stage 3 (HCC)- (present on admission)  Assessment & Plan  Status post debridement  Discussed with surgery  No obvious osteomyelitis on imaging or during surgery  Wound cultures pending she is sent off multiple tissue fragments    Multifocal pneumonia  Assessment & Plan  Continue at this point with IV antibiotics using Zosyn and vancomycin  Follow blood cultures which so far  negative  Follow sputum cultures.  Monitor H&H as the patient has developed some hemoptysis with  Negative for PE    Hypokalemia- (present on admission)  Assessment & Plan  Potassium supplementation being actively given  Continue with monitoring of magnesium also replace magnesium    Anemia of chronic disease- (present on admission)  Assessment & Plan  Monitor H&H if drops below 7 or 21 transfuse    Urinary retention  Assessment & Plan  Monitor urinary retention    Epithelioid hemangioendothelioma- (present on admission)  Assessment & Plan  Patient has completed chemotherapy  She follows as an outpatient with Dr. Brown  Discussed with Dr. Masterson, no further oncological recommendations at this point in time.       VTE prophylaxis: SCDs

## 2020-02-02 NOTE — CARE PLAN
Problem: Pain Management  Goal: Pain level will decrease to patient's comfort goal  Outcome: PROGRESSING AS EXPECTED  Intervention: Follow pain managment plan developed in collaboration with patient and Interdisciplinary Team  Note:   Pt reports pain using 0-10 scale, medicated per MAR. Pt states she is feeling relief from her pain.      Problem: Infection  Goal: Will remain free from infection  Outcome: PROGRESSING AS EXPECTED  Intervention: Assess signs and symptoms of infection  Note:   Sacral pressure ulcer with wound vac present. Pt receiving Zosyn and Vanco. Daily labs. Hand hygiene performed before and after pt interaction.

## 2020-02-03 PROBLEM — A41.9 SEPSIS (HCC): Status: RESOLVED | Noted: 2019-10-16 | Resolved: 2020-02-03

## 2020-02-03 LAB
ALBUMIN SERPL BCP-MCNC: 2.7 G/DL (ref 3.2–4.9)
ALBUMIN/GLOB SERPL: 0.8 G/DL
ALP SERPL-CCNC: 107 U/L (ref 30–99)
ALT SERPL-CCNC: 11 U/L (ref 2–50)
ANION GAP SERPL CALC-SCNC: 8 MMOL/L (ref 0–11.9)
AST SERPL-CCNC: 26 U/L (ref 12–45)
BACTERIA SPEC ANAEROBE CULT: ABNORMAL
BACTERIA SPEC ANAEROBE CULT: ABNORMAL
BASOPHILS # BLD AUTO: 0.4 % (ref 0–1.8)
BASOPHILS # BLD: 0.06 K/UL (ref 0–0.12)
BILIRUB SERPL-MCNC: 2.2 MG/DL (ref 0.1–1.5)
BUN SERPL-MCNC: 5 MG/DL (ref 8–22)
CALCIUM SERPL-MCNC: 8.6 MG/DL (ref 8.5–10.5)
CHLORIDE SERPL-SCNC: 100 MMOL/L (ref 96–112)
CO2 SERPL-SCNC: 28 MMOL/L (ref 20–33)
CREAT SERPL-MCNC: 0.4 MG/DL (ref 0.5–1.4)
EOSINOPHIL # BLD AUTO: 0.02 K/UL (ref 0–0.51)
EOSINOPHIL NFR BLD: 0.1 % (ref 0–6.9)
ERYTHROCYTE [DISTWIDTH] IN BLOOD BY AUTOMATED COUNT: 66.4 FL (ref 35.9–50)
GLOBULIN SER CALC-MCNC: 3.3 G/DL (ref 1.9–3.5)
GLUCOSE SERPL-MCNC: 136 MG/DL (ref 65–99)
HCT VFR BLD AUTO: 24.9 % (ref 37–47)
HGB BLD-MCNC: 7.9 G/DL (ref 12–16)
IMM GRANULOCYTES # BLD AUTO: 0.67 K/UL (ref 0–0.11)
IMM GRANULOCYTES NFR BLD AUTO: 4.5 % (ref 0–0.9)
LYMPHOCYTES # BLD AUTO: 1.83 K/UL (ref 1–4.8)
LYMPHOCYTES NFR BLD: 12.3 % (ref 22–41)
MCH RBC QN AUTO: 30.5 PG (ref 27–33)
MCHC RBC AUTO-ENTMCNC: 31.7 G/DL (ref 33.6–35)
MCV RBC AUTO: 96.1 FL (ref 81.4–97.8)
MONOCYTES # BLD AUTO: 1.22 K/UL (ref 0–0.85)
MONOCYTES NFR BLD AUTO: 8.2 % (ref 0–13.4)
MORPHOLOGY BLD-IMP: NORMAL
MRSA DNA SPEC QL NAA+PROBE: NORMAL
NEUTROPHILS # BLD AUTO: 11.09 K/UL (ref 2–7.15)
NEUTROPHILS NFR BLD: 74.5 % (ref 44–72)
NRBC # BLD AUTO: 1.04 K/UL
NRBC BLD-RTO: 7 /100 WBC
PLATELET # BLD AUTO: 94 K/UL (ref 164–446)
PMV BLD AUTO: 9.7 FL (ref 9–12.9)
POTASSIUM SERPL-SCNC: 3.6 MMOL/L (ref 3.6–5.5)
PROT SERPL-MCNC: 6 G/DL (ref 6–8.2)
RBC # BLD AUTO: 2.59 M/UL (ref 4.2–5.4)
SIGNIFICANT IND 70042: ABNORMAL
SIGNIFICANT IND 70042: NORMAL
SITE SITE: ABNORMAL
SITE SITE: NORMAL
SODIUM SERPL-SCNC: 136 MMOL/L (ref 135–145)
SOURCE SOURCE: ABNORMAL
SOURCE SOURCE: NORMAL
VANCOMYCIN TROUGH SERPL-MCNC: 16.5 UG/ML (ref 10–20)
WBC # BLD AUTO: 14.9 K/UL (ref 4.8–10.8)

## 2020-02-03 PROCEDURE — 80053 COMPREHEN METABOLIC PANEL: CPT

## 2020-02-03 PROCEDURE — A9270 NON-COVERED ITEM OR SERVICE: HCPCS | Performed by: INTERNAL MEDICINE

## 2020-02-03 PROCEDURE — 85025 COMPLETE CBC W/AUTO DIFF WBC: CPT

## 2020-02-03 PROCEDURE — 700102 HCHG RX REV CODE 250 W/ 637 OVERRIDE(OP): Performed by: INTERNAL MEDICINE

## 2020-02-03 PROCEDURE — 306372 DRESSING,VAC SIMPLACE MED: Performed by: SURGERY

## 2020-02-03 PROCEDURE — 80202 ASSAY OF VANCOMYCIN: CPT

## 2020-02-03 PROCEDURE — 770004 HCHG ROOM/CARE - ONCOLOGY PRIVATE *

## 2020-02-03 PROCEDURE — A6213 FOAM DRG >16<=48 SQ IN W/BDR: HCPCS | Performed by: SURGERY

## 2020-02-03 PROCEDURE — 700111 HCHG RX REV CODE 636 W/ 250 OVERRIDE (IP): Performed by: HOSPITALIST

## 2020-02-03 PROCEDURE — 302098 PASTE RING (FLAT): Performed by: SURGERY

## 2020-02-03 PROCEDURE — 700105 HCHG RX REV CODE 258: Performed by: HOSPITALIST

## 2020-02-03 PROCEDURE — 302146: Performed by: SURGERY

## 2020-02-03 PROCEDURE — 307059 PAD,EAR PROTECTOR: Performed by: SURGERY

## 2020-02-03 PROCEDURE — 99232 SBSQ HOSP IP/OBS MODERATE 35: CPT | Performed by: HOSPITALIST

## 2020-02-03 PROCEDURE — 97606 NEG PRS WND THER DME>50 SQCM: CPT

## 2020-02-03 PROCEDURE — 87641 MR-STAPH DNA AMP PROBE: CPT

## 2020-02-03 PROCEDURE — 700111 HCHG RX REV CODE 636 W/ 250 OVERRIDE (IP): Performed by: INTERNAL MEDICINE

## 2020-02-03 PROCEDURE — 700105 HCHG RX REV CODE 258: Performed by: INTERNAL MEDICINE

## 2020-02-03 RX ADMIN — OXYCODONE HYDROCHLORIDE 80 MG: 20 TABLET, FILM COATED, EXTENDED RELEASE ORAL at 14:15

## 2020-02-03 RX ADMIN — MORPHINE SULFATE 4 MG: 4 INJECTION INTRAVENOUS at 03:31

## 2020-02-03 RX ADMIN — VANCOMYCIN HYDROCHLORIDE 1100 MG: 500 INJECTION, POWDER, LYOPHILIZED, FOR SOLUTION INTRAVENOUS at 10:49

## 2020-02-03 RX ADMIN — PIPERACILLIN AND TAZOBACTAM 3.38 G: 3; .375 INJECTION, POWDER, LYOPHILIZED, FOR SOLUTION INTRAVENOUS; PARENTERAL at 20:49

## 2020-02-03 RX ADMIN — ALPRAZOLAM 0.25 MG: 0.25 TABLET ORAL at 10:39

## 2020-02-03 RX ADMIN — PIPERACILLIN AND TAZOBACTAM 3.38 G: 3; .375 INJECTION, POWDER, LYOPHILIZED, FOR SOLUTION INTRAVENOUS; PARENTERAL at 13:22

## 2020-02-03 RX ADMIN — OXYCODONE HYDROCHLORIDE 10 MG: 10 TABLET ORAL at 10:12

## 2020-02-03 RX ADMIN — SODIUM CHLORIDE, POTASSIUM CHLORIDE, SODIUM LACTATE AND CALCIUM CHLORIDE: 600; 310; 30; 20 INJECTION, SOLUTION INTRAVENOUS at 00:35

## 2020-02-03 RX ADMIN — SODIUM CHLORIDE, POTASSIUM CHLORIDE, SODIUM LACTATE AND CALCIUM CHLORIDE: 600; 310; 30; 20 INJECTION, SOLUTION INTRAVENOUS at 09:35

## 2020-02-03 RX ADMIN — PIPERACILLIN AND TAZOBACTAM 3.38 G: 3; .375 INJECTION, POWDER, LYOPHILIZED, FOR SOLUTION INTRAVENOUS; PARENTERAL at 05:43

## 2020-02-03 RX ADMIN — MORPHINE SULFATE 4 MG: 4 INJECTION INTRAVENOUS at 12:32

## 2020-02-03 RX ADMIN — SODIUM CHLORIDE, POTASSIUM CHLORIDE, SODIUM LACTATE AND CALCIUM CHLORIDE: 600; 310; 30; 20 INJECTION, SOLUTION INTRAVENOUS at 16:51

## 2020-02-03 RX ADMIN — MORPHINE SULFATE 4 MG: 4 INJECTION INTRAVENOUS at 20:50

## 2020-02-03 RX ADMIN — MORPHINE SULFATE 4 MG: 4 INJECTION INTRAVENOUS at 00:35

## 2020-02-03 RX ADMIN — OXYCODONE HYDROCHLORIDE 80 MG: 20 TABLET, FILM COATED, EXTENDED RELEASE ORAL at 05:42

## 2020-02-03 RX ADMIN — OXYCODONE HYDROCHLORIDE 80 MG: 20 TABLET, FILM COATED, EXTENDED RELEASE ORAL at 20:49

## 2020-02-03 ASSESSMENT — ENCOUNTER SYMPTOMS
POLYDIPSIA: 0
DIAPHORESIS: 0
NAUSEA: 0
NEUROLOGICAL NEGATIVE: 1
EYE DISCHARGE: 0
WEIGHT LOSS: 0
WEAKNESS: 0
SORE THROAT: 0
PSYCHIATRIC NEGATIVE: 1
HEMOPTYSIS: 1
CONSTIPATION: 0
GASTROINTESTINAL NEGATIVE: 1
CHILLS: 0
DEPRESSION: 0
MUSCULOSKELETAL NEGATIVE: 1
INSOMNIA: 0
FEVER: 0
CARDIOVASCULAR NEGATIVE: 1
EYES NEGATIVE: 1
COUGH: 0
PHOTOPHOBIA: 0
CLAUDICATION: 0
VOMITING: 0
SHORTNESS OF BREATH: 1
BACK PAIN: 0
EYE REDNESS: 0
SEIZURES: 0
DIZZINESS: 0
HEADACHES: 0
CONSTITUTIONAL NEGATIVE: 1

## 2020-02-03 ASSESSMENT — LIFESTYLE VARIABLES: SUBSTANCE_ABUSE: 0

## 2020-02-03 NOTE — DISCHARGE PLANNING
LSW met with Pt at bedside to compete assessment.  Pt was emotional and reported she was in pain and requested for this LSW to come back later.  Pt went on to report the bedside RN just provided her pain medication and she was waiting for it to start working.

## 2020-02-03 NOTE — PROGRESS NOTES
Patient medicated per MAR at 0331. Patient appears calm, comfortable, and w/unlabored breathing. Heart rate sustaining between 125-128 bpm since 0331, SPO2 remaining at 94% on 3L O2 NC.  on, alarms active and sounding, frequent rounding in place.

## 2020-02-03 NOTE — PROGRESS NOTES
Rapid RN rounding on CNU, asked to see patient by primary RN. Vitals - , RR 18, /79, 95% on 4L n.c. Patient states her pain is controlled at this time, is not having any trouble breathing but is worried she may have another anxiety attack like she did earlier in the night. MAR, labs, assessment reviewed with primary RN. Primary RN to offer PRN pain and anxiety medication when it is due and discuss ordering repeat H&H/CBC with MD to recheck after PRBC transfusion from day shift.

## 2020-02-03 NOTE — CARE PLAN
Problem: Pain Management  Goal: Pain level will decrease to patient's comfort goal  Outcome: PROGRESSING AS EXPECTED   Pt informed of pain medication management available  Problem: Communication  Goal: The ability to communicate needs accurately and effectively will improve  Outcome: PROGRESSING AS EXPECTED   Pt able to adequately express needs

## 2020-02-03 NOTE — PROGRESS NOTES
Patient's heart rate noted to be 130 bpm and SPO2 88% w/2L O2 NC. Patient on .   Patient baseline heart rate is tachycardic between 110-120.   Patient asymptomatic otherwise, resting comfortably. Oxygen turned up to 4L O2 NC. SPO2 nico to 94%. Suspect increased heart rate is r/t pain, and anxiety though patient declining available medications at time. Suspect decreased SPO2 r/t increased secretions not being suctioned (patient suctions PRN). Charge RN notified.   Rapid RN called to round on patient. Rapid RN agreed w/suspicions and said would round again later.    on, alarms active and sounding, frequent rounding in place.

## 2020-02-03 NOTE — PROGRESS NOTES
Received report and assumed care of patient at 1900. AOx4; Fatigued; 2L O2 NC; ; Tachycardic at baseline; Moore in place; Wound vac in place to sacral pressure ulcer; Port flushes w/positive blood return and fluids infusing; Right forearm PIV x2, both flush, no blood return noted in either, both w/ABX infusing; Q2 turns in place; Medicated for pain per MAR. POC discussed with patient, all questions and concerns addressed; Bed locked and in lowest position; Alarms active and sounding; Call light and personal belongings within reach; Hourly rounding in place.

## 2020-02-03 NOTE — PROGRESS NOTES
Bedside shift report completed. Pt sleeping with unlabored respirations, -122, 3LNC 95%. Moore in place draining yellow colored urine, wound vac in place. Safety measures maintained.

## 2020-02-03 NOTE — CARE PLAN
Problem: Skin Integrity  Goal: Risk for impaired skin integrity will decrease  Outcome: PROGRESSING AS EXPECTED  Note:   Patient turns self in bed; Patient accepts repositioning and calls for assistance to do so frequently.      Problem: Psychosocial Needs:  Goal: Level of anxiety will decrease  Outcome: PROGRESSING AS EXPECTED  Note:   Received orders from MD for Xanax to manage anxiety; Pt report feeling less anxious after dose; Provided psychosocial support, therapeutic touch, and breathing exercises to help ease anxiety.

## 2020-02-03 NOTE — PROGRESS NOTES
Patient states feeling very anxious, requesting medication for anxiety and states has never taken medication for anxiety before. Provided psychosocial support and breathing exercises to help calm patient. MD Akbar notified and received orders for Xanax. Pt states feelings of anxiety resided after one dose.

## 2020-02-03 NOTE — PROGRESS NOTES
Progress Note               Author: Ally Alcazar M.D. Date & Time created: 2/3/2020  8:51 AM     Interval History:  Some difficulty with anxiety overnight. Pain well controlled. Looking forward to going home.     Review of Systems:  Review of Systems   Constitutional: Negative for chills and fever.   Gastrointestinal: Negative for nausea and vomiting.       Physical Exam:  Physical Exam  Constitutional:       Appearance: Normal appearance.   Pulmonary:      Effort: Pulmonary effort is normal.   Skin:     General: Skin is warm and dry.      Comments: Wound vac intact, minimal drainage in canister   Neurological:      Mental Status: She is alert.         Labs:          Recent Labs     20  0253   SODIUM 139 136   POTASSIUM 3.3* 3.6   CHLORIDE 105 100   CO2 27 28   BUN 9 5*   CREATININE 0.68 0.40*   MAGNESIUM 1.8  --    CALCIUM 8.1* 8.6     Recent Labs     20  0253   ALTSGPT  --  11   ASTSGOT  --  26   ALKPHOSPHAT  --  107*   TBILIRUBIN  --  2.2*   GLUCOSE 142* 136*     Recent Labs     20  0253   RBC 2.14* 2.59*   HEMOGLOBIN 6.6* 7.9*   HEMATOCRIT 21.5* 24.9*   PLATELETCT 112* 94*     Recent Labs     20  0253   WBC 9.5 14.9*   NEUTSPOLYS  --  74.50*   LYMPHOCYTES  --  12.30*   MONOCYTES  --  8.20   EOSINOPHILS  --  0.10   BASOPHILS  --  0.40   ASTSGOT  --  26   ALTSGPT  --  11   ALKPHOSPHAT  --  107*   TBILIRUBIN  --  2.2*     Hemodynamics:  Temp (24hrs), Av.7 °C (98 °F), Min:36.2 °C (97.2 °F), Max:37.2 °C (99 °F)  Temperature: 36.2 °C (97.2 °F)  Pulse  Av.8  Min: 81  Max: 131   Blood Pressure: 134/92     Respiratory:    Respiration: 18, Pulse Oximetry: 94 %     Work Of Breathing / Effort: Mild  RUL Breath Sounds: Clear, RML Breath Sounds: Clear, RLL Breath Sounds: Clear, MARKOS Breath Sounds: Clear, LLL Breath Sounds: Clear  Fluids:    Intake/Output Summary (Last 24 hours) at 2/3/2020 0851  Last data filed at 2/3/2020  0737  Gross per 24 hour   Intake 3465 ml   Output 6400 ml   Net -2935 ml        GI/Nutrition:  Orders Placed This Encounter   Procedures   • Diet Order Regular     Standing Status:   Standing     Number of Occurrences:   1     Order Specific Question:   Diet:     Answer:   Regular [1]     Medical Decision Making, by Problem:  Active Hospital Problems    Diagnosis   • *Sepsis (HCC) [A41.9]   • Hemoptysis [R04.2]   • Pressure injury of sacral region, stage 3 (HCC) [L89.153]   • Multifocal pneumonia [J18.9]   • Hypokalemia [E87.6]   • Anemia of chronic disease [D63.8]   • 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   Wound vac intact - first change today. If she does well, ok from my standpoint to start making discharge arrangements. Discussed with patient that if she has a lot of pain with this wound vac change, that I would recommend staying another couple of days for another wound vac change so that she will have IV pain medications available.     Quality-Core Measures   Reviewed items::  Labs reviewed  Moore catheter::  Urinary Tract Retention or Urinary Tract Obstruction

## 2020-02-03 NOTE — PROGRESS NOTES
[facility] Medicine Daily Progress Note    Date of Service  2/3/2020    Chief Complaint  39 y.o. female admitted 1/30/2020 with worsening decubital ulcer.    Hospital Course   2/1/2020-patient was hospitalized because of the decubital ulcer.  The patient at this point has been evaluated and debrided.  The patient's cultures show mixed gram-positive cocci which is most likely a skin jessica.  Patient remains on vancomycin and Zosyn.  Continue with pain management.  Patient is also complaining of hemoptysis and thus a CT of the chest will be evaluated.  2/2/2020- continue at this point with antibiotic management.  PE is negative on CT scan.  Patient has completed chemotherapy for her primary cancer.  No cancer invasion noted on CT scan of the chest.  Continue with wound management after ulcer debridement.  No osteomyelitis noted in the imaging studies or during surgery.  Continue with pain management wound care and antibiotic management follow cultures.  2/3/2020- patient has multifocal pneumonia as well as a gluteal abscess that was incised and drained.  Patient was on Zosyn and vancomycin.  We can reduce it to Zosyn as the patient is negative for MRSA.  The patient at this point will need continued wound care and antibiotics and will most likely need to go to an LTAC for long-term therapy.  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  Negative CT for PE.      Interval Problem Update  With ongoing hemoptysis monitor H&H if necessary transfuse  Patient has had multiple treatments for hemangioendothelioma in the past with Dr. Brown as an outpatient and has apparently failed.  Will refer to LTAC for long-term therapy with her gluteal wound and her pneumonia.  Continue with pain management  Monitor urinary output and straight cath if necessary.      Consultants/Specialty  Surgery    Code Status  DNR/DNI    Disposition  To be determined    Review of  Systems  Review of Systems   Constitutional: Negative.  Negative for diaphoresis, fever and weight loss.   HENT: Negative.  Negative for congestion, sore throat and tinnitus.    Eyes: Negative.  Negative for photophobia, discharge and redness.   Respiratory: Positive for hemoptysis and shortness of breath. Negative for cough.    Cardiovascular: Negative.  Negative for chest pain, claudication and leg swelling.   Gastrointestinal: Negative.  Negative for constipation, melena, nausea and vomiting.   Genitourinary: Negative.  Negative for dysuria, frequency and hematuria.   Musculoskeletal: Negative.  Negative for back pain and joint pain.   Skin: Negative for itching and rash.        Pain in the gluteal region with wound   Neurological: Negative.  Negative for dizziness, seizures, weakness and headaches.   Endo/Heme/Allergies: Negative.  Negative for environmental allergies and polydipsia.   Psychiatric/Behavioral: Negative.  Negative for depression, substance abuse and suicidal ideas. The patient does not have insomnia.    All other systems reviewed and are negative.       Physical Exam  Temp:  [36.2 °C (97.2 °F)-36.9 °C (98.5 °F)] 36.4 °C (97.5 °F)  Pulse:  [] 117  Resp:  [17-18] 18  BP: ()/(50-92) 126/90  SpO2:  [93 %-95 %] 93 %    Physical Exam  Vitals signs and nursing note reviewed.   Constitutional:       Appearance: She is well-developed.   HENT:      Head: Normocephalic and atraumatic.      Right Ear: External ear normal.      Left Ear: External ear normal.      Nose: Nose normal.      Mouth/Throat:      Mouth: Mucous membranes are dry.      Pharynx: No oropharyngeal exudate or posterior oropharyngeal erythema.   Eyes:      Extraocular Movements: Extraocular movements intact.      Conjunctiva/sclera: Conjunctivae normal.      Pupils: Pupils are equal, round, and reactive to light.   Neck:      Musculoskeletal: Normal range of motion and neck supple.      Thyroid: No thyromegaly.      Vascular: No  JVD.   Cardiovascular:      Rate and Rhythm: Normal rate and regular rhythm.      Pulses: Normal pulses.      Heart sounds: Normal heart sounds. No murmur.   Pulmonary:      Effort: Tachypnea, accessory muscle usage and prolonged expiration present.      Breath sounds: Decreased air movement present. Examination of the right-upper field reveals decreased breath sounds and rhonchi. Examination of the left-upper field reveals decreased breath sounds and rhonchi. Examination of the right-middle field reveals decreased breath sounds and rhonchi. Examination of the left-middle field reveals decreased breath sounds and rhonchi. Examination of the right-lower field reveals decreased breath sounds and rhonchi. Examination of the left-lower field reveals decreased breath sounds and rhonchi. Decreased breath sounds and rhonchi present.      Comments: Hemoptysis  Chest:      Chest wall: No tenderness.   Abdominal:      General: Abdomen is flat. There is no distension.      Palpations: Abdomen is soft. There is no mass.      Tenderness: There is no tenderness. There is no guarding or rebound.   Genitourinary:     Comments: Moore catheter  Musculoskeletal: Normal range of motion.   Lymphadenopathy:      Cervical: No cervical adenopathy.   Skin:     General: Skin is warm and dry.      Capillary Refill: Capillary refill takes 2 to 3 seconds.      Findings: No erythema or rash.          Neurological:      General: No focal deficit present.      Mental Status: She is alert and oriented to person, place, and time. Mental status is at baseline.      Cranial Nerves: No cranial nerve deficit.      Deep Tendon Reflexes: Reflexes are normal and symmetric.   Psychiatric:         Attention and Perception: Attention and perception normal.         Mood and Affect: Mood is depressed.         Speech: Speech normal.         Behavior: Behavior normal.         Thought Content: Thought content normal.         Cognition and Memory: Cognition normal.          Judgment: Judgment normal.         Fluids    Intake/Output Summary (Last 24 hours) at 2/3/2020 1444  Last data filed at 2/3/2020 0737  Gross per 24 hour   Intake 3035 ml   Output 6400 ml   Net -3365 ml       Laboratory  Recent Labs     02/02/20  0030 02/03/20  0253   WBC 9.5 14.9*   RBC 2.14* 2.59*   HEMOGLOBIN 6.6* 7.9*   HEMATOCRIT 21.5* 24.9*   .5* 96.1   MCH 30.8 30.5   MCHC 30.7* 31.7*   RDW 70.0* 66.4*   PLATELETCT 112* 94*   MPV 9.7 9.7     Recent Labs     02/02/20  0030 02/03/20  0253   SODIUM 139 136   POTASSIUM 3.3* 3.6   CHLORIDE 105 100   CO2 27 28   GLUCOSE 142* 136*   BUN 9 5*   CREATININE 0.68 0.40*   CALCIUM 8.1* 8.6                   Imaging  CT-CTA CHEST PULMONARY ARTERY W/ RECONS   Final Result         1. No CT evidence of pulmonary embolism.      2. Extensive airspace opacities throughout both lungs, most in the middle lobe, concerning for multifocal pneumonia.      3. Small bilateral pleural effusions.      4. Significantly enlarged heterogeneous spleen with multiple lesions.      DX-SACRUM AND COCCYX 2+   Final Result      No radiographic evidence of osteomyelitis. MRI could be performed to further evaluate if greater sensitivity is required      Osteoblastic metastases      DX-CHEST-PORTABLE (1 VIEW)   Final Result      Stable dense right basilar consolidation and reticular opacity. Mass may be present      Stable right lateral seventh rib bony destruction/presumed pathologic fracture and osteoblastic metastases      MR-LUMBAR SPINE-WITH & W/O    (Results Pending)        Assessment/Plan  Hemoptysis  Assessment & Plan  With multifocal pneumonia the patient has hemoptysis.  Continue at this point frequent suctioning.  Negative for PE  Most likely secondary to combination of multifocal pneumonia and cancer.    Pressure injury of sacral region, stage 3 (HCC)- (present on admission)  Assessment & Plan  Patient has come back at this point with coagulase-negative staph in the wound  cultures.  Continue with Zosyn given the fact the patient also has multifocal pneumonia    Multifocal pneumonia  Assessment & Plan  At this point cultures have come back negative and the blood so far  With multifocal pneumonia patient will need to continue with Zosyn, vancomycin at this point can be stopped    Hypokalemia- (present on admission)  Assessment & Plan  Optimize potassium management.  Most recent potassium 3.6.  Magnesium was checked and is 1.8 no replacement indicated    Anemia of chronic disease- (present on admission)  Assessment & Plan  Monitor H&H if drops below 7 or 21 transfuse most recently 7.9    Urinary retention  Assessment & Plan  Currently urination is stabilizing.  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 Brown as an outpatient.  Patient has multiple bone lesions.  Apparently it was not really well treated with chemotherapy.  Discussed with oncology and currently no further recommendations for treatment are obtained.         VTE prophylaxis: SCDs

## 2020-02-04 LAB
ALBUMIN SERPL BCP-MCNC: 2.7 G/DL (ref 3.2–4.9)
ALBUMIN/GLOB SERPL: 0.8 G/DL
ALP SERPL-CCNC: 94 U/L (ref 30–99)
ALT SERPL-CCNC: 12 U/L (ref 2–50)
ANION GAP SERPL CALC-SCNC: 8 MMOL/L (ref 0–11.9)
AST SERPL-CCNC: 12 U/L (ref 12–45)
BACTERIA BLD CULT: NORMAL
BACTERIA BLD CULT: NORMAL
BACTERIA TISS AEROBE CULT: ABNORMAL
BASOPHILS # BLD AUTO: 0.6 % (ref 0–1.8)
BASOPHILS # BLD: 0.07 K/UL (ref 0–0.12)
BILIRUB SERPL-MCNC: 2.4 MG/DL (ref 0.1–1.5)
BUN SERPL-MCNC: 5 MG/DL (ref 8–22)
CALCIUM SERPL-MCNC: 8.6 MG/DL (ref 8.5–10.5)
CHLORIDE SERPL-SCNC: 97 MMOL/L (ref 96–112)
CO2 SERPL-SCNC: 30 MMOL/L (ref 20–33)
CREAT SERPL-MCNC: 0.41 MG/DL (ref 0.5–1.4)
EOSINOPHIL # BLD AUTO: 0.03 K/UL (ref 0–0.51)
EOSINOPHIL NFR BLD: 0.2 % (ref 0–6.9)
ERYTHROCYTE [DISTWIDTH] IN BLOOD BY AUTOMATED COUNT: 66 FL (ref 35.9–50)
GLOBULIN SER CALC-MCNC: 3.3 G/DL (ref 1.9–3.5)
GLUCOSE SERPL-MCNC: 121 MG/DL (ref 65–99)
GRAM STN SPEC: ABNORMAL
HCT VFR BLD AUTO: 23.2 % (ref 37–47)
HGB BLD-MCNC: 7.1 G/DL (ref 12–16)
IMM GRANULOCYTES # BLD AUTO: 0.46 K/UL (ref 0–0.11)
IMM GRANULOCYTES NFR BLD AUTO: 3.7 % (ref 0–0.9)
LYMPHOCYTES # BLD AUTO: 1.74 K/UL (ref 1–4.8)
LYMPHOCYTES NFR BLD: 14.1 % (ref 22–41)
MCH RBC QN AUTO: 29.3 PG (ref 27–33)
MCHC RBC AUTO-ENTMCNC: 30.6 G/DL (ref 33.6–35)
MCV RBC AUTO: 95.9 FL (ref 81.4–97.8)
MONOCYTES # BLD AUTO: 0.74 K/UL (ref 0–0.85)
MONOCYTES NFR BLD AUTO: 6 % (ref 0–13.4)
NEUTROPHILS # BLD AUTO: 9.3 K/UL (ref 2–7.15)
NEUTROPHILS NFR BLD: 75.4 % (ref 44–72)
NRBC # BLD AUTO: 0.64 K/UL
NRBC BLD-RTO: 5.2 /100 WBC
PLATELET # BLD AUTO: 82 K/UL (ref 164–446)
PMV BLD AUTO: 9.6 FL (ref 9–12.9)
POTASSIUM SERPL-SCNC: 3.3 MMOL/L (ref 3.6–5.5)
PROT SERPL-MCNC: 6 G/DL (ref 6–8.2)
RBC # BLD AUTO: 2.42 M/UL (ref 4.2–5.4)
SIGNIFICANT IND 70042: ABNORMAL
SIGNIFICANT IND 70042: NORMAL
SIGNIFICANT IND 70042: NORMAL
SITE SITE: ABNORMAL
SITE SITE: NORMAL
SITE SITE: NORMAL
SODIUM SERPL-SCNC: 135 MMOL/L (ref 135–145)
SOURCE SOURCE: ABNORMAL
SOURCE SOURCE: NORMAL
SOURCE SOURCE: NORMAL
WBC # BLD AUTO: 12.3 K/UL (ref 4.8–10.8)

## 2020-02-04 PROCEDURE — 700102 HCHG RX REV CODE 250 W/ 637 OVERRIDE(OP): Performed by: INTERNAL MEDICINE

## 2020-02-04 PROCEDURE — 700102 HCHG RX REV CODE 250 W/ 637 OVERRIDE(OP): Performed by: HOSPITALIST

## 2020-02-04 PROCEDURE — 85025 COMPLETE CBC W/AUTO DIFF WBC: CPT

## 2020-02-04 PROCEDURE — 80053 COMPREHEN METABOLIC PANEL: CPT

## 2020-02-04 PROCEDURE — 770004 HCHG ROOM/CARE - ONCOLOGY PRIVATE *

## 2020-02-04 PROCEDURE — A9270 NON-COVERED ITEM OR SERVICE: HCPCS | Performed by: INTERNAL MEDICINE

## 2020-02-04 PROCEDURE — 97165 OT EVAL LOW COMPLEX 30 MIN: CPT

## 2020-02-04 PROCEDURE — 99255 IP/OBS CONSLTJ NEW/EST HI 80: CPT | Mod: GC | Performed by: INTERNAL MEDICINE

## 2020-02-04 PROCEDURE — 700105 HCHG RX REV CODE 258: Performed by: INTERNAL MEDICINE

## 2020-02-04 PROCEDURE — 97162 PT EVAL MOD COMPLEX 30 MIN: CPT

## 2020-02-04 PROCEDURE — 700111 HCHG RX REV CODE 636 W/ 250 OVERRIDE (IP): Performed by: INTERNAL MEDICINE

## 2020-02-04 PROCEDURE — 99232 SBSQ HOSP IP/OBS MODERATE 35: CPT | Performed by: HOSPITALIST

## 2020-02-04 PROCEDURE — A9270 NON-COVERED ITEM OR SERVICE: HCPCS | Performed by: HOSPITALIST

## 2020-02-04 RX ORDER — POTASSIUM CHLORIDE 20 MEQ/1
40 TABLET, EXTENDED RELEASE ORAL 2 TIMES DAILY
Status: COMPLETED | OUTPATIENT
Start: 2020-02-04 | End: 2020-02-05

## 2020-02-04 RX ORDER — FLUCONAZOLE 100 MG/1
200 TABLET ORAL DAILY
Status: COMPLETED | OUTPATIENT
Start: 2020-02-04 | End: 2020-02-10

## 2020-02-04 RX ORDER — DOXYCYCLINE 100 MG/1
100 TABLET ORAL EVERY 12 HOURS
Status: COMPLETED | OUTPATIENT
Start: 2020-02-04 | End: 2020-02-10

## 2020-02-04 RX ORDER — METRONIDAZOLE 500 MG/1
500 TABLET ORAL EVERY 8 HOURS
Status: COMPLETED | OUTPATIENT
Start: 2020-02-04 | End: 2020-02-10

## 2020-02-04 RX ADMIN — METRONIDAZOLE 500 MG: 500 TABLET ORAL at 14:15

## 2020-02-04 RX ADMIN — PIPERACILLIN AND TAZOBACTAM 3.38 G: 3; .375 INJECTION, POWDER, LYOPHILIZED, FOR SOLUTION INTRAVENOUS; PARENTERAL at 05:28

## 2020-02-04 RX ADMIN — MORPHINE SULFATE 4 MG: 4 INJECTION INTRAVENOUS at 05:28

## 2020-02-04 RX ADMIN — FLUCONAZOLE 200 MG: 100 TABLET ORAL at 12:04

## 2020-02-04 RX ADMIN — SENNOSIDES AND DOCUSATE SODIUM 2 TABLET: 8.6; 5 TABLET ORAL at 18:07

## 2020-02-04 RX ADMIN — DOXYCYCLINE 100 MG: 100 TABLET, FILM COATED ORAL at 12:04

## 2020-02-04 RX ADMIN — DOXYCYCLINE 100 MG: 100 TABLET, FILM COATED ORAL at 18:07

## 2020-02-04 RX ADMIN — OXYCODONE HYDROCHLORIDE 80 MG: 20 TABLET, FILM COATED, EXTENDED RELEASE ORAL at 22:37

## 2020-02-04 RX ADMIN — SODIUM CHLORIDE, POTASSIUM CHLORIDE, SODIUM LACTATE AND CALCIUM CHLORIDE: 600; 310; 30; 20 INJECTION, SOLUTION INTRAVENOUS at 18:08

## 2020-02-04 RX ADMIN — OXYCODONE HYDROCHLORIDE 10 MG: 10 TABLET ORAL at 18:08

## 2020-02-04 RX ADMIN — OXYCODONE HYDROCHLORIDE 10 MG: 10 TABLET ORAL at 09:34

## 2020-02-04 RX ADMIN — OXYCODONE HYDROCHLORIDE 80 MG: 20 TABLET, FILM COATED, EXTENDED RELEASE ORAL at 05:27

## 2020-02-04 RX ADMIN — METRONIDAZOLE 500 MG: 500 TABLET ORAL at 22:43

## 2020-02-04 RX ADMIN — OXYCODONE HYDROCHLORIDE 80 MG: 20 TABLET, FILM COATED, EXTENDED RELEASE ORAL at 14:15

## 2020-02-04 RX ADMIN — POTASSIUM CHLORIDE 40 MEQ: 1500 TABLET, EXTENDED RELEASE ORAL at 18:07

## 2020-02-04 RX ADMIN — ALPRAZOLAM 0.25 MG: 0.25 TABLET ORAL at 18:12

## 2020-02-04 RX ADMIN — SODIUM CHLORIDE, POTASSIUM CHLORIDE, SODIUM LACTATE AND CALCIUM CHLORIDE: 600; 310; 30; 20 INJECTION, SOLUTION INTRAVENOUS at 09:36

## 2020-02-04 ASSESSMENT — COGNITIVE AND FUNCTIONAL STATUS - GENERAL
WALKING IN HOSPITAL ROOM: A LITTLE
HELP NEEDED FOR BATHING: A LOT
DRESSING REGULAR UPPER BODY CLOTHING: A LITTLE
MOVING TO AND FROM BED TO CHAIR: UNABLE
DRESSING REGULAR LOWER BODY CLOTHING: A LOT
STANDING UP FROM CHAIR USING ARMS: A LITTLE
MOBILITY SCORE: 12
MOVING FROM LYING ON BACK TO SITTING ON SIDE OF FLAT BED: UNABLE
TURNING FROM BACK TO SIDE WHILE IN FLAT BAD: A LOT
SUGGESTED CMS G CODE MODIFIER MOBILITY: CL
TOILETING: A LOT
CLIMB 3 TO 5 STEPS WITH RAILING: A LOT
PERSONAL GROOMING: A LITTLE
DAILY ACTIVITIY SCORE: 16
SUGGESTED CMS G CODE MODIFIER DAILY ACTIVITY: CK

## 2020-02-04 ASSESSMENT — GAIT ASSESSMENTS: GAIT LEVEL OF ASSIST: UNABLE TO PARTICIPATE

## 2020-02-04 ASSESSMENT — ENCOUNTER SYMPTOMS
CHILLS: 0
FEVER: 0
HEMOPTYSIS: 0
DIAPHORESIS: 1
BACK PAIN: 1

## 2020-02-04 ASSESSMENT — ACTIVITIES OF DAILY LIVING (ADL): TOILETING: REQUIRES ASSIST

## 2020-02-04 NOTE — FACE TO FACE
Face to Face Supporting Documentation - Home Health    The encounter with this patient was in whole or in part the primary reason for home health admission.    Date of encounter:   Patient:                    MRN:                       YOB: 2018  Amanda Bae  [id number]  1980     Home health to see patient for:  Skilled Nursing care for assessment, interventions & education, Wound Care, Home health aide, Physical Therapy evaluation and treatment and Occupational therapy evaluation and treatment    Skilled need for:  Surgical Aftercare Decub ulcer debridement with wound VAC    Skilled nursing interventions to include:  Wound Care    Homebound status evidenced by:  Needs the assistance of another person in order to leave the home. Leaving home requires a considerable and taxing effort. There is a normal inability to leave the home.    Community Physician to provide follow up care: Rosita Delaney M.D.     Optional Interventions? Yes, Details: Wound VAC management      I certify the face to face encounter for this home health care referral meets the CMS requirements and the encounter/clinical assessment with the patient was, in whole, or in part, for the medical condition(s) listed above, which is the primary reason for home health care. Based on my clinical findings: the service(s) are medically necessary, support the need for home health care, and the homebound criteria are met.  I certify that this patient has had a face to face encounter by myself.  Roberto Villatoro M.D. - NPI: [id number]

## 2020-02-04 NOTE — PROGRESS NOTES
Progress Note               Author: Ally Alcazar M.D. Date & Time created: 2020  8:06 AM     Interval History:  Did very well with wound vac change yesterday, only needed PO pain medications.     Review of Systems:  Review of Systems   Constitutional: Positive for diaphoresis. Negative for chills and fever.       Physical Exam:  Physical Exam  Constitutional:       General: She is not in acute distress.     Appearance: She is obese. She is diaphoretic.   HENT:      Head: Normocephalic and atraumatic.   Pulmonary:      Effort: Pulmonary effort is normal.   Skin:     General: Skin is warm.      Comments: Wound care photos reviewed. Wound vac intact with minimal drainage.   Neurological:      General: No focal deficit present.      Mental Status: She is alert and oriented to person, place, and time.         Labs:          Recent Labs     20  0030 20   SODIUM 139 136 135   POTASSIUM 3.3* 3.6 3.3*   CHLORIDE 105 100 97   CO2 27 28 30   BUN 9 5* 5*   CREATININE 0.68 0.40* 0.41*   MAGNESIUM 1.8  --   --    CALCIUM 8.1* 8.6 8.6     Recent Labs     20  0030 20  0253 20  025   ALTSGPT  --  11 12   ASTSGOT  --  26 12   ALKPHOSPHAT  --  107* 94   TBILIRUBIN  --  2.2* 2.4*   GLUCOSE 142* 136* 121*     Recent Labs     20  0030 203 20   RBC 2.14* 2.59* 2.42*   HEMOGLOBIN 6.6* 7.9* 7.1*   HEMATOCRIT 21.5* 24.9* 23.2*   PLATELETCT 112* 94* 82*     Recent Labs     20  0030 20  0253 20  025   WBC 9.5 14.9* 12.3*   NEUTSPOLYS  --  74.50* 75.40*   LYMPHOCYTES  --  12.30* 14.10*   MONOCYTES  --  8.20 6.00   EOSINOPHILS  --  0.10 0.20   BASOPHILS  --  0.40 0.60   ASTSGOT  --  26 12   ALTSGPT  --  11 12   ALKPHOSPHAT  --  107* 94   TBILIRUBIN  --  2.2* 2.4*     Hemodynamics:  Temp (24hrs), Av.2 °C (97.2 °F), Min:36.1 °C (97 °F), Max:36.4 °C (97.5 °F)  Temperature: 36.1 °C (97 °F)  Pulse  Av.2  Min: 81  Max: 131   Blood  Pressure: 139/91     Respiratory:    Respiration: 20, Pulse Oximetry: 97 %, O2 Daily Delivery Respiratory : Silicone Nasal Cannula     Work Of Breathing / Effort: Mild  RUL Breath Sounds: Clear, RML Breath Sounds: Clear, RLL Breath Sounds: Clear, MARKOS Breath Sounds: Clear, LLL Breath Sounds: Clear  Fluids:    Intake/Output Summary (Last 24 hours) at 2/4/2020 0806  Last data filed at 2/4/2020 0313  Gross per 24 hour   Intake 1600 ml   Output 3450 ml   Net -1850 ml        GI/Nutrition:  Orders Placed This Encounter   Procedures   • Diet Order Regular     Standing Status:   Standing     Number of Occurrences:   1     Order Specific Question:   Diet:     Answer:   Regular [1]     Medical Decision Making, by Problem:  Active Hospital Problems    Diagnosis   • Hemoptysis [R04.2]   • Pressure injury of sacral region, stage 3 (HCC) [L89.153]   • Multifocal pneumonia [J18.9]   • Hypokalemia [E87.6]   • Anemia of chronic disease [D63.8]   • 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   Antibiotics per hospitalist. Wound care going very well.   Ok from surgical stanpdoint for home with home wound vac.   My partner will round 2-3x over the next week and will be available for questions in between.     Quality-Core Measures

## 2020-02-04 NOTE — PROGRESS NOTES
Received bedside report and assumed care of patient at 0700.  Patient is A&Ox4. Tachycardic at rest, mentioned to MD during rounds. Oxygen titrated down from 4L to 2L. Incentive spirometer implemented but patient gives poor effort and refuses.   Patient consistently refusing Q2 turns. Education provided but still refuses. Also refusing SCD's. Aggressive oral care provided as patient had dried blood throughout oral cavity. Moore in place for retention and cleansed with soap and water. Wound vac was not having a good seal. Reinforced with Tegaderm and now to continuous suction with no issues. Pain controlled with PO oxycodone.  Bed is in the lowest position. Bed alarm is on. Call light is in reach.  All questions and concerns answered.

## 2020-02-04 NOTE — PROGRESS NOTES
Received report and assumed care of patient at 1900. AOx4; Fatigued; 4L O2 NC; ; Tachycardic at baseline; Moore in place; Wound vac in place to sacral pressure ulcer; Port flushes w/positive blood return and fluids infusing; Right and left forearm PIV, both flush, no blood return noted in either; Q2 turns in place (as patient agrees); Medicated for pain per MAR. POC discussed with patient, all questions and concerns addressed; Bed locked and in lowest position; Alarms active and sounding; Call light and personal belongings within reach; Hourly rounding in place.

## 2020-02-04 NOTE — PROGRESS NOTES
Hospital Medicine Daily Progress Note    Date of Service  2/4/2020    Chief Complaint  39 y.o. female admitted 1/30/2020 with worsening decubital ulcer.    Hospital Course   2/1/2020-patient was hospitalized because of the decubital ulcer.  The patient at this point has been evaluated and debrided.  The patient's cultures show mixed gram-positive cocci which is most likely a skin jessica.  Patient remains on vancomycin and Zosyn.  Continue with pain management.  Patient is also complaining of hemoptysis and thus a CT of the chest will be evaluated.  2/2/2020- continue at this point with antibiotic management.  PE is negative on CT scan.  Patient has completed chemotherapy for her primary cancer.  No cancer invasion noted on CT scan of the chest.  Continue with wound management after ulcer debridement.  No osteomyelitis noted in the imaging studies or during surgery.  Continue with pain management wound care and antibiotic management follow cultures.  2/3/2020- patient has multifocal pneumonia as well as a gluteal abscess that was incised and drained.  Patient was on Zosyn and vancomycin.  We can reduce it to Zosyn as the patient is negative for MRSA.  The patient at this point will need continued wound care and antibiotics and will most likely need to go to an LTAC for long-term therapy.  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  Negative CT for PE.      Interval Problem Update      No further hemoptysis  Pain is controlled  Wound VAC in place    Consultants/Specialty  Surgery  ID    Code Status  DNR/DNI    Disposition  To be determined    Review of Systems  Review of Systems   Constitutional: Positive for malaise/fatigue. Negative for chills and fever.   Respiratory: Negative for hemoptysis.    Musculoskeletal: Positive for back pain.   All other systems reviewed and are negative.       Physical Exam  Temp:  [36.1 °C (97 °F)-36.2 °C (97.1 °F)] 36.1 °C  (97 °F)  Pulse:  [117-120] 120  Resp:  [18-20] 18  BP: (129-139)/(83-91) 131/88  SpO2:  [91 %-97 %] 94 %    Physical Exam  Vitals signs and nursing note reviewed.   Constitutional:       General: She is not in acute distress.  HENT:      Head: Normocephalic and atraumatic.      Nose: Nose normal.      Mouth/Throat:      Pharynx: No oropharyngeal exudate or posterior oropharyngeal erythema.   Eyes:      General:         Right eye: No discharge.         Left eye: No discharge.   Neck:      Musculoskeletal: Neck supple.   Cardiovascular:      Rate and Rhythm: Normal rate and regular rhythm.      Heart sounds: No murmur. No friction rub. No gallop.    Pulmonary:      Effort: Pulmonary effort is normal. No respiratory distress.      Breath sounds: No stridor. Rhonchi present. No rales.   Chest:      Chest wall: No tenderness.   Abdominal:      General: Bowel sounds are normal. There is no distension.      Palpations: Abdomen is soft. There is no mass.      Tenderness: There is no tenderness. There is no guarding.   Musculoskeletal:         General: Swelling present. No tenderness.   Skin:     General: Skin is warm and dry.      Coloration: Skin is not cyanotic.      Nails: There is no clubbing.        Comments: Wound VAC in place no surrounding erythema   Neurological:      General: No focal deficit present.      Mental Status: She is alert and oriented to person, place, and time.      Cranial Nerves: No cranial nerve deficit.      Motor: No weakness.   Psychiatric:         Mood and Affect: Mood normal.         Behavior: Behavior normal.         Fluids    Intake/Output Summary (Last 24 hours) at 2/4/2020 1543  Last data filed at 2/4/2020 0936  Gross per 24 hour   Intake 1600 ml   Output 4950 ml   Net -3350 ml       Laboratory  Recent Labs     02/02/20  0030 02/03/20  0253 02/04/20  0255   WBC 9.5 14.9* 12.3*   RBC 2.14* 2.59* 2.42*   HEMOGLOBIN 6.6* 7.9* 7.1*   HEMATOCRIT 21.5* 24.9* 23.2*   .5* 96.1 95.9   MCH  30.8 30.5 29.3   MCHC 30.7* 31.7* 30.6*   RDW 70.0* 66.4* 66.0*   PLATELETCT 112* 94* 82*   MPV 9.7 9.7 9.6     Recent Labs     02/02/20  0030 02/03/20  0253 02/04/20  0255   SODIUM 139 136 135   POTASSIUM 3.3* 3.6 3.3*   CHLORIDE 105 100 97   CO2 27 28 30   GLUCOSE 142* 136* 121*   BUN 9 5* 5*   CREATININE 0.68 0.40* 0.41*   CALCIUM 8.1* 8.6 8.6                   Imaging  CT-CTA CHEST PULMONARY ARTERY W/ RECONS   Final Result         1. No CT evidence of pulmonary embolism.      2. Extensive airspace opacities throughout both lungs, most in the middle lobe, concerning for multifocal pneumonia.      3. Small bilateral pleural effusions.      4. Significantly enlarged heterogeneous spleen with multiple lesions.      DX-SACRUM AND COCCYX 2+   Final Result      No radiographic evidence of osteomyelitis. MRI could be performed to further evaluate if greater sensitivity is required      Osteoblastic metastases      DX-CHEST-PORTABLE (1 VIEW)   Final Result      Stable dense right basilar consolidation and reticular opacity. Mass may be present      Stable right lateral seventh rib bony destruction/presumed pathologic fracture and osteoblastic metastases      MR-LUMBAR SPINE-WITH & W/O    (Results Pending)        Assessment/Plan  Hemoptysis  Assessment & Plan  Hemoptysis resolved  CTA negative for PE      Pressure injury of sacral region, stage 3 (HCC)- (present on admission)  Assessment & Plan  Wound cultures coag negative staph Bacteroides and Candida  Continue wound care and wound VAC  Discussed with Dr. 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 Brown as an outpatient.  Dr. Huston previously discussed with oncology with no further recommendations at this time and outpatient follow-up recommended         VTE prophylaxis: SCDs

## 2020-02-04 NOTE — CARE PLAN
Problem: Pain Management  Goal: Pain level will decrease to patient's comfort goal  Outcome: PROGRESSING AS EXPECTED     Problem: Skin Integrity  Goal: Risk for impaired skin integrity will decrease  Outcome: PROGRESSING SLOWER THAN EXPECTED   Admitted for pressure sore on sacrum. Patient consistently refusing turns. Education provided patient still refusing.    87

## 2020-02-04 NOTE — PROGRESS NOTES
"Pharmacy Kinetics 39 y.o. female on vancomycin day # 5 2/3/2020    Currently on Vancomycin 1100 mg iv q12hr  Provider specified end date: TBD    Indication for Treatment: SSTI, PNA    Pertinent history per medical record: Pertinent history per medical record: Admitted on 2020. 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 Brown of heme-onc, who presented 2020 with tailbone ulcer which was discovered by her  2 days ago.  Patient states it bothers her with pain rated 6 out of 10 in severity, described as sharp and \"bad\", causing her to unable to sit up on her buttocks.  It was noted to have purulent drainage/discharge with pus.  She also states that makes her feel weaker than usual.     Patient was also found to have multifocal PNA w/ hemoptysis secondary to cancer.      Other antibiotics: Zosyn 3.375 gm IV Q8h     Allergies: Patient has no known allergies.     List concerns for renal function: low albumin, CT w/ contrast     Pertinent cultures to date:   20 - blood (peripheral) x 2: NGTD  20 - wound (back) x 2: bacteroides fragilis, peptostreptococcus   20 - tissue (sacral decubitus): Coag Neg Staph, c. albicans     MRSA nares swab if pneumonia is a concern (ordered/positive/negative/n-a): pending     Recent Labs     20  0030 20  0253   WBC 9.5 14.9*   NEUTSPOLYS  --  74.50*     Recent Labs     20  0030 20  0253   BUN 9 5*   CREATININE 0.68 0.40*   ALBUMIN  --  2.7*     Recent Labs     20  1000 20  0920   [facility] 16[facility]       Intake/Output Summary (Last 24 hours) at 2/3/2020 1709    Last data filed at 2/3/2020 1651  Gross per 24 hour   Intake 4635 ml   Output 7650 ml   Net -3015 ml      /83   Pulse (!) 118   Temp 36.1 °C (97 °F) (Temporal)   Resp 18   Ht 1.626 m (5' 4\")   Wt 73.7 kg (162 lb 7.7 oz)   SpO2 95%  Temp (24hrs), Av.4 °C (97.6 °F), Min:36.1 °C " (97 °F), Max:36.9 °C (98.5 °F)      A/P   1. Vancomycin dose change: not indicated at this time   2. Next vancomycin level: TBD   3. Goal trough: 16-20 mcg/mL for PNA  4. Comments: afebrile, leukocytosis, renal indices stable w/ good UOP.  Antimicrobial stewardship team rounded on patient and discussed recommendation w/ Dr. Betzaida MD would like to continue Vanco/Zosyn until cultures finalized.  Recommend vanco de-escalation once MRSA nares result avail and all cultures finalized.  Pharmacy to monitor and adjust if needed.     Brendon Brady, PharmD, BCOP

## 2020-02-04 NOTE — WOUND TEAM
[facility]  Inpatient Services  Initial Wound and Skin Care Evaluation    Admission Date: 1/30/2020     Consult Date: 01/30    Hospitals in [place], PMH, SH: Reviewed    Unit where seen by Wound Team: R325/00     WOUND CONSULT RELATED TO:  VAC change      Self Report / Pain Level:  Pre-medicated with 80 mg CR oxy       OBJECTIVE:  In bed.  Waffle overlay in place, but patient refused to have it inflated.  Will order SYLVESTER mattress.     WOUND TYPE, LOCATION, CHARACTERISTICS (Pressure Injuries: location, stage, POA or date identified)             Pressure Injury Coccyx Right POA open surgical  (Active)   Pressure Injury Stage 4    State of Healing Non-healing    Site Assessment Red;Yellow    An-wound Assessment Denuded    Margins Attached edges    Wound Length (cm) 7 cm    Wound Width (cm) 3.8 cm    Wound Depth (cm) 1.5 cm    Wound Surface Area (cm^2) 26.6 cm^2    Undermining 3 cm    Closure Secondary intention    Drainage Amount Small    Drainage Description Serosanguineous    Treatments Cleansed;Site care    Cleansing Approved Wound Cleanser    Periwound Protectant Skin Protectant wipes to Periwound    Dressing Options Wound Vac    Dressing Cleansing/Solutions Not Applicable    Dressing Changed Changed    Dressing Status Clean;Dry;Intact    Dressing Change Frequency Monday, Wednesday, Friday    NEXT Dressing Change  02/05/20    WOUND NURSE ONLY - Odor None    WOUND NURSE ONLY - Exposed Structures Adipose    WOUND NURSE ONLY - Tissue Type and Percentage mixed red/yellow    WOUND NURSE ONLY - Time Spent with Patient (mins) 60      Negative Pressure Wound Therapy Sacrum (Active)   NPWT Pump Mode / Pressure Setting Continuous;125 mmHg    Dressing Type Medium;Black foam    Number of Foam Pieces Used 2    Canister Changed No    Output (mL) 0 mL    VAC VeraFlo Pressure (mm/Hg) 125 mmHg      Lab Values:    Lab Results   Component Value Date/Time    WBC 14.9 (H) 02/03/2020 02:53 AM    RBC 2.59 (L) 02/03/2020 02:53 AM     HEMOGLOBIN 7.9 (L) 02/03/2020 02:53 AM    HEMATOCRIT 24.9 (L) 02/03/2020 02:53 AM        Results from last 7 days   Lab Units 01/30/20  1339   C REACTIVE PROTEIN 4596 mg/dL 1.68*       Results from last 7 days   Lab Units 01/30/20  0846   SED RATE WESTERGREN 1526 mm/hour 27*       Lab Results   Component Value Date/Time    HBA1C 5.6 10/17/2019 12:01 AM      Culture:   Obtained 02/01/20, Results show   Coagulase-negative Staphylococcus species   Moderate growth   P       Culture Result Abnormal    Candida albicans   Moderate growth        INTERVENTIONS BY WOUND TEAM:  Previous dressing soaked and removed.  Cleansed wound with wound cleanser, benzoin and paste ring to anthony-wound bridged to right hip.  1 piece of black foam to wound bed and undermining, bridged and sealed with drape.  Track pad applied, suction obtained.  mepilex sacral under tubing and secured.  Patient stayed on left side.      Interdisciplinary consultation: Patient, Bedside RN (Deandra)     EVALUATION: patient admitted with worsening wound to right coccyx. Surgical debridement and VAC placement per Dr. Alcazar.  Possible DC in next day or two as patient tolerated oral pain medication with VAC change.      Goals: Steady decrease in wound area and depth weekly.    NURSING PLAN OF CARE ORDERS (X):  Dressing changes: See Dressing Care orders: X  Skin care: See Skin Care orders: X  Rectal tube care: See Rectal Tube Care orders:        Other orders:                           RSKIN:   CURRENTLY IN PLACE (X), APPLIED THIS VISIT (A), ORDERED (O):   Q shift Shon:  X  Q shift pressure point assessments:  X  Pressure redistribution mattress                  X           Low Airloss                      O  Bariatric SYLVESTER                     Bariatric foam                        Heel float boots                     Heel Silicone dressing                         Float Heels off Bed with Pillows             X  Barrier wipes         Barrier Cream         Barrier  paste          Sacral silicone dressing       X  Silicone O2 tubing         Anchorfast         Cannula fixation Device (Tender )          Gray Foam Ear protectors           Trach with Optifoam split foam                 Waffle cushion        Waffle Overlay       refused  Rectal tube or BMS    Purwick/Condom Cath          Antifungal tx      Interdry          Reposition q 2 hours      X  Up to chair        Ambulate      PT/OT        Dietician        Diabetes Education      PO   X  TF     TPN     NPO   # days   Other        WOUND TEAM PLAN OF CARE (X):   Dressing changes by wound team:          Follow up 1-2 times weekly:               Follow up 3 times weekly:                NPWT change 3 times weekly:   X  Follow up as needed:       Other (explain):     Anticipated discharge plans (X):   LTACH:        SNF/Rehab:                  Home Care:         X  Outpatient Wound Center:            Self Care:            Other:

## 2020-02-04 NOTE — DISCHARGE PLANNING
Care Transition Team Assessment    LSW met with Pt at bedside to complete assessment.  Pt reported she lives in a single story home with her spouse and sister in law.  Pt reported she was limited with daily ADL/IADLs prior to admission. Pt uses a walker and wheelchair.  Pt's pharmacy is [facility].          Information Source  Orientation : Oriented x 4  Information Given By: Patient  Who is responsible for making decisions for patient? : Patient         Elopement Risk  Legal Hold: No  Ambulatory or Self Mobile in Wheelchair: No-Not an Elopement Risk  Disoriented: No  Psychiatric Symptoms: None  History of Wandering: No  Elopement this Admit: No  Vocalizing Wanting to Leave: No  Displays Behaviors, Body Language Wanting to Leave: No-Not at Risk for Elopement  Elopement Risk: Not at Risk for Elopement    Interdisciplinary Discharge Planning  Patient or legal guardian wants to designate a caregiver (see row info): No    Discharge Preparedness  What is your plan after discharge?: Home with help  What are your discharge supports?: Sibling, Spouse  Prior Functional Level: Ambulatory, Needs Assist with Activities of Daily Living, Uses Walker, Uses Wheelchair  Difficulity with ADLs: Bathing, Walking  Difficulity with IADLs: Cooking, Driving, Laundry, Shopping    Functional Assesment  Prior Functional Level: Ambulatory, Needs Assist with Activities of Daily Living, Uses Walker, Uses Wheelchair    Finances  Financial Barriers to Discharge: No  Prescription Coverage: Yes    Vision / Hearing Impairment  Vision Impairment : No  Hearing Impairment : No              Domestic Abuse  Have you ever been the victim of abuse or violence?: No  Physical Abuse or Sexual Abuse: No  Verbal Abuse or Emotional Abuse: No  Possible Abuse Reported to:: Not Applicable    Psychological Assessment  History of Substance Abuse: None  History of Psychiatric Problems: No    Discharge Risks or Barriers  Discharge risks or barriers?:  No    Anticipated Discharge Information  Anticipated discharge disposition: Discharge needs currently unknown

## 2020-02-05 ENCOUNTER — HOME HEALTH ADMISSION (OUTPATIENT)
Dept: HOME HEALTH SERVICES | Facility: HOME HEALTHCARE | Age: 40
End: 2020-02-05
Payer: MEDICAID

## 2020-02-05 PROBLEM — E83.42 HYPOMAGNESEMIA: Status: ACTIVE | Noted: 2020-02-05

## 2020-02-05 LAB
ABO GROUP BLD: NORMAL
ANION GAP SERPL CALC-SCNC: 10 MMOL/L (ref 0–11.9)
BARCODED ABORH UBTYP: 5100
BARCODED PRD CODE UBPRD: NORMAL
BARCODED UNIT NUM UBUNT: NORMAL
BASOPHILS # BLD AUTO: 0.4 % (ref 0–1.8)
BASOPHILS # BLD: 0.05 K/UL (ref 0–0.12)
BLD GP AB SCN SERPL QL: NORMAL
BUN SERPL-MCNC: 6 MG/DL (ref 8–22)
CALCIUM SERPL-MCNC: 8.6 MG/DL (ref 8.5–10.5)
CHLORIDE SERPL-SCNC: 97 MMOL/L (ref 96–112)
CO2 SERPL-SCNC: 28 MMOL/L (ref 20–33)
COMPONENT R 8504R: NORMAL
CREAT SERPL-MCNC: 0.4 MG/DL (ref 0.5–1.4)
EOSINOPHIL # BLD AUTO: 0.02 K/UL (ref 0–0.51)
EOSINOPHIL NFR BLD: 0.2 % (ref 0–6.9)
ERYTHROCYTE [DISTWIDTH] IN BLOOD BY AUTOMATED COUNT: 67.6 FL (ref 35.9–50)
GLUCOSE SERPL-MCNC: 125 MG/DL (ref 65–99)
HCT VFR BLD AUTO: 22.1 % (ref 37–47)
HGB BLD-MCNC: 6.8 G/DL (ref 12–16)
IMM GRANULOCYTES # BLD AUTO: 0.38 K/UL (ref 0–0.11)
IMM GRANULOCYTES NFR BLD AUTO: 3.1 % (ref 0–0.9)
LYMPHOCYTES # BLD AUTO: 2.33 K/UL (ref 1–4.8)
LYMPHOCYTES NFR BLD: 19.1 % (ref 22–41)
MAGNESIUM SERPL-MCNC: 1.4 MG/DL (ref 1.5–2.5)
MCH RBC QN AUTO: 30 PG (ref 27–33)
MCHC RBC AUTO-ENTMCNC: 30.8 G/DL (ref 33.6–35)
MCV RBC AUTO: 97.4 FL (ref 81.4–97.8)
MONOCYTES # BLD AUTO: 0.88 K/UL (ref 0–0.85)
MONOCYTES NFR BLD AUTO: 7.2 % (ref 0–13.4)
NEUTROPHILS # BLD AUTO: 8.56 K/UL (ref 2–7.15)
NEUTROPHILS NFR BLD: 70 % (ref 44–72)
NRBC # BLD AUTO: 0.58 K/UL
NRBC BLD-RTO: 4.7 /100 WBC
PLATELET # BLD AUTO: 99 K/UL (ref 164–446)
PMV BLD AUTO: 10.7 FL (ref 9–12.9)
POTASSIUM SERPL-SCNC: 3.7 MMOL/L (ref 3.6–5.5)
PRODUCT TYPE UPROD: NORMAL
RBC # BLD AUTO: 2.27 M/UL (ref 4.2–5.4)
RH BLD: NORMAL
SODIUM SERPL-SCNC: 135 MMOL/L (ref 135–145)
UNIT STATUS USTAT: NORMAL
WBC # BLD AUTO: 12.2 K/UL (ref 4.8–10.8)

## 2020-02-05 PROCEDURE — 80048 BASIC METABOLIC PNL TOTAL CA: CPT

## 2020-02-05 PROCEDURE — 36430 TRANSFUSION BLD/BLD COMPNT: CPT

## 2020-02-05 PROCEDURE — 85025 COMPLETE CBC W/AUTO DIFF WBC: CPT

## 2020-02-05 PROCEDURE — 700105 HCHG RX REV CODE 258: Performed by: INTERNAL MEDICINE

## 2020-02-05 PROCEDURE — 86850 RBC ANTIBODY SCREEN: CPT

## 2020-02-05 PROCEDURE — A9270 NON-COVERED ITEM OR SERVICE: HCPCS | Performed by: INTERNAL MEDICINE

## 2020-02-05 PROCEDURE — 83735 ASSAY OF MAGNESIUM: CPT

## 2020-02-05 PROCEDURE — P9016 RBC LEUKOCYTES REDUCED: HCPCS

## 2020-02-05 PROCEDURE — 86923 COMPATIBILITY TEST ELECTRIC: CPT

## 2020-02-05 PROCEDURE — 86900 BLOOD TYPING SEROLOGIC ABO: CPT

## 2020-02-05 PROCEDURE — 99232 SBSQ HOSP IP/OBS MODERATE 35: CPT | Performed by: HOSPITALIST

## 2020-02-05 PROCEDURE — 306372 DRESSING,VAC SIMPLACE MED: Performed by: SURGERY

## 2020-02-05 PROCEDURE — 700102 HCHG RX REV CODE 250 W/ 637 OVERRIDE(OP): Performed by: HOSPITALIST

## 2020-02-05 PROCEDURE — 700111 HCHG RX REV CODE 636 W/ 250 OVERRIDE (IP): Performed by: HOSPITALIST

## 2020-02-05 PROCEDURE — 700102 HCHG RX REV CODE 250 W/ 637 OVERRIDE(OP): Performed by: INTERNAL MEDICINE

## 2020-02-05 PROCEDURE — 86901 BLOOD TYPING SEROLOGIC RH(D): CPT

## 2020-02-05 PROCEDURE — 770004 HCHG ROOM/CARE - ONCOLOGY PRIVATE *

## 2020-02-05 PROCEDURE — A9270 NON-COVERED ITEM OR SERVICE: HCPCS | Performed by: HOSPITALIST

## 2020-02-05 PROCEDURE — 700111 HCHG RX REV CODE 636 W/ 250 OVERRIDE (IP): Performed by: INTERNAL MEDICINE

## 2020-02-05 PROCEDURE — 86945 BLOOD PRODUCT/IRRADIATION: CPT

## 2020-02-05 PROCEDURE — 97605 NEG PRS WND THER DME<=50SQCM: CPT

## 2020-02-05 RX ORDER — ASCORBIC ACID 500 MG
500 TABLET ORAL 2 TIMES DAILY
Status: DISCONTINUED | OUTPATIENT
Start: 2020-02-05 | End: 2020-02-12 | Stop reason: HOSPADM

## 2020-02-05 RX ORDER — MAGNESIUM SULFATE HEPTAHYDRATE 40 MG/ML
4 INJECTION, SOLUTION INTRAVENOUS ONCE
Status: COMPLETED | OUTPATIENT
Start: 2020-02-05 | End: 2020-02-05

## 2020-02-05 RX ADMIN — METRONIDAZOLE 500 MG: 500 TABLET ORAL at 22:23

## 2020-02-05 RX ADMIN — DOXYCYCLINE 100 MG: 100 TABLET, FILM COATED ORAL at 17:34

## 2020-02-05 RX ADMIN — POLYETHYLENE GLYCOL 3350 1 PACKET: 17 POWDER, FOR SOLUTION ORAL at 08:51

## 2020-02-05 RX ADMIN — OXYCODONE HYDROCHLORIDE 10 MG: 10 TABLET ORAL at 08:51

## 2020-02-05 RX ADMIN — ALPRAZOLAM 0.25 MG: 0.25 TABLET ORAL at 20:15

## 2020-02-05 RX ADMIN — OXYCODONE HYDROCHLORIDE 80 MG: 20 TABLET, FILM COATED, EXTENDED RELEASE ORAL at 14:03

## 2020-02-05 RX ADMIN — SENNOSIDES AND DOCUSATE SODIUM 2 TABLET: 8.6; 5 TABLET ORAL at 17:34

## 2020-02-05 RX ADMIN — OXYCODONE HYDROCHLORIDE AND ACETAMINOPHEN 500 MG: 500 TABLET ORAL at 14:03

## 2020-02-05 RX ADMIN — ALPRAZOLAM 0.25 MG: 0.25 TABLET ORAL at 09:02

## 2020-02-05 RX ADMIN — FLUCONAZOLE 200 MG: 100 TABLET ORAL at 05:02

## 2020-02-05 RX ADMIN — SODIUM CHLORIDE, POTASSIUM CHLORIDE, SODIUM LACTATE AND CALCIUM CHLORIDE: 600; 310; 30; 20 INJECTION, SOLUTION INTRAVENOUS at 19:13

## 2020-02-05 RX ADMIN — OXYCODONE HYDROCHLORIDE 80 MG: 20 TABLET, FILM COATED, EXTENDED RELEASE ORAL at 04:57

## 2020-02-05 RX ADMIN — DOXYCYCLINE 100 MG: 100 TABLET, FILM COATED ORAL at 04:57

## 2020-02-05 RX ADMIN — OXYCODONE HYDROCHLORIDE 10 MG: 10 TABLET ORAL at 17:34

## 2020-02-05 RX ADMIN — METRONIDAZOLE 500 MG: 500 TABLET ORAL at 14:03

## 2020-02-05 RX ADMIN — OXYCODONE HYDROCHLORIDE 80 MG: 20 TABLET, FILM COATED, EXTENDED RELEASE ORAL at 22:22

## 2020-02-05 RX ADMIN — THERA TABS 1 TABLET: TAB at 14:03

## 2020-02-05 RX ADMIN — POTASSIUM CHLORIDE 40 MEQ: 1500 TABLET, EXTENDED RELEASE ORAL at 04:56

## 2020-02-05 RX ADMIN — METRONIDAZOLE 500 MG: 500 TABLET ORAL at 04:57

## 2020-02-05 RX ADMIN — MORPHINE SULFATE 4 MG: 4 INJECTION INTRAVENOUS at 20:15

## 2020-02-05 RX ADMIN — MAGNESIUM SULFATE IN WATER 4 G: 40 INJECTION, SOLUTION INTRAVENOUS at 14:35

## 2020-02-05 RX ADMIN — SENNOSIDES AND DOCUSATE SODIUM 2 TABLET: 8.6; 5 TABLET ORAL at 04:56

## 2020-02-05 ASSESSMENT — ENCOUNTER SYMPTOMS
VOMITING: 0
CHILLS: 0
FEVER: 0
WHEEZING: 0
NAUSEA: 0
COUGH: 1
ROS SKIN COMMENTS: DECUB ULCER
ABDOMINAL PAIN: 0

## 2020-02-05 NOTE — DISCHARGE PLANNING
HH Liaison  Rachel to asses for HH appropriateness. Once decision has been made JAH Yin will be notified. Called Dr. Delaney office to verify. They do not show MD as her primary care but will verify with MD if she will sign.     Thank you

## 2020-02-05 NOTE — DISCHARGE PLANNING
Anticipated Discharge Disposition: Home with HH    Action: LSW met with Pt at bedside to complete HH Choice.  Pt chose to do a blanket referral.      LSW faxed completed Choice to [facility]     Barriers to Discharge: Medical clearance, HH acceptance    Plan: LSW will continue to follow and assist as needed

## 2020-02-05 NOTE — DISCHARGE PLANNING
ATTN: Case Management  RE: Referral for Home Health    Reason for referral denial: Patient is not safe and is non compliant with care                  Unfortunately, we are not able to accept this referral for the reason listed above. If further clarity is needed, our Transitional Care Specialists are available to discuss any barriers to service at [phone].      We look forward to collaborating with you in the future,  [facility] Team

## 2020-02-05 NOTE — DISCHARGE PLANNING
Anticipated Discharge Disposition: Home with HH    Action: LSW met with Pt at bedside to complete DME (O2) Choice.  Pt chose to do a blanket referral.    LSW faxed completed DME (O2) Choice to [facility]     Barriers to Discharge: Medical clearance, O2, wound vac, and HH acceptance.     Plan: LSW will continue to follow and assist as needed

## 2020-02-05 NOTE — DISCHARGE PLANNING
Contacted Veronica with [facility] wound vac ([phone]).  Leatha reported she needed faxed to her the following information:  Facesheet, H&P, operations report, note from wound team with measurements, ICD-10 codes, CBC, CMP, Albumin (less than 3.5 Pt will need a nutrition consult), progress note from Dr. Omar Villatoro stating Pt will d/c once wound vac has been delivered.     Faxed to [phone]    hospitals requested a progress note from Dr. Omar Villatoro.  [facility] is faxing requested information.     Veronica also reported due to the Pt having Medicaid FFS, the wound vac could take up to 5-7 business days to be approved through insurance.

## 2020-02-05 NOTE — CARE PLAN
Problem: Pain Management  Goal: Pain level will decrease to patient's comfort goal  Outcome: PROGRESSING AS EXPECTED     Problem: Safety  Goal: Will remain free from falls  Outcome: PROGRESSING AS EXPECTED  Note:   Bed alarm on, A&O x4, slipper socks, bed locked and in low position, call light and personal belongings with reach, report given to CNA, appropriate signs outside door, hourly rounding in place.       Problem: Skin Integrity  Goal: Risk for impaired skin integrity will decrease  Outcome: PROGRESSING SLOWER THAN EXPECTED

## 2020-02-05 NOTE — CONSULTS
Infectious disease consult note    Reason for Consult:  Asked by Dr Roberto Villatoro M.D. to see this patient with infected sacral ulcer    Patient's PCP: Rosita Delaney M.D.    CC:   Chief Complaint   Patient presents with   • Open Wound     Pt states she has a pressure ulcer on her tailbone. Pt is actively limited d/t diaggnosis of stage 4 cancer.  discovered ulcer 2 days ago, pt reports exudate, inability to sleep, pain rated 10/10.       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  At the emergency department patient was SIRS positive with leukocytosis (WBC 12) and lactic acidosis at 2.5.  Chest x-ray showed right consolidation.  X-ray of the sacrum without evidence of osteomyelitis.  Patient was started on aggressive IV hydration and on intravenous Vancomycin/Zosyn antibiotic regimen for multifocal pneumonia and infected sacral ulcer coverage.  Wound cultures grew Bacteroides fragilis and Peptostreptococcus. General surgery was consulted for which patient underwent OR I&D on 2/1/2020 and she was noted for a necrotic stage III sacral decubitus ulcer with underlying healthy-appearing granulation tissue, no bone noted as per report.  ED blood cultures with no growth to date.  Vancomycin was 6 continued on 2/3/2020 and thus she is on Zosyn antibiotic regimen.  Infectious disease was consulted for antibiotic recommendation.    On review today, patient no acute distress reporting marked improvement with hemoptysis and back pain.  She still feels weakness but is able to ambulate to bathroom.  No fever, chills or night sweats.    Medications / Drug list prior to admission:  No current facility-administered medications on file prior to encounter.      Current Outpatient Medications on File Prior to Encounter   Medication Sig Dispense  Refill   • clonazePAM (KLONOPIN) 1 MG Tab TAKE 1 TABLET BY MOUTH AT BEDTIME MAY REPEAT 1 TAB IN 2 HRS FOR SLEEP     • ondansetron (ZOFRAN ODT) 4 MG TABLET DISPERSIBLE Take 1 Tab by mouth every 6 hours as needed. 10 Tab 0   • nicotine (NICODERM) 7 MG/24HR PATCH 24 HR Apply 1 Patch to skin as directed 1 time daily as needed.     • prochlorperazine (COMPAZINE) 10 MG Tab Take 10 mg by mouth every 8 hours as needed for Nausea/Vomiting.  2   • oxyCODONE CR (OXYCONTIN) 80 MG Tablet Extended Release 12 hour Abuse-Deterrent Take 80 mg by mouth every 8 hours.     • oxyCODONE (OXY-IR) 30 MG immediate release tablet Take 30 mg by mouth every 6 hours as needed.  0       Current list of administered Medications:    Current Facility-Administered Medications:   •  doxycycline monohydrate (ADOXA) tablet 100 mg, 100 mg, Oral, Q12HRS, Ilene Majaon M.D., 100 mg at 02/04/20 1204  •  metroNIDAZOLE (FLAGYL) tablet 500 mg, 500 mg, Oral, Q8HRS, Ilene Majano M.D., 500 mg at 02/04/20 1415  •  fluconazole (DIFLUCAN) tablet 200 mg, 200 mg, Oral, DAILY, Ilene Majano M.D., 200 mg at 02/04/20 1204  •  potassium chloride SA (Kdur) tablet 40 mEq, 40 mEq, Oral, BID, Roberto Villatoro M.D.  •  ALPRAZolam (XANAX) tablet 0.25 mg, 0.25 mg, Oral, 4X/DAY PRN, Jose Akbar M.D., 0.25 mg at 02/03/20 1039  •  oxyCODONE CR (OXYCONTIN) tablet 80 mg, 80 mg, Oral, Q8HRS, Jabari Chinchilla M.D., 80 mg at 02/04/20 1415  •  Respiratory Care per Protocol, , Nebulization, Continuous RT, Jabari Chinchilla M.D.  •  acetaminophen (TYLENOL) tablet 650 mg, 650 mg, Oral, Q6HRS PRN, Jabari Chinchilla M.D., 650 mg at 01/31/20 1830  •  cloNIDine (CATAPRES) tablet 0.1 mg, 0.1 mg, Oral, Q6HRS PRN, Jabari Chinchilla M.D.  •  enalaprilat (VASOTEC) injection 1.25 mg, 1.25 mg, Intravenous, Q6HRS PRN, Jabari Chinchilla M.D.  •  labetalol (NORMODYNE/TRANDATE) injection 10 mg, 10 mg, Intravenous, Q4HRS PRN, Jabari Chinchilla M.D.  •  ondansetron (ZOFRAN) syringe/vial  injection 4 mg, 4 mg, Intravenous, Q4HRS PRN, Jabari Chinchilla M.D.  •  ondansetron (ZOFRAN ODT) dispertab 4 mg, 4 mg, Oral, Q4HRS PRN, Jabari Chinchilla M.D.  •  promethazine (PHENERGAN) tablet 12.5-25 mg, 12.5-25 mg, Oral, Q4HRS PRN, Jabari Chinchilla M.D.  •  promethazine (PHENERGAN) suppository 12.5-25 mg, 12.5-25 mg, Rectal, Q4HRS PRN, Jabari Chinchilla M.D.  •  prochlorperazine (COMPAZINE) injection 5-10 mg, 5-10 mg, Intravenous, Q4HRS PRN, Jabari Chinchilla M.D.  •  senna-docusate (PERICOLACE or SENOKOT S) 8.6-50 MG per tablet 2 Tab, 2 Tab, Oral, BID, Stopped at 01/30/20 1800 **AND** polyethylene glycol/lytes (MIRALAX) PACKET 1 Packet, 1 Packet, Oral, QDAY PRN **AND** magnesium hydroxide (MILK OF MAGNESIA) suspension 30 mL, 30 mL, Oral, QDAY PRN **AND** bisacodyl (DULCOLAX) suppository 10 mg, 10 mg, Rectal, QDAY PRN, Jabari Chinchilla M.D.  •  lactated ringers infusion (BOLUS), 1,000 mL, Intravenous, Once PRN, Jabari Chinchilla M.D.  •  lactated ringers infusion, , Intravenous, Continuous, Jabari Chinchilla M.D., Last Rate: 125 mL/hr at 02/04/20 0936  •  lactated ringers infusion (BOLUS): BMI less than or equal to 30, 30 mL/kg, Intravenous, Once PRN, Jabari Chinchilla M.D.  •  Notify provider if pain remains uncontrolled, , , CONTINUOUS **AND** Use the numeric rating scale (NRS-11) on regular floors and Critical-Care Pain Observation Tool (CPOT) on ICUs/Trauma to assess pain, , , CONTINUOUS **AND** Pulse Ox (Oximetry), , , CONTINUOUS **AND** Pharmacy Consult Request ...Pain Management Review 1 Each, 1 Each, Other, PHARMACY TO DOSE **AND** If patient difficult to arouse and/or has respiratory depression, stop any opiates that are currently infusing and call a Rapid Response., , , CONTINUOUS **AND** oxyCODONE immediate-release (ROXICODONE) tablet 5 mg, 5 mg, Oral, Q3HRS PRN, 5 mg at 01/30/20 1731 **AND** oxyCODONE immediate-release (ROXICODONE) tablet 10 mg, 10 mg, Oral, Q3HRS PRN, 10 mg at 02/04/20 0934 **AND** morphine (pf) 4  MG/ML injection 4 mg, 4 mg, Intravenous, Q3HRS PRN, Jabari Chinchilla M.D., 4 mg at 02/04/20 0528    Past Medical History:   Diagnosis Date   • Cancer (HCC)        Past Surgical History:   Procedure Laterality Date   • IRRIGATION & DEBRIDEMENT GENERAL N/A 2/1/2020    Procedure: IRRIGATION AND DEBRIDEMENT, WOUND;  Surgeon: Ally Alcazar M.D.;  Location: SURGERY [facility];  Service: General   • OTHER ORTHOPEDIC SURGERY      left hip, right lower ext yusra       History reviewed. No pertinent family history.  Patient family history was personally reviewed, no pertinent family history to current presentation    Social History     Tobacco Use   • Smoking status: Current Every Day Smoker     Packs/day: 0.25     Types: Cigarettes   • Smokeless tobacco: Never Used   Substance Use Topics   • Alcohol use: No   • Drug use: No       ALLERGIES:  No Known Allergies    Review of systems:  A complete review of symptoms was reviewed with patient. This is reviewed in H&P and PMH. ALL OTHERS reviewed and negative    Physical exam:  Patient Vitals for the past 24 hrs:   BP Temp Temp src Pulse Resp SpO2   02/04/20 1200 -- -- -- -- -- 94 %   02/04/20 0850 131/88 36.1 °C (97 °F) Temporal (!) 120 18 96 %   02/04/20 0311 139/91 36.1 °C (97 °F) Temporal (!) 119 20 97 %   02/03/20 2015 129/84 36.2 °C (97.1 °F) Temporal (!) 117 20 91 %   02/03/20 1700 131/83 36.1 °C (97 °F) Temporal (!) 118 18 95 %     General: No acute distress.   EYES: no jaundice  HEENT: OP clear   Neck: No bruits No JVD.   CVS: RRR. S1 + S2. No M/R/G  Resp: CTAB. No wheezing or crackles/rhonchi.  Abdomen: Soft, NT, ND,  Skin: Grossly nothing acute no obvious rashes  Neurological: Alert, Moves all extremities, no cranial nerve defects on limited exam  Extremities: Dry LE, No edema      Data:  Laboratory studies personally reviewed by me:  Recent Results (from the past 24 hour(s))   CBC WITH DIFFERENTIAL    Collection Time: 02/04/20  2:55 AM   Result Value Ref Range     WBC 12.3 (H) 4.8 - 10.8 K/uL    RBC 2.42 (L) 4.20 - 5.40 M/uL    Hemoglobin 7.1 (L) 12.0 - 16.0 g/dL    Hematocrit 23.2 (L) 37.0 - 47.0 %    MCV 95.9 81.4 - 97.8 fL    MCH 29.3 27.0 - 33.0 pg    MCHC 30.6 (L) 33.6 - 35.0 g/dL    RDW 66.0 (H) 35.9 - 50.0 fL    Platelet Count 82 (L) 164 - 446 K/uL    MPV 9.6 9.0 - 12.9 fL    Neutrophils-Polys 75.40 (H) 44.00 - 72.00 %    Lymphocytes 14.10 (L) 22.00 - 41.00 %    Monocytes 6.00 0.00 - 13.40 %    Eosinophils 0.20 0.00 - 6.90 %    Basophils 0.60 0.00 - 1.80 %    Immature Granulocytes 3.70 (H) 0.00 - 0.90 %    Nucleated RBC 5.20 /100 WBC    Neutrophils (Absolute) 9.30 (H) 2.00 - 7.15 K/uL    Lymphs (Absolute) 1.74 1.00 - 4.80 K/uL    Monos (Absolute) 0.74 0.00 - 0.85 K/uL    Eos (Absolute) 0.03 0.00 - 0.51 K/uL    Baso (Absolute) 0.07 0.00 - 0.12 K/uL    Immature Granulocytes (abs) 0.46 (H) 0.00 - 0.11 K/uL    NRBC (Absolute) 0.64 K/uL   Comp Metabolic Panel    Collection Time: 02/04/20  2:55 AM   Result Value Ref Range    Sodium 135 135 - 145 mmol/L    Potassium 3.3 (L) 3.6 - 5.5 mmol/L    Chloride 97 96 - 112 mmol/L    Co2 30 20 - 33 mmol/L    Anion Gap 8.0 0.0 - 11.9    Glucose 121 (H) 65 - 99 mg/dL    Bun 5 (L) 8 - 22 mg/dL    Creatinine 0.41 (L) 0.50 - 1.40 mg/dL    Calcium 8.6 8.5 - 10.5 mg/dL    AST(SGOT) 12 12 - 45 U/L    ALT(SGPT) 12 2 - 50 U/L    Alkaline Phosphatase 94 30 - 99 U/L    Total Bilirubin 2.4 (H) 0.1 - 1.5 mg/dL    Albumin 2.7 (L) 3.2 - 4.9 g/dL    Total Protein 6.0 6.0 - 8.2 g/dL    Globulin 3.3 1.9 - 3.5 g/dL    A-G Ratio 0.8 g/dL   ESTIMATED GFR    Collection Time: 02/04/20  2:55 AM   Result Value Ref Range    GFR If African American >60 >60 mL/min/1.73 m 2    GFR If Non African American >60 >60 mL/min/1.73 m 2       Imaging:  CT-CTA CHEST PULMONARY ARTERY W/ RECONS   Final Result         1. No CT evidence of pulmonary embolism.      2. Extensive airspace opacities throughout both lungs, most in the middle lobe, concerning for multifocal  pneumonia.      3. Small bilateral pleural effusions.      4. Significantly enlarged heterogeneous spleen with multiple lesions.      DX-SACRUM AND COCCYX 2+   Final Result      No radiographic evidence of osteomyelitis. MRI could be performed to further evaluate if greater sensitivity is required      Osteoblastic metastases      DX-CHEST-PORTABLE (1 VIEW)   Final Result      Stable dense right basilar consolidation and reticular opacity. Mass may be present      Stable right lateral seventh rib bony destruction/presumed pathologic fracture and osteoblastic metastases          Principal Problem (Resolved):    Sepsis (HCC) POA: Yes  Active Problems:    Multifocal pneumonia POA: Unknown    Pressure injury of sacral region, stage 3 (HCC) POA: Yes    Hemoptysis POA: Unknown    Anemia of chronic disease POA: Yes    Hypokalemia POA: Yes    Urinary retention POA: Unknown    Epithelioid hemangioendothelioma POA: Yes      Assessment / Plan:  #Infected sacral ulcer S/P OR I&D  #Multifocal pneumonia, resolved  #Hemoptysis, resolved  #Epithelioid Hemangioendothelioma  Admitted for SIRS and purulent sacral ulcer  Reporting improvement with back pain and hemoptysis  Leukocytosis trending down  CT PE neg  Sacral x-ray without signs of osteomyelitis  Status post OR I&D on 2/1/2020, on wound VAC  Blood cultures w/NGTD  Wound cultures grew Bacteroides fragilis and Peptostreptococcus  OR sacral cultures grew Staphylococcus epi, and Candida albicans  Last QTC in the 350s  Continue frequent wound care  Vancomycin was discontinued on 2/3/2020  Patient already completed an appropriate course (5-7 days) for suspected multifocal pneumonia  Discontinue Pip/Tazo  Start PO doxycycline, PO metronidazole and PO fluconazole for 10 days from surgery (last dose on 2/11/2020)      Thank you for the consult, infectious disease will sign off    It is my pleasure to participate in the care of Ms. Bae.  Please do not hesitate to contact us with  questions or concerns.    Please note that this dictation was created using voice recognition software. I have worked with consultants from the vendor as well as technical experts from [facility] to optimize the interface. I have made every reasonable attempt to correct obvious errors, but I expect that there are errors of grammar and possibly content I did not discover before finalizing the note.

## 2020-02-05 NOTE — THERAPY
"Physical Therapy Evaluation completed.   Bed Mobility:  Supine to Sit: Moderate Assist  Transfers: Sit to Stand: Minimal Assist  Gait: Level Of Assist: Unable to Participate  Plan of Care: Will benefit from Physical Therapy 4 times per week  Discharge Recommendations: Equipment: Will Continue to Assess for Equipment Needs. Recommend home health transitional care for continued physical therapy services if pt has 24/7 physical assistance and supervision. If not available, would recommend postacute placement.     See \"Rehab Therapy-Acute\" Patient Summary Report for complete documentation.     Pt is a 40yo female admitted with decubitus ulcer. Pt seen for PT evaluation. Reports she has been using WC for 2 years from cancer and says her LLE is \"dead.\" Pt required extra time and effort for all mobility. Required mod A for supine > sit. Was able to scoot and position EOB on her own. Performed STS with FWW and min A. Was able to tolerate weight bearing and was able to side step along EOB. Pt declined sitting up in chair or ambulating further. Pt reports her spouse is able to assist at home 24/7 and pt reports no concerns with return home at [place]. Recommend home health therapy to assist in strengthening, functional mobility, and activity tolerance. Will continue to work with pt in acute setting to progress mobility.   "

## 2020-02-05 NOTE — PROGRESS NOTES
Received report and assumed care of patient at 1900. Reviewed chart and completed assessment. Patient is A&O x4, chronic pain rating 4-8/10., medicated per MAR. Patient not requesting PRN Oxy 10 or morphine this shift. O2 on 3 li NC sats are high 80's to low 90's. Patient encouraged to use incentive spirometer and cough. Patient states that her pain increases significantly when coughing. Patient tachycardic and coughing up kelly blood, suction available prn at bedside. Patient attempting to reposition self this shift, requesting occasional help. Patient hgb 6.8 this AM, infusing 1 unit of PRBCs per MD order. Bed in low locked position, call light and personal belongings with in reach. Patient calls appropriately. All needs met.

## 2020-02-05 NOTE — WOUND TEAM
[facility]  Inpatient Services  Wound and Skin Care Progress    Admission Date: 1/30/2020     Consult Date: 01/30    HPI, PMH, SH: Reviewed    Unit where seen by Wound Team: R325/00     WOUND CONSULT RELATED TO: scheduled NPWT dsg change.    Self Report / Pain Level:  Pre-medicated by RN      OBJECTIVE: Pt on pressure redistribution with waffle overlay. Order for SYLVESTER in placed 2/3.     WOUND TYPE, LOCATION, CHARACTERISTICS (Pressure Injuries: location, stage, POA or date identified)         Pressure Injury Coccyx Right POA open surgical  (Active)   Pressure Injury Stage 4    State of Healing Non-healing    Site Assessment Yellow;Red;Pink    An-wound Assessment Dry;Fragile    Margins Unattached edges    Wound Length (cm) 7 cm 2/3/2020     Wound Width (cm) 3.8 cm 2/3/2020     Wound Depth (cm) 1.5 cm 2/3/2020     Wound Surface Area (cm^2) 26.6 cm^2 2/3/2020     Undermining 3 cm 2/3/2020     Closure Secondary intention    Drainage Amount Small    Drainage Description Serosanguineous    Treatments Cleansed;Site care    Cleansing Approved Wound Cleanser    Periwound Protectant Skin Protectant wipes to Periwound;Benzoin;Drape    Dressing Options Wound Vac    Dressing Cleansing/Solutions Not Applicable    Dressing Changed Changed    Dressing Status Clean;Dry;Intact    Dressing Change Frequency Monday, Wednesday, Friday    NEXT Dressing Change  02/10/20    NEXT Weekly Photo (Inpatient Only) 02/12/20    WOUND NURSE ONLY - Odor None    WOUND NURSE ONLY - Exposed Structures Adipose    WOUND NURSE ONLY - Tissue Type and Percentage red yellow       Negative Pressure Wound Therapy Sacrum (Active)   NPWT Pump Mode / Pressure Setting Continuous;125 mmHg    Dressing Type Medium;Black foam    Number of Foam Pieces Used 4    Canister Changed No    Output (mL) 0 mL    VAC VeraFlo Pressure (mm/Hg) 125 mmHg;Continuous        Lab Values:    Lab Results   Component Value Date/Time    WBC 12.2 (H) 02/05/2020 12:45 AM    RBC  2.27 (L) 02/05/2020 12:45 AM    HEMOGLOBIN 6.8 (L) 02/05/2020 12:45 AM    HEMATOCRIT 22.1 (L) 02/05/2020 12:45 AM        Results from last 7 days   Lab Units 01/30/20  1339   C REACTIVE PROTEIN 4596 mg/dL 1.68*       Results from last 7 days   Lab Units 01/30/20  0846   SED RATE WESTERGREN 1526 mm/hour 27*       Lab Results   Component Value Date/Time    HBA1C 5.6 10/17/2019 12:01 AM      Culture:   Obtained 02/01/20, Results show   Coagulase-negative Staphylococcus species   Moderate growth        Abnormal    Candida albicans   Moderate growth     INTERVENTIONS BY WOUND TEAM:  Dressing removed. Wound and anthony wound cleansed with wound cleanser and gauze. No sting barrier to periwound and paste ring applied. Drape bridged to L hip. 1 foam to wound bed/undermining, and 3 foam to bridge including button for tracpad. Drape and tracpad to foam. Mepilex to L thigh and trac pad tubing secured to it. New chucks reapplied and pt repositioned.       Interdisciplinary consultation: Patient, HAYLEY Hargrove CNA     EVALUATION: Patient admitted with worsening wound to right coccyx. Surgical debridement done by Dr. Alcazar on 2/1. Wound presenting with no odor, minimal granular tissue and 3 cm undermining at 1200. Pt tolerated dressing change with PO premedication. Followed up with bed runners for SYLVESTER bed.    Goals: Steady decrease in wound area and depth weekly.    NURSING PLAN OF CARE ORDERS (X):  Dressing changes: See Dressing Care orders: X  Skin care: See Skin Care orders: X  Rectal tube care: See Rectal Tube Care orders:        Other orders:                             WOUND TEAM PLAN OF CARE (X):   Dressing changes by wound team:          Follow up 1-2 times weekly:               Follow up 3 times weekly:                NPWT change 3 times weekly:   X  Follow up as needed:       Other (explain):     Anticipated discharge plans (X):   LTACH:        SNF/Rehab:                  Home Care:         X  HH order in place    Outpatient Wound Center:            Self Care:            Other:

## 2020-02-05 NOTE — CARE PLAN
Problem: Bowel/Gastric:  Goal: Normal bowel function is maintained or improved  Outcome: PROGRESSING SLOWER THAN EXPECTED  Goal: Will not experience complications related to bowel motility  Outcome: PROGRESSING SLOWER THAN EXPECTED  Flowsheets (Taken 2/5/2020 0845)  Last BM: 02/01/20  Note:   Patient states had a BM 2/3. This is inconsistent with what was stated yesterday and no BM documented in 6 days. Bowel protocol in place and Miralax.      Problem: Psychosocial Needs:  Goal: Level of anxiety will decrease  Outcome: PROGRESSING SLOWER THAN EXPECTED  Flowsheets (Taken 2/5/2020 0932)  Patient Behaviors: Anxious; Fatigue  Note:   Xanax given prn

## 2020-02-05 NOTE — CARE PLAN
Problem: Nutritional:  Goal: Achieve adequate nutritional intake  Description  Patient will consume 50% of meals   Outcome: PROGRESSING SLOWER THAN EXPECTED    Patient reports poor PO intake since admit, but is starting to feel hungry today.

## 2020-02-05 NOTE — DISCHARGE PLANNING
Received Choice form at 1155 (wait on order)  Agency/Facility Name: [facility]   Referral sent per Choice form @ 1312       Per Razia STEPHEN requested [facility] faxed labs, Woundcare notes, OR notes, H&P, ICD-10 codes, Face sheet  to Veronica @ [facility] [phone]

## 2020-02-05 NOTE — DIETARY
"Nutrition Services:  Met with patient this afternoon to discuss nutrition supplements for wound healing.  Advised Arginaid supplements and described potential benefits.  Patient agreeable to trialing them while admitted.  She reports an \"okay\" appetite and that she is eating a little better. She is also agreeable to protein milkshakes and smoothies as snacks.    Discussed patient with Dr. Omar Villatoro and recommended daily MVI and 500 mg vitamin C.      RD will continue to monitor.   "

## 2020-02-05 NOTE — PROGRESS NOTES
Hospital Medicine Daily Progress Note    Date of Service  2/5/2020    Chief Complaint  39 y.o. female admitted 1/30/2020 with worsening decubital ulcer.    Hospital Course   2/1/2020-patient was hospitalized because of the decubital ulcer.  The patient at this point has been evaluated and debrided.  The patient's cultures show mixed gram-positive cocci which is most likely a skin jessica.  Patient remains on vancomycin and Zosyn.  Continue with pain management.  Patient is also complaining of hemoptysis and thus a CT of the chest will be evaluated.  2/2/2020- continue at this point with antibiotic management.  PE is negative on CT scan.  Patient has completed chemotherapy for her primary cancer.  No cancer invasion noted on CT scan of the chest.  Continue with wound management after ulcer debridement.  No osteomyelitis noted in the imaging studies or during surgery.  Continue with pain management wound care and antibiotic management follow cultures.  2/3/2020- patient has multifocal pneumonia as well as a gluteal abscess that was incised and drained.  Patient was on Zosyn and vancomycin.  We can reduce it to Zosyn as the patient is negative for MRSA.  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  Negative CT for PE.      Interval Problem Update      Pain is controlled  Mild hemoptysis  Afebrile      Consultants/Specialty  Surgery  ID    Code Status  DNR/DNI    Disposition  To be determined    Review of Systems  Review of Systems   Constitutional: Negative for chills and fever.   Respiratory: Positive for cough. Negative for wheezing.    Gastrointestinal: Negative for abdominal pain, nausea and vomiting.   Skin:        Decub ulcer   All other systems reviewed and are negative.       Physical Exam  Temp:  [36.1 °C (96.9 °F)-37 °C (98.6 °F)] 36.1 °C (96.9 °F)  Pulse:  [117-127] 117  Resp:  [18-20] 18  BP: (115-130)/(78-89) 126/86  SpO2:  [90 %-96 %] 96  %    Physical Exam  Vitals signs and nursing note reviewed.   Constitutional:       General: She is not in acute distress.  HENT:      Head: Normocephalic and atraumatic.      Nose: Nose normal. No rhinorrhea.      Mouth/Throat:      Pharynx: No oropharyngeal exudate or posterior oropharyngeal erythema.   Eyes:      General: No scleral icterus.        Right eye: No discharge.         Left eye: No discharge.   Neck:      Musculoskeletal: Neck supple. No neck rigidity.   Cardiovascular:      Rate and Rhythm: Normal rate and regular rhythm.      Heart sounds: Normal heart sounds. No murmur. No friction rub. No gallop.    Pulmonary:      Effort: Pulmonary effort is normal. No respiratory distress.      Breath sounds: No stridor. Rhonchi present. No wheezing or rales.   Chest:      Chest wall: No tenderness.   Abdominal:      General: Bowel sounds are normal. There is no distension.      Palpations: Abdomen is soft. There is no mass.      Tenderness: There is no tenderness. There is no rebound.   Musculoskeletal:         General: Swelling present. No tenderness.   Skin:     General: Skin is warm and dry.      Coloration: Skin is not cyanotic or jaundiced.      Nails: There is no clubbing.        Comments: Wound VAC in place no surrounding erythema   Neurological:      General: No focal deficit present.      Mental Status: She is alert and oriented to person, place, and time.      Cranial Nerves: No cranial nerve deficit.      Motor: No weakness.   Psychiatric:      Comments: Anxious         Fluids    Intake/Output Summary (Last 24 hours) at 2/5/2020 1238  Last data filed at 2/5/2020 0845  Gross per 24 hour   Intake 609 ml   Output 2675 ml   Net -2066 ml       Laboratory  Recent Labs     02/03/20  0253 02/04/20  0255 02/05/20  0045   WBC 14.9* 12.3* 12.2*   RBC 2.59* 2.42* 2.27*   HEMOGLOBIN 7.9* 7.1* 6.8*   HEMATOCRIT 24.9* 23.2* 22.1*   MCV 96.1 95.9 97.4   MCH 30.5 29.3 30.0   MCHC 31.7* 30.6* 30.8*   RDW 66.4* 66.0*  67.6*   PLATELETCT 94* 82* 99*   MPV 9.7 9.6 10.7     Recent Labs     02/03/20  0253 02/04/20  0255 02/05/20  0045   SODIUM 136 135 135   POTASSIUM 3.6 3.3* 3.7   CHLORIDE 100 97 97   CO2 28 30 28   GLUCOSE 136* 121* 125*   BUN 5* 5* 6*   CREATININE 0.40* 0.41* 0.40*   CALCIUM 8.6 8.6 8.6                   Imaging  CT-CTA CHEST PULMONARY ARTERY W/ RECONS   Final Result         1. No CT evidence of pulmonary embolism.      2. Extensive airspace opacities throughout both lungs, most in the middle lobe, concerning for multifocal pneumonia.      3. Small bilateral pleural effusions.      4. Significantly enlarged heterogeneous spleen with multiple lesions.      DX-SACRUM AND COCCYX 2+   Final Result      No radiographic evidence of osteomyelitis. MRI could be performed to further evaluate if greater sensitivity is required      Osteoblastic metastases      DX-CHEST-PORTABLE (1 VIEW)   Final Result      Stable dense right basilar consolidation and reticular opacity. Mass may be present      Stable right lateral seventh rib bony destruction/presumed pathologic fracture and osteoblastic metastases           Assessment/Plan  Hemoptysis  Assessment & Plan  CTA was negative for PE  Hemoptysis is mild continue to monitor        Pressure injury of sacral region, stage 3 (HCC)- (present on admission)  Assessment & Plan  Wound cultures coag negative staph Bacteroides and Shilpi  Discussed with Dr. 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 Brown as an outpatient.  Dr. Huston previously discussed with oncology with no further recommendations at this time and outpatient follow-up recommended  We will arrange for home O2         VTE prophylaxis: SCDs

## 2020-02-05 NOTE — DIETARY
Nutrition services: Day 5 of admit.  Patient continues to have poor PO intake.  Patient reported, today is the first day she has started to feel hungry.  Per wound team, patient with a Stage IV wound.  High protein, high fat snacks in place.    Recommendations/Plan:  1. Add multivitamin with minerals and 500 mg vit C per day to aid in wound healing  2. Continue Regular diet  3. Encourage intake of meals.  4. Document intake of all meals as % taken in ADL's to provide interdisciplinary communication across all shifts.   5. Monitor weight.  6. Nutrition rep will continue to see patient for ongoing meal and snack preferences.     RD to follow.

## 2020-02-06 LAB
ANION GAP SERPL CALC-SCNC: 8 MMOL/L (ref 0–11.9)
BASOPHILS # BLD AUTO: 0.4 % (ref 0–1.8)
BASOPHILS # BLD: 0.05 K/UL (ref 0–0.12)
BUN SERPL-MCNC: 8 MG/DL (ref 8–22)
CALCIUM SERPL-MCNC: 8.4 MG/DL (ref 8.5–10.5)
CHLORIDE SERPL-SCNC: 95 MMOL/L (ref 96–112)
CO2 SERPL-SCNC: 28 MMOL/L (ref 20–33)
CREAT SERPL-MCNC: 0.39 MG/DL (ref 0.5–1.4)
EOSINOPHIL # BLD AUTO: 0.03 K/UL (ref 0–0.51)
EOSINOPHIL NFR BLD: 0.2 % (ref 0–6.9)
ERYTHROCYTE [DISTWIDTH] IN BLOOD BY AUTOMATED COUNT: 64.3 FL (ref 35.9–50)
GLUCOSE SERPL-MCNC: 119 MG/DL (ref 65–99)
HCT VFR BLD AUTO: 25.3 % (ref 37–47)
HGB BLD-MCNC: 7.9 G/DL (ref 12–16)
IMM GRANULOCYTES # BLD AUTO: 0.36 K/UL (ref 0–0.11)
IMM GRANULOCYTES NFR BLD AUTO: 2.7 % (ref 0–0.9)
LYMPHOCYTES # BLD AUTO: 2.2 K/UL (ref 1–4.8)
LYMPHOCYTES NFR BLD: 16.6 % (ref 22–41)
MAGNESIUM SERPL-MCNC: 2.1 MG/DL (ref 1.5–2.5)
MCH RBC QN AUTO: 29.7 PG (ref 27–33)
MCHC RBC AUTO-ENTMCNC: 31.2 G/DL (ref 33.6–35)
MCV RBC AUTO: 95.1 FL (ref 81.4–97.8)
MONOCYTES # BLD AUTO: 1.11 K/UL (ref 0–0.85)
MONOCYTES NFR BLD AUTO: 8.4 % (ref 0–13.4)
NEUTROPHILS # BLD AUTO: 9.53 K/UL (ref 2–7.15)
NEUTROPHILS NFR BLD: 71.7 % (ref 44–72)
NRBC # BLD AUTO: 0.55 K/UL
NRBC BLD-RTO: 4.1 /100 WBC
PLATELET # BLD AUTO: 106 K/UL (ref 164–446)
PMV BLD AUTO: 9.9 FL (ref 9–12.9)
POTASSIUM SERPL-SCNC: 4 MMOL/L (ref 3.6–5.5)
RBC # BLD AUTO: 2.66 M/UL (ref 4.2–5.4)
SODIUM SERPL-SCNC: 131 MMOL/L (ref 135–145)
WBC # BLD AUTO: 13.3 K/UL (ref 4.8–10.8)

## 2020-02-06 PROCEDURE — 700105 HCHG RX REV CODE 258: Performed by: INTERNAL MEDICINE

## 2020-02-06 PROCEDURE — 80048 BASIC METABOLIC PNL TOTAL CA: CPT

## 2020-02-06 PROCEDURE — 700102 HCHG RX REV CODE 250 W/ 637 OVERRIDE(OP): Performed by: INTERNAL MEDICINE

## 2020-02-06 PROCEDURE — A9270 NON-COVERED ITEM OR SERVICE: HCPCS | Performed by: HOSPITALIST

## 2020-02-06 PROCEDURE — 700111 HCHG RX REV CODE 636 W/ 250 OVERRIDE (IP): Performed by: INTERNAL MEDICINE

## 2020-02-06 PROCEDURE — A9270 NON-COVERED ITEM OR SERVICE: HCPCS | Performed by: INTERNAL MEDICINE

## 2020-02-06 PROCEDURE — 85025 COMPLETE CBC W/AUTO DIFF WBC: CPT

## 2020-02-06 PROCEDURE — 83735 ASSAY OF MAGNESIUM: CPT

## 2020-02-06 PROCEDURE — 99232 SBSQ HOSP IP/OBS MODERATE 35: CPT | Performed by: HOSPITALIST

## 2020-02-06 PROCEDURE — 770004 HCHG ROOM/CARE - ONCOLOGY PRIVATE *

## 2020-02-06 PROCEDURE — 700102 HCHG RX REV CODE 250 W/ 637 OVERRIDE(OP): Performed by: HOSPITALIST

## 2020-02-06 RX ADMIN — THERA TABS 1 TABLET: TAB at 05:31

## 2020-02-06 RX ADMIN — ALPRAZOLAM 0.25 MG: 0.25 TABLET ORAL at 20:26

## 2020-02-06 RX ADMIN — OXYCODONE HYDROCHLORIDE 10 MG: 10 TABLET ORAL at 20:53

## 2020-02-06 RX ADMIN — DOXYCYCLINE 100 MG: 100 TABLET, FILM COATED ORAL at 16:56

## 2020-02-06 RX ADMIN — ALPRAZOLAM 0.25 MG: 0.25 TABLET ORAL at 13:25

## 2020-02-06 RX ADMIN — FLUCONAZOLE 200 MG: 100 TABLET ORAL at 05:31

## 2020-02-06 RX ADMIN — ALPRAZOLAM 0.25 MG: 0.25 TABLET ORAL at 02:02

## 2020-02-06 RX ADMIN — MORPHINE SULFATE 4 MG: 4 INJECTION INTRAVENOUS at 05:31

## 2020-02-06 RX ADMIN — OXYCODONE HYDROCHLORIDE 10 MG: 10 TABLET ORAL at 02:13

## 2020-02-06 RX ADMIN — SODIUM CHLORIDE, POTASSIUM CHLORIDE, SODIUM LACTATE AND CALCIUM CHLORIDE: 600; 310; 30; 20 INJECTION, SOLUTION INTRAVENOUS at 14:19

## 2020-02-06 RX ADMIN — METRONIDAZOLE 500 MG: 500 TABLET ORAL at 22:24

## 2020-02-06 RX ADMIN — OXYCODONE HYDROCHLORIDE 80 MG: 20 TABLET, FILM COATED, EXTENDED RELEASE ORAL at 05:31

## 2020-02-06 RX ADMIN — METRONIDAZOLE 500 MG: 500 TABLET ORAL at 05:31

## 2020-02-06 RX ADMIN — OXYCODONE HYDROCHLORIDE 80 MG: 20 TABLET, FILM COATED, EXTENDED RELEASE ORAL at 15:17

## 2020-02-06 RX ADMIN — SODIUM CHLORIDE, POTASSIUM CHLORIDE, SODIUM LACTATE AND CALCIUM CHLORIDE: 600; 310; 30; 20 INJECTION, SOLUTION INTRAVENOUS at 05:36

## 2020-02-06 RX ADMIN — OXYCODONE HYDROCHLORIDE 80 MG: 20 TABLET, FILM COATED, EXTENDED RELEASE ORAL at 22:24

## 2020-02-06 RX ADMIN — OXYCODONE HYDROCHLORIDE AND ACETAMINOPHEN 500 MG: 500 TABLET ORAL at 16:56

## 2020-02-06 RX ADMIN — DOXYCYCLINE 100 MG: 100 TABLET, FILM COATED ORAL at 05:31

## 2020-02-06 RX ADMIN — OXYCODONE HYDROCHLORIDE AND ACETAMINOPHEN 500 MG: 500 TABLET ORAL at 05:31

## 2020-02-06 RX ADMIN — OXYCODONE HYDROCHLORIDE 10 MG: 10 TABLET ORAL at 13:10

## 2020-02-06 RX ADMIN — MORPHINE SULFATE 4 MG: 4 INJECTION INTRAVENOUS at 01:08

## 2020-02-06 RX ADMIN — METRONIDAZOLE 500 MG: 500 TABLET ORAL at 15:17

## 2020-02-06 ASSESSMENT — ENCOUNTER SYMPTOMS
PALPITATIONS: 0
FEVER: 0
CHILLS: 0
ABDOMINAL PAIN: 0
BACK PAIN: 1
NAUSEA: 0
COUGH: 1
VOMITING: 0

## 2020-02-06 NOTE — DISCHARGE PLANNING
"Anticipated Discharge Disposition: Home with HH    Action: LSW met with Pt at bedside and provided her with a \"[employer]\" prescription, MTM brochure and behavioral health resources.    Barriers to Discharge: Medical clearance, HH acceptance, Wound VAC acceptance and delivery.    Plan: LSW will continue to follow and assist as needed    "

## 2020-02-06 NOTE — DISCHARGE PLANNING
Agency/Facility Name: [facility]   Spoke To:Intake   Outcome: pt accepted. Portable tanks will be delivered in 2hrs. Requested pt to call. [facility] updated LSW.

## 2020-02-06 NOTE — PROGRESS NOTES
Surgery General Daily Progress Note    Date of Service  2/6/2020    Subjective:  Patient feels well this am. Minimal pain. Dressing changed yesterday on wound VAC, holding suction well. Patient reports no issues with VAC when having BM. Received blood yesterday, remains tachycardic with good UOP. WBC remains minimally elevated. CT chest negative despite continuing hemoptysis.       Physical Exam  Temp:  [36.1 °C (96.9 °F)-36.8 °C (98.3 °F)] 36.8 °C (98.3 °F)  Pulse:  [108-120] 108  Resp:  [18-20] 20  BP: (110-130)/(70-86) 115/78  SpO2:  [92 %-96 %] 95 %    Physical Exam  NAD, Appears well  Normal respiratory effort  Abdomen soft, NT  Vac to sacral wound in place with good suction, SSG output  No surrounding erythema or induration      Fluids    Intake/Output Summary (Last 24 hours) at 2/6/2020 0830  Last data filed at 2/6/2020 0443  Gross per 24 hour   Intake 489 ml   Output 3400 ml   Net -2911 ml       Laboratory  Recent Labs     02/04/20  0255 02/05/20  0045 02/06/20  0200   WBC 12.3* 12.2* 13.3*   RBC 2.42* 2.27* 2.66*   HEMOGLOBIN 7.1* 6.8* 7.9*   HEMATOCRIT 23.2* 22.1* 25.3*   MCV 95.9 97.4 95.1   MCH 29.3 30.0 29.7   MCHC 30.6* 30.8* 31.2*   RDW 66.0* 67.6* 64.3*   PLATELETCT 82* 99* 106*   MPV 9.6 10.7 9.9     Recent Labs     02/04/20  0255 02/05/20  0045 02/06/20  0200   SODIUM 135 135 131*   POTASSIUM 3.3* 3.7 4.0   CHLORIDE 97 97 95*   CO2 30 28 28   GLUCOSE 121* 125* 119*   BUN 5* 6* 8   CREATININE 0.41* 0.40* 0.39*   CALCIUM 8.6 8.6 8.4*                   Assessment/Plan  38 y/o female with sacral decubitus ulcer, s/p debridement with wound VAC now  1. Wound VAC changes x3 weekly  2. No osteo radiographically or evident is surgery  3. abx per medical team  4. Tachycardic, s/p I unit blood yesterday, UOP 3.4 L yesterday  5. DVT prophylaxis per medical team in setting of ongoing hemoptysis  6. Will follow with you

## 2020-02-06 NOTE — DISCHARGE PLANNING
Agency/Facility Name: [facility]   Outcome: pt declined. Do not provide any type of therapy.    [facility]ed [facility]  @ [phone]

## 2020-02-06 NOTE — DISCHARGE PLANNING
Agency/Facility Name: [facility]   Outcome: pt declined. Non contracted ins company     [facility] faxed HH referral to [facility] @  [phone]

## 2020-02-06 NOTE — FACE TO FACE
"Face to Face Note  -  Durable Medical Equipment    Roberto Villatoro M.D. - NPI: [id number]  I certify that this patient is under my care and that they had a durable medical equipment(DME)face to face encounter by myself that meets the physician DME face-to-face encounter requirements with this patient on:    Date of encounter:   Patient:                    MRN:                       YOB: 2018  Amanda Bae  [id number]  1980     The encounter with the patient was in whole, or in part, for the following medical condition, which is the primary reason for durable medical equipment:  Other - Epithelioid hemangioendothelioma    I certify that, based on my findings, the following durable medical equipment is medically necessary:  Oxygen.    HOME O2 Saturation Measurements:(Values must be present for Home Oxygen orders)  Room air sat at rest: 82  Room air sat with amb: 80  With liters of O2: 3, O2 sat at rest with O2: 93  With Liters of O2: 4, O2 sat with amb with O2 : 92  Is the patient mobile?: Yes    My Clinical findings support the need for the above equipment due to:  Hypoxia    Supporting Symptoms: The patient requires supplemental oxygen, as the following interventions have been tried with limited or no improvement: \"Ambulation with oximetry and \"Incentive spirometry    "

## 2020-02-06 NOTE — DISCHARGE PLANNING
LSW received [facility] VAC insurance auth form from [facility] and was requested for Dr. Omar Villatoro to complete.  This W gave the form to Dr. Omar Villatoro to complete.  LSW faxed form to [facility] ([phone]).

## 2020-02-06 NOTE — PROGRESS NOTES
Received bedside report and assumed care of patient at 0700.  Patient is A&Ox4. VSS on 3L.  Pain controlled with PO oxycodone. Patient more compliant with turns and sat up in wheelchair for several hours today. Wound vac in place and changed by wound care. Patient transferred to low air loss mattress. Incentive spirometer heavily encouraged throughout day and patient showed better effort today than yesterday.  Bed is in the lowest position. Bed alarm is on. Call light is in reach. Family is at the bedside. All questions and concerns answered.

## 2020-02-06 NOTE — DISCHARGE PLANNING
Agency/Facility Name: [facility]  Outcome: pt declined. Not taking Medicaid pt at this time.     [facility] faxed HH order to [facility] @ [phone].

## 2020-02-06 NOTE — DISCHARGE PLANNING
Received [employer] form at 0800  Agency[facility]Facility Name: [facility]   Referral sent per [facility] form @ 0902

## 2020-02-06 NOTE — CARE PLAN
Problem: Mobility  Goal: Risk for activity intolerance will decrease  Outcome: PROGRESSING AS EXPECTED  Note:   Patient got up to chair today, and has been working to turn self in bed.     Problem: Pain Management  Goal: Pain level will decrease to patient's comfort goal  2/5/2020 2245 by Oxana Ortega R.N.  Outcome: PROGRESSING AS EXPECTED  2/5/2020 2238 by Oxana Ortega R.JOHNNY.  Note:   Medicated per MAR with adequate pain relief.

## 2020-02-07 PROBLEM — J18.9 MULTIFOCAL PNEUMONIA: Status: RESOLVED | Noted: 2019-06-02 | Resolved: 2020-02-07

## 2020-02-07 LAB
ANION GAP SERPL CALC-SCNC: 5 MMOL/L (ref 0–11.9)
BASOPHILS # BLD AUTO: 0.4 % (ref 0–1.8)
BASOPHILS # BLD: 0.05 K/UL (ref 0–0.12)
BUN SERPL-MCNC: 8 MG/DL (ref 8–22)
CALCIUM SERPL-MCNC: 7.9 MG/DL (ref 8.5–10.5)
CHLORIDE SERPL-SCNC: 93 MMOL/L (ref 96–112)
CO2 SERPL-SCNC: 29 MMOL/L (ref 20–33)
CREAT SERPL-MCNC: 0.38 MG/DL (ref 0.5–1.4)
EOSINOPHIL # BLD AUTO: 0.07 K/UL (ref 0–0.51)
EOSINOPHIL NFR BLD: 0.6 % (ref 0–6.9)
ERYTHROCYTE [DISTWIDTH] IN BLOOD BY AUTOMATED COUNT: 62.6 FL (ref 35.9–50)
GLUCOSE SERPL-MCNC: 126 MG/DL (ref 65–99)
HCT VFR BLD AUTO: 23 % (ref 37–47)
HGB BLD-MCNC: 7.2 G/DL (ref 12–16)
IMM GRANULOCYTES # BLD AUTO: 0.28 K/UL (ref 0–0.11)
IMM GRANULOCYTES NFR BLD AUTO: 2.4 % (ref 0–0.9)
LYMPHOCYTES # BLD AUTO: 3.13 K/UL (ref 1–4.8)
LYMPHOCYTES NFR BLD: 26.6 % (ref 22–41)
MCH RBC QN AUTO: 29.8 PG (ref 27–33)
MCHC RBC AUTO-ENTMCNC: 31.3 G/DL (ref 33.6–35)
MCV RBC AUTO: 95 FL (ref 81.4–97.8)
MONOCYTES # BLD AUTO: 1.16 K/UL (ref 0–0.85)
MONOCYTES NFR BLD AUTO: 9.9 % (ref 0–13.4)
NEUTROPHILS # BLD AUTO: 7.08 K/UL (ref 2–7.15)
NEUTROPHILS NFR BLD: 60.1 % (ref 44–72)
NRBC # BLD AUTO: 0.67 K/UL
NRBC BLD-RTO: 5.7 /100 WBC
PLATELET # BLD AUTO: 121 K/UL (ref 164–446)
PMV BLD AUTO: 9.9 FL (ref 9–12.9)
POTASSIUM SERPL-SCNC: 3.7 MMOL/L (ref 3.6–5.5)
RBC # BLD AUTO: 2.42 M/UL (ref 4.2–5.4)
SODIUM SERPL-SCNC: 127 MMOL/L (ref 135–145)
WBC # BLD AUTO: 11.8 K/UL (ref 4.8–10.8)

## 2020-02-07 PROCEDURE — 306372 DRESSING,VAC SIMPLACE MED: Performed by: HOSPITALIST

## 2020-02-07 PROCEDURE — A9270 NON-COVERED ITEM OR SERVICE: HCPCS | Performed by: HOSPITALIST

## 2020-02-07 PROCEDURE — 700102 HCHG RX REV CODE 250 W/ 637 OVERRIDE(OP): Performed by: INTERNAL MEDICINE

## 2020-02-07 PROCEDURE — A9270 NON-COVERED ITEM OR SERVICE: HCPCS | Performed by: INTERNAL MEDICINE

## 2020-02-07 PROCEDURE — 700111 HCHG RX REV CODE 636 W/ 250 OVERRIDE (IP): Performed by: INTERNAL MEDICINE

## 2020-02-07 PROCEDURE — 99232 SBSQ HOSP IP/OBS MODERATE 35: CPT | Performed by: HOSPITALIST

## 2020-02-07 PROCEDURE — 97605 NEG PRS WND THER DME<=50SQCM: CPT

## 2020-02-07 PROCEDURE — 80048 BASIC METABOLIC PNL TOTAL CA: CPT

## 2020-02-07 PROCEDURE — 302098 PASTE RING (FLAT): Performed by: HOSPITALIST

## 2020-02-07 PROCEDURE — 770004 HCHG ROOM/CARE - ONCOLOGY PRIVATE *

## 2020-02-07 PROCEDURE — 85025 COMPLETE CBC W/AUTO DIFF WBC: CPT

## 2020-02-07 PROCEDURE — 700102 HCHG RX REV CODE 250 W/ 637 OVERRIDE(OP): Performed by: HOSPITALIST

## 2020-02-07 RX ADMIN — MORPHINE SULFATE 4 MG: 4 INJECTION INTRAVENOUS at 21:50

## 2020-02-07 RX ADMIN — OXYCODONE HYDROCHLORIDE 10 MG: 10 TABLET ORAL at 17:24

## 2020-02-07 RX ADMIN — OXYCODONE HYDROCHLORIDE AND ACETAMINOPHEN 500 MG: 500 TABLET ORAL at 17:24

## 2020-02-07 RX ADMIN — ALPRAZOLAM 0.25 MG: 0.25 TABLET ORAL at 21:51

## 2020-02-07 RX ADMIN — MORPHINE SULFATE 4 MG: 4 INJECTION INTRAVENOUS at 12:26

## 2020-02-07 RX ADMIN — METRONIDAZOLE 500 MG: 500 TABLET ORAL at 21:46

## 2020-02-07 RX ADMIN — ACETAMINOPHEN 650 MG: 325 TABLET, FILM COATED ORAL at 17:23

## 2020-02-07 RX ADMIN — OXYCODONE HYDROCHLORIDE 80 MG: 20 TABLET, FILM COATED, EXTENDED RELEASE ORAL at 14:50

## 2020-02-07 RX ADMIN — DOXYCYCLINE 100 MG: 100 TABLET, FILM COATED ORAL at 17:24

## 2020-02-07 RX ADMIN — THERA TABS 1 TABLET: TAB at 05:22

## 2020-02-07 RX ADMIN — OXYCODONE HYDROCHLORIDE 80 MG: 20 TABLET, FILM COATED, EXTENDED RELEASE ORAL at 21:46

## 2020-02-07 RX ADMIN — OXYCODONE HYDROCHLORIDE 80 MG: 20 TABLET, FILM COATED, EXTENDED RELEASE ORAL at 05:21

## 2020-02-07 RX ADMIN — MORPHINE SULFATE 4 MG: 4 INJECTION INTRAVENOUS at 17:31

## 2020-02-07 RX ADMIN — SENNOSIDES AND DOCUSATE SODIUM 2 TABLET: 8.6; 5 TABLET ORAL at 05:23

## 2020-02-07 RX ADMIN — METRONIDAZOLE 500 MG: 500 TABLET ORAL at 05:22

## 2020-02-07 RX ADMIN — DOXYCYCLINE 100 MG: 100 TABLET, FILM COATED ORAL at 05:22

## 2020-02-07 RX ADMIN — OXYCODONE HYDROCHLORIDE 10 MG: 10 TABLET ORAL at 12:06

## 2020-02-07 RX ADMIN — FLUCONAZOLE 200 MG: 100 TABLET ORAL at 05:22

## 2020-02-07 RX ADMIN — ALPRAZOLAM 0.25 MG: 0.25 TABLET ORAL at 07:46

## 2020-02-07 RX ADMIN — OXYCODONE HYDROCHLORIDE 10 MG: 10 TABLET ORAL at 23:06

## 2020-02-07 RX ADMIN — OXYCODONE HYDROCHLORIDE 10 MG: 10 TABLET ORAL at 07:37

## 2020-02-07 RX ADMIN — OXYCODONE HYDROCHLORIDE 10 MG: 10 TABLET ORAL at 04:06

## 2020-02-07 RX ADMIN — METRONIDAZOLE 500 MG: 500 TABLET ORAL at 14:51

## 2020-02-07 RX ADMIN — SENNOSIDES AND DOCUSATE SODIUM 2 TABLET: 8.6; 5 TABLET ORAL at 17:23

## 2020-02-07 RX ADMIN — OXYCODONE HYDROCHLORIDE AND ACETAMINOPHEN 500 MG: 500 TABLET ORAL at 05:23

## 2020-02-07 ASSESSMENT — ENCOUNTER SYMPTOMS
CHILLS: 0
NERVOUS/ANXIOUS: 1
FEVER: 0
PALPITATIONS: 0
BACK PAIN: 1
COUGH: 1
NAUSEA: 0

## 2020-02-07 NOTE — DISCHARGE PLANNING
Agency/Facility Name: [facility]  Spoke To: Liaison   Outcome: pt declined. Non contracted ins provider.     Per Shruthi @ [facility] the are not accepting Medicaid at this time.

## 2020-02-07 NOTE — WOUND TEAM
[facility]  Inpatient Services  Wound and Skin Care Progress    Admission Date: 1/30/2020         HPI, PMH, SH: Reviewed    Unit where seen by Wound Team: R325/00     WOUND CONSULT RELATED TO: scheduled NPWT dsg change.    Self Report / Pain Level:  Pre-medicated by RN      OBJECTIVE: Pt on pressure redistribution with waffle overlay. Nursing to f/u with bed runners for bed availability. Mepliex in place.    WOUND TYPE, LOCATION, CHARACTERISTICS (Pressure Injuries: location, stage, POA or date identified)         Pressure Injury Coccyx Right POA open surgical  (Active)   Pressure Injury Stage 4    State of Healing Non-healing    Site Assessment Yellow;Red;Pink    An-wound Assessment Dry;Fragile    Margins Unattached edges    Wound Length (cm) 7 cm    Wound Width (cm) 3.8 cm    Wound Depth (cm) 1.5 cm    Wound Surface Area (cm^2) 26.6 cm^2    Tunneling 0 cm    Undermining 3 cm    Closure Secondary intention    Drainage Amount Small    Drainage Description Yellow    Treatments Cleansed;Site care    Cleansing Approved Wound Cleanser    Periwound Protectant Skin Protectant wipes to Periwound;Benzoin;Drape    Dressing Options Wound Vac    Dressing Cleansing/Solutions Not Applicable    Dressing Changed Changed    Dressing Status Clean;Dry;Intact    Dressing Change Frequency Monday, Wednesday, Friday    NEXT Dressing Change  02/10/20    NEXT Weekly Photo (Inpatient Only) 02/10/20    WOUND NURSE ONLY - Odor None    WOUND NURSE ONLY - Exposed Structures Adipose    WOUND NURSE ONLY - Tissue Type and Percentage red yellow    WOUND NURSE ONLY - Time Spent with Patient (mins) 60            Negative Pressure Wound Therapy Sacrum (Active)   NPWT Pump Mode / Pressure Setting Continuous;125 mmHg    Dressing Type Medium;Black foam    Number of Foam Pieces Used 2    Canister Changed No    Output (mL) 0 mL    VAC VeraFlo Pressure (mm/Hg) 125 mmHg;Continuous                Lab Values:    Lab Results   Component Value  Date/Time    WBC 11.8 (H) 02/07/2020 01:40 AM    RBC 2.42 (L) 02/07/2020 01:40 AM    HEMOGLOBIN 7.2 (L) 02/07/2020 01:40 AM    HEMATOCRIT 23.0 (L) 02/07/2020 01:40 AM          Lab Results   Component Value Date/Time    HBA1C 5.6 10/17/2019 12:01 AM      Culture:   Obtained 02/01/20, Results show   Coagulase-negative Staphylococcus species   Moderate growth        Abnormal    Candida albicans   Moderate growth     INTERVENTIONS BY WOUND TEAM:  Dressing removed. Wound and anthony wound cleansed with wound cleanser and gauze. No sting barrier to periwound and paste ring applied. Drape bridged to R hip. 1 foam to wound bed and to bridge, 2nd foam used as  button for tracpad. Drape and tracpad to foam. Mepilex to L thigh and trac pad tubing secured to it. New chucks reapplied and pt repositioned.       Interdisciplinary consultation: Patient, Sylvia RN    EVALUATION: Patient admitted with worsening wound to right coccyx. Surgical debridement done by Dr. Alcazar on 2/1. Wound presenting with no odor, minimal granular tissue and 3 cm undermining at 1200. Pt tolerated dressing change with  And IV premedication. Followed up with primary RN on SYLVESTER ordered 2/3. No change in vac settings.    Goals: Steady decrease in wound area and depth weekly.    NURSING PLAN OF CARE ORDERS (X):  Dressing changes: See Dressing Care orders: X  Skin care: See Skin Care orders: X  Rectal tube care: See Rectal Tube Care orders:        Other orders:                             WOUND TEAM PLAN OF CARE (X):   Dressing changes by wound team:          Follow up 1-2 times weekly:               Follow up 3 times weekly:                NPWT change 3 times weekly:   X  Follow up as needed:       Other (explain):     Anticipated discharge plans (X):   LTACH:        SNF/Rehab:                  Home Care:         X  HH order in place   Outpatient Wound Center:            Self Care:            Other:

## 2020-02-07 NOTE — DISCHARGE PLANNING
Anticipated Discharge Disposition: Home    Action: Pt was declined with all HH agencies.  LSW sent a tiger text to Dr. Omar Villatoro requesting an outpatient wound care order.  This LSW will contact scheduling department once that order is placed, to have them schedule an outpatient appointment with wound care.       Barriers to Discharge: Medical clearance, [facility] V[facility] acceptance and delivery, outpatient wound care appointment.      Plan: LSW will continue to follow and assist as needed

## 2020-02-07 NOTE — CARE PLAN
Problem: Nutritional:  Goal: Achieve adequate nutritional intake  Description  Patient will consume 50% of meals   Outcome: PROGRESSING AS EXPECTED    PO intake appears to be improving.  Patient ate 50-75% at two recent meals.  Encouraged her to drink the Arginaid to aid in wound healing.

## 2020-02-07 NOTE — CARE PLAN
Problem: Pain Management  Goal: Pain level will decrease to patient's comfort goal  Outcome: PROGRESSING SLOWER THAN EXPECTED     Scheduled oxycodone, prn oxycodone, and prn IV morphine administered as needed throughout the day.

## 2020-02-07 NOTE — CARE PLAN
Problem: Pain Management  Goal: Pain level will decrease to patient's comfort goal  Outcome: PROGRESSING AS EXPECTED     Problem: Safety  Goal: Will remain free from injury  Outcome: PROGRESSING AS EXPECTED     Problem: Infection  Goal: Will remain free from infection  Outcome: PROGRESSING AS EXPECTED

## 2020-02-07 NOTE — PROGRESS NOTES
Hospital Medicine Daily Progress Note    Date of Service  2/6/2020    Chief Complaint  39 y.o. female admitted 1/30/2020 with worsening decubital ulcer.    Hospital Course   2/1/2020-patient was hospitalized because of the decubital ulcer.  The patient at this point has been evaluated and debrided.  The patient's cultures show mixed gram-positive cocci which is most likely a skin jessica.  Patient remains on vancomycin and Zosyn.  Continue with pain management.  Patient is also complaining of hemoptysis and thus a CT of the chest will be evaluated.  2/2/2020- continue at this point with antibiotic management.  PE is negative on CT scan.  Patient has completed chemotherapy for her primary cancer.  No cancer invasion noted on CT scan of the chest.  Continue with wound management after ulcer debridement.  No osteomyelitis noted in the imaging studies or during surgery.  Continue with pain management wound care and antibiotic management follow cultures.  2/3/2020- patient has multifocal pneumonia as well as a gluteal abscess that was incised and drained.  Patient was on Zosyn and vancomycin.  We can reduce it to Zosyn as the patient is negative for MRSA.  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  Negative CT for PE.      Interval Problem Update      Reports pain is controlled on current regimen  Hemoptysis is improved  Home O2 was approved still awaiting wound VAC approval      Consultants/Specialty  Surgery  ID    Code Status  DNR/DNI    Disposition  To be determined    Review of Systems  Review of Systems   Constitutional: Negative for chills and fever.   Respiratory: Positive for cough.    Cardiovascular: Negative for chest pain and palpitations.   Gastrointestinal: Negative for abdominal pain, nausea and vomiting.   Musculoskeletal: Positive for back pain.   All other systems reviewed and are negative.       Physical Exam  Temp:  [36.3 °C (97.4  °F)-36.8 °C (98.3 °F)] 36.6 °C (97.8 °F)  Pulse:  [108-118] 118  Resp:  [18-20] 18  BP: (110-117)/(70-85) 117/80  SpO2:  [92 %-95 %] 95 %    Physical Exam  Vitals signs and nursing note reviewed.   Constitutional:       General: She is not in acute distress.  HENT:      Head: Normocephalic and atraumatic.      Nose: Nose normal. No rhinorrhea.      Mouth/Throat:      Pharynx: No oropharyngeal exudate or posterior oropharyngeal erythema.   Eyes:      General: No scleral icterus.        Right eye: No discharge.         Left eye: No discharge.   Neck:      Musculoskeletal: Neck supple. No neck rigidity.   Cardiovascular:      Rate and Rhythm: Normal rate and regular rhythm.      Heart sounds: Normal heart sounds. No murmur. No friction rub. No gallop.    Pulmonary:      Effort: Pulmonary effort is normal. No respiratory distress.      Breath sounds: No stridor. Examination of the right-upper field reveals rhonchi. Examination of the left-upper field reveals rhonchi. Rhonchi present. No wheezing or rales.   Chest:      Chest wall: No tenderness.   Abdominal:      General: Bowel sounds are normal. There is no distension.      Palpations: Abdomen is soft. There is no mass.      Tenderness: There is no tenderness. There is no rebound.   Musculoskeletal:         General: Swelling present. No tenderness.   Skin:     General: Skin is warm and dry.      Coloration: Skin is not cyanotic or jaundiced.      Nails: There is no clubbing.        Comments: Wound VAC in place no surrounding erythema   Neurological:      General: No focal deficit present.      Mental Status: She is alert and oriented to person, place, and time.      Cranial Nerves: No cranial nerve deficit.      Motor: No weakness.   Psychiatric:         Mood and Affect: Mood normal.         Behavior: Behavior normal.         Fluids    Intake/Output Summary (Last 24 hours) at 2/6/2020 1601  Last data filed at 2/6/2020 1000  Gross per 24 hour   Intake 100 ml   Output  3400 ml   Net -3300 ml       Laboratory  Recent Labs     02/04/20  0255 02/05/20  0045 02/06/20  0200   WBC 12.3* 12.2* 13.3*   RBC 2.42* 2.27* 2.66*   HEMOGLOBIN 7.1* 6.8* 7.9*   HEMATOCRIT 23.2* 22.1* 25.3*   MCV 95.9 97.4 95.1   MCH 29.3 30.0 29.7   MCHC 30.6* 30.8* 31.2*   RDW 66.0* 67.6* 64.3*   PLATELETCT 82* 99* 106*   MPV 9.6 10.7 9.9     Recent Labs     02/04/20  0255 02/05/20  0045 02/06/20  0200   SODIUM 135 135 131*   POTASSIUM 3.3* 3.7 4.0   CHLORIDE 97 97 95*   CO2 30 28 28   GLUCOSE 121* 125* 119*   BUN 5* 6* 8   CREATININE 0.41* 0.40* 0.39*   CALCIUM 8.6 8.6 8.4*                   Imaging  CT-CTA CHEST PULMONARY ARTERY W/ RECONS   Final Result         1. No CT evidence of pulmonary embolism.      2. Extensive airspace opacities throughout both lungs, most in the middle lobe, concerning for multifocal pneumonia.      3. Small bilateral pleural effusions.      4. Significantly enlarged heterogeneous spleen with multiple lesions.      DX-SACRUM AND COCCYX 2+   Final Result      No radiographic evidence of osteomyelitis. MRI could be performed to further evaluate if greater sensitivity is required      Osteoblastic metastases      DX-CHEST-PORTABLE (1 VIEW)   Final Result      Stable dense right basilar consolidation and reticular opacity. Mass may be present      Stable right lateral seventh rib bony destruction/presumed pathologic fracture and osteoblastic metastases           Assessment/Plan  Hemoptysis  Assessment & Plan  CTA was negative for PE  Improved monitor        Pressure injury of sacral region, stage 3 (HCC)- (present on admission)  Assessment & Plan  Wound cultures coag negative staph Bacteroides and Candida  Continue fluconazole Flagyl and doxycycline through 2/10/2020  Continue wound care with wound VAC      Multifocal pneumonia  Assessment & Plan  Resolved    Anemia of chronic disease- (present on admission)  Assessment & Plan  Hemoglobin 7.9 improved posttransfusion continue to  monitor    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 Richardson from oncology he recommended outpatient follow-up with her primary oncologist Dr. Delaney  after discharge    Plan of care reviewed with patient and discussed with nursing staff and case management    VTE prophylaxis: ZEINABs

## 2020-02-07 NOTE — PROGRESS NOTES
"Received report and assumed care of patient at 1900. Reviewed chart and completed assessment. Patient is A&O x4, right chest port at TKO, PIV RFA patent. Patient turning self in bed more this shift. C/o the \"cancer eating her left knee\". Medicated per MAR with good effect. Family in room most of the night. Wound vac in place. Low air loss bed. Call light and personal belongings with in reach. Patient calls appropriately. All needs met.   "

## 2020-02-07 NOTE — PROGRESS NOTES
Report given at bedside. Assumed care of patient at 0700. Patient A/Ox4, intermittent anxiety - prn Xanax administered as ordered in MAR. Continuous pain of the buttocks, scheduled and prn oxycodone administered as needed. Encouraged to allow repositioning, frequently refused - charted refusal in doc flowsheets appropriately. Wound care completed by wound care RN, prn  IV morphine administered prior to dressing change, dressing remains C/D/I, wound vac set appropriately. Mediport and PIV dressing C/D/I both flush easily, IVF discontinued. Young daughter at bedside - charge RN aware. Refused to get out of bed. Bed alarm on, call bell within arms reach, and bed in lowest position.

## 2020-02-08 PROCEDURE — 700102 HCHG RX REV CODE 250 W/ 637 OVERRIDE(OP): Performed by: HOSPITALIST

## 2020-02-08 PROCEDURE — A9270 NON-COVERED ITEM OR SERVICE: HCPCS | Performed by: HOSPITALIST

## 2020-02-08 PROCEDURE — A9270 NON-COVERED ITEM OR SERVICE: HCPCS | Performed by: INTERNAL MEDICINE

## 2020-02-08 PROCEDURE — 770004 HCHG ROOM/CARE - ONCOLOGY PRIVATE *

## 2020-02-08 PROCEDURE — 700102 HCHG RX REV CODE 250 W/ 637 OVERRIDE(OP): Performed by: INTERNAL MEDICINE

## 2020-02-08 PROCEDURE — 99232 SBSQ HOSP IP/OBS MODERATE 35: CPT | Performed by: HOSPITALIST

## 2020-02-08 PROCEDURE — 700111 HCHG RX REV CODE 636 W/ 250 OVERRIDE (IP): Performed by: INTERNAL MEDICINE

## 2020-02-08 RX ADMIN — MORPHINE SULFATE 4 MG: 4 INJECTION INTRAVENOUS at 16:42

## 2020-02-08 RX ADMIN — OXYCODONE HYDROCHLORIDE 10 MG: 10 TABLET ORAL at 15:25

## 2020-02-08 RX ADMIN — THERA TABS 1 TABLET: TAB at 04:24

## 2020-02-08 RX ADMIN — OXYCODONE HYDROCHLORIDE 10 MG: 10 TABLET ORAL at 12:22

## 2020-02-08 RX ADMIN — SENNOSIDES AND DOCUSATE SODIUM 2 TABLET: 8.6; 5 TABLET ORAL at 16:42

## 2020-02-08 RX ADMIN — OXYCODONE HYDROCHLORIDE AND ACETAMINOPHEN 500 MG: 500 TABLET ORAL at 16:42

## 2020-02-08 RX ADMIN — DOXYCYCLINE 100 MG: 100 TABLET, FILM COATED ORAL at 04:24

## 2020-02-08 RX ADMIN — METRONIDAZOLE 500 MG: 500 TABLET ORAL at 21:37

## 2020-02-08 RX ADMIN — OXYCODONE HYDROCHLORIDE 80 MG: 20 TABLET, FILM COATED, EXTENDED RELEASE ORAL at 13:50

## 2020-02-08 RX ADMIN — OXYCODONE HYDROCHLORIDE 10 MG: 10 TABLET ORAL at 23:16

## 2020-02-08 RX ADMIN — ALPRAZOLAM 0.25 MG: 0.25 TABLET ORAL at 18:43

## 2020-02-08 RX ADMIN — SENNOSIDES AND DOCUSATE SODIUM 2 TABLET: 8.6; 5 TABLET ORAL at 04:24

## 2020-02-08 RX ADMIN — OXYCODONE HYDROCHLORIDE 80 MG: 20 TABLET, FILM COATED, EXTENDED RELEASE ORAL at 21:30

## 2020-02-08 RX ADMIN — MORPHINE SULFATE 4 MG: 4 INJECTION INTRAVENOUS at 20:26

## 2020-02-08 RX ADMIN — DOXYCYCLINE 100 MG: 100 TABLET, FILM COATED ORAL at 16:42

## 2020-02-08 RX ADMIN — ACETAMINOPHEN 650 MG: 325 TABLET, FILM COATED ORAL at 03:29

## 2020-02-08 RX ADMIN — MORPHINE SULFATE 4 MG: 4 INJECTION INTRAVENOUS at 13:50

## 2020-02-08 RX ADMIN — MORPHINE SULFATE 4 MG: 4 INJECTION INTRAVENOUS at 09:25

## 2020-02-08 RX ADMIN — OXYCODONE HYDROCHLORIDE 80 MG: 20 TABLET, FILM COATED, EXTENDED RELEASE ORAL at 04:23

## 2020-02-08 RX ADMIN — MORPHINE SULFATE 4 MG: 4 INJECTION INTRAVENOUS at 02:07

## 2020-02-08 RX ADMIN — OXYCODONE HYDROCHLORIDE 10 MG: 10 TABLET ORAL at 18:42

## 2020-02-08 RX ADMIN — OXYCODONE HYDROCHLORIDE AND ACETAMINOPHEN 500 MG: 500 TABLET ORAL at 04:24

## 2020-02-08 RX ADMIN — METRONIDAZOLE 500 MG: 500 TABLET ORAL at 04:24

## 2020-02-08 RX ADMIN — METRONIDAZOLE 500 MG: 500 TABLET ORAL at 13:50

## 2020-02-08 RX ADMIN — FLUCONAZOLE 200 MG: 100 TABLET ORAL at 04:24

## 2020-02-08 ASSESSMENT — ENCOUNTER SYMPTOMS
CHILLS: 0
FEVER: 0
ROS SKIN COMMENTS: WOUND VAC IN PLACE
SPUTUM PRODUCTION: 0
PALPITATIONS: 0
HEMOPTYSIS: 0
COUGH: 1

## 2020-02-08 NOTE — PROGRESS NOTES
Hospital Medicine Daily Progress Note    Date of Service  2/7/2020    Chief Complaint  39 y.o. female admitted 1/30/2020 with worsening decubital ulcer.    Hospital Course   2/1/2020-patient was hospitalized because of the decubital ulcer.  The patient at this point has been evaluated and debrided.  The patient's cultures show mixed gram-positive cocci which is most likely a skin jessica.  Patient remains on vancomycin and Zosyn.  Continue with pain management.  Patient is also complaining of hemoptysis and thus a CT of the chest will be evaluated.  2/2/2020- continue at this point with antibiotic management.  PE is negative on CT scan.  Patient has completed chemotherapy for her primary cancer.  No cancer invasion noted on CT scan of the chest.  Continue with wound management after ulcer debridement.  No osteomyelitis noted in the imaging studies or during surgery.  Continue with pain management wound care and antibiotic management follow cultures.  2/3/2020- patient has multifocal pneumonia as well as a gluteal abscess that was incised and drained.  Patient was on Zosyn and vancomycin.  We can reduce it to Zosyn as the patient is negative for MRSA.  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  Negative CT for PE.      Interval Problem Update      Anxious received Xanax this morning  Denies hemoptysis today  Declined by home health        Consultants/Specialty  Surgery  ID    Code Status  DNR/DNI    Disposition  To be determined    Review of Systems  Review of Systems   Constitutional: Negative for chills and fever.   Respiratory: Positive for cough.    Cardiovascular: Negative for chest pain and palpitations.   Gastrointestinal: Negative for nausea.   Musculoskeletal: Positive for back pain.   Psychiatric/Behavioral: The patient is nervous/anxious.    All other systems reviewed and are negative.       Physical Exam  Temp:  [36.2 °C (97.1 °F)-36.8 °C  (98.3 °F)] 36.7 °C (98 °F)  Pulse:  [111-121] 111  Resp:  [16-20] 16  BP: ()/(62-84) 108/72  SpO2:  [91 %-100 %] 94 %    Physical Exam  Vitals signs and nursing note reviewed.   Constitutional:       General: She is not in acute distress.  HENT:      Head: Normocephalic and atraumatic.      Nose: Nose normal. No rhinorrhea.      Mouth/Throat:      Pharynx: No oropharyngeal exudate or posterior oropharyngeal erythema.   Eyes:      General: No scleral icterus.        Right eye: No discharge.         Left eye: No discharge.   Neck:      Musculoskeletal: Neck supple. No neck rigidity.   Cardiovascular:      Rate and Rhythm: Normal rate and regular rhythm.      Heart sounds: Normal heart sounds. No murmur. No friction rub. No gallop.    Pulmonary:      Effort: Pulmonary effort is normal. No respiratory distress.      Breath sounds: No stridor. Rhonchi present. No wheezing or rales.   Chest:      Chest wall: No tenderness.   Abdominal:      General: Bowel sounds are normal. There is no distension.      Palpations: Abdomen is soft. There is no mass.      Tenderness: There is no tenderness.   Musculoskeletal:         General: Swelling present. No tenderness.   Skin:     General: Skin is warm and dry.      Coloration: Skin is not cyanotic or jaundiced.      Nails: There is no clubbing.        Comments: Wound VAC in place no surrounding erythema   Neurological:      General: No focal deficit present.      Mental Status: She is alert and oriented to person, place, and time.      Cranial Nerves: No cranial nerve deficit.      Motor: No weakness.   Psychiatric:         Mood and Affect: Mood normal.         Behavior: Behavior normal.         Fluids    Intake/Output Summary (Last 24 hours) at 2/7/2020 1630  Last data filed at 2/7/2020 0404  Gross per 24 hour   Intake --   Output 2475 ml   Net -2475 ml       Laboratory  Recent Labs     02/05/20  0045 02/06/20  0200 02/07/20  0140   WBC 12.2* 13.3* 11.8*   RBC 2.27* 2.66*  2.42*   HEMOGLOBIN 6.8* 7.9* 7.2*   HEMATOCRIT 22.1* 25.3* 23.0*   MCV 97.4 95.1 95.0   MCH 30.0 29.7 29.8   MCHC 30.8* 31.2* 31.3*   RDW 67.6* 64.3* 62.6*   PLATELETCT 99* 106* 121*   MPV 10.7 9.9 9.9     Recent Labs     02/05/20  0045 02/06/20  0200 02/07/20  0140   SODIUM 135 131* 127*   POTASSIUM 3.7 4.0 3.7   CHLORIDE 97 95* 93*   CO2 28 28 29   GLUCOSE 125* 119* 126*   BUN 6* 8 8   CREATININE 0.40* 0.39* 0.38*   CALCIUM 8.6 8.4* 7.9*                   Imaging  CT-CTA CHEST PULMONARY ARTERY W/ RECONS   Final Result         1. No CT evidence of pulmonary embolism.      2. Extensive airspace opacities throughout both lungs, most in the middle lobe, concerning for multifocal pneumonia.      3. Small bilateral pleural effusions.      4. Significantly enlarged heterogeneous spleen with multiple lesions.      DX-SACRUM AND COCCYX 2+   Final Result      No radiographic evidence of osteomyelitis. MRI could be performed to further evaluate if greater sensitivity is required      Osteoblastic metastases      DX-CHEST-PORTABLE (1 VIEW)   Final Result      Stable dense right basilar consolidation and reticular opacity. Mass may be present      Stable right lateral seventh rib bony destruction/presumed pathologic fracture and osteoblastic metastases           Assessment/Plan  Hemoptysis  Assessment & Plan  CTA was negative for PE    Clinically improved continue to monitor        Pressure injury of sacral region, stage 3 (HCC)- (present on admission)  Assessment & Plan  Wound cultures coag negative staph Bacteroides and Candida  Continue Flagyl doxycycline and Diflucan will complete course on 2/10/2020  Continue wound care  Discussed with  awaiting approval for wound VAC no accepting home health agencies will initiate referral for wound care  Continue multivitamin vitamin C supplements and nutritional support      Anemia of chronic disease- (present on admission)  Assessment & Plan  Monitor hemoglobin  posttransfusion     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 Delaney  Previously discussed with Dr. Richardson from oncology who recommended outpatient follow-up    Plan of care reviewed with patient and discussed with nursing staff pharmacist and     VTE prophylaxis: SCDs

## 2020-02-08 NOTE — PROGRESS NOTES
[facility] Medicine Daily Progress Note    Date of Service  2/8/2020    Chief Complaint  39 y.o. female admitted 1/30/2020 with worsening decubital ulcer.    Hospital Course   2/1/2020-patient was hospitalized because of the decubital ulcer.  The patient at this point has been evaluated and debrided.  The patient's cultures show mixed gram-positive cocci which is most likely a skin jessica.  Patient remains on vancomycin and Zosyn.  Continue with pain management.  Patient is also complaining of hemoptysis and thus a CT of the chest will be evaluated.  2/2/2020- continue at this point with antibiotic management.  PE is negative on CT scan.  Patient has completed chemotherapy for her primary cancer.  No cancer invasion noted on CT scan of the chest.  Continue with wound management after ulcer debridement.  No osteomyelitis noted in the imaging studies or during surgery.  Continue with pain management wound care and antibiotic management follow cultures.  2/3/2020- patient has multifocal pneumonia as well as a gluteal abscess that was incised and drained.  Patient was on Zosyn and vancomycin.  We can reduce it to Zosyn as the patient is negative for MRSA.  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  Negative CT for PE.      Interval Problem Update      No overnight events  Pain overall controlled  Jocelyne on 3 L nasal cannula  Afebrile    Consultants/Specialty  Surgery  ID    Code Status  DNR/DNI    Disposition  To be determined    Review of Systems  Review of Systems   Constitutional: Negative for chills and fever.   Respiratory: Positive for cough. Negative for hemoptysis and sputum production.    Cardiovascular: Negative for chest pain and palpitations.   Skin:        Wound VAC in place   All other systems reviewed and are negative.       Physical Exam  Temp:  [36.2 °C (97.2 °F)-36.7 °C (98.1 °F)] 36.3 °C (97.4 °F)  Pulse:  [104-112] 104  Resp:  [16] 16  BP:  (105-110)/(60-77) 105/67  SpO2:  [94 %-98 %] 96 %    Physical Exam  Vitals signs and nursing note reviewed.   Constitutional:       General: She is not in acute distress.  HENT:      Head: Normocephalic and atraumatic.      Nose: Nose normal.      Mouth/Throat:      Pharynx: No oropharyngeal exudate or posterior oropharyngeal erythema.   Eyes:      General:         Right eye: No discharge.         Left eye: No discharge.   Neck:      Musculoskeletal: Neck supple.   Cardiovascular:      Rate and Rhythm: Normal rate and regular rhythm.      Heart sounds: No murmur. No friction rub. No gallop.    Pulmonary:      Effort: Pulmonary effort is normal. No respiratory distress.      Breath sounds: No stridor. Decreased breath sounds present. No rales.   Chest:      Chest wall: No tenderness.   Abdominal:      General: Bowel sounds are normal. There is no distension.      Palpations: Abdomen is soft. There is no mass.      Tenderness: There is no tenderness. There is no guarding.   Musculoskeletal:         General: Swelling present. No tenderness.   Skin:     General: Skin is warm and dry.      Coloration: Skin is not cyanotic.      Nails: There is no clubbing.        Comments: Wound VAC in place with no surrounding erythema   Neurological:      General: No focal deficit present.      Mental Status: She is alert and oriented to person, place, and time.      Cranial Nerves: No cranial nerve deficit.      Motor: No weakness.   Psychiatric:         Mood and Affect: Mood normal.         Behavior: Behavior normal.         Fluids    Intake/Output Summary (Last 24 hours) at 2/8/2020 1434  Last data filed at 2/8/2020 0316  Gross per 24 hour   Intake --   Output 2650 ml   Net -2650 ml       Laboratory  Recent Labs     02/06/20  0200 02/07/20  0140   WBC 13.3* 11.8*   RBC 2.66* 2.42*   HEMOGLOBIN 7.9* 7.2*   HEMATOCRIT 25.3* 23.0*   MCV 95.1 95.0   MCH 29.7 29.8   MCHC 31.2* 31.3*   RDW 64.3* 62.6*   PLATELETCT 106* 121*   MPV 9.9 9.9      Recent Labs     02/06/20  0200 02/07/20  0140   SODIUM 131* 127*   POTASSIUM 4.0 3.7   CHLORIDE 95* 93*   CO2 28 29   GLUCOSE 119* 126*   BUN 8 8   CREATININE 0.39* 0.38*   CALCIUM 8.4* 7.9*                   Imaging  CT-CTA CHEST PULMONARY ARTERY W/ RECONS   Final Result         1. No CT evidence of pulmonary embolism.      2. Extensive airspace opacities throughout both lungs, most in the middle lobe, concerning for multifocal pneumonia.      3. Small bilateral pleural effusions.      4. Significantly enlarged heterogeneous spleen with multiple lesions.      DX-SACRUM AND COCCYX 2+   Final Result      No radiographic evidence of osteomyelitis. MRI could be performed to further evaluate if greater sensitivity is required      Osteoblastic metastases      DX-CHEST-PORTABLE (1 VIEW)   Final Result      Stable dense right basilar consolidation and reticular opacity. Mass may be present      Stable right lateral seventh rib bony destruction/presumed pathologic fracture and osteoblastic metastases           Assessment/Plan  Hemoptysis  Assessment & Plan  CTA was negative for PE    Overall improved        Pressure injury of sacral region, stage 3 (HCC)- (present on admission)  Assessment & Plan  Wound cultures coag negative staph Bacteroides and Candida  On Flagyl doxycycline and Diflucan through February 10, 2020  Continue wound care  Encourage oral intake and continue nutritional support and vitamin supplements        Anemia of chronic disease- (present on admission)  Assessment & Plan  Recheck CBC in a.m.    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 Delaney  Previously discussed with Dr. Richardson from oncology who recommended outpatient follow-up  Continue oxygen and supportive care    Plan of care reviewed with patient and discussed with nursing staff    VTE prophylaxis: SCDs

## 2020-02-08 NOTE — CARE PLAN
Problem: Pain Management  Goal: Pain level will decrease to patient's comfort goal  Outcome: PROGRESSING AS EXPECTED  Note:   Patient educated on 0-10 pain rating scale, PRN and scheduled pain medication provided to patient.     Problem: Communication  Goal: The ability to communicate needs accurately and effectively will improve  Outcome: PROGRESSING AS EXPECTED  Note:   Plan of care discussed with patient, all questions answered.

## 2020-02-08 NOTE — PROGRESS NOTES
Received report & assumed care of patient at 1900. Assessment completed. Patient is A&Ox4. R PIV patent, SL. R port patent, positive blood return. Patient reports pain of 6/10, medication provided per MAR. POC discussed & questions answered. Bed locked and in lowest position, bed alarm is on, call light within reach, non-skid socks in place, hourly rounding. Patient reports no further needs at this time.

## 2020-02-09 LAB
ABO GROUP BLD: NORMAL
ANION GAP SERPL CALC-SCNC: 7 MMOL/L (ref 0–11.9)
ANISOCYTOSIS BLD QL SMEAR: ABNORMAL
BARCODED ABORH UBTYP: 5100
BARCODED PRD CODE UBPRD: NORMAL
BARCODED UNIT NUM UBUNT: NORMAL
BASO STIPL BLD QL SMEAR: NORMAL
BASOPHILS # BLD AUTO: 0.3 % (ref 0–1.8)
BASOPHILS # BLD: 0.05 K/UL (ref 0–0.12)
BLD GP AB SCN SERPL QL: NORMAL
BUN SERPL-MCNC: 7 MG/DL (ref 8–22)
CALCIUM SERPL-MCNC: 8.6 MG/DL (ref 8.5–10.5)
CHLORIDE SERPL-SCNC: 92 MMOL/L (ref 96–112)
CO2 SERPL-SCNC: 29 MMOL/L (ref 20–33)
COMMENT 1642: NORMAL
COMPONENT R 8504R: NORMAL
CREAT SERPL-MCNC: 0.38 MG/DL (ref 0.5–1.4)
EOSINOPHIL # BLD AUTO: 0.06 K/UL (ref 0–0.51)
EOSINOPHIL NFR BLD: 0.4 % (ref 0–6.9)
ERYTHROCYTE [DISTWIDTH] IN BLOOD BY AUTOMATED COUNT: 67.3 FL (ref 35.9–50)
GLUCOSE SERPL-MCNC: 122 MG/DL (ref 65–99)
HCT VFR BLD AUTO: 21.6 % (ref 37–47)
HGB BLD-MCNC: 6.8 G/DL (ref 12–16)
HYPOCHROMIA BLD QL SMEAR: ABNORMAL
IMM GRANULOCYTES # BLD AUTO: 0.37 K/UL (ref 0–0.11)
IMM GRANULOCYTES NFR BLD AUTO: 2.5 % (ref 0–0.9)
LYMPHOCYTES # BLD AUTO: 2.9 K/UL (ref 1–4.8)
LYMPHOCYTES NFR BLD: 19.2 % (ref 22–41)
MACROCYTES BLD QL SMEAR: ABNORMAL
MCH RBC QN AUTO: 30.4 PG (ref 27–33)
MCHC RBC AUTO-ENTMCNC: 31.5 G/DL (ref 33.6–35)
MCV RBC AUTO: 96.4 FL (ref 81.4–97.8)
MICROCYTES BLD QL SMEAR: ABNORMAL
MONOCYTES # BLD AUTO: 1.16 K/UL (ref 0–0.85)
MONOCYTES NFR BLD AUTO: 7.7 % (ref 0–13.4)
MORPHOLOGY BLD-IMP: NORMAL
NEUTROPHILS # BLD AUTO: 10.54 K/UL (ref 2–7.15)
NEUTROPHILS NFR BLD: 69.9 % (ref 44–72)
NRBC # BLD AUTO: 1.19 K/UL
NRBC BLD-RTO: 7.9 /100 WBC
PLATELET # BLD AUTO: 144 K/UL (ref 164–446)
PLATELET BLD QL SMEAR: NORMAL
PMV BLD AUTO: 10 FL (ref 9–12.9)
POLYCHROMASIA BLD QL SMEAR: NORMAL
POTASSIUM SERPL-SCNC: 3.9 MMOL/L (ref 3.6–5.5)
PRODUCT TYPE UPROD: NORMAL
RBC # BLD AUTO: 2.24 M/UL (ref 4.2–5.4)
RBC BLD AUTO: PRESENT
RH BLD: NORMAL
SODIUM SERPL-SCNC: 128 MMOL/L (ref 135–145)
STOMATOCYTES BLD QL SMEAR: NORMAL
UNIT STATUS USTAT: NORMAL
WBC # BLD AUTO: 15.1 K/UL (ref 4.8–10.8)

## 2020-02-09 PROCEDURE — 700102 HCHG RX REV CODE 250 W/ 637 OVERRIDE(OP): Performed by: INTERNAL MEDICINE

## 2020-02-09 PROCEDURE — A9270 NON-COVERED ITEM OR SERVICE: HCPCS | Performed by: INTERNAL MEDICINE

## 2020-02-09 PROCEDURE — P9016 RBC LEUKOCYTES REDUCED: HCPCS

## 2020-02-09 PROCEDURE — 99232 SBSQ HOSP IP/OBS MODERATE 35: CPT | Performed by: HOSPITALIST

## 2020-02-09 PROCEDURE — 700111 HCHG RX REV CODE 636 W/ 250 OVERRIDE (IP): Performed by: HOSPITALIST

## 2020-02-09 PROCEDURE — 86850 RBC ANTIBODY SCREEN: CPT

## 2020-02-09 PROCEDURE — 85025 COMPLETE CBC W/AUTO DIFF WBC: CPT

## 2020-02-09 PROCEDURE — 80048 BASIC METABOLIC PNL TOTAL CA: CPT

## 2020-02-09 PROCEDURE — 770004 HCHG ROOM/CARE - ONCOLOGY PRIVATE *

## 2020-02-09 PROCEDURE — 700111 HCHG RX REV CODE 636 W/ 250 OVERRIDE (IP): Performed by: INTERNAL MEDICINE

## 2020-02-09 PROCEDURE — 700102 HCHG RX REV CODE 250 W/ 637 OVERRIDE(OP): Performed by: HOSPITALIST

## 2020-02-09 PROCEDURE — A9270 NON-COVERED ITEM OR SERVICE: HCPCS | Performed by: HOSPITALIST

## 2020-02-09 PROCEDURE — 36430 TRANSFUSION BLD/BLD COMPNT: CPT

## 2020-02-09 PROCEDURE — 86923 COMPATIBILITY TEST ELECTRIC: CPT

## 2020-02-09 PROCEDURE — 86900 BLOOD TYPING SEROLOGIC ABO: CPT

## 2020-02-09 PROCEDURE — 86901 BLOOD TYPING SEROLOGIC RH(D): CPT

## 2020-02-09 RX ORDER — HYDROMORPHONE HYDROCHLORIDE 2 MG/ML
1 INJECTION, SOLUTION INTRAMUSCULAR; INTRAVENOUS; SUBCUTANEOUS
Status: DISCONTINUED | OUTPATIENT
Start: 2020-02-09 | End: 2020-02-12 | Stop reason: HOSPADM

## 2020-02-09 RX ADMIN — OXYCODONE HYDROCHLORIDE 80 MG: 20 TABLET, FILM COATED, EXTENDED RELEASE ORAL at 21:56

## 2020-02-09 RX ADMIN — ACETAMINOPHEN 650 MG: 325 TABLET, FILM COATED ORAL at 17:55

## 2020-02-09 RX ADMIN — METRONIDAZOLE 500 MG: 500 TABLET ORAL at 05:59

## 2020-02-09 RX ADMIN — OXYCODONE HYDROCHLORIDE 10 MG: 10 TABLET ORAL at 15:47

## 2020-02-09 RX ADMIN — OXYCODONE HYDROCHLORIDE AND ACETAMINOPHEN 500 MG: 500 TABLET ORAL at 05:58

## 2020-02-09 RX ADMIN — ALTEPLASE 2 MG: 2.2 INJECTION, POWDER, LYOPHILIZED, FOR SOLUTION INTRAVENOUS at 03:07

## 2020-02-09 RX ADMIN — ACETAMINOPHEN 650 MG: 325 TABLET, FILM COATED ORAL at 08:32

## 2020-02-09 RX ADMIN — MORPHINE SULFATE 4 MG: 4 INJECTION INTRAVENOUS at 05:59

## 2020-02-09 RX ADMIN — OXYCODONE HYDROCHLORIDE 80 MG: 20 TABLET, FILM COATED, EXTENDED RELEASE ORAL at 05:58

## 2020-02-09 RX ADMIN — SENNOSIDES AND DOCUSATE SODIUM 2 TABLET: 8.6; 5 TABLET ORAL at 17:55

## 2020-02-09 RX ADMIN — SENNOSIDES AND DOCUSATE SODIUM 2 TABLET: 8.6; 5 TABLET ORAL at 05:59

## 2020-02-09 RX ADMIN — THERA TABS 1 TABLET: TAB at 05:58

## 2020-02-09 RX ADMIN — METRONIDAZOLE 500 MG: 500 TABLET ORAL at 14:10

## 2020-02-09 RX ADMIN — FLUCONAZOLE 200 MG: 100 TABLET ORAL at 05:58

## 2020-02-09 RX ADMIN — OXYCODONE HYDROCHLORIDE 10 MG: 10 TABLET ORAL at 03:06

## 2020-02-09 RX ADMIN — OXYCODONE HYDROCHLORIDE 10 MG: 10 TABLET ORAL at 19:05

## 2020-02-09 RX ADMIN — HYDROMORPHONE HYDROCHLORIDE 1 MG: 2 INJECTION, SOLUTION INTRAMUSCULAR; INTRAVENOUS; SUBCUTANEOUS at 11:51

## 2020-02-09 RX ADMIN — OXYCODONE HYDROCHLORIDE 10 MG: 10 TABLET ORAL at 11:51

## 2020-02-09 RX ADMIN — METRONIDAZOLE 500 MG: 500 TABLET ORAL at 21:56

## 2020-02-09 RX ADMIN — DOXYCYCLINE 100 MG: 100 TABLET, FILM COATED ORAL at 05:58

## 2020-02-09 RX ADMIN — OXYCODONE HYDROCHLORIDE 80 MG: 20 TABLET, FILM COATED, EXTENDED RELEASE ORAL at 14:09

## 2020-02-09 RX ADMIN — ALPRAZOLAM 0.25 MG: 0.25 TABLET ORAL at 15:47

## 2020-02-09 RX ADMIN — OXYCODONE HYDROCHLORIDE AND ACETAMINOPHEN 500 MG: 500 TABLET ORAL at 17:55

## 2020-02-09 RX ADMIN — MORPHINE SULFATE 4 MG: 4 INJECTION INTRAVENOUS at 01:56

## 2020-02-09 RX ADMIN — HYDROMORPHONE HYDROCHLORIDE 1 MG: 2 INJECTION, SOLUTION INTRAMUSCULAR; INTRAVENOUS; SUBCUTANEOUS at 21:57

## 2020-02-09 RX ADMIN — DOXYCYCLINE 100 MG: 100 TABLET, FILM COATED ORAL at 17:55

## 2020-02-09 RX ADMIN — HYDROMORPHONE HYDROCHLORIDE 1 MG: 2 INJECTION, SOLUTION INTRAMUSCULAR; INTRAVENOUS; SUBCUTANEOUS at 15:04

## 2020-02-09 RX ADMIN — HYDROMORPHONE HYDROCHLORIDE 1 MG: 2 INJECTION, SOLUTION INTRAMUSCULAR; INTRAVENOUS; SUBCUTANEOUS at 08:32

## 2020-02-09 RX ADMIN — HYDROMORPHONE HYDROCHLORIDE 1 MG: 2 INJECTION, SOLUTION INTRAMUSCULAR; INTRAVENOUS; SUBCUTANEOUS at 18:14

## 2020-02-09 ASSESSMENT — ENCOUNTER SYMPTOMS
SPUTUM PRODUCTION: 0
FEVER: 0
VOMITING: 0
SHORTNESS OF BREATH: 0
NAUSEA: 0
PALPITATIONS: 0
CHILLS: 0
COUGH: 1
BACK PAIN: 1

## 2020-02-09 NOTE — PROGRESS NOTES
Recheck patient's Port after letting alteplase dwell for approximately an hour; Flushes and blood return noted.

## 2020-02-09 NOTE — CARE PLAN
Problem: Pain Management  Goal: Pain level will decrease to patient's comfort goal  Outcome: PROGRESSING AS EXPECTED     Problem: Fluid Volume:  Goal: Will maintain balanced intake and output  Outcome: PROGRESSING AS EXPECTED     Problem: Safety  Goal: Will remain free from injury  Outcome: PROGRESSING AS EXPECTED

## 2020-02-09 NOTE — PROGRESS NOTES
Patient's Port noted to be partially occluded, flushing but no blood return. Notified on call Hospitalist (MD Beaulieu); Orders for Alteplase received.

## 2020-02-09 NOTE — PROGRESS NOTES
Shift summary    Assumed care of patient at 0700.  Aox4, able to make needs known.  3L 02, CPOX on.  x1-2 assist with turns and repositioning in bed.   Occasionally refuses q2h turns, stating pain is unbearable on certain sides at certain times.  R port needle changed today. + Blood return.   Moore draining adequate urine output.   WVac CDI. Dressing reinforced on corner.   No leaks noted.  Patient continuing to request pain meds q3 hours. Stating pain relief is not adequate.   Poor appetite.  No BM's today.   Bed alarm on.  Call light in reach.

## 2020-02-09 NOTE — THERAPY
Attempted therapy follow up session. After discussion with RN, hold on therapy due to pain. Rn reporting pt is very tearful and is unable to kristopher bed at this time due to pain. Will follow up as appropriate.

## 2020-02-09 NOTE — PROGRESS NOTES
Hospital Medicine Daily Progress Note    Date of Service  2/9/2020    Chief Complaint  39 y.o. female admitted 1/30/2020 with worsening decubital ulcer.    Hospital Course   2/1/2020-patient was hospitalized because of the decubital ulcer.  The patient at this point has been evaluated and debrided.  The patient's cultures show mixed gram-positive cocci which is most likely a skin jessica.  Patient remains on vancomycin and Zosyn.  Continue with pain management.  Patient is also complaining of hemoptysis and thus a CT of the chest will be evaluated.  2/2/2020- continue at this point with antibiotic management.  PE is negative on CT scan.  Patient has completed chemotherapy for her primary cancer.  No cancer invasion noted on CT scan of the chest.  Continue with wound management after ulcer debridement.  No osteomyelitis noted in the imaging studies or during surgery.  Continue with pain management wound care and antibiotic management follow cultures.  2/3/2020- patient has multifocal pneumonia as well as a gluteal abscess that was incised and drained.  Patient was on Zosyn and vancomycin.  We can reduce it to Zosyn as the patient is negative for MRSA.  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  Negative CT for PE.      Interval Problem Update      Patient complains of generalized pain  Reports hemoptysis has resolved  Hemoglobin 6.8      Consultants/Specialty  Surgery  ID    Code Status  DNR/DNI    Disposition  To be determined    Review of Systems  Review of Systems   Constitutional: Negative for chills and fever.   Respiratory: Positive for cough. Negative for sputum production and shortness of breath.    Cardiovascular: Negative for chest pain and palpitations.   Gastrointestinal: Negative for nausea and vomiting.   Musculoskeletal: Positive for back pain and joint pain.   All other systems reviewed and are negative.       Physical Exam  Temp:  [36.6  °C (97.8 °F)-37.4 °C (99.3 °F)] 36.6 °C (97.8 °F)  Pulse:  [112-120] 112  Resp:  [18-21] 18  BP: (102-137)/(45-86) 137/86  SpO2:  [94 %-98 %] 98 %    Physical Exam  Vitals signs and nursing note reviewed.   Constitutional:       General: She is not in acute distress.  HENT:      Head: Normocephalic and atraumatic.      Nose: Nose normal. No rhinorrhea.      Mouth/Throat:      Pharynx: No oropharyngeal exudate or posterior oropharyngeal erythema.   Eyes:      General:         Right eye: No discharge.         Left eye: No discharge.   Neck:      Musculoskeletal: Neck supple. No neck rigidity.   Cardiovascular:      Rate and Rhythm: Normal rate and regular rhythm.      Heart sounds: Normal heart sounds. No murmur. No friction rub. No gallop.    Pulmonary:      Effort: Pulmonary effort is normal. No respiratory distress.      Breath sounds: Decreased breath sounds present. No wheezing or rales.   Chest:      Chest wall: No tenderness.   Abdominal:      General: Bowel sounds are normal. There is no distension.      Palpations: Abdomen is soft. There is no mass.      Tenderness: There is no tenderness. There is no rebound.   Musculoskeletal:         General: Swelling present. No tenderness.   Skin:     General: Skin is warm and dry.      Coloration: Skin is not cyanotic or jaundiced.      Nails: There is no clubbing.        Comments: Wound VAC in place   Neurological:      General: No focal deficit present.      Mental Status: She is alert and oriented to person, place, and time.      Cranial Nerves: No cranial nerve deficit.      Motor: No weakness.   Psychiatric:         Mood and Affect: Mood normal.         Behavior: Behavior normal.         Fluids    Intake/Output Summary (Last 24 hours) at 2/9/2020 1448  Last data filed at 2/8/2020 1800  Gross per 24 hour   Intake --   Output 1500 ml   Net -1500 ml       Laboratory  Recent Labs     02/07/20  0140 02/09/20  0430   WBC 11.8* 15.1*   RBC 2.42* 2.24*   HEMOGLOBIN 7.2*  6.8*   HEMATOCRIT 23.0* 21.6*   MCV 95.0 96.4   MCH 29.8 30.4   MCHC 31.3* 31.5*   RDW 62.6* 67.3*   PLATELETCT 121* 144*   MPV 9.9 10.0     Recent Labs     02/07/20  0140 02/09/20  0430   SODIUM 127* 128*   POTASSIUM 3.7 3.9   CHLORIDE 93* 92*   CO2 29 29   GLUCOSE 126* 122*   BUN 8 7*   CREATININE 0.38* 0.38*   CALCIUM 7.9* 8.6                   Imaging  CT-CTA CHEST PULMONARY ARTERY W/ RECONS   Final Result         1. No CT evidence of pulmonary embolism.      2. Extensive airspace opacities throughout both lungs, most in the middle lobe, concerning for multifocal pneumonia.      3. Small bilateral pleural effusions.      4. Significantly enlarged heterogeneous spleen with multiple lesions.      DX-SACRUM AND COCCYX 2+   Final Result      No radiographic evidence of osteomyelitis. MRI could be performed to further evaluate if greater sensitivity is required      Osteoblastic metastases      DX-CHEST-PORTABLE (1 VIEW)   Final Result      Stable dense right basilar consolidation and reticular opacity. Mass may be present      Stable right lateral seventh rib bony destruction/presumed pathologic fracture and osteoblastic metastases           Assessment/Plan  Hemoptysis  Assessment & Plan  CTA was negative for PE    Likely related to her malignancy  Clinically improved monitor        Pressure injury of sacral region, stage 3 (HCC)- (present on admission)  Assessment & Plan  Status post surgical irrigation debridement by Dr. Dietz    Wound cultures coag negative staph Bacteroides and Candida  Continue Flagyl doxycycline and Diflucan will complete course on February 10  Continue wound care VAC in place discharge delayed awaiting approval of wound VAC  WBC increased today recheck CBC in a.m. and monitor clinically          Anemia of chronic disease- (present on admission)  Assessment & Plan  Likely multifactorial secondary to her malignancy and chronic blood loss from hemoptysis  Patient will be transfused 1 unit  of packed RBC  Recheck CBC in a.m.  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 Delaney  I previously discussed her case with Dr. Richardson he recommended outpatient follow-up with her primary oncologist after discharge    Plan of care reviewed with patient and discussed with nursing staff    VTE prophylaxis: SCDs

## 2020-02-09 NOTE — CARE PLAN
Problem: Venous Thromboembolism (VTW)/Deep Vein Thrombosis (DVT) Prevention:  Goal: Patient will participate in Venous Thrombosis (VTE)/Deep Vein Thrombosis (DVT)Prevention Measures  Outcome: PROGRESSING AS EXPECTED  Note:   Pharmacologic prophylaxis not indicated per MD; Pt wears SCDs intermittently.      Problem: Skin Integrity  Goal: Risk for impaired skin integrity will decrease  Outcome: PROGRESSING AS EXPECTED  Note:   Patient turns self in bed occasionally; Patient complaint w/Q2 turns.

## 2020-02-09 NOTE — PROGRESS NOTES
Received report and assumed care of patient at 1900. AOx4; Anxious; Tearful; Non-ambulatory; 3L O2 NC; ; Tachycardic at baseline; Moore w/positive output; Wound vac in place to sacrum; Port flushes w/positive blood return, SL; PIV flushes, SL; Q2 turns in place; Patient coughs up bloody sputum, suctions self; Patient medicated for pain per MAR.    POC discussed with patient, all questions and concerns addressed; Bed locked and in lowest position; Alarms active and sounding; Call light and personal belongings within reach; Hourly rounding in place.

## 2020-02-09 NOTE — PROGRESS NOTES
Blood transfusion order changed per Dr. seferino rossi.   Patient does not require irradated PRBC's.  Will change order and notify Blood Bank.

## 2020-02-10 ENCOUNTER — TELEPHONE (OUTPATIENT)
Dept: HEALTH INFORMATION MANAGEMENT | Facility: OTHER | Age: 40
End: 2020-02-10

## 2020-02-10 PROBLEM — E87.1 HYPONATREMIA: Status: ACTIVE | Noted: 2020-02-10

## 2020-02-10 LAB
ANION GAP SERPL CALC-SCNC: 6 MMOL/L (ref 0–11.9)
BASOPHILS # BLD AUTO: 0.2 % (ref 0–1.8)
BASOPHILS # BLD: 0.03 K/UL (ref 0–0.12)
BUN SERPL-MCNC: 10 MG/DL (ref 8–22)
CALCIUM SERPL-MCNC: 8.8 MG/DL (ref 8.5–10.5)
CHLORIDE SERPL-SCNC: 93 MMOL/L (ref 96–112)
CO2 SERPL-SCNC: 31 MMOL/L (ref 20–33)
CREAT SERPL-MCNC: 0.39 MG/DL (ref 0.5–1.4)
EOSINOPHIL # BLD AUTO: 0.11 K/UL (ref 0–0.51)
EOSINOPHIL NFR BLD: 0.8 % (ref 0–6.9)
ERYTHROCYTE [DISTWIDTH] IN BLOOD BY AUTOMATED COUNT: 65.8 FL (ref 35.9–50)
FERRITIN SERPL-MCNC: 814.6 NG/ML (ref 10–291)
GLUCOSE SERPL-MCNC: 113 MG/DL (ref 65–99)
HCT VFR BLD AUTO: 25.6 % (ref 37–47)
HGB BLD-MCNC: 8.1 G/DL (ref 12–16)
IMM GRANULOCYTES # BLD AUTO: 0.26 K/UL (ref 0–0.11)
IMM GRANULOCYTES NFR BLD AUTO: 2 % (ref 0–0.9)
IRON SATN MFR SERPL: 22 % (ref 15–55)
IRON SERPL-MCNC: 36 UG/DL (ref 40–170)
LYMPHOCYTES # BLD AUTO: 2.99 K/UL (ref 1–4.8)
LYMPHOCYTES NFR BLD: 22.8 % (ref 22–41)
MCH RBC QN AUTO: 30.1 PG (ref 27–33)
MCHC RBC AUTO-ENTMCNC: 31.6 G/DL (ref 33.6–35)
MCV RBC AUTO: 95.2 FL (ref 81.4–97.8)
MONOCYTES # BLD AUTO: 0.81 K/UL (ref 0–0.85)
MONOCYTES NFR BLD AUTO: 6.2 % (ref 0–13.4)
NEUTROPHILS # BLD AUTO: 8.92 K/UL (ref 2–7.15)
NEUTROPHILS NFR BLD: 68 % (ref 44–72)
NRBC # BLD AUTO: 1.19 K/UL
NRBC BLD-RTO: 9.1 /100 WBC
PLATELET # BLD AUTO: 145 K/UL (ref 164–446)
PMV BLD AUTO: 9.9 FL (ref 9–12.9)
POTASSIUM SERPL-SCNC: 4 MMOL/L (ref 3.6–5.5)
RBC # BLD AUTO: 2.69 M/UL (ref 4.2–5.4)
SODIUM SERPL-SCNC: 130 MMOL/L (ref 135–145)
TIBC SERPL-MCNC: 164 UG/DL (ref 250–450)
WBC # BLD AUTO: 13.1 K/UL (ref 4.8–10.8)

## 2020-02-10 PROCEDURE — A9270 NON-COVERED ITEM OR SERVICE: HCPCS | Performed by: INTERNAL MEDICINE

## 2020-02-10 PROCEDURE — 97116 GAIT TRAINING THERAPY: CPT

## 2020-02-10 PROCEDURE — 700102 HCHG RX REV CODE 250 W/ 637 OVERRIDE(OP): Performed by: INTERNAL MEDICINE

## 2020-02-10 PROCEDURE — 83540 ASSAY OF IRON: CPT

## 2020-02-10 PROCEDURE — 97606 NEG PRS WND THER DME>50 SQCM: CPT

## 2020-02-10 PROCEDURE — 82728 ASSAY OF FERRITIN: CPT

## 2020-02-10 PROCEDURE — 99232 SBSQ HOSP IP/OBS MODERATE 35: CPT | Performed by: HOSPITALIST

## 2020-02-10 PROCEDURE — 80048 BASIC METABOLIC PNL TOTAL CA: CPT

## 2020-02-10 PROCEDURE — A9270 NON-COVERED ITEM OR SERVICE: HCPCS | Performed by: HOSPITALIST

## 2020-02-10 PROCEDURE — 700111 HCHG RX REV CODE 636 W/ 250 OVERRIDE (IP): Performed by: HOSPITALIST

## 2020-02-10 PROCEDURE — 83550 IRON BINDING TEST: CPT

## 2020-02-10 PROCEDURE — 85025 COMPLETE CBC W/AUTO DIFF WBC: CPT

## 2020-02-10 PROCEDURE — 770004 HCHG ROOM/CARE - ONCOLOGY PRIVATE *

## 2020-02-10 PROCEDURE — 700102 HCHG RX REV CODE 250 W/ 637 OVERRIDE(OP): Performed by: HOSPITALIST

## 2020-02-10 RX ORDER — PETROLATUM 420 MG/G
OINTMENT TOPICAL 2 TIMES DAILY
Status: DISCONTINUED | OUTPATIENT
Start: 2020-02-10 | End: 2020-02-12 | Stop reason: HOSPADM

## 2020-02-10 RX ORDER — PETROLATUM 42 G/100G
OINTMENT TOPICAL 2 TIMES DAILY
Status: DISCONTINUED | OUTPATIENT
Start: 2020-02-10 | End: 2020-02-10

## 2020-02-10 RX ADMIN — SENNOSIDES AND DOCUSATE SODIUM 2 TABLET: 8.6; 5 TABLET ORAL at 17:08

## 2020-02-10 RX ADMIN — OXYCODONE HYDROCHLORIDE AND ACETAMINOPHEN 500 MG: 500 TABLET ORAL at 17:08

## 2020-02-10 RX ADMIN — ALPRAZOLAM 0.25 MG: 0.25 TABLET ORAL at 12:04

## 2020-02-10 RX ADMIN — DOXYCYCLINE 100 MG: 100 TABLET, FILM COATED ORAL at 17:08

## 2020-02-10 RX ADMIN — DOXYCYCLINE 100 MG: 100 TABLET, FILM COATED ORAL at 06:21

## 2020-02-10 RX ADMIN — METRONIDAZOLE 500 MG: 500 TABLET ORAL at 14:39

## 2020-02-10 RX ADMIN — ALPRAZOLAM 0.25 MG: 0.25 TABLET ORAL at 03:43

## 2020-02-10 RX ADMIN — OXYCODONE HYDROCHLORIDE 80 MG: 20 TABLET, FILM COATED, EXTENDED RELEASE ORAL at 22:17

## 2020-02-10 RX ADMIN — OXYCODONE HYDROCHLORIDE 80 MG: 20 TABLET, FILM COATED, EXTENDED RELEASE ORAL at 14:39

## 2020-02-10 RX ADMIN — METRONIDAZOLE 500 MG: 500 TABLET ORAL at 06:21

## 2020-02-10 RX ADMIN — HYDROMORPHONE HYDROCHLORIDE 1 MG: 2 INJECTION, SOLUTION INTRAMUSCULAR; INTRAVENOUS; SUBCUTANEOUS at 09:53

## 2020-02-10 RX ADMIN — OXYCODONE HYDROCHLORIDE AND ACETAMINOPHEN 500 MG: 500 TABLET ORAL at 06:21

## 2020-02-10 RX ADMIN — FLUCONAZOLE 200 MG: 100 TABLET ORAL at 06:21

## 2020-02-10 RX ADMIN — OXYCODONE HYDROCHLORIDE 10 MG: 10 TABLET ORAL at 17:08

## 2020-02-10 RX ADMIN — HYDROMORPHONE HYDROCHLORIDE 1 MG: 2 INJECTION, SOLUTION INTRAMUSCULAR; INTRAVENOUS; SUBCUTANEOUS at 18:16

## 2020-02-10 RX ADMIN — ALPRAZOLAM 0.25 MG: 0.25 TABLET ORAL at 18:17

## 2020-02-10 RX ADMIN — OXYCODONE HYDROCHLORIDE 10 MG: 10 TABLET ORAL at 20:20

## 2020-02-10 RX ADMIN — ACETAMINOPHEN 650 MG: 325 TABLET, FILM COATED ORAL at 03:43

## 2020-02-10 RX ADMIN — OXYCODONE HYDROCHLORIDE 80 MG: 20 TABLET, FILM COATED, EXTENDED RELEASE ORAL at 06:21

## 2020-02-10 RX ADMIN — METRONIDAZOLE 500 MG: 500 TABLET ORAL at 22:00

## 2020-02-10 RX ADMIN — THERA TABS 1 TABLET: TAB at 06:21

## 2020-02-10 RX ADMIN — SENNOSIDES AND DOCUSATE SODIUM 2 TABLET: 8.6; 5 TABLET ORAL at 06:21

## 2020-02-10 RX ADMIN — OXYCODONE HYDROCHLORIDE 10 MG: 10 TABLET ORAL at 08:29

## 2020-02-10 RX ADMIN — OXYCODONE HYDROCHLORIDE 10 MG: 10 TABLET ORAL at 12:04

## 2020-02-10 ASSESSMENT — ENCOUNTER SYMPTOMS
ROS SKIN COMMENTS: DECUB ULCER
COUGH: 1
FEVER: 0
CHILLS: 0
PALPITATIONS: 0

## 2020-02-10 ASSESSMENT — COGNITIVE AND FUNCTIONAL STATUS - GENERAL
CLIMB 3 TO 5 STEPS WITH RAILING: A LOT
SUGGESTED CMS G CODE MODIFIER MOBILITY: CL
TURNING FROM BACK TO SIDE WHILE IN FLAT BAD: A LOT
MOVING FROM LYING ON BACK TO SITTING ON SIDE OF FLAT BED: A LOT
WALKING IN HOSPITAL ROOM: A LITTLE
STANDING UP FROM CHAIR USING ARMS: A LITTLE
MOVING TO AND FROM BED TO CHAIR: A LOT
MOBILITY SCORE: 14

## 2020-02-10 ASSESSMENT — GAIT ASSESSMENTS
ASSISTIVE DEVICE: FRONT WHEEL WALKER
DEVIATION: BRADYKINETIC;ANTALGIC
GAIT LEVEL OF ASSIST: STAND BY ASSIST
DISTANCE (FEET): 60

## 2020-02-10 NOTE — THERAPY
"Physical Therapy Treatment completed.   Bed Mobility:  Supine to Sit: Stand by Assist  Transfers: Sit to Stand: Minimal Assist  Gait: Level Of Assist: Stand by Assist with Front-Wheel Walker       Plan of Care: Will benefit from Physical Therapy 4 times per week  Discharge Recommendations: Equipment: Will Continue to Assess for Equipment Needs. Recommend home health transitional care for continued physical therapy services.     See \"Rehab Therapy-Acute\" Patient Summary Report for complete documentation.     Pt improving with therapy. Pt was able to perform supine > sit with use of bed features without assist. Extra time and effort d/t pain and weakness. Pt reports has adjustable bed at home. Pt completed STS and ambulated 60ft with FWW, no LOB. Slow nathalia and short step lengths. Required few brief standing rest breaks. Pt left EOB to eat breakfast. Continue to recommend home health PT to assist in strengthening and endurance with return home. Pt continues to report no concerns with return home with intermittent assist from spouse. Will continue to work with in acute setting.   "

## 2020-02-10 NOTE — CARE PLAN
Problem: Venous Thromboembolism (VTW)/Deep Vein Thrombosis (DVT) Prevention:  Goal: Patient will participate in Venous Thrombosis (VTE)/Deep Vein Thrombosis (DVT)Prevention Measures  Outcome: PROGRESSING AS EXPECTED  Note:   Patient compliant w/wearing SCDs.     Problem: Skin Integrity  Goal: Risk for impaired skin integrity will decrease  Outcome: PROGRESSING AS EXPECTED  Note:   Patient moving self in bed more frequently. Patient requesting more frequent repositioning.

## 2020-02-10 NOTE — CARE PLAN
Problem: Pain Management  Goal: Pain level will decrease to patient's comfort goal  Outcome: PROGRESSING SLOWER THAN EXPECTED

## 2020-02-10 NOTE — CARE PLAN
Problem: Nutritional:  Goal: Achieve adequate nutritional intake  Description  Patient will consume 50% of meals   Outcome: MET.

## 2020-02-10 NOTE — PROGRESS NOTES
Received report and assumed care of patient at 1900. AOx4; Patient in better spirits w/family at bedside; Non-ambulatory; 3L O2 NC; ; Tachycardic at baseline; Moore w/positive output; Wound vac in place to sacrum; Port flushes w/positive blood return; Patient coughs up bloody sputum, suctions self; Patient medicated for pain per MAR. POC discussed with patient, all questions and concerns addressed; Bed locked and in lowest position; Alarms active and sounding; Call light and personal belongings within reach; Hourly rounding in place.

## 2020-02-10 NOTE — DISCHARGE PLANNING
LSW contacted scheduling regarding outpatient wound care appointment. Pt was scheduled for Friday 2/21/2020 @ 2:45PM.  Address:  [address]  [place]    Pt is still waiting for wound vac acceptance and delivery from [facility].

## 2020-02-10 NOTE — DIETARY
Brief Nutrition Services PO intake Follow up: Pt reports eating >50% of meals + snacks (~ 5 small meals/day). Discontinued Arginaid per pt request, she dislikes them. Re-iterated importance of adequate nutrition for wound healing, pt verbalized understanding with no questions/concerns. Pt with low Na+ (130)-trending up from 2/7.     Plan/Rec: Will continue current diet regimen with high protein snacks. Monitor Na+ and replete PRN. RD to screen weekly per department policy for adequate intake.

## 2020-02-10 NOTE — PROGRESS NOTES
Shift summary    Assumed care at 0700  Aox4, able to make needs known  2L O2 via NC. CPOx on  bedbound - bed alarm on, patient does not try to get out of bed.   Non ambulatory, but states she is able to get in wheelchair at times.   Wound drssing CDI, wv settings reviewed.   Port saline locked. CDI.   PIV removed today.   Moore draining to gravity. Adequate output.  Received 1u PRBC's.   Pain somewhat more controlled with IV dilaudid, however patient reports frequent pain.

## 2020-02-11 PROCEDURE — 700102 HCHG RX REV CODE 250 W/ 637 OVERRIDE(OP)

## 2020-02-11 PROCEDURE — A9270 NON-COVERED ITEM OR SERVICE: HCPCS | Performed by: INTERNAL MEDICINE

## 2020-02-11 PROCEDURE — 700111 HCHG RX REV CODE 636 W/ 250 OVERRIDE (IP): Performed by: HOSPITALIST

## 2020-02-11 PROCEDURE — 99232 SBSQ HOSP IP/OBS MODERATE 35: CPT | Performed by: HOSPITALIST

## 2020-02-11 PROCEDURE — A9270 NON-COVERED ITEM OR SERVICE: HCPCS

## 2020-02-11 PROCEDURE — A9270 NON-COVERED ITEM OR SERVICE: HCPCS | Performed by: HOSPITALIST

## 2020-02-11 PROCEDURE — 700102 HCHG RX REV CODE 250 W/ 637 OVERRIDE(OP): Performed by: HOSPITALIST

## 2020-02-11 PROCEDURE — 700102 HCHG RX REV CODE 250 W/ 637 OVERRIDE(OP): Performed by: INTERNAL MEDICINE

## 2020-02-11 PROCEDURE — 770004 HCHG ROOM/CARE - ONCOLOGY PRIVATE *

## 2020-02-11 RX ORDER — GABAPENTIN 300 MG/1
300 CAPSULE ORAL 3 TIMES DAILY
Status: DISCONTINUED | OUTPATIENT
Start: 2020-02-11 | End: 2020-02-12

## 2020-02-11 RX ADMIN — HYDROMORPHONE HYDROCHLORIDE 1 MG: 2 INJECTION, SOLUTION INTRAMUSCULAR; INTRAVENOUS; SUBCUTANEOUS at 08:41

## 2020-02-11 RX ADMIN — HYDROMORPHONE HYDROCHLORIDE 1 MG: 2 INJECTION, SOLUTION INTRAMUSCULAR; INTRAVENOUS; SUBCUTANEOUS at 12:09

## 2020-02-11 RX ADMIN — HYDROMORPHONE HYDROCHLORIDE 1 MG: 2 INJECTION, SOLUTION INTRAMUSCULAR; INTRAVENOUS; SUBCUTANEOUS at 02:58

## 2020-02-11 RX ADMIN — OXYCODONE HYDROCHLORIDE 10 MG: 10 TABLET ORAL at 14:16

## 2020-02-11 RX ADMIN — SENNOSIDES AND DOCUSATE SODIUM 2 TABLET: 8.6; 5 TABLET ORAL at 16:30

## 2020-02-11 RX ADMIN — OXYCODONE HYDROCHLORIDE 10 MG: 10 TABLET ORAL at 09:24

## 2020-02-11 RX ADMIN — GABAPENTIN 300 MG: 300 CAPSULE ORAL at 08:40

## 2020-02-11 RX ADMIN — OXYCODONE HYDROCHLORIDE AND ACETAMINOPHEN 500 MG: 500 TABLET ORAL at 05:07

## 2020-02-11 RX ADMIN — OXYCODONE HYDROCHLORIDE 80 MG: 20 TABLET, FILM COATED, EXTENDED RELEASE ORAL at 21:04

## 2020-02-11 RX ADMIN — SENNOSIDES AND DOCUSATE SODIUM 2 TABLET: 8.6; 5 TABLET ORAL at 05:07

## 2020-02-11 RX ADMIN — MAGNESIUM HYDROXIDE 30 ML: 400 SUSPENSION ORAL at 05:05

## 2020-02-11 RX ADMIN — THERA TABS 1 TABLET: TAB at 05:06

## 2020-02-11 RX ADMIN — PETROLATUM: 420 OINTMENT TOPICAL at 05:10

## 2020-02-11 RX ADMIN — OXYCODONE HYDROCHLORIDE 80 MG: 20 TABLET, FILM COATED, EXTENDED RELEASE ORAL at 05:06

## 2020-02-11 RX ADMIN — GABAPENTIN 300 MG: 300 CAPSULE ORAL at 12:09

## 2020-02-11 RX ADMIN — GABAPENTIN 300 MG: 300 CAPSULE ORAL at 16:30

## 2020-02-11 RX ADMIN — OXYCODONE HYDROCHLORIDE 10 MG: 10 TABLET ORAL at 19:36

## 2020-02-11 RX ADMIN — HYDROMORPHONE HYDROCHLORIDE 1 MG: 2 INJECTION, SOLUTION INTRAMUSCULAR; INTRAVENOUS; SUBCUTANEOUS at 16:30

## 2020-02-11 RX ADMIN — OXYCODONE HYDROCHLORIDE 80 MG: 20 TABLET, FILM COATED, EXTENDED RELEASE ORAL at 14:16

## 2020-02-11 RX ADMIN — OXYCODONE HYDROCHLORIDE AND ACETAMINOPHEN 500 MG: 500 TABLET ORAL at 16:30

## 2020-02-11 ASSESSMENT — ENCOUNTER SYMPTOMS
BACK PAIN: 1
BLURRED VISION: 0
HEMOPTYSIS: 1
NERVOUS/ANXIOUS: 0
FEVER: 0
MYALGIAS: 1
ABDOMINAL PAIN: 0
CHILLS: 0
SHORTNESS OF BREATH: 0
SORE THROAT: 0
NAUSEA: 0
CONSTIPATION: 1
DIZZINESS: 0

## 2020-02-11 NOTE — WOUND TEAM
[facility]  Inpatient Services  Wound and Skin Care Progress Note      HPI, PMH, SH: Reviewed    Unit where seen by Wound Team: [id number]/[id number]     WOUND CONSULT RELATED TO:  Scheduled negative pressure wound therapy dressing change     Self Report / Pain Level:  5/10       OBJECTIVE:  Previous dressing lifted distally, moderate drainage coming from under lifted area    WOUND TYPE, LOCATION, CHARACTERISTICS (Pressure Injuries: location, stage, POA or date identified)       Pressure Injury Coccyx;Sacrum Right POA open surgical  (Active)   Pressure Injury Stage 4 2/10/2020  9:30 AM   State of Healing Non-healing 2/10/2020  9:30 AM   Site Assessment Red;Yellow 2/10/2020  9:30 AM   An-wound Assessment Dry 2/10/2020  9:30 AM   Margins Defined edges;Unattached edges 2/10/2020  9:30 AM   Wound Length (cm) 5.4 cm 2/10/2020  9:30 AM   Wound Width (cm) 3.1 cm 2/10/2020  9:30 AM   Wound Depth (cm) 2.6 cm 2/10/2020  9:30 AM   Wound Surface Area (cm^2) 16.74 cm^2 2/10/2020  9:30 AM   Tunneling 0 cm 2/10/2020  9:30 AM   Undermining 0.7 cm 2/10/2020  9:30 AM   Closure Secondary intention 2/10/2020  9:30 AM   Drainage Amount Moderate 2/10/2020  9:30 AM   Drainage Description Serosanguineous;Yellow 2/10/2020  9:30 AM   Non-staged Wound Description Not applicable 2/10/2020  9:30 AM   Treatments Cleansed;Site care 2/10/2020  9:30 AM   Cleansing Approved Wound Cleanser 2/10/2020  9:30 AM   Periwound Protectant Benzoin;Drape 2/10/2020  9:30 AM   Dressing Options Wound Vac 2/10/2020  9:30 AM   Dressing Cleansing/Solutions Not Applicable 2/10/2020  9:30 AM   Dressing Changed Changed 2/10/2020  9:30 AM   Dressing Status Clean;Dry;Intact 2/10/2020  9:30 AM   Dressing Change Frequency Monday, Wednesday, Friday 2/10/2020  9:30 AM   NEXT Dressing Change  02/12/20 2/10/2020  9:30 AM   NEXT Weekly Photo (Inpatient Only) 02/17/20 2/10/2020  9:30 AM   WOUND NURSE ONLY - Odor Mild 2/10/2020  9:30 AM   WOUND NURSE ONLY - Exposed Structures  Bone 2/10/2020  9:30 AM   WOUND NURSE ONLY - Tissue Type and Percentage 30% red, 70% yellow/tan 2/10/2020  9:30 AM           Negative Pressure Wound Therapy Sacrum (Active)   NPWT Pump Mode / Pressure Setting Continuous;125 mmHg 2/10/2020  9:30 AM   Dressing Type Medium;Black foam 2/10/2020  9:30 AM   Number of Foam Pieces Used 3 2/10/2020  9:30 AM   Canister Changed No 2/10/2020  9:30 AM   Output (mL) 0 mL 2/6/2020 10:00 AM   VAC VeraFlo Pressure (mm/Hg) 125 mmHg;Continuous 2/9/2020 10:00 PM       Lab Values:    Lab Results   Component Value Date/Time    WBC 13.1 (H) 02/10/2020 01:38 AM    RBC 2.69 (L) 02/10/2020 01:38 AM    HEMOGLOBIN 8.1 (L) 02/10/2020 01:38 AM    HEMATOCRIT 25.6 (L) 02/10/2020 01:38 AM                    Lab Results   Component Value Date/Time    HBA1C 5.6 10/17/2019 12:01 AM           Culture:   Obtained Not at this time, Results show NA    INTERVENTIONS BY WOUND TEAM:  Patient premedicated. Old dressing removed. Wound inspected with flashlight for retained foam, no foam present. Wound cleaned with wound cleanser and dry gauze. periwound cleaned well with wound cleanser and dry gauze. No Sting applied to periwound and over to right hip where bridge will go. Paste ring applied to immediate periwound, then drape, drape applied over to right hip. 2 pieces of black foam in wound, 1 strip for bridge, and 1 piece for button=4 pieces of black foam in total. Sween daily lotion applied all over patient's dry skin, ordered Aquaphor to be applied BID.    Interdisciplinary consultation: Patient, Bedside RN, Dr. Omar Araiza    EVALUATION: Wound bed with biofilm and some slough. Wound vac to encourage granulation tissue growth and manage drainage, opimized moisture level needed for wound healing    Goals: Steady decrease in wound area and depth weekly.    NURSING PLAN OF CARE ORDERS (X):  Dressing changes: See Dressing Care orders: X  Skin care: See Skin Care orders: X  Rectal tube care: See Rectal Tube Care  orders:        Other orders:                             WOUND TEAM PLAN OF CARE (X):   Dressing changes by wound team:          Follow up 1-2 times weekly:               Follow up 3 times weekly:                NPWT change 3 times weekly:  X   Follow up as needed:       Other (explain):

## 2020-02-11 NOTE — THERAPY
"Attempted to see pt X2 today for Occupational Therapy treatment. Pt crying during first attempt stating she \"could do no therapy now due to my extreme pain in my left leg\". My pain level is above 10\". RN informed. Pt did agree for OT to \"come back later\". Second attempt this PM, pt informed RN she cannot due OT /therapy today due to severe pain. Pt tearful  stating, \"It's my cancer and it's killing my leg\". Will attempt next scheduled OT session. RN informed and agreed.    "

## 2020-02-11 NOTE — CARE PLAN
Problem: Pain Management  Goal: Pain level will decrease to patient's comfort goal  Outcome: PROGRESSING SLOWER THAN EXPECTED  Note:   Patient could benefit from palliative care consult for pain control. Current pain control medications not sufficient for pain control.

## 2020-02-11 NOTE — PROGRESS NOTES
Recieved report and assumed care of patient at 1900. Reviewed chart and completed assessment. Patient is A&O x4, able to make needs known. C//o pain 8/10 improved to 4/10 with after pain medication. Wound vac in place, patient anxious about wound vac, asking several times if there was a leak. No leak identified this shift. Meplilex in place. R port patent infusing TKO, Moore in place. No labs ordered overnight.  All needs met, family at bedside. Patient on low airloss mattress, frame alarm active.

## 2020-02-11 NOTE — PROGRESS NOTES
Surgery General Daily Progress Note    Date of Service  2/11/2020    Subjective:  Patient feels ok today. She says she is having some discomfort in her leg. Wound VAC changed yesterday, holding good seal. Pictures in media        Physical Exam  Temp:  [36.3 °C (97.4 °F)-36.9 °C (98.4 °F)] 36.4 °C (97.5 °F)  Pulse:  [112-124] 120  Resp:  [18-20] 18  BP: (107-111)/(70-74) 111/70  SpO2:  [95 %-98 %] 97 %    Physical Exam  NAD, appears well and in good spirits  Wound VAC in place with good suction  Wound edges clean and without cellultiis      Fluids    Intake/Output Summary (Last 24 hours) at 2/11/2020 1031  Last data filed at 2/11/2020 0900  Gross per 24 hour   Intake 400 ml   Output 950 ml   Net -550 ml       Laboratory  Recent Labs     02/09/20  0430 02/10/20  0138   WBC 15.1* 13.1*   RBC 2.24* 2.69*   HEMOGLOBIN 6.8* 8.1*   HEMATOCRIT 21.6* 25.6*   MCV 96.4 95.2   MCH 30.4 30.1   MCHC 31.5* 31.6*   RDW 67.3* 65.8*   PLATELETCT 144* 145*   MPV 10.0 9.9     Recent Labs     02/09/20  0430 02/10/20  0138   SODIUM 128* 130*   POTASSIUM 3.9 4.0   CHLORIDE 92* 93*   CO2 29 31   GLUCOSE 122* 113*   BUN 7* 10   CREATININE 0.38* 0.39*   CALCIUM 8.6 8.8                   Assessment/Plan  40 y/o female s/p debridement of her sacral pressure wound  1. Continue wound VAC changes TID  2. F/u with wound care clinic per   3. F/u with Dr. Alcazar 1 week after discharge

## 2020-02-11 NOTE — CARE PLAN
Problem: Pain Management  Goal: Pain level will decrease to patient's comfort goal  Outcome: PROGRESSING SLOWER THAN EXPECTED     Problem: Communication  Goal: The ability to communicate needs accurately and effectively will improve  Outcome: PROGRESSING AS EXPECTED     Problem: Safety  Goal: Will remain free from injury  Outcome: PROGRESSING AS EXPECTED     Problem: Infection  Goal: Will remain free from infection  Outcome: PROGRESSING AS EXPECTED

## 2020-02-11 NOTE — CARE PLAN
Problem: Infection  Goal: Will remain free from infection  Outcome: PROGRESSING AS EXPECTED  Note:   Standard precautions in place, monitor labs and vitals for s/s of worsening infection, antibiotics per MAR

## 2020-02-11 NOTE — PROGRESS NOTES
"Hospital Medicine Daily Progress Note    Date of Service  2/11/2020    Chief Complaint      Hospital Course    39 y.o. female a rare stage IV metastatic epithelioid hemangioendothelioma admitted 1/30/2020 with a c/o sepsis and sacral ulcer infection.  The patient had debridement of her ulcer and cultures grew mixed skin jessica. She had wound care and a vacuum placed to her sacral wound.  She was also found to have c/o multifocal pneumonia and a gluteal abscess.  During admit she had hemoptysis believed to be due to her cancer.       Interval Problem Update  Ongoing uncontrolled \"all over body\" pain.    A+Ox3 with clear speech.  Appears uncomfortable at times  Some consitpation.  Eating okay.    Consultants/Specialty  Oncology  Infectious disease  Gen Surgery    Code Status  DNR    Disposition  Home once wound vacuum and wound care determined    Review of Systems  Review of Systems   Constitutional: Positive for malaise/fatigue. Negative for chills and fever.   HENT: Negative for congestion and sore throat.    Eyes: Negative for blurred vision.   Respiratory: Positive for hemoptysis. Negative for shortness of breath.    Cardiovascular: Negative for chest pain and leg swelling.   Gastrointestinal: Positive for constipation. Negative for abdominal pain and nausea.   Musculoskeletal: Positive for back pain, joint pain and myalgias.   Neurological: Negative for dizziness.   Psychiatric/Behavioral: The patient is not nervous/anxious.         Physical Exam  Temp:  [36.3 °C (97.4 °F)-37.2 °C (98.9 °F)] 36.7 °C (98.1 °F)  Pulse:  [117-124] 119  Resp:  [18-20] 18  BP: (109-122)/(70-77) 122/73  SpO2:  [95 %-100 %] 100 %    Physical Exam  Vitals signs reviewed.   Constitutional:       Appearance: Normal appearance. She is not diaphoretic.   HENT:      Head: Normocephalic and atraumatic.      Nose: Nose normal.      Mouth/Throat:      Mouth: Mucous membranes are moist.      Pharynx: No oropharyngeal exudate.   Eyes:      General: " No scleral icterus.        Right eye: No discharge.         Left eye: No discharge.      Extraocular Movements: Extraocular movements intact.      Conjunctiva/sclera: Conjunctivae normal.      Pupils: Pupils are equal, round, and reactive to light.   Neck:      Musculoskeletal: No muscular tenderness.      Vascular: No carotid bruit.   Cardiovascular:      Rate and Rhythm: Normal rate and regular rhythm.      Pulses:           Radial pulses are 2+ on the right side and 2+ on the left side.        Dorsalis pedis pulses are 2+ on the right side and 2+ on the left side.      Heart sounds: No murmur.      Comments: Right upper chest port  Pulmonary:      Effort: Pulmonary effort is normal. No respiratory distress.      Breath sounds: Normal breath sounds. No wheezing or rales.   Abdominal:      General: Bowel sounds are normal. There is no distension.      Palpations: Abdomen is soft.   Musculoskeletal:         General: No swelling or tenderness.   Lymphadenopathy:      Cervical: No cervical adenopathy.   Skin:     Coloration: Skin is not jaundiced or pale.   Neurological:      General: No focal deficit present.      Mental Status: She is alert and oriented to person, place, and time. Mental status is at baseline.      Cranial Nerves: No cranial nerve deficit.   Psychiatric:         Mood and Affect: Mood normal.         Behavior: Behavior normal.         Fluids    Intake/Output Summary (Last 24 hours) at 2/11/2020 2108  Last data filed at 2/11/2020 1732  Gross per 24 hour   Intake 1200 ml   Output 1050 ml   Net 150 ml       Laboratory  Recent Labs     02/09/20  0430 02/10/20  0138   WBC 15.1* 13.1*   RBC 2.24* 2.69*   HEMOGLOBIN 6.8* 8.1*   HEMATOCRIT 21.6* 25.6*   MCV 96.4 95.2   MCH 30.4 30.1   MCHC 31.5* 31.6*   RDW 67.3* 65.8*   PLATELETCT 144* 145*   MPV 10.0 9.9     Recent Labs     02/09/20  0430 02/10/20  0138   SODIUM 128* 130*   POTASSIUM 3.9 4.0   CHLORIDE 92* 93*   CO2 29 31   GLUCOSE 122* 113*   BUN 7* 10    CREATININE 0.38* 0.39*   CALCIUM 8.6 8.8                   Imaging  CT-CTA CHEST PULMONARY ARTERY W/ RECONS   Final Result         1. No CT evidence of pulmonary embolism.      2. Extensive airspace opacities throughout both lungs, most in the middle lobe, concerning for multifocal pneumonia.      3. Small bilateral pleural effusions.      4. Significantly enlarged heterogeneous spleen with multiple lesions.      DX-SACRUM AND COCCYX 2+   Final Result      No radiographic evidence of osteomyelitis. MRI could be performed to further evaluate if greater sensitivity is required      Osteoblastic metastases      DX-CHEST-PORTABLE (1 VIEW)   Final Result      Stable dense right basilar consolidation and reticular opacity. Mass may be present      Stable right lateral seventh rib bony destruction/presumed pathologic fracture and osteoblastic metastases           Assessment/Plan  Hemoptysis  Assessment & Plan  CTA was negative for PE  Likely related to her malignancy          Pressure injury of sacral region, stage 3 (HCC)- (present on admission)  Assessment & Plan  Status post surgical irrigation debridement by Dr. Dietz  Wound cultures coag negative staph Bacteroides and Candida  S/p antibiotics and diflucan  Continue wound care with wound VAC changes on Monday Wednesday Friday    Hypokalemia- (present on admission)  Assessment & Plan  Monitor bmp  2/10 K:4    Anemia of chronic disease- (present on admission)  Assessment & Plan  Likely secondary to her malignancy and intermittent hemoptysis  Monitor CBC and transfuse as needed    Hyponatremia  Assessment & Plan  2/10Na 130 Improved  Monitor bmp      Urinary retention  Assessment & Plan  Low urine output 2/11 and IV fluids increased due to low oral intake    Epithelioid hemangioendothelioma- (present on admission)  Assessment & Plan  Patient established with Dr. Delaney  Pain management  Discussed end of life situation and she is aiming at DNR but would like to  discuss it further with her family and get more information from oncology       VTE prophylaxis: SCD's

## 2020-02-12 VITALS
HEIGHT: 64 IN | SYSTOLIC BLOOD PRESSURE: 97 MMHG | DIASTOLIC BLOOD PRESSURE: 71 MMHG | RESPIRATION RATE: 17 BRPM | HEART RATE: 123 BPM | BODY MASS INDEX: 25.67 KG/M2 | OXYGEN SATURATION: 98 % | TEMPERATURE: 97.9 F | WEIGHT: 150.35 LBS

## 2020-02-12 PROBLEM — R33.9 URINARY RETENTION: Status: RESOLVED | Noted: 2020-01-31 | Resolved: 2020-02-12

## 2020-02-12 PROBLEM — E87.1 HYPONATREMIA: Status: RESOLVED | Noted: 2020-02-10 | Resolved: 2020-02-12

## 2020-02-12 PROBLEM — E83.42 HYPOMAGNESEMIA: Status: RESOLVED | Noted: 2020-02-05 | Resolved: 2020-02-12

## 2020-02-12 PROBLEM — E87.6 HYPOKALEMIA: Status: RESOLVED | Noted: 2020-01-30 | Resolved: 2020-02-12

## 2020-02-12 PROBLEM — R04.2 HEMOPTYSIS: Status: RESOLVED | Noted: 2020-02-01 | Resolved: 2020-02-12

## 2020-02-12 LAB
ALBUMIN SERPL BCP-MCNC: 2.9 G/DL (ref 3.2–4.9)
ALBUMIN/GLOB SERPL: 0.8 G/DL
ALP SERPL-CCNC: 59 U/L (ref 30–99)
ALT SERPL-CCNC: 5 U/L (ref 2–50)
ANION GAP SERPL CALC-SCNC: 7 MMOL/L (ref 0–11.9)
AST SERPL-CCNC: 16 U/L (ref 12–45)
BASOPHILS # BLD AUTO: 0.3 % (ref 0–1.8)
BASOPHILS # BLD: 0.03 K/UL (ref 0–0.12)
BILIRUB SERPL-MCNC: 1.1 MG/DL (ref 0.1–1.5)
BUN SERPL-MCNC: 9 MG/DL (ref 8–22)
CALCIUM SERPL-MCNC: 8.6 MG/DL (ref 8.5–10.5)
CHLORIDE SERPL-SCNC: 91 MMOL/L (ref 96–112)
CO2 SERPL-SCNC: 29 MMOL/L (ref 20–33)
CREAT SERPL-MCNC: 0.42 MG/DL (ref 0.5–1.4)
EOSINOPHIL # BLD AUTO: 0.09 K/UL (ref 0–0.51)
EOSINOPHIL NFR BLD: 0.8 % (ref 0–6.9)
ERYTHROCYTE [DISTWIDTH] IN BLOOD BY AUTOMATED COUNT: 68 FL (ref 35.9–50)
GLOBULIN SER CALC-MCNC: 3.7 G/DL (ref 1.9–3.5)
GLUCOSE SERPL-MCNC: 181 MG/DL (ref 65–99)
HCT VFR BLD AUTO: 23.9 % (ref 37–47)
HGB BLD-MCNC: 7.5 G/DL (ref 12–16)
IMM GRANULOCYTES # BLD AUTO: 0.23 K/UL (ref 0–0.11)
IMM GRANULOCYTES NFR BLD AUTO: 2 % (ref 0–0.9)
LYMPHOCYTES # BLD AUTO: 2.05 K/UL (ref 1–4.8)
LYMPHOCYTES NFR BLD: 17.7 % (ref 22–41)
MCH RBC QN AUTO: 30 PG (ref 27–33)
MCHC RBC AUTO-ENTMCNC: 31.4 G/DL (ref 33.6–35)
MCV RBC AUTO: 95.6 FL (ref 81.4–97.8)
MONOCYTES # BLD AUTO: 0.98 K/UL (ref 0–0.85)
MONOCYTES NFR BLD AUTO: 8.4 % (ref 0–13.4)
NEUTROPHILS # BLD AUTO: 8.23 K/UL (ref 2–7.15)
NEUTROPHILS NFR BLD: 70.8 % (ref 44–72)
NRBC # BLD AUTO: 0.75 K/UL
NRBC BLD-RTO: 6.5 /100 WBC
PLATELET # BLD AUTO: 150 K/UL (ref 164–446)
PMV BLD AUTO: 9.9 FL (ref 9–12.9)
POTASSIUM SERPL-SCNC: 3.7 MMOL/L (ref 3.6–5.5)
PROT SERPL-MCNC: 6.6 G/DL (ref 6–8.2)
RBC # BLD AUTO: 2.5 M/UL (ref 4.2–5.4)
SODIUM SERPL-SCNC: 127 MMOL/L (ref 135–145)
WBC # BLD AUTO: 11.6 K/UL (ref 4.8–10.8)

## 2020-02-12 PROCEDURE — A9270 NON-COVERED ITEM OR SERVICE: HCPCS | Performed by: INTERNAL MEDICINE

## 2020-02-12 PROCEDURE — 700111 HCHG RX REV CODE 636 W/ 250 OVERRIDE (IP): Performed by: HOSPITALIST

## 2020-02-12 PROCEDURE — A9270 NON-COVERED ITEM OR SERVICE: HCPCS | Performed by: HOSPITALIST

## 2020-02-12 PROCEDURE — 85025 COMPLETE CBC W/AUTO DIFF WBC: CPT

## 2020-02-12 PROCEDURE — 700102 HCHG RX REV CODE 250 W/ 637 OVERRIDE(OP): Performed by: INTERNAL MEDICINE

## 2020-02-12 PROCEDURE — 700102 HCHG RX REV CODE 250 W/ 637 OVERRIDE(OP): Performed by: HOSPITALIST

## 2020-02-12 PROCEDURE — 302098 PASTE RING (FLAT): Performed by: HOSPITALIST

## 2020-02-12 PROCEDURE — 80053 COMPREHEN METABOLIC PANEL: CPT

## 2020-02-12 PROCEDURE — 306372 DRESSING,VAC SIMPLACE MED: Performed by: HOSPITALIST

## 2020-02-12 PROCEDURE — 97605 NEG PRS WND THER DME<=50SQCM: CPT

## 2020-02-12 PROCEDURE — 99239 HOSP IP/OBS DSCHRG MGMT >30: CPT | Performed by: HOSPITALIST

## 2020-02-12 RX ORDER — KETOROLAC TROMETHAMINE 30 MG/ML
30 INJECTION, SOLUTION INTRAMUSCULAR; INTRAVENOUS EVERY 6 HOURS PRN
Status: DISCONTINUED | OUTPATIENT
Start: 2020-02-12 | End: 2020-02-12 | Stop reason: HOSPADM

## 2020-02-12 RX ADMIN — HEPARIN 100 UNITS: 100 SYRINGE at 19:47

## 2020-02-12 RX ADMIN — HYDROMORPHONE HYDROCHLORIDE 1 MG: 2 INJECTION, SOLUTION INTRAMUSCULAR; INTRAVENOUS; SUBCUTANEOUS at 09:01

## 2020-02-12 RX ADMIN — SENNOSIDES AND DOCUSATE SODIUM 2 TABLET: 8.6; 5 TABLET ORAL at 05:17

## 2020-02-12 RX ADMIN — OXYCODONE HYDROCHLORIDE 80 MG: 20 TABLET, FILM COATED, EXTENDED RELEASE ORAL at 05:16

## 2020-02-12 RX ADMIN — OXYCODONE HYDROCHLORIDE 80 MG: 20 TABLET, FILM COATED, EXTENDED RELEASE ORAL at 15:26

## 2020-02-12 RX ADMIN — MAGNESIUM HYDROXIDE 30 ML: 400 SUSPENSION ORAL at 05:16

## 2020-02-12 RX ADMIN — THERA TABS 1 TABLET: TAB at 05:17

## 2020-02-12 RX ADMIN — OXYCODONE HYDROCHLORIDE AND ACETAMINOPHEN 500 MG: 500 TABLET ORAL at 05:17

## 2020-02-12 ASSESSMENT — PAIN SCALES - WONG BAKER: WONGBAKER_NUMERICALRESPONSE: HURTS A WHOLE LOT

## 2020-02-12 NOTE — CARE PLAN
Problem: Safety  Goal: Will remain free from injury  Outcome: PROGRESSING AS EXPECTED     Problem: Infection  Goal: Will remain free from infection  Outcome: PROGRESSING AS EXPECTED     Problem: Bowel/Gastric:  Goal: Will not experience complications related to bowel motility  Outcome: PROGRESSING SLOWER THAN EXPECTED  Note:   Last bowel movement 2/1, given MOM per MAR this AM with no response.

## 2020-02-12 NOTE — DISCHARGE PLANNING
KHURRAMW received a call from Monica with [facility] VAC.  Pt has been approved for wound vac through [facility].  KHURRAMW contacted scheduling to reschedule outpatient wound care appointment.  Pt is now rescheduled for 2/14 @ 8:15am with an 8:00am check in.      KHURRAMW spoke with Dr. Rahman and updated him on the above information.

## 2020-02-12 NOTE — DISCHARGE PLANNING
"Anticipated Discharge Disposition: Home    Action: LSW met with Pt at bedside to discuss discharge.  LSW updated Pt on wound VAC information and wound care outpatient date and time of appointment.     Pt was extremely emotional during conversation and reported she was \"scared\" about the possible progression of her cancer.  Pt went on to report she was on chemo and stopped for a PET scan and has not restarted.  Pt stated, \"I just don't know what's going on and I haven't seen an Oncologist since I have been here.\"  LSW asked Pt is she would consent to having a Psych eval to help her through these feelings while in the hospital.  Pt stated, \"I would like that.\"  Pt also had concerns about her daughters feeling \"sad and scared\" about her cancer.  LSW provided Pt with a brochure for [facility].  Pt accepted brochure.    LSW sent a tiger text to Dr. Rahman requesting a Psych Eval and a Oncology order if he felt appropriate.      Barriers to Discharge: Medical clearance, [facility] [facility] acceptance and delivery    Plan: LSW will continue to follow and assist as needed    "

## 2020-02-12 NOTE — WOUND TEAM
[facility]  Inpatient Services  Wound and Skin Care Progress Note      HPI, PMH, SH: Reviewed    Unit where seen by Wound Team: R325/00     WOUND CONSULT RELATED TO:  Scheduled negative pressure wound therapy dressing change     Self Report / Pain Level:  Pain to BLE when turning. Pre med IV Dilaudid by primary RN    OBJECTIVE:  Previous dressing lifted distally, small drainage coming from under lifted area. Pt noted to be more confused today asking this RN if she had seen family members in the room. Was also yelling for family member in room. Primary RN aware and will notify MD.    WOUND TYPE, LOCATION, CHARACTERISTICS (Pressure Injuries: location, stage, POA or date identified)    Pressure Injury Coccyx;Sacrum Right POA open surgical  (Active)   Wound Image        Pressure Injury Stage 4    State of Healing Non-healing    Site Assessment Drainage;Granulation tissue;Red    Periwound Assessment Dry;Intact    Margins Defined edges;Unattached edges    Wound Length (cm) 5.4 cm 2/10/2020  9:30 AM   Wound Width (cm) 3.1 cm 2/10/2020  9:30 AM   Wound Depth (cm) 2.6 cm 2/10/2020  9:30 AM   Wound Surface Area (cm^2) 16.74 cm^2 2/10/2020  9:30 AM   Wound Volume (cm^3) 43.52 cm^3 2/10/2020  9:30 AM   Tunneling (cm) 0 cm    Undermining (cm) 0.7 cm    Closure Secondary intention    Drainage Amount Small    Drainage Description Yellow;Serosanguineous    Non-staged Wound Description Not applicable    Treatments Cleansed;Site care    Wound Cleansing Approved Wound Cleanser    Periwound Protectant Drape;Benzoin;Paste Ring    Dressing Options Wound Vac    Dressing Cleansing/Solutions Not Applicable    Dressing Changed Changed    Dressing Status Clean;Dry;Intact    Dressing Change/Treatment Frequency By Wound Team Only    NEXT Dressing Change/Treatment Date 02/14/20    NEXT Weekly Photo (Inpatient Only) 02/17/20    Wound Odor None    Exposed Structures Bone    WOUND NURSE ONLY - Tissue Type and Percentage 80% red 20%  yellow            Negative Pressure Wound Therapy Sacrum (Active)   NPWT Pump Mode / Pressure Setting Continuous;125 mmHg    Dressing Type Medium;Black Foam (Regular)    Number of Foam Pieces Used 2    Canister Changed No    Output (mL) 0 mL    VAC VeraFlo Pressure (mm/Hg) 125 mmHg;Continuous        Lab Values:    Lab Results   Component Value Date/Time    WBC 11.6 (H) 02/12/2020 03:40 AM    RBC 2.50 (L) 02/12/2020 03:40 AM    HEMOGLOBIN 7.5 (L) 02/12/2020 03:40 AM    HEMATOCRIT 23.9 (L) 02/12/2020 03:40 AM        Lab Results   Component Value Date/Time    HBA1C 5.6 10/17/2019 12:01 AM           Culture:   Not indicated at this time    INTERVENTIONS BY WOUND TEAM:  Patient premedicated. Old dressing removed. Wound cleaned with wound cleanser and dry gauze. No retained foam noted. Periwound cleaned well with wound cleanser and dry gauze. Benzoin applied to periwound and over to right hip where bridge will go. Paste ring applied to immediate anthony wound; drape applied over to L hip. 1 contionuous  foam in wound and over bridge. 2nd piece used as button for tracpad. Drape and tracpad to foam. 1 mepilex along L hip and 2nd mepilex on coccyx. Tubing secured to hip mepilex.    Interdisciplinary consultation: PtAbigail RN, CNA    EVALUATION: Wound bed with biofilm and some slough. Wound vac to encourage granulation tissue growth and manage drainage, opimized moisture level needed for wound healing.    Goals: Steady decrease in wound area and depth weekly.    NURSING PLAN OF CARE ORDERS (X):  Dressing changes: See Dressing Care orders: X  Skin care: See Skin Care orders: X  Rectal tube care: See Rectal Tube Care orders:        Other orders:                             WOUND TEAM PLAN OF CARE (X):   Dressing changes by wound team:          Follow up 1-2 times weekly:               Follow up 3 times weekly:                NPWT change 3 times weekly:  X Will need ongoing wound care.   Follow up as needed:       Other  (explain):   Anticipated discharge plans (X):   LTACH:        SNF/Rehab:                  Home Care:         X  HH order in place   Outpatient Wound Center:            Self Care:            Other:

## 2020-02-12 NOTE — PROGRESS NOTES
Received report and assumed care of patient at 1900. Reviewed chart and completed assessment. Patient is A&O x4, tearful today, but states she had a great day. Patient has new medication to help with pain, reporting pain much improved. Patient resistant to frequent position changes but anxious about mepilex and wound vac. Assured her that everything is in place. R port infusing with brisk blood return. Moore in place. Frame alarm on - low airloss bed. All needs met, patient able to make needs known.

## 2020-02-13 NOTE — DISCHARGE SUMMARY
Discharge Summary    CHIEF COMPLAINT ON ADMISSION  Chief Complaint   Patient presents with   • Open Wound     Pt states she has a pressure ulcer on her tailbone. Pt is actively limited d/t diaggnosis of stage 4 cancer.  discovered ulcer 2 days ago, pt reports exudate, inability to sleep, pain rated 10/10.       Reason for Admission  Bed Sore     Admission Date  1/30/2020    CODE STATUS  DNAR/DNI    HPI & HOSPITAL COURSE  This is a 39 y.o. female a rare stage IV metastatic epithelioid hemangioendothelioma admitted 1/30/2020 with a c/o sepsis and sacral ulcer infection.  The patient had debridement of her ulcer and cultures grew mixed skin jessica. She had wound care and a vacuum placed to her sacral wound.  She was also found to have c/o multifocal pneumonia and a gluteal abscess.  During admit she had hemoptysis believed to be due to her cancer. She was finally approved for home wound vacuum and wound care by her insurance.  Pain management was her biggest concern and ongoing issue.  I had consulted Dr Mccarthy, oncology on her day of discharge.  Patient was able to void after removal of her finch catheter and was to be discharged home with prompt follow up with her oncologist outpatient.    Therefore, she is discharged in fair and stable condition to home with close outpatient follow-up.    The patient met 2-midnight criteria for an inpatient stay at the time of discharge.    Discharge Date  2/12/2020    FOLLOW UP ITEMS POST DISCHARGE  None    DISCHARGE DIAGNOSES  Principal Problem (Resolved):    Sepsis (HCC) POA: Yes  Active Problems:    Pressure injury of sacral region, stage 3 (HCC) POA: Yes    Anemia of chronic disease POA: Yes    Epithelioid hemangioendothelioma POA: Yes  Resolved Problems:    Multifocal pneumonia POA: Unknown    Hemoptysis POA: Unknown    Hypokalemia POA: Yes    Urinary retention POA: Unknown    Hypomagnesemia POA: Unknown    Hyponatremia POA: Unknown      FOLLOW UP  Future Appointments    Date Time Provider Department Center   2/14/2020  8:15 AM DOMINIC Clement [address]     [facility]  [address]  [place] [address]  [phone]  Go in 2 days  Scheduling Set Wound Care Appointment for 02/14 Friday at 8:15am check in at 8:00am    Rosita Delaney M.D.  [address] [place] [address]  [phone]    Schedule an appointment as soon as possible for a visit in 1 week        MEDICATIONS ON DISCHARGE     Medication List      CONTINUE taking these medications      Instructions   clonazePAM 1 MG Tabs  Commonly known as:  KLONOPIN   TAKE 1 TABLET BY MOUTH AT BEDTIME MAY REPEAT 1 TAB IN 2 HRS FOR SLEEP     nicotine 7 MG/24HR Pt24  Commonly known as:  NICODERM   Apply 1 Patch to skin as directed 1 time daily as needed.  Dose:  1 Patch     ondansetron 4 MG Tbdp  Commonly known as:  Zofran ODT   Take 1 Tab by mouth every 6 hours as needed.  Dose:  4 mg     * OxyCONTIN 80 MG T12a  Generic drug:  oxyCODONE CR   Take 80 mg by mouth every 8 hours.  Dose:  80 mg     * oxyCODONE 30 MG immediate release tablet  Commonly known as:  OXY-IR   Take 30 mg by mouth every 6 hours as needed.  Dose:  30 mg     prochlorperazine 10 MG Tabs  Commonly known as:  COMPAZINE   Take 10 mg by mouth every 8 hours as needed for Nausea/Vomiting.  Dose:  10 mg         * This list has 2 medication(s) that are the same as other medications prescribed for you. Read the directions carefully, and ask your doctor or other care provider to review them with you.                Allergies  No Known Allergies    DIET  Orders Placed This Encounter   Procedures   • Diet Order Regular     Standing Status:   Standing     Number of Occurrences:   1     Order Specific Question:   Diet:     Answer:   Regular [1]       ACTIVITY  As tolerated.  Weight bearing as tolerated    CONSULTATIONS  Oncology  Infectious disease  General surgery    PROCEDURES  2/1/2020 Irrigation and debridement of sacral decubitus ulcer  stage III    LABORATORY  Lab Results   Component Value Date    SODIUM 127 (L) 02/12/2020    POTASSIUM 3.7 02/12/2020    CHLORIDE 91 (L) 02/12/2020    CO2 29 02/12/2020    GLUCOSE 181 (H) 02/12/2020    BUN 9 02/12/2020    CREATININE 0.42 (L) 02/12/2020        Lab Results   Component Value Date    WBC 11.6 (H) 02/12/2020    HEMOGLOBIN 7.5 (L) 02/12/2020    HEMATOCRIT 23.9 (L) 02/12/2020    PLATELETCT 150 (L) 02/12/2020        Total time of the discharge process exceeds 32 minutes.

## 2020-02-13 NOTE — PROGRESS NOTES
Moore catheter discontinued. Spoke with  and patient and will trial patient on the commode in 30 minutes

## 2020-02-13 NOTE — DISCHARGE INSTRUCTIONS
Discharge Instructions    Discharged to home by car with relative. Discharged via wheelchair, hospital escort: Yes.  Special equipment needed: Oxygen, Wheelchair and Wound VAC    Be sure to schedule a follow-up appointment with your primary care doctor or any specialists as instructed.     Discharge Plan:   Diet Plan: Discussed  Activity Level: Discussed  Smoking Cessation Offered: Patient Refused  Confirmed Follow up Appointment: Appointment Scheduled  Confirmed Symptoms Management: Discussed  Medication Reconciliation Updated: Yes  Influenza Vaccine Indication: Not indicated: Previously immunized this influenza season and > 8 years of age    I understand that a diet low in cholesterol, fat, and sodium is recommended for good health. Unless I have been given specific instructions below for another diet, I accept this instruction as my diet prescription.   Other diet: Healthy Diet    Special Instructions: None    · Is patient discharged on Warfarin / Coumadin?   No     Depression / Suicide Risk    As you are discharged from this [facility] facility, it is important to learn how to keep safe from harming yourself.    Recognize the warning signs:  · Abrupt changes in personality, positive or negative- including increase in energy   · Giving away possessions  · Change in eating patterns- significant weight changes-  positive or negative  · Change in sleeping patterns- unable to sleep or sleeping all the time   · Unwillingness or inability to communicate  · Depression  · Unusual sadness, discouragement and loneliness  · Talk of wanting to die  · Neglect of personal appearance   · Rebelliousness- reckless behavior  · Withdrawal from people/activities they love  · Confusion- inability to concentrate     If you or a loved one observes any of these behaviors or has concerns about self-harm, here's what you can do:  · Talk about it- your feelings and reasons for harming yourself  · Remove any means that you might use to  hurt yourself (examples: pills, rope, extension cords, firearm)  · Get professional help from the community (Mental Health, Substance Abuse, psychological counseling)  · Do not be alone:Call your Safe Contact- someone whom you trust who will be there for you.  · Call your local CRISIS HOTLINE 741-3161 or 187-349-4564  · Call your local Children's Mobile Crisis Response Team [place] (038) 898-0841 or www.Movolo.com  · Call the toll free National Suicide Prevention Hotlines   · National Suicide Prevention Lifeline 499-629-DBJH (7465)  · National Hope Line Network 800-SUICIDE (614-4008)      Sepsis, Adult  Sepsis is a serious infection of your blood or tissues that affects your whole body. The infection that causes sepsis may be bacterial, viral, fungal, or parasitic. Sepsis may be life threatening. Sepsis can cause your blood pressure to drop. This may result in shock. Shock causes your central nervous system and your organs to stop working correctly.  What increases the risk?  Sepsis can happen in anyone, but it is more likely to happen in people who have weakened immune systems.  What are the signs or symptoms?  Symptoms of sepsis can include:  · Fever or low body temperature (hypothermia).  · Rapid breathing (hyperventilation).  · Chills.  · Rapid heartbeat (tachycardia).  · Confusion or light-headedness.  · Trouble breathing.  · Urinating much less than usual.  · Cool, clammy skin or red, flushed skin.  · Other problems with the heart, kidneys, or brain.  How is this diagnosed?  Your health care provider will likely do tests to look for an infection, to see if the infection has spread to your blood, and to see how serious your condition is. Tests can include:  · Blood tests, including cultures of your blood.  · Cultures of other fluids from your body, such as:  ¨ Urine.  ¨ Pus from wounds.  ¨ Mucus coughed up from your lungs.  · Urine tests other than cultures.  · X-ray exams or other imaging  tests.  How is this treated?  Treatment will begin with elimination of the source of infection. If your sepsis is likely caused by a bacterial or fungal infection, you will be given antibiotic or antifungal medicines.  You may also receive:  · Oxygen.  · Fluids through an IV tube.  · Medicines to increase your blood pressure.  · A machine to clean your blood (dialysis) if your kidneys fail.  · A machine to help you breathe if your lungs fail.  Get help right away if:  You get an infection or develop any of the signs and symptoms of sepsis after surgery or a hospitalization.  This information is not intended to replace advice given to you by your health care provider. Make sure you discuss any questions you have with your health care provider.  Document Released: 09/15/2004 Document Revised: 05/25/2017 Document Reviewed: 08/25/2014  [employer] Interactive Patient Education © 2017 [employer]      Pressure Injury  A pressure injury, sometimes called a bedsore, is an injury to the skin and underlying tissue caused by pressure. Pressure on blood vessels causes decreased blood flow to the skin, which can eventually cause the skin tissue to die and break down into a wound.  Pressure injuries usually occur:  · Over bony parts of the body such as the tailbone, shoulders, elbows, hips, and heels.  · Under medical devices such as respiratory equipment, stockings, tubes, and splints.  Pressure injuries start as reddened areas on the skin and can lead to pain, muscle damage, and infection. Pressure injuries can vary in severity.  What are the causes?  Pressure injuries are caused by a lack of blood supply to an area of skin. They can occur from intense pressure over a short period of time or from less intense pressure over a long period of time.  What increases the risk?  This condition is more likely to develop in people who:  · Are in the hospital or an extended care facility.  · Are bedridden or in a wheelchair.  · Have an  injury or disease that keeps them from:  ¨ Moving normally.  ¨ Feeling pain or pressure.  · Have a condition that:  ¨ Makes them sleepy or less alert.  ¨ Causes poor blood flow.  · Need to wear a medical device.  · Have poor control of their bladder or bowel functions (incontinence).  · Have poor nutrition (malnutrition).  · Are of certain ethnicities. People of  and  or  descent are at higher risk compared to other ethnic groups.  If you are at risk for pressure ulcers, your health care provider may recommend certain types of bedding to help prevent them. These may include foam or gel mattresses covered with one of the following:  · A sheepskin blanket.  · A pad that is filled with gel, air, water, or foam.  What are the signs or symptoms?  The main symptom is a blister or change in skin color that opens into a wound. Other symptoms include:  · Red or dark areas of skin that do not turn white or pale when pressed with a finger.  · Pain, warmth, or change of skin texture.  How is this diagnosed?  This condition is diagnosed with a medical history and physical exam. You may also have tests, including:  · Blood tests to check for infection or signs of poor nutrition.  · Imaging studies to check for damage to the deep tissues under your skin.  · Blood flow studies.  Your pressure injury will be staged to determine its severity. Staging is an assessment of:  · The depth of the pressure injury.  · Which tissues are exposed because of the pressure injury.  · The causes of the pressure injury.  How is this treated?  The main focus of treatment is to help your injury heal. This may be done by:  · Relieving or redistributing pressure on your skin. This includes:  ¨ Frequently changing your position.  ¨ Eliminating or minimizing positions that caused the wound or that can make the wound worse.  ¨ Using specific bed mattresses and chair cushions.  ¨ Refitting, resizing, or replacing any medical  devices, or padding the skin under them.  ¨ Using creams or powders to prevent rubbing (friction) on the skin.  · Keeping your skin clean and dry. This may include using a skin cleanser or skin protectant as told by your health care provider. This may be a lotion, ointment, or spray.  · Cleaning your injury and removing any dead tissue from the wound (debridement).  · Placing a bandage (dressing) over your injury.  · Preventing or treating infection. This may include antibiotic, antimicrobial, or antiseptic medicines.  Treatment may also include medicine for pain.  Sometimes surgery is needed to close the wound with a flap of healthy skin or a piece of skin from another area of your body (graft). You may need surgery if other treatments are not working or if your injury is very deep.  Follow these instructions at home:  Wound care  · Follow instructions from your health care provider about:  ¨ How to take care of your wound.  ¨ When and how you should change your dressing.  ¨ When you should remove your dressing. If your dressing is dry and stuck when you try to remove it, moisten or wet the dressing with saline or water so that it can be removed without harming your skin or wound tissue.  · Check your wound every day for signs of infection. Have a caregiver do this for you if you are not able. Watch for:  ¨ More redness, swelling, or pain.  ¨ More fluid, blood, or pus.  ¨ A bad smell.  Skin Care  · Keep your skin clean and dry. Gently pat your skin dry.  · Do not rub or massage your skin.  · Use a skin protectant only as told by your health care provider.  · Check your skin every day for any changes in color or any new blisters or sores (ulcers). Have a caregiver do this for you if you are not able.  Medicines  · Take over-the-counter and prescription medicines only as told by your health care provider.  · If you were prescribed an antibiotic medicine, take it or apply it as told by your health care provider. Do  not stop taking or using the antibiotic even if your condition improves.  Reducing and Redistributing Pressure  · Do not lie or sit in one position for a long time. Move or change position every two hours or as told by your health care provider.  · Use pillows or cushions to reduce pressure. Ask your health care provider to recommend cushions or pads for you.  · Use medical devices that do not rub your skin. Tell your health care provider if one of your medical devices is causing a pressure injury to develop.  General instructions  · Eat a healthy diet that includes lots of protein. Ask your health care provider for diet advice.  · Drink enough fluid to keep your urine clear or pale yellow.  · Be as active as you can every day. Ask your health care provider to suggest safe exercises or activities.  · Do not abuse drugs or alcohol.  · Keep all follow-up visits as told by your health care provider. This is important.  · Do not smoke.  Contact a health care provider if:    · You have chills or fever.  · Your pain medicine is not helping.  · You have any changes in skin color.  · You have new blisters or sores.  · You develop warmth, redness, or swelling near a pressure injury.  · You have a bad odor or pus coming from your pressure injury.  · You lose control of your bowels or bladder.  · You develop new symptoms.  · Your wound does not improve after 1-2 weeks of treatment.  · You develop a new medical condition, such as diabetes, peripheral vascular disease, or conditions that affect your defense (immune) system.  This information is not intended to replace advice given to you by your health care provider. Make sure you discuss any questions you have with your health care provider.  Document Released: 12/18/2006 Document Revised: 05/22/2017 Document Reviewed: 04/27/2016  [employer] Interactive Patient Education © 2017 [employer]        Discharge Instructions per Gerson Rahman D.O.      DIET: Healthy balanced  diet    ACTIVITY: As tolerates    DIAGNOSIS: Sacral wound, multifocal pneumonia    Return to ER if worsening pain, inability to urinate

## 2020-02-13 NOTE — PROGRESS NOTES
Patient is yelling in room and screaming and crying that she wants her bed rails down and wants to go home. RN attempt to calm patient down. She is now alert and knows where and who she is. Wants discharge orders and wants RN to stay in room while she makes phone calls.

## 2020-02-13 NOTE — PROGRESS NOTES
Bedside report received from night shift RN. Assumed care. Pt is confused and does not know where she is, believes she is at [facility] office and believes youngest son is in the room. Pt is in bed. Pt has chronic pain at this time.   Pt was updated on the plan of care for the day. All questions answered.   Pt has call light within reach and bed is in lowest position.  All fall precautions in place. Pt had no other needs at this time, hourly rounding in place.

## 2020-03-08 ENCOUNTER — HOSPITAL ENCOUNTER (INPATIENT)
Facility: MEDICAL CENTER | Age: 40
LOS: 3 days | DRG: 843 | End: 2020-03-11
Attending: EMERGENCY MEDICINE | Admitting: INTERNAL MEDICINE
Payer: MEDICAID

## 2020-03-08 DIAGNOSIS — G89.3 CANCER ASSOCIATED PAIN: ICD-10-CM

## 2020-03-08 DIAGNOSIS — L89.153 PRESSURE INJURY OF SACRAL REGION, STAGE 3 (HCC): ICD-10-CM

## 2020-03-08 DIAGNOSIS — C79.9 METASTATIC CANCER (HCC): ICD-10-CM

## 2020-03-08 DIAGNOSIS — D48.9 EPITHELIOID HEMANGIOENDOTHELIOMA: ICD-10-CM

## 2020-03-08 DIAGNOSIS — D63.8 ANEMIA OF CHRONIC DISEASE: ICD-10-CM

## 2020-03-08 DIAGNOSIS — R09.02 HYPOXIA: ICD-10-CM

## 2020-03-08 DIAGNOSIS — R62.7 ADULT FAILURE TO THRIVE: ICD-10-CM

## 2020-03-08 LAB
ABO GROUP BLD: NORMAL
ALBUMIN SERPL BCP-MCNC: 3.3 G/DL (ref 3.2–4.9)
ALBUMIN/GLOB SERPL: 0.8 G/DL
ALP SERPL-CCNC: 106 U/L (ref 30–99)
ALT SERPL-CCNC: 22 U/L (ref 2–50)
ANION GAP SERPL CALC-SCNC: 12 MMOL/L (ref 0–11.9)
ANISOCYTOSIS BLD QL SMEAR: ABNORMAL
AST SERPL-CCNC: 37 U/L (ref 12–45)
BARCODED ABORH UBTYP: 5100
BARCODED ABORH UBTYP: 5100
BARCODED ABORH UBTYP: 9500
BARCODED PRD CODE UBPRD: NORMAL
BARCODED UNIT NUM UBUNT: NORMAL
BASOPHILS # BLD AUTO: 0.4 % (ref 0–1.8)
BASOPHILS # BLD: 0.06 K/UL (ref 0–0.12)
BILIRUB SERPL-MCNC: 1.2 MG/DL (ref 0.1–1.5)
BLD GP AB SCN SERPL QL: NORMAL
BLOOD CULTURE HOLD CXBCH: NORMAL
BUN SERPL-MCNC: 15 MG/DL (ref 8–22)
CALCIUM SERPL-MCNC: 9 MG/DL (ref 8.5–10.5)
CHLORIDE SERPL-SCNC: 97 MMOL/L (ref 96–112)
CO2 SERPL-SCNC: 25 MMOL/L (ref 20–33)
COMMENT 1642: NORMAL
COMPONENT R 8504R: NORMAL
CREAT SERPL-MCNC: 0.52 MG/DL (ref 0.5–1.4)
EOSINOPHIL # BLD AUTO: 0.07 K/UL (ref 0–0.51)
EOSINOPHIL NFR BLD: 0.5 % (ref 0–6.9)
ERYTHROCYTE [DISTWIDTH] IN BLOOD BY AUTOMATED COUNT: 89 FL (ref 35.9–50)
GLOBULIN SER CALC-MCNC: 3.9 G/DL (ref 1.9–3.5)
GLUCOSE SERPL-MCNC: 139 MG/DL (ref 65–99)
HCT VFR BLD AUTO: 19.7 % (ref 37–47)
HGB BLD-MCNC: 5.7 G/DL (ref 12–16)
HYPOCHROMIA BLD QL SMEAR: ABNORMAL
IMM GRANULOCYTES # BLD AUTO: 0.29 K/UL (ref 0–0.11)
IMM GRANULOCYTES NFR BLD AUTO: 2 % (ref 0–0.9)
LYMPHOCYTES # BLD AUTO: 2.77 K/UL (ref 1–4.8)
LYMPHOCYTES NFR BLD: 19.1 % (ref 22–41)
MACROCYTES BLD QL SMEAR: ABNORMAL
MCH RBC QN AUTO: 30.6 PG (ref 27–33)
MCHC RBC AUTO-ENTMCNC: 28.9 G/DL (ref 33.6–35)
MCV RBC AUTO: 105.9 FL (ref 81.4–97.8)
MONOCYTES # BLD AUTO: 1.21 K/UL (ref 0–0.85)
MONOCYTES NFR BLD AUTO: 8.3 % (ref 0–13.4)
MORPHOLOGY BLD-IMP: NORMAL
NEUTROPHILS # BLD AUTO: 10.1 K/UL (ref 2–7.15)
NEUTROPHILS NFR BLD: 69.7 % (ref 44–72)
NRBC # BLD AUTO: 1.39 K/UL
NRBC BLD-RTO: 9.6 /100 WBC
PLATELET # BLD AUTO: 171 K/UL (ref 164–446)
PLATELET BLD QL SMEAR: NORMAL
PMV BLD AUTO: 10 FL (ref 9–12.9)
POIKILOCYTOSIS BLD QL SMEAR: NORMAL
POLYCHROMASIA BLD QL SMEAR: NORMAL
POTASSIUM SERPL-SCNC: 3.4 MMOL/L (ref 3.6–5.5)
PRODUCT TYPE UPROD: NORMAL
PROT SERPL-MCNC: 7.2 G/DL (ref 6–8.2)
RBC # BLD AUTO: 1.86 M/UL (ref 4.2–5.4)
RBC BLD AUTO: PRESENT
RH BLD: NORMAL
SODIUM SERPL-SCNC: 134 MMOL/L (ref 135–145)
TARGETS BLD QL SMEAR: NORMAL
UNIT STATUS USTAT: NORMAL
WBC # BLD AUTO: 14.5 K/UL (ref 4.8–10.8)

## 2020-03-08 PROCEDURE — 99223 1ST HOSP IP/OBS HIGH 75: CPT | Performed by: INTERNAL MEDICINE

## 2020-03-08 PROCEDURE — 86850 RBC ANTIBODY SCREEN: CPT

## 2020-03-08 PROCEDURE — 96374 THER/PROPH/DIAG INJ IV PUSH: CPT

## 2020-03-08 PROCEDURE — P9016 RBC LEUKOCYTES REDUCED: HCPCS

## 2020-03-08 PROCEDURE — A9270 NON-COVERED ITEM OR SERVICE: HCPCS | Performed by: INTERNAL MEDICINE

## 2020-03-08 PROCEDURE — 86900 BLOOD TYPING SEROLOGIC ABO: CPT

## 2020-03-08 PROCEDURE — 80053 COMPREHEN METABOLIC PANEL: CPT

## 2020-03-08 PROCEDURE — 700105 HCHG RX REV CODE 258: Performed by: INTERNAL MEDICINE

## 2020-03-08 PROCEDURE — 86923 COMPATIBILITY TEST ELECTRIC: CPT

## 2020-03-08 PROCEDURE — 85025 COMPLETE CBC W/AUTO DIFF WBC: CPT

## 2020-03-08 PROCEDURE — 99285 EMERGENCY DEPT VISIT HI MDM: CPT

## 2020-03-08 PROCEDURE — 700105 HCHG RX REV CODE 258: Performed by: EMERGENCY MEDICINE

## 2020-03-08 PROCEDURE — 770006 HCHG ROOM/CARE - MED/SURG/GYN SEMI*

## 2020-03-08 PROCEDURE — 86945 BLOOD PRODUCT/IRRADIATION: CPT

## 2020-03-08 PROCEDURE — 700102 HCHG RX REV CODE 250 W/ 637 OVERRIDE(OP): Performed by: INTERNAL MEDICINE

## 2020-03-08 PROCEDURE — 30233N1 TRANSFUSION OF NONAUTOLOGOUS RED BLOOD CELLS INTO PERIPHERAL VEIN, PERCUTANEOUS APPROACH: ICD-10-PCS | Performed by: INTERNAL MEDICINE

## 2020-03-08 PROCEDURE — 96375 TX/PRO/DX INJ NEW DRUG ADDON: CPT

## 2020-03-08 PROCEDURE — 700111 HCHG RX REV CODE 636 W/ 250 OVERRIDE (IP): Performed by: EMERGENCY MEDICINE

## 2020-03-08 PROCEDURE — 36430 TRANSFUSION BLD/BLD COMPNT: CPT

## 2020-03-08 PROCEDURE — 86901 BLOOD TYPING SEROLOGIC RH(D): CPT

## 2020-03-08 RX ORDER — POLYETHYLENE GLYCOL 3350 17 G/17G
1 POWDER, FOR SOLUTION ORAL
Status: DISCONTINUED | OUTPATIENT
Start: 2020-03-08 | End: 2020-03-11 | Stop reason: HOSPADM

## 2020-03-08 RX ORDER — SODIUM CHLORIDE, SODIUM LACTATE, POTASSIUM CHLORIDE, CALCIUM CHLORIDE 600; 310; 30; 20 MG/100ML; MG/100ML; MG/100ML; MG/100ML
1000 INJECTION, SOLUTION INTRAVENOUS ONCE
Status: COMPLETED | OUTPATIENT
Start: 2020-03-08 | End: 2020-03-08

## 2020-03-08 RX ORDER — SODIUM CHLORIDE 9 MG/ML
INJECTION, SOLUTION INTRAVENOUS CONTINUOUS
Status: ACTIVE | OUTPATIENT
Start: 2020-03-08 | End: 2020-03-09

## 2020-03-08 RX ORDER — ONDANSETRON 2 MG/ML
4 INJECTION INTRAMUSCULAR; INTRAVENOUS ONCE
Status: COMPLETED | OUTPATIENT
Start: 2020-03-08 | End: 2020-03-08

## 2020-03-08 RX ORDER — CLONAZEPAM 0.5 MG/1
0.25 TABLET ORAL NIGHTLY PRN
Status: DISCONTINUED | OUTPATIENT
Start: 2020-03-08 | End: 2020-03-11 | Stop reason: HOSPADM

## 2020-03-08 RX ORDER — TOPIRAMATE 25 MG/1
50 TABLET ORAL EVERY 6 HOURS
Status: DISCONTINUED | OUTPATIENT
Start: 2020-03-08 | End: 2020-03-11 | Stop reason: HOSPADM

## 2020-03-08 RX ORDER — OXYCODONE HYDROCHLORIDE 30 MG/1
30 TABLET ORAL EVERY 6 HOURS PRN
Status: DISCONTINUED | OUTPATIENT
Start: 2020-03-08 | End: 2020-03-11 | Stop reason: HOSPADM

## 2020-03-08 RX ORDER — SODIUM CHLORIDE 9 MG/ML
INJECTION, SOLUTION INTRAVENOUS CONTINUOUS
Status: DISCONTINUED | OUTPATIENT
Start: 2020-03-08 | End: 2020-03-10

## 2020-03-08 RX ORDER — MORPHINE SULFATE 4 MG/ML
4 INJECTION, SOLUTION INTRAMUSCULAR; INTRAVENOUS ONCE
Status: COMPLETED | OUTPATIENT
Start: 2020-03-08 | End: 2020-03-08

## 2020-03-08 RX ORDER — ACETAMINOPHEN 325 MG/1
650 TABLET ORAL EVERY 6 HOURS PRN
Status: DISCONTINUED | OUTPATIENT
Start: 2020-03-08 | End: 2020-03-11 | Stop reason: HOSPADM

## 2020-03-08 RX ORDER — OXYCODONE HCL 20 MG/1
80 TABLET, FILM COATED, EXTENDED RELEASE ORAL EVERY 6 HOURS
Status: DISCONTINUED | OUTPATIENT
Start: 2020-03-08 | End: 2020-03-11 | Stop reason: HOSPADM

## 2020-03-08 RX ORDER — TOPIRAMATE 50 MG/1
50 TABLET, FILM COATED ORAL EVERY 6 HOURS
COMMUNITY

## 2020-03-08 RX ORDER — BISACODYL 10 MG
10 SUPPOSITORY, RECTAL RECTAL
Status: DISCONTINUED | OUTPATIENT
Start: 2020-03-08 | End: 2020-03-11 | Stop reason: HOSPADM

## 2020-03-08 RX ORDER — AMOXICILLIN 250 MG
2 CAPSULE ORAL 2 TIMES DAILY
Status: DISCONTINUED | OUTPATIENT
Start: 2020-03-08 | End: 2020-03-11 | Stop reason: HOSPADM

## 2020-03-08 RX ADMIN — MORPHINE SULFATE 4 MG: 4 INJECTION INTRAVENOUS at 14:44

## 2020-03-08 RX ADMIN — SODIUM CHLORIDE, POTASSIUM CHLORIDE, SODIUM LACTATE AND CALCIUM CHLORIDE 1000 ML: 600; 310; 30; 20 INJECTION, SOLUTION INTRAVENOUS at 14:44

## 2020-03-08 RX ADMIN — SODIUM CHLORIDE: 9 INJECTION, SOLUTION INTRAVENOUS at 22:35

## 2020-03-08 RX ADMIN — TOPIRAMATE 50 MG: 25 TABLET, FILM COATED ORAL at 22:37

## 2020-03-08 RX ADMIN — ONDANSETRON 4 MG: 2 INJECTION INTRAMUSCULAR; INTRAVENOUS at 14:44

## 2020-03-08 ASSESSMENT — COGNITIVE AND FUNCTIONAL STATUS - GENERAL
HELP NEEDED FOR BATHING: A LITTLE
WALKING IN HOSPITAL ROOM: A LOT
MOBILITY SCORE: 12
STANDING UP FROM CHAIR USING ARMS: A LOT
DRESSING REGULAR UPPER BODY CLOTHING: A LITTLE
DRESSING REGULAR LOWER BODY CLOTHING: A LITTLE
TOILETING: A LITTLE
MOVING TO AND FROM BED TO CHAIR: A LOT
CLIMB 3 TO 5 STEPS WITH RAILING: A LOT
SUGGESTED CMS G CODE MODIFIER DAILY ACTIVITY: CJ
DAILY ACTIVITIY SCORE: 20
MOVING FROM LYING ON BACK TO SITTING ON SIDE OF FLAT BED: A LOT
SUGGESTED CMS G CODE MODIFIER MOBILITY: CL
TURNING FROM BACK TO SIDE WHILE IN FLAT BAD: A LOT

## 2020-03-08 ASSESSMENT — LIFESTYLE VARIABLES
TOTAL SCORE: 0
HOW MANY TIMES IN THE PAST YEAR HAVE YOU HAD 5 OR MORE DRINKS IN A DAY: 0
EVER HAD A DRINK FIRST THING IN THE MORNING TO STEADY YOUR NERVES TO GET RID OF A HANGOVER: NO
EVER FELT BAD OR GUILTY ABOUT YOUR DRINKING: NO
EVER_SMOKED: YES
PACK_YEARS: 1
CONSUMPTION TOTAL: NEGATIVE
DOES PATIENT WANT TO STOP DRINKING: CANNOT ASSESS
HAVE PEOPLE ANNOYED YOU BY CRITICIZING YOUR DRINKING: NO
ALCOHOL_USE: NO
HAVE YOU EVER FELT YOU SHOULD CUT DOWN ON YOUR DRINKING: NO
TOTAL SCORE: 0
AVERAGE NUMBER OF DAYS PER WEEK YOU HAVE A DRINK CONTAINING ALCOHOL: 0
ON A TYPICAL DAY WHEN YOU DRINK ALCOHOL HOW MANY DRINKS DO YOU HAVE: 0
TOTAL SCORE: 0

## 2020-03-08 ASSESSMENT — PATIENT HEALTH QUESTIONNAIRE - PHQ9
SUM OF ALL RESPONSES TO PHQ9 QUESTIONS 1 AND 2: 4
5. POOR APPETITE OR OVEREATING: MORE THAN HALF THE DAYS
SUM OF ALL RESPONSES TO PHQ QUESTIONS 1-9: 10
3. TROUBLE FALLING OR STAYING ASLEEP OR SLEEPING TOO MUCH: NOT AT ALL
7. TROUBLE CONCENTRATING ON THINGS, SUCH AS READING THE NEWSPAPER OR WATCHING TELEVISION: NOT AT ALL
4. FEELING TIRED OR HAVING LITTLE ENERGY: MORE THAN HALF THE DAYS
9. THOUGHTS THAT YOU WOULD BE BETTER OFF DEAD, OR OF HURTING YOURSELF: NOT AT ALL
8. MOVING OR SPEAKING SO SLOWLY THAT OTHER PEOPLE COULD HAVE NOTICED. OR THE OPPOSITE, BEING SO FIGETY OR RESTLESS THAT YOU HAVE BEEN MOVING AROUND A LOT MORE THAN USUAL: NOT AT ALL
6. FEELING BAD ABOUT YOURSELF - OR THAT YOU ARE A FAILURE OR HAVE LET YOURSELF OR YOUR FAMILY DOWN: MORE THAN HALF THE DAYS
2. FEELING DOWN, DEPRESSED, IRRITABLE, OR HOPELESS: MORE THAN HALF THE DAYS
1. LITTLE INTEREST OR PLEASURE IN DOING THINGS: MORE THAN HALF THE DAYS

## 2020-03-08 ASSESSMENT — FIBROSIS 4 INDEX: FIB4 SCORE: 1.77

## 2020-03-08 ASSESSMENT — ENCOUNTER SYMPTOMS: MYALGIAS: 1

## 2020-03-08 ASSESSMENT — COPD QUESTIONNAIRES
DO YOU EVER COUGH UP ANY MUCUS OR PHLEGM?: NO/ONLY WITH OCCASIONAL COLDS OR INFECTIONS
DURING THE PAST 4 WEEKS HOW MUCH DID YOU FEEL SHORT OF BREATH: NONE/LITTLE OF THE TIME
COPD SCREENING SCORE: 0
HAVE YOU SMOKED AT LEAST 100 CIGARETTES IN YOUR ENTIRE LIFE: NO/DON'T KNOW
IN THE PAST 12 MONTHS DO YOU DO LESS THAN YOU USED TO BECAUSE OF YOUR BREATHING PROBLEMS: DISAGREE/UNSURE

## 2020-03-08 NOTE — ED TRIAGE NOTES
Amanda Bae  39 y.o.  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 Orozco, ran out of her wound vac supplies for sacral wound, unable to get to wound care appointments     Patient to triage via wheelchair for above. A & O x 4, GCS 15.    States that she ran out of her supplies yesterday.    Complains of generalized full body cancer pain 10/10. Takes all home medication as prescribed.    Mask applied to patient in triage.    Charge HAYLEY Patel notified of patient.    Discussed with JENIFER Keyes.    Patient directly from Triage to [facility] via wheelchair.

## 2020-03-08 NOTE — ED PROVIDER NOTES
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 Orozco, ran out of her wound vac supplies for sacral wound, unable to get to wound care appointments       [facility]  Amanda Bae is a 39 y.o. female who presents to the emergency department complaining of pain that is out of control.  The patient has a history of stage IV metastatic cancer.  She had a recent debridement of sacral decubitus ulcer gluteal area.  Her pain is out of control.  She states she feels terrible and she feels achy all over.  She reports taking her chronic pain medications but she does not know what these are.  She states she did need some dressing supplies and she is feeling poorly.    Denies any fevers or chills.    REVIEW OF SYSTEMS  See HPI for further details. All other systems are negative.    PAST MEDICAL HISTORY  Past Medical History:   Diagnosis Date   • Cancer (HCC)        FAMILY HISTORY  History reviewed. No pertinent family history.    SOCIAL HISTORY  Social History     Socioeconomic History   • Marital status:      Spouse name: Not on file   • Number of children: Not on file   • Years of education: Not on file   • Highest education level: Not on file   Occupational History   • Not on file   Social Needs   • Financial resource strain: Not on file   • Food insecurity     Worry: Not on file     Inability: Not on file   • Transportation needs     Medical: Not on file     Non-medical: Not on file   Tobacco Use   • Smoking status: Former Smoker     Packs/day: 0.25     Types: Cigarettes   • Smokeless tobacco: Never Used   Substance and Sexual Activity   • Alcohol use: No   • Drug use: Yes     Types: Inhaled     Comment: occasional THC   • Sexual activity: Not on file     Comment:      Lifestyle   • Physical activity     Days per week: Not on file     Minutes per session: Not on file   • Stress: Not on file   Relationships   • Social connections     Talks on phone: Not on file     Gets together: Not on file     Attends Judaism service: Not on file     Active member of club or organization: Not on file     Attends meetings of clubs or organizations: Not on file     Relationship status: Not on file   • Intimate partner violence     Fear of current or ex partner: Not on file     Emotionally abused: Not on file     Physically abused: Not on file     Forced sexual activity: Not on file   Other Topics Concern   • Primary/coprimary nurse & associates Not Asked   • Family contact information Not Asked   • OK to release patient information to the following Not Asked   • Patient preferred routine/Privacy concerns Not Asked   • Patient likes and dislikes Not Asked   • Participating In Research Study Not Asked   • Miscellaneous Not Asked   Social History Narrative   • Not on file       SURGICAL HISTORY  Past Surgical History:   Procedure Laterality Date   • IRRIGATION & DEBRIDEMENT GENERAL N/A 2/1/2020    Procedure: IRRIGATION AND DEBRIDEMENT, WOUND;  Surgeon: Ally Alcazar M.D.;  Location: SURGERY [facility];  Service: General   • OTHER ORTHOPEDIC SURGERY      left hip, right lower ext yusra       CURRENT MEDICATIONS  Home Medications     Reviewed by Marci Cuello R.N. (Registered Nurse) on 03/08/20 at 1352  Med List Status: Complete   Medication Last Dose Status   clonazePAM (KLONOPIN) 1 MG Tab  Active   nicotine (NICODERM) 7 MG/24HR PATCH 24 HR  Active   ondansetron (ZOFRAN ODT) 4 MG TABLET DISPERSIBLE  Active   oxyCODONE (OXY-IR) 30 MG immediate release tablet  Active   oxyCODONE CR (OXYCONTIN) 80 MG Tablet Extended Release 12 hour Abuse-Deterrent  Active   prochlorperazine (COMPAZINE) 10 MG Tab  Active                ALLERGIES  No Known Allergies    PHYSICAL EXAM  VITAL SIGNS: /71   " Pulse (!) 122   Temp 36.2 °C (97.1 °F) (Temporal)   Resp 18   Ht 1.626 m (5' 4\")   Wt 71.7 kg (158 lb)   SpO2 100%   BMI 27.12 kg/m²    Constitutional: Awake alert chronically ill-appearing no acute cardiopulmonary distress.  HENT: Normocephalic, Atraumatic, Bilateral external ears normal, Oropharynx moist, No oral exudates, Nose normal.   Eyes: PERRL, EOMI, Conjunctiva normal, No discharge.   Neck: Normal range of motion, No tenderness, Supple, No stridor.   Lymphatic: No lymphadenopathy noted.   Cardiovascular: Tachycardic murmurs rubs gallops  Thorax & Lungs: Clear to auscultation.  Abdomen: Bowel sounds normal, Soft, No tenderness,  Skin: Warm, Dry, No erythema, No rash.   Back: No tenderness, No CVA tenderness.  Sacral decubitus ulcer with some oozing and drainage of pus.  No surrounding cellulitis.    Musculoskeletal: Good range of motion in all major joints.  Moderate edema  Neurologic: Somnolent but arousable.  At baseline.  Per her own report  Psychiatric: Affect difficult to assess      Results for orders placed or performed during the hospital encounter of 03/08/20   CBC WITH DIFFERENTIAL   Result Value Ref Range    WBC 14.5 (H) 4.8 - 10.8 K/uL    RBC 1.86 (L) 4.20 - 5.40 M/uL    Hemoglobin 5.7 (LL) 12.0 - 16.0 g/dL    Hematocrit 19.7 (L) 37.0 - 47.0 %    .9 (H) 81.4 - 97.8 fL    MCH 30.6 27.0 - 33.0 pg    MCHC 28.9 (L) 33.6 - 35.0 g/dL    RDW 89.0 (H) 35.9 - 50.0 fL    Platelet Count 171 164 - 446 K/uL    MPV 10.0 9.0 - 12.9 fL    Neutrophils-Polys 69.70 44.00 - 72.00 %    Lymphocytes 19.10 (L) 22.00 - 41.00 %    Monocytes 8.30 0.00 - 13.40 %    Eosinophils 0.50 0.00 - 6.90 %    Basophils 0.40 0.00 - 1.80 %    Immature Granulocytes 2.00 (H) 0.00 - 0.90 %    Nucleated RBC 9.60 /100 WBC    Neutrophils (Absolute) 10.10 (H) 2.00 - 7.15 K/uL    Lymphs (Absolute) 2.77 1.00 - 4.80 K/uL    Monos (Absolute) 1.21 (H) 0.00 - 0.85 K/uL    Eos (Absolute) 0.07 0.00 - 0.51 K/uL    Baso (Absolute) 0.06 " 0.00 - 0.12 K/uL    Immature Granulocytes (abs) 0.29 (H) 0.00 - 0.11 K/uL    NRBC (Absolute) 1.39 K/uL    Hypochromia 2+     Anisocytosis 3+     Macrocytosis 3+    COMP METABOLIC PANEL   Result Value Ref Range    Sodium 134 (L) 135 - 145 mmol/L    Potassium 3.4 (L) 3.6 - 5.5 mmol/L    Chloride 97 96 - 112 mmol/L    Co2 25 20 - 33 mmol/L    Anion Gap 12.0 (H) 0.0 - 11.9    Glucose 139 (H) 65 - 99 mg/dL    Bun 15 8 - 22 mg/dL    Creatinine 0.52 0.50 - 1.40 mg/dL    Calcium 9.0 8.5 - 10.5 mg/dL    AST(SGOT) 37 12 - 45 U/L    ALT(SGPT) 22 2 - 50 U/L    Alkaline Phosphatase 106 (H) 30 - 99 U/L    Total Bilirubin 1.2 0.1 - 1.5 mg/dL    Albumin 3.3 3.2 - 4.9 g/dL    Total Protein 7.2 6.0 - 8.2 g/dL    Globulin 3.9 (H) 1.9 - 3.5 g/dL    A-G Ratio 0.8 g/dL   ESTIMATED GFR   Result Value Ref Range    GFR If African American >60 >60 mL/min/1.73 m 2    GFR If Non African American >60 >60 mL/min/1.73 m 2   PERIPHERAL SMEAR REVIEW   Result Value Ref Range    Peripheral Smear Review see below    PLATELET ESTIMATE   Result Value Ref Range    Plt Estimation Normal    MORPHOLOGY   Result Value Ref Range    RBC Morphology Present     Polychromia 3+     Poikilocytosis 1+     Target Cells 1+    DIFFERENTIAL COMMENT   Result Value Ref Range    Comments-Diff see below    Blood Culture,Hold   Result Value Ref Range    Blood Culture Hold Collected    COD - Adult (Type and Screen)   Result Value Ref Range    ABO Grouping Only O     Rh Grouping Only POS     Antibody Screen-Cod NEG         COURSE & MEDICAL DECISION MAKING  Pertinent Labs & Imaging studies reviewed. (See chart for details)    Patient presents to the emergency department for evaluation of pain but she is generally doing poorly.  She is unable to control her pain or take care of herself.  She is not apparently been following up.    The patient complains of just only not feeling well being achy.  She is given a dose of IV morphine plus Zofran she is on IV fluid resuscitation  because she is tachycardic.    Because the patient's mental status is and will tolerate fluids.    She is given IV fluids and reassessed and is improved a little bit.    She is found to have leukocytosis but I think is unlikely that she is septic.    The patient is anemic.  She has been DNR/DNI in the past.  She wishes to stay this way.  I will the hospitalist discuss further palliative care options versus more aggressive therapy.  For now control her pain admit her for continued work-up and treatment.  Care discussed with the hospitalist.      FINAL IMPRESSION  1. Pressure injury of sacral region, stage 3 (HCC)     2. Metastatic cancer (HCC)     3. Anemia of chronic disease     4. Adult failure to thrive         3.         Electronically signed by: Marc Dennis M.D., 3/8/2020 2:26 PM

## 2020-03-08 NOTE — H&P
Hospital Medicine History & Physical Note    Date of Service  3/8/2020    Primary Care Physician  Rosita Delaney M.D.    Consultants  Palliative care    Code Status  DNR/DNI    Chief Complaint  Pain and fatigue    History of Presenting Illness    39 y.o. female with past medical history of stage IV cancer who presented to the hospital on 3/8/2020 with pain and fatigue.  She found to have low hemoglobin and she was started on blood transfusion.  When I evaluated her she was not providing information other than she reported pain and she did not specify the site of pain.  I asked her about blood transfusion and she quested blood transfusion and saving treatment.  She is DNR/DNI      History is very limited as patient is very somnolent could be due to pain medication and I was unable to obtain detailed information from the patient.    I discussed about this admission with ED physician Dr. Dennis.    ER course: I discussed finding of anemia with patient and she expressed that she is okay to receiving blood transfusion.  I requested palliative care consult    Review of Systems  Review of Systems   Unable to perform ROS: Mental acuity   Musculoskeletal: Positive for myalgias.       Past Medical History   has a past medical history of Cancer (HCC).    Surgical History   has a past surgical history that includes other orthopedic surgery and irrigation & debridement general (N/A, 2/1/2020).     Family History  I was unable to review family history with patient as she was very somnolent at the time of evaluation.    Social History   reports that she has quit smoking. Her smoking use included cigarettes. She smoked 0.25 packs per day. She has never used smokeless tobacco. She reports current drug use. Drug: Inhaled. She reports that she does not drink alcohol.    Allergies  No Known Allergies    Medications  Prior to Admission Medications   Prescriptions Last Dose Informant Patient Reported? Taking?   clonazePAM (KLONOPIN)  1 MG Tab 3/7/2020 at HS Significant Other Yes No   Sig: TAKE 1 TABLET BY MOUTH AT BEDTIME MAY REPEAT 1 TAB IN 2 HRS FOR SLEEP   oxyCODONE (OXY-IR) 30 MG immediate release tablet 3/8/2020 at am Significant Other Yes No   Sig: Take 30 mg by mouth every 3 hours.   oxyCODONE CR (OXYCONTIN) 80 MG Tablet Extended Release 12 hour Abuse-Deterrent 3/8/2020 at AM Significant Other Yes No   Sig: Take 80 mg by mouth 4 times a day. 0600, 1200, 1800, 0000   topiramate (TOPAMAX) 50 MG tablet 3/8/2020 at HS Significant Other Yes Yes   Sig: Take 50 mg by mouth every 6 hours. 0600, 1200, 1800, 0000      Facility-Administered Medications: None       Physical Exam  Temp:  [36.2 °C (97.1 °F)] 36.2 °C (97.1 °F)  Pulse:  [116-122] 117  Resp:  [18] 18  BP: ()/(59-72) 111/59  SpO2:  [91 %-100 %] 92 %    Physical Exam  Vitals signs reviewed.   Constitutional:       General: She is not in acute distress.     Appearance: She is ill-appearing and toxic-appearing.   HENT:      Head: Normocephalic and atraumatic.      Nose: No congestion.   Eyes:      General:         Right eye: No discharge.         Left eye: No discharge.      Pupils: Pupils are equal, round, and reactive to light.   Neck:      Musculoskeletal: Normal range of motion. No neck rigidity.   Cardiovascular:      Rate and Rhythm: Regular rhythm. Tachycardia present.      Pulses: Normal pulses.      Heart sounds: Normal heart sounds. No murmur.   Pulmonary:      Effort: Pulmonary effort is normal. No respiratory distress.      Breath sounds: Normal breath sounds. No stridor.   Abdominal:      General: Bowel sounds are normal. There is no distension.      Palpations: Abdomen is soft.      Tenderness: There is no abdominal tenderness.   Musculoskeletal: Normal range of motion.         General: No swelling or tenderness.   Skin:     General: Skin is warm.      Capillary Refill: Capillary refill takes less than 2 seconds.      Coloration: Skin is not jaundiced or pale.       Findings: Lesion and rash present. No bruising.   Neurological:      General: No focal deficit present.      Mental Status: She is alert.      Cranial Nerves: No cranial nerve deficit.      Comments: Unable to perform complete neuro exam as she is very somnolent.  She was moving her extremities   Psychiatric:      Comments: Unable to perform psychiatric/behavioral evaluation and patient is very somnolent.         Laboratory:  Recent Labs     03/08/20  1435   WBC 14.5*   RBC 1.86*   HEMOGLOBIN 5.7*   HEMATOCRIT 19.7*   .9*   MCH 30.6   MCHC 28.9*   RDW 89.0*   PLATELETCT 171   MPV 10.0     Recent Labs     03/08/20  1435   SODIUM 134*   POTASSIUM 3.4*   CHLORIDE 97   CO2 25   GLUCOSE 139*   BUN 15   CREATININE 0.52   CALCIUM 9.0     Recent Labs     03/08/20  1435   ALTSGPT 22   ASTSGOT 37   ALKPHOSPHAT 106*   TBILIRUBIN 1.2   GLUCOSE 139*         No results for input(s): NTPROBNP in the last 72 hours.      No results for input(s): TROPONINT in the last 72 hours.    Urinalysis:    No results found     Imaging:  No orders to display         Assessment/Plan:  I anticipate this patient will require at least two midnights for appropriate medical management, necessitating inpatient admission.    Metastatic cancer (HCC)- (present on admission)  Assessment & Plan  She has a metastatic cancer.  I reviewed her last discharge summary she is DNR/DNI and she is not on hospice  I discussed with her regarding low hemoglobin and she expressed she is on blood transfusion  I requested palliative care consult.    Anemia of chronic disease- (present on admission)  Assessment & Plan  She has chronic anemia and today she found to have hemoglobin of 5.7.  I discussed with patient regarding her low hemoglobin and she said yes on blood transfusion.  I ordered 1 unit of blood transfusion.  I reviewed her last discharge summary which indicates patient is DNR/DNI when I discussed CODE STATUS with her and explained DNR she said yes on it  as well.  She is minimally communicative and I was unable to obtain detailed information from patient.  I requested palliative care consult.      DNR (do not resuscitate)  Assessment & Plan  I reviewed her prior discharge summary and it indicates patient was DNR on prior hospitalization.  I discussed about DNR with her and she expressed yes on DNR.  I was unable to discuss with her further advance care planning as she was very somnolent.  I requested palliative care consult.    Cancer associated pain- (present on admission)  Assessment & Plan  She has been receiving significant amount of pain medication.  That could be the result for her somnolence.  Continue provided pain control and monitor for adverse effect of pain medications per  I requested palliative care consult      I reviewed her last discharge summary which is from February 12, 2020.    VTE prophylaxis: SCDs as patient has anemia requiring blood transfusion

## 2020-03-08 NOTE — DISCHARGE PLANNING
Appears patient has not had wound looked at since leaving hospital 4 weeks ago.Has not keep wound care appointments.States her  has been buying supplies at [facility].  Patient states she took her wound vac off and its at home.

## 2020-03-08 NOTE — ED NOTES
Lab called with critical result of Hemoglobin 4.7 and hematocrit 14.7 at 1510. Critical lab result read back.   Dr. Dennis notified of critical lab result at 1515.  Critical lab result read back by Dr. Dennis.

## 2020-03-09 ENCOUNTER — APPOINTMENT (OUTPATIENT)
Dept: RADIOLOGY | Facility: MEDICAL CENTER | Age: 40
DRG: 843 | End: 2020-03-09
Attending: INTERNAL MEDICINE
Payer: MEDICAID

## 2020-03-09 PROBLEM — R53.81 DEBILITY: Chronic | Status: ACTIVE | Noted: 2020-03-09

## 2020-03-09 PROBLEM — Z66 DNR (DO NOT RESUSCITATE): Status: ACTIVE | Noted: 2020-03-09

## 2020-03-09 LAB
ALBUMIN SERPL BCP-MCNC: 2.6 G/DL (ref 3.2–4.9)
ALBUMIN/GLOB SERPL: 0.9 G/DL
ALP SERPL-CCNC: 90 U/L (ref 30–99)
ALT SERPL-CCNC: 15 U/L (ref 2–50)
ANION GAP SERPL CALC-SCNC: 9 MMOL/L (ref 0–11.9)
AST SERPL-CCNC: 27 U/L (ref 12–45)
BASOPHILS # BLD AUTO: 0.7 % (ref 0–1.8)
BASOPHILS # BLD: 0.07 K/UL (ref 0–0.12)
BILIRUB SERPL-MCNC: 1 MG/DL (ref 0.1–1.5)
BUN SERPL-MCNC: 12 MG/DL (ref 8–22)
CALCIUM SERPL-MCNC: 8 MG/DL (ref 8.5–10.5)
CHLORIDE SERPL-SCNC: 103 MMOL/L (ref 96–112)
CO2 SERPL-SCNC: 24 MMOL/L (ref 20–33)
CREAT SERPL-MCNC: 0.43 MG/DL (ref 0.5–1.4)
EOSINOPHIL # BLD AUTO: 0.06 K/UL (ref 0–0.51)
EOSINOPHIL NFR BLD: 0.6 % (ref 0–6.9)
ERYTHROCYTE [DISTWIDTH] IN BLOOD BY AUTOMATED COUNT: 66.4 FL (ref 35.9–50)
ERYTHROCYTE [DISTWIDTH] IN BLOOD BY AUTOMATED COUNT: 69.5 FL (ref 35.9–50)
ERYTHROCYTE [DISTWIDTH] IN BLOOD BY AUTOMATED COUNT: 74.3 FL (ref 35.9–50)
GLOBULIN SER CALC-MCNC: 3 G/DL (ref 1.9–3.5)
GLUCOSE SERPL-MCNC: 114 MG/DL (ref 65–99)
HCT VFR BLD AUTO: 17.9 % (ref 37–47)
HCT VFR BLD AUTO: 21.1 % (ref 37–47)
HCT VFR BLD AUTO: 27.6 % (ref 37–47)
HGB BLD-MCNC: 5.3 G/DL (ref 12–16)
HGB BLD-MCNC: 6.7 G/DL (ref 12–16)
HGB BLD-MCNC: 8.6 G/DL (ref 12–16)
IMM GRANULOCYTES # BLD AUTO: 0.3 K/UL (ref 0–0.11)
IMM GRANULOCYTES NFR BLD AUTO: 3 % (ref 0–0.9)
LDH SERPL L TO P-CCNC: 285 U/L (ref 107–266)
LYMPHOCYTES # BLD AUTO: 2.34 K/UL (ref 1–4.8)
LYMPHOCYTES NFR BLD: 23.3 % (ref 22–41)
MCH RBC QN AUTO: 29.9 PG (ref 27–33)
MCH RBC QN AUTO: 30.5 PG (ref 27–33)
MCH RBC QN AUTO: 31.2 PG (ref 27–33)
MCHC RBC AUTO-ENTMCNC: 29.6 G/DL (ref 33.6–35)
MCHC RBC AUTO-ENTMCNC: 31.2 G/DL (ref 33.6–35)
MCHC RBC AUTO-ENTMCNC: 31.8 G/DL (ref 33.6–35)
MCV RBC AUTO: 102.9 FL (ref 81.4–97.8)
MCV RBC AUTO: 95.8 FL (ref 81.4–97.8)
MCV RBC AUTO: 98.1 FL (ref 81.4–97.8)
MONOCYTES # BLD AUTO: 0.82 K/UL (ref 0–0.85)
MONOCYTES NFR BLD AUTO: 8.2 % (ref 0–13.4)
NEUTROPHILS # BLD AUTO: 6.45 K/UL (ref 2–7.15)
NEUTROPHILS NFR BLD: 64.2 % (ref 44–72)
NRBC # BLD AUTO: 1.07 K/UL
NRBC BLD-RTO: 10.7 /100 WBC
PLATELET # BLD AUTO: 109 K/UL (ref 164–446)
PLATELET # BLD AUTO: 96 K/UL (ref 164–446)
PLATELET # BLD AUTO: 99 K/UL (ref 164–446)
PMV BLD AUTO: 10.2 FL (ref 9–12.9)
PMV BLD AUTO: 9.5 FL (ref 9–12.9)
PMV BLD AUTO: 9.7 FL (ref 9–12.9)
POTASSIUM SERPL-SCNC: 3.6 MMOL/L (ref 3.6–5.5)
PROT SERPL-MCNC: 5.6 G/DL (ref 6–8.2)
RBC # BLD AUTO: 1.74 M/UL (ref 4.2–5.4)
RBC # BLD AUTO: 2.15 M/UL (ref 4.2–5.4)
RBC # BLD AUTO: 2.88 M/UL (ref 4.2–5.4)
SODIUM SERPL-SCNC: 136 MMOL/L (ref 135–145)
WBC # BLD AUTO: 10 K/UL (ref 4.8–10.8)
WBC # BLD AUTO: 12.6 K/UL (ref 4.8–10.8)
WBC # BLD AUTO: 9.1 K/UL (ref 4.8–10.8)

## 2020-03-09 PROCEDURE — 700102 HCHG RX REV CODE 250 W/ 637 OVERRIDE(OP): Performed by: INTERNAL MEDICINE

## 2020-03-09 PROCEDURE — 36430 TRANSFUSION BLD/BLD COMPNT: CPT

## 2020-03-09 PROCEDURE — 700111 HCHG RX REV CODE 636 W/ 250 OVERRIDE (IP): Performed by: INTERNAL MEDICINE

## 2020-03-09 PROCEDURE — 770006 HCHG ROOM/CARE - MED/SURG/GYN SEMI*

## 2020-03-09 PROCEDURE — A9270 NON-COVERED ITEM OR SERVICE: HCPCS | Performed by: INTERNAL MEDICINE

## 2020-03-09 PROCEDURE — 71045 X-RAY EXAM CHEST 1 VIEW: CPT

## 2020-03-09 PROCEDURE — 85027 COMPLETE CBC AUTOMATED: CPT

## 2020-03-09 PROCEDURE — 93005 ELECTROCARDIOGRAM TRACING: CPT | Performed by: INTERNAL MEDICINE

## 2020-03-09 PROCEDURE — 80053 COMPREHEN METABOLIC PANEL: CPT

## 2020-03-09 PROCEDURE — 85025 COMPLETE CBC W/AUTO DIFF WBC: CPT

## 2020-03-09 PROCEDURE — 700105 HCHG RX REV CODE 258: Performed by: INTERNAL MEDICINE

## 2020-03-09 PROCEDURE — P9016 RBC LEUKOCYTES REDUCED: HCPCS

## 2020-03-09 PROCEDURE — 86945 BLOOD PRODUCT/IRRADIATION: CPT | Mod: 91

## 2020-03-09 PROCEDURE — 99232 SBSQ HOSP IP/OBS MODERATE 35: CPT | Mod: 25 | Performed by: INTERNAL MEDICINE

## 2020-03-09 PROCEDURE — 36415 COLL VENOUS BLD VENIPUNCTURE: CPT

## 2020-03-09 PROCEDURE — 99497 ADVNCD CARE PLAN 30 MIN: CPT | Performed by: INTERNAL MEDICINE

## 2020-03-09 PROCEDURE — 86923 COMPATIBILITY TEST ELECTRIC: CPT

## 2020-03-09 PROCEDURE — C9113 INJ PANTOPRAZOLE SODIUM, VIA: HCPCS | Performed by: INTERNAL MEDICINE

## 2020-03-09 PROCEDURE — 83615 LACTATE (LD) (LDH) ENZYME: CPT

## 2020-03-09 RX ORDER — POTASSIUM CHLORIDE 20 MEQ/1
40 TABLET, EXTENDED RELEASE ORAL ONCE
Status: COMPLETED | OUTPATIENT
Start: 2020-03-09 | End: 2020-03-09

## 2020-03-09 RX ORDER — BUDESONIDE AND FORMOTEROL FUMARATE DIHYDRATE 80; 4.5 UG/1; UG/1
2 AEROSOL RESPIRATORY (INHALATION) 2 TIMES DAILY
Status: DISCONTINUED | OUTPATIENT
Start: 2020-03-09 | End: 2020-03-11 | Stop reason: HOSPADM

## 2020-03-09 RX ORDER — OMEPRAZOLE 20 MG/1
20 CAPSULE, DELAYED RELEASE ORAL DAILY
Status: DISCONTINUED | OUTPATIENT
Start: 2020-03-09 | End: 2020-03-09

## 2020-03-09 RX ORDER — BUDESONIDE AND FORMOTEROL FUMARATE DIHYDRATE 80; 4.5 UG/1; UG/1
2 AEROSOL RESPIRATORY (INHALATION)
Status: DISCONTINUED | OUTPATIENT
Start: 2020-03-09 | End: 2020-03-09

## 2020-03-09 RX ORDER — PANTOPRAZOLE SODIUM 40 MG/10ML
40 INJECTION, POWDER, LYOPHILIZED, FOR SOLUTION INTRAVENOUS 2 TIMES DAILY
Status: DISCONTINUED | OUTPATIENT
Start: 2020-03-09 | End: 2020-03-10

## 2020-03-09 RX ORDER — IPRATROPIUM BROMIDE AND ALBUTEROL SULFATE 2.5; .5 MG/3ML; MG/3ML
3 SOLUTION RESPIRATORY (INHALATION)
Status: DISCONTINUED | OUTPATIENT
Start: 2020-03-09 | End: 2020-03-11 | Stop reason: HOSPADM

## 2020-03-09 RX ADMIN — PANTOPRAZOLE SODIUM 40 MG: 40 INJECTION, POWDER, LYOPHILIZED, FOR SOLUTION INTRAVENOUS at 18:03

## 2020-03-09 RX ADMIN — TOPIRAMATE 50 MG: 25 TABLET, FILM COATED ORAL at 12:44

## 2020-03-09 RX ADMIN — TOPIRAMATE 50 MG: 25 TABLET, FILM COATED ORAL at 18:03

## 2020-03-09 RX ADMIN — SODIUM CHLORIDE: 9 INJECTION, SOLUTION INTRAVENOUS at 04:13

## 2020-03-09 RX ADMIN — POTASSIUM CHLORIDE 40 MEQ: 1500 TABLET, EXTENDED RELEASE ORAL at 04:12

## 2020-03-09 RX ADMIN — TOPIRAMATE 50 MG: 25 TABLET, FILM COATED ORAL at 04:12

## 2020-03-09 RX ADMIN — SENNOSIDES AND DOCUSATE SODIUM 2 TABLET: 8.6; 5 TABLET ORAL at 18:03

## 2020-03-09 RX ADMIN — OXYCODONE HYDROCHLORIDE 30 MG: 30 TABLET ORAL at 23:29

## 2020-03-09 RX ADMIN — PANTOPRAZOLE SODIUM 40 MG: 40 INJECTION, POWDER, LYOPHILIZED, FOR SOLUTION INTRAVENOUS at 13:12

## 2020-03-09 RX ADMIN — OMEPRAZOLE 20 MG: 20 CAPSULE, DELAYED RELEASE ORAL at 09:39

## 2020-03-09 RX ADMIN — SODIUM CHLORIDE: 9 INJECTION, SOLUTION INTRAVENOUS at 23:19

## 2020-03-09 RX ADMIN — ACETAMINOPHEN 650 MG: 325 TABLET, FILM COATED ORAL at 04:11

## 2020-03-09 ASSESSMENT — ENCOUNTER SYMPTOMS
CARDIOVASCULAR NEGATIVE: 1
RESPIRATORY NEGATIVE: 1
GASTROINTESTINAL NEGATIVE: 1
FALLS: 1
EYES NEGATIVE: 1
NECK PAIN: 1
BACK PAIN: 1
MYALGIAS: 1
WEIGHT LOSS: 1

## 2020-03-09 ASSESSMENT — FIBROSIS 4 INDEX: FIB4 SCORE: 2.37

## 2020-03-09 NOTE — PROGRESS NOTES
Assumed care of patient at 0700.  Report received at bedside.  Patient is lethargic and somnolent, not willing to respond or participate in assessment.  BP remains low.  Will continue to monitor.    1025:  Spoke to Palliative RN regarding patient condition and reaching out to family.

## 2020-03-09 NOTE — ASSESSMENT & PLAN NOTE
She has chronic anemia and today she found to have hemoglobin of 5.7.  No significant elevation on LDH or bilirubin and no signs of GI bleeding; rectal exam showed normal stool  Received 3 units red blood cell and stable with no significant improving on her hemoglobin  Likely related to her cancer  Stop IVF   Oncology was consulted discussed the case with Dr. Mccarthy. No further intervention.

## 2020-03-09 NOTE — ED NOTES
Patient sleeping. No apparent sign of discomfort, distress. Call bell within reach, side rails up, bed in low position. Patient awaiting admit. Repositioning self in bed.

## 2020-03-09 NOTE — ASSESSMENT & PLAN NOTE
The patient was not able to make a good conversation however according to admission physician and my discharge summary the patient always DNR and DNI.

## 2020-03-09 NOTE — CARE PLAN
Problem: Nutritional:  Goal: Achieve adequate nutritional intake  Description: Patient will consume 50% of meals and supplements   Outcome: NOT MET

## 2020-03-09 NOTE — ASSESSMENT & PLAN NOTE
Stage IV with metastasis to the bone  Diagnosed after left femur biopsy in 1/10/2018, completed palliative XRT and then started gemcitabine on 3/16/2018 and completed 7 cycles, she had pathologic left hip fracture, PET scan in 2019 showed extensive bone metastasis, with splenic metastasis and possible hypermetabolic liver lesions.  MRI brain on 7/22/2019 showed diffuse osseous metastatic disease and no evidence of brain metastasis.  anemia and failure to thrive  Chronic pain on OxyContin 3 times a day  Oncology was consulted, case was discussed with Dr. Mccarthy  Palliative was consulted. As per above. Pt not ready for hospice

## 2020-03-09 NOTE — THERAPY
PT consult received. RN requested to defer PT eval today due to low Hgb. Will initiate PT evaluation as medically appropriate. Thanks    Hellen Barclay, PT, DPT Pager: [phone]

## 2020-03-09 NOTE — ASSESSMENT & PLAN NOTE
With generalized weakness and low appetite with weight loss  Failure to thrive due to cancer stage IV  Palliative was consulted.pt and  seems like they have never been informed well if they have any other treatment option. Not ready for hospice. Will give some time and continue to discuss   PT and OT  Nutrition

## 2020-03-09 NOTE — DISCHARGE PLANNING
Patient is eligible for Medicaid Meds to Beds at discharge if they have coverage with [place] Medicaid, Medicaid FFS, Medicaid HMO ([facility]), or [place]. This service is provided through the [facility] if orders are received by the pharmacy prior to 4pm Monday through Friday excluding holidays. Preferred pharmacy has been changed to [facility]. Please call x [phone] prior to discharge.

## 2020-03-09 NOTE — PROGRESS NOTES
2 RN skin check completed with Robb RN  Devices in place x1 PIV, nasal cannula, R chest port.  Skin assessed under devices, yes  Confirmed pressure ulcers found on, yes. Pressure ulcer to sacrum, picture taken, mepilex in place.  New potential pressure ulcers noted on, yes. Wound consult placed, yes.   Pts generalized skin is flaky, peeling and calloused on the feet.  The following interventions in place: q2 turns, pillows for comfort, mepilex.

## 2020-03-09 NOTE — PROGRESS NOTES
[facility] Medicine Daily Progress Note    Date of Service  3/9/2020    Chief Complaint  39 y.o. 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 Mccarthy oncologist.*        Interval Problem Update  -Evaluated and examined the patient at bedside, very lethargic with generalized pain, hemoglobin still less than 7, second red blood cell unit was given, IV fluid for hypotension  -No source of infection however blood culture is pending.  -She has no wheezing; started with Symbicort and inhalers, chest x-ray was ordered.  -Discussed with her  about the plan of care, he will think about comfort care, and discussed the hospice option with the palliative team and oncologist.  -Palliative was consulted.       Consultants/Specialty  Oncologist    Code Status  DNR/DNI    Disposition  To be determined later after discussion with the palliative team and her .  And after PT and OT evaluation    Review of Systems  Review of Systems   Constitutional: Positive for malaise/fatigue and weight loss.   HENT: Negative.    Eyes: Negative.    Respiratory: Negative.    Cardiovascular: Negative.    Gastrointestinal: Negative.    Genitourinary: Negative.    Musculoskeletal: Positive for back pain, falls, joint pain, myalgias and neck pain.        Physical Exam  Temp:  [36.3 °C (97.3 °F)-37.9 °C (100.3 °F)] 36.9 °C (98.4 °F)  Pulse:  [] 109  Resp:  [16-20] 18  BP:  ()/(45-62) 96/62  SpO2:  [94 %-100 %] 96 %    Physical Exam  Constitutional:       Comments: in pain   Eyes:      General: No scleral icterus.  Cardiovascular:      Rate and Rhythm: Normal rate.      Heart sounds: No murmur.   Pulmonary:      Effort: Pulmonary effort is normal. No respiratory distress.      Breath sounds: Wheezing present. No rales.   Abdominal:      General: Abdomen is flat. Bowel sounds are normal. There is no distension.      Tenderness: There is no abdominal tenderness. There is no guarding.   Genitourinary:     Rectum: Normal. Guaiac result negative.      Comments: Hemorrhoid but no signs of mass or active bleeding  Musculoskeletal:         General: No swelling or deformity.      Right lower leg: No edema.   Skin:     General: Skin is warm.      Coloration: Skin is not jaundiced.      Findings: No bruising.   Neurological:      General: No focal deficit present.      Mental Status: She is alert. Mental status is at baseline.      Cranial Nerves: No cranial nerve deficit.      Motor: No weakness.      Comments: The patient is oriented however she is very lethargic and not able to keep with a conversation         Fluids    Intake/Output Summary (Last 24 hours) at 3/9/2020 1831  Last data filed at 3/9/2020 1345  Gross per 24 hour   Intake 655 ml   Output 800 ml   Net -145 ml       Laboratory  Recent Labs     03/08/20  1435 03/09/20  0311 03/09/20  1019   WBC 14.5* 10.0 9.1   RBC 1.86* 1.74* 2.15*   HEMOGLOBIN 5.7* 5.3* 6.7*   HEMATOCRIT 19.7* 17.9* 21.1*   .9* 102.9* 98.1*   MCH 30.6 30.5 31.2   MCHC 28.9* 29.6* 31.8*   RDW 89.0* 74.3* 66.4*   PLATELETCT 171 109* 99*   MPV 10.0 10.2 9.5     Recent Labs     03/08/20  1435 03/09/20  0311   SODIUM 134* 136   POTASSIUM 3.4* 3.6   CHLORIDE 97 103   CO2 25 24   GLUCOSE 139* 114*   BUN 15 12   CREATININE 0.52 0.43*   CALCIUM 9.0 8.0*                   Imaging  DX-CHEST-PORTABLE (1 VIEW)    (Results Pending)        Assessment/Plan  *  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  MRI brain on 7/22/2019 showed diffuse osseous metastatic disease and no evidence of brain metastasis.  anemia and failure to thrive  Chronic pain on OxyContin 3 times a day  Oncology was consulted, case was discussed with Dr. Mccarthy  Palliative was consulted    Debility- (present on admission)  Assessment & Plan  With generalized weakness and low appetite with weight loss  Failure to thrive due to cancer stage IV  Palliative was consulted  PT and OT  Nutrition      Anemia of chronic disease- (present on admission)  Assessment & Plan  She has chronic anemia and today she found to have hemoglobin of 5.7.  No significant elevation on LDH or bilirubin and no signs of GI bleeding; rectal exam showed normal stool  Received 3 units red blood cell and stable with no significant improving on her hemoglobin  Likely related to her cancer  Continue IV fluid and monitoring her hemoglobin every 12 hours  Oncology was consulted discussed the case with Dr. Mccarthy        Cancer associated pain- (present on admission)  Assessment & Plan  Metastasis to the bone, pain on all over  she has been receiving significant amount of pain medication.  The patient has some somnolence and hypotension  Due to hypotension, we might need to switch OxyContin to fentanyl patch  Palliative team was consulted    Metastatic cancer (HCC)- (present on admission)  Assessment & Plan  She has a metastatic cancer, epithelioid hemangioendothelioma   PET: Extensive bony metastatic disease in 11/2019  On OxyContin for chronic pain  Oncology was  consulted  Palliative was consulted    DNR (do not resuscitate)  Assessment & Plan  The patient was not able to make a good conversation however according to admission physician and my discharge summary the patient always DNR and DNI.  I had long discussion with the  more than 30 minutes, explained to him the poor prognosis of the cancer stage IV, discussed the quality of life and comfort care option, he is open to think about hospice and comfort care however he wants to discuss with the oncologist and palliative team.        VTE prophylaxis: SCDs only hold on Lovenox or heparin due to anemia and possible bleeding.

## 2020-03-09 NOTE — PROGRESS NOTES
RN in room, 1unit of RBC's started at 2254. Pre transfusion VSS, see VS flowsheet.    Transfusion stopped at 0000, no reactions noted, pt tolerated well.

## 2020-03-09 NOTE — DIETARY
Nutrition services: Day 1 of admit.  40 yo female admitted with anemia.  Consult for malnutrition score of 5 - weight loss and poor appetite PTA.    Evaluation:  1. Weight on admit 67.2 kg is decreased 9.5 kg from 76.7 kg on December 6, 2019 admit.this is a 12% severe wt loss.  2. Pt reports weight loss secondary to cancer, chemo and decreased intake of less than 75% of normal.   3. Pt reports she does drink Ensure supplements when she can - up to 3 per day.  4. GI Soft diet ordered this afternoon.  Nutrition representative visited pt for meal preferences.  Supplements ordered with all meals.     Malnutrition risk: severe chronic illness related malnutrition as evidenced by 12% weight loss and <75% of energy intake over 3 months.     Recommendations/Plan:  1. encourage intake of meals and supplements  2. document intake of all meals and supplements as % taken in ADL's to provide interdisciplinary communication across all shifts   3. monitor daily weights  4. Nutrition rep will continue to see patient for ongoing meal and snack preferences.

## 2020-03-09 NOTE — ASSESSMENT & PLAN NOTE
Metastasis to the bone, pain on all over  she has been receiving significant amount of pain medication.  The patient has some somnolence and hypotension  Ok to use oxycontin prn. Fentanyl added   Palliative team was consulted

## 2020-03-09 NOTE — PROGRESS NOTES
"Palliative Care   Attempted Palliative Care consult for Advance Care Planning.  Dr. Dawson, Geriatric Fellow present during interview.  Patient resting on right side, appears very fatigued and lethargic, is minimally verbally responsive.  Patient receiving blood transfusion at this time.  Patient indicates she is too tired to talk right now.  When I asked patient if she would like us to return after her blood transfusion she said, \"Yes, please.\"  Goal to [place] back later today or tomorrow to attempt Advance Care Planning discussion.     ALEJANDRO Johnson, [facility]  Palliative Care Nurse Practitioner  [phone]      "

## 2020-03-09 NOTE — THERAPY
"Occupational Therapy Evaluation Held    OT held t/o am due to low Hbg. Nurse aware. Will continue to follow    See \"Rehab Therapy-Acute\" Patient Summary Report for complete documentation.    "

## 2020-03-09 NOTE — PROGRESS NOTES
Dr. Solis paged to inform of critical labs, hgb: 5.3 and hct 17.9    0440- orders received, transfuse 1unit RBC then recheck H&H.

## 2020-03-09 NOTE — ASSESSMENT & PLAN NOTE
She has a metastatic cancer, epithelioid hemangioendothelioma   PET: Extensive bony metastatic disease in 11/2019  On OxyContin for chronic pain  Oncology was consulted  Palliative was consulted

## 2020-03-10 PROBLEM — I95.9 HYPOTENSION: Status: ACTIVE | Noted: 2020-03-10

## 2020-03-10 LAB
ALBUMIN SERPL BCP-MCNC: 2.3 G/DL (ref 3.2–4.9)
ALBUMIN/GLOB SERPL: 0.7 G/DL
ALP SERPL-CCNC: 86 U/L (ref 30–99)
ALT SERPL-CCNC: 14 U/L (ref 2–50)
ANION GAP SERPL CALC-SCNC: 8 MMOL/L (ref 0–11.9)
AST SERPL-CCNC: 25 U/L (ref 12–45)
BASOPHILS # BLD AUTO: 0.5 % (ref 0–1.8)
BASOPHILS # BLD: 0.06 K/UL (ref 0–0.12)
BILIRUB SERPL-MCNC: 1.1 MG/DL (ref 0.1–1.5)
BUN SERPL-MCNC: 10 MG/DL (ref 8–22)
CALCIUM SERPL-MCNC: 8 MG/DL (ref 8.5–10.5)
CHLORIDE SERPL-SCNC: 108 MMOL/L (ref 96–112)
CO2 SERPL-SCNC: 23 MMOL/L (ref 20–33)
CREAT SERPL-MCNC: 0.44 MG/DL (ref 0.5–1.4)
EKG IMPRESSION: NORMAL
EOSINOPHIL # BLD AUTO: 0.04 K/UL (ref 0–0.51)
EOSINOPHIL NFR BLD: 0.3 % (ref 0–6.9)
ERYTHROCYTE [DISTWIDTH] IN BLOOD BY AUTOMATED COUNT: 73.5 FL (ref 35.9–50)
GLOBULIN SER CALC-MCNC: 3.2 G/DL (ref 1.9–3.5)
GLUCOSE SERPL-MCNC: 110 MG/DL (ref 65–99)
HCT VFR BLD AUTO: 25 % (ref 37–47)
HGB BLD-MCNC: 7.8 G/DL (ref 12–16)
IMM GRANULOCYTES # BLD AUTO: 0.54 K/UL (ref 0–0.11)
IMM GRANULOCYTES NFR BLD AUTO: 4.5 % (ref 0–0.9)
LDH SERPL L TO P-CCNC: 235 U/L (ref 107–266)
LYMPHOCYTES # BLD AUTO: 2.1 K/UL (ref 1–4.8)
LYMPHOCYTES NFR BLD: 17.4 % (ref 22–41)
MAGNESIUM SERPL-MCNC: 1.9 MG/DL (ref 1.5–2.5)
MCH RBC QN AUTO: 30.1 PG (ref 27–33)
MCHC RBC AUTO-ENTMCNC: 31.2 G/DL (ref 33.6–35)
MCV RBC AUTO: 96.5 FL (ref 81.4–97.8)
MONOCYTES # BLD AUTO: 0.84 K/UL (ref 0–0.85)
MONOCYTES NFR BLD AUTO: 6.9 % (ref 0–13.4)
NEUTROPHILS # BLD AUTO: 8.51 K/UL (ref 2–7.15)
NEUTROPHILS NFR BLD: 70.4 % (ref 44–72)
NRBC # BLD AUTO: 1.51 K/UL
NRBC BLD-RTO: 12.5 /100 WBC
PLATELET # BLD AUTO: 95 K/UL (ref 164–446)
PMV BLD AUTO: 10 FL (ref 9–12.9)
POTASSIUM SERPL-SCNC: 2.9 MMOL/L (ref 3.6–5.5)
PROCALCITONIN SERPL-MCNC: 0.24 NG/ML
PROT SERPL-MCNC: 5.5 G/DL (ref 6–8.2)
RBC # BLD AUTO: 2.59 M/UL (ref 4.2–5.4)
SODIUM SERPL-SCNC: 139 MMOL/L (ref 135–145)
WBC # BLD AUTO: 12.1 K/UL (ref 4.8–10.8)

## 2020-03-10 PROCEDURE — A9270 NON-COVERED ITEM OR SERVICE: HCPCS | Performed by: INTERNAL MEDICINE

## 2020-03-10 PROCEDURE — 93010 ELECTROCARDIOGRAM REPORT: CPT | Performed by: INTERNAL MEDICINE

## 2020-03-10 PROCEDURE — 99232 SBSQ HOSP IP/OBS MODERATE 35: CPT | Performed by: INTERNAL MEDICINE

## 2020-03-10 PROCEDURE — 85025 COMPLETE CBC W/AUTO DIFF WBC: CPT

## 2020-03-10 PROCEDURE — 99497 ADVNCD CARE PLAN 30 MIN: CPT | Performed by: NURSE PRACTITIONER

## 2020-03-10 PROCEDURE — 700102 HCHG RX REV CODE 250 W/ 637 OVERRIDE(OP): Performed by: INTERNAL MEDICINE

## 2020-03-10 PROCEDURE — 84145 PROCALCITONIN (PCT): CPT

## 2020-03-10 PROCEDURE — C9113 INJ PANTOPRAZOLE SODIUM, VIA: HCPCS | Performed by: INTERNAL MEDICINE

## 2020-03-10 PROCEDURE — 700111 HCHG RX REV CODE 636 W/ 250 OVERRIDE (IP): Performed by: INTERNAL MEDICINE

## 2020-03-10 PROCEDURE — 80053 COMPREHEN METABOLIC PANEL: CPT

## 2020-03-10 PROCEDURE — 99498 ADVNCD CARE PLAN ADDL 30 MIN: CPT | Performed by: NURSE PRACTITIONER

## 2020-03-10 PROCEDURE — 770006 HCHG ROOM/CARE - MED/SURG/GYN SEMI*

## 2020-03-10 PROCEDURE — 83735 ASSAY OF MAGNESIUM: CPT

## 2020-03-10 PROCEDURE — 36415 COLL VENOUS BLD VENIPUNCTURE: CPT

## 2020-03-10 PROCEDURE — 83615 LACTATE (LD) (LDH) ENZYME: CPT

## 2020-03-10 RX ORDER — FENTANYL 50 UG/1
1 PATCH TRANSDERMAL
Status: DISCONTINUED | OUTPATIENT
Start: 2020-03-10 | End: 2020-03-11 | Stop reason: HOSPADM

## 2020-03-10 RX ORDER — OMEPRAZOLE 20 MG/1
20 CAPSULE, DELAYED RELEASE ORAL 2 TIMES DAILY
Status: DISCONTINUED | OUTPATIENT
Start: 2020-03-10 | End: 2020-03-11 | Stop reason: HOSPADM

## 2020-03-10 RX ORDER — POTASSIUM CHLORIDE 20 MEQ/1
40 TABLET, EXTENDED RELEASE ORAL 2 TIMES DAILY
Status: COMPLETED | OUTPATIENT
Start: 2020-03-10 | End: 2020-03-10

## 2020-03-10 RX ORDER — MIDODRINE HYDROCHLORIDE 5 MG/1
5 TABLET ORAL
Status: DISCONTINUED | OUTPATIENT
Start: 2020-03-10 | End: 2020-03-11 | Stop reason: HOSPADM

## 2020-03-10 RX ADMIN — TOPIRAMATE 50 MG: 25 TABLET, FILM COATED ORAL at 12:28

## 2020-03-10 RX ADMIN — POTASSIUM CHLORIDE 40 MEQ: 1500 TABLET, EXTENDED RELEASE ORAL at 08:45

## 2020-03-10 RX ADMIN — SENNOSIDES AND DOCUSATE SODIUM 2 TABLET: 8.6; 5 TABLET ORAL at 17:11

## 2020-03-10 RX ADMIN — MIDODRINE HYDROCHLORIDE 5 MG: 5 TABLET ORAL at 17:11

## 2020-03-10 RX ADMIN — PANTOPRAZOLE SODIUM 40 MG: 40 INJECTION, POWDER, LYOPHILIZED, FOR SOLUTION INTRAVENOUS at 05:45

## 2020-03-10 RX ADMIN — POTASSIUM CHLORIDE 40 MEQ: 1500 TABLET, EXTENDED RELEASE ORAL at 17:11

## 2020-03-10 RX ADMIN — TOPIRAMATE 50 MG: 25 TABLET, FILM COATED ORAL at 17:10

## 2020-03-10 RX ADMIN — TOPIRAMATE 50 MG: 25 TABLET, FILM COATED ORAL at 05:45

## 2020-03-10 RX ADMIN — TOPIRAMATE 50 MG: 25 TABLET, FILM COATED ORAL at 00:57

## 2020-03-10 RX ADMIN — OMEPRAZOLE 20 MG: 20 CAPSULE, DELAYED RELEASE ORAL at 17:11

## 2020-03-10 RX ADMIN — FENTANYL 1 PATCH: 50 PATCH, EXTENDED RELEASE TRANSDERMAL at 17:11

## 2020-03-10 RX ADMIN — OXYCODONE HYDROCHLORIDE 80 MG: 20 TABLET, FILM COATED, EXTENDED RELEASE ORAL at 17:11

## 2020-03-10 RX ADMIN — ACETAMINOPHEN 650 MG: 325 TABLET, FILM COATED ORAL at 20:18

## 2020-03-10 RX ADMIN — OXYCODONE HYDROCHLORIDE 30 MG: 30 TABLET ORAL at 05:45

## 2020-03-10 RX ADMIN — OXYCODONE HYDROCHLORIDE 80 MG: 20 TABLET, FILM COATED, EXTENDED RELEASE ORAL at 12:28

## 2020-03-10 RX ADMIN — OXYCODONE HYDROCHLORIDE 30 MG: 30 TABLET ORAL at 14:33

## 2020-03-10 ASSESSMENT — ENCOUNTER SYMPTOMS
DIZZINESS: 0
SHORTNESS OF BREATH: 0
DEPRESSION: 1
FALLS: 1
ABDOMINAL PAIN: 0
DIARRHEA: 0
WEIGHT LOSS: 1
MYALGIAS: 1
COUGH: 0
NECK PAIN: 1
BACK PAIN: 1

## 2020-03-10 NOTE — PROGRESS NOTES
Patient refused Q 2 hour turns and wound assessment, educated regarding importance.  Education reinforced by this RN.  Patient continues to refuse.

## 2020-03-10 NOTE — CARE PLAN
Problem: Pain Management  Goal: Pain level will decrease to patient's comfort goal  Outcome: PROGRESSING SLOWER THAN EXPECTED  Note: Pt continues to complain of 8/10 pain all over.  notified, medicated per the MAR.      Problem: Respiratory:  Goal: Respiratory status will improve  Outcome: PROGRESSING SLOWER THAN EXPECTED  Note: Pt was placed on 2L to maintain her oxygen saturation.

## 2020-03-10 NOTE — PROGRESS NOTES
[facility] Medicine Daily Progress Note    Date of Service  3/10/2020    Chief Complaint  39 y.o. 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 Mccarthy oncologist.*        Interval Problem Update  She looks sleepy and lethargic on examination, but then she is so much in pain even turning. I did review Dr. Mccarthy and discuss with palliative. BP stable today. Discontinue IVF and will start midodrine. She is not a candidate for chemo or any other modality treatment per Dr. Mccarthy.       Consultants/Specialty  Oncologist    Code Status  DNR/DNI    Disposition  To be determined later after discussion with the palliative team and her .  And after PT and OT evaluation    Review of Systems  Review of Systems   Constitutional: Positive for malaise/fatigue and weight loss.   HENT: Negative.    Respiratory: Negative for cough and shortness of breath.    Cardiovascular: Positive for leg swelling. Negative for chest pain.   Gastrointestinal: Negative for abdominal pain and diarrhea.   Genitourinary: Negative for dysuria.   Musculoskeletal: Positive for back pain, falls, joint pain, myalgias and neck pain.   Neurological: Negative for dizziness.   Psychiatric/Behavioral: Positive for depression.        Physical Exam  Temp:  [36.8 °C (98.2 °F)-37.4 °C (99.4 °F)] 36.8 °C (98.2 °F)  Pulse:  [109-124] 124  Resp:  [18-19] 19  BP: ()/(48-69) 112/59  SpO2:  [94  %-98 %] 95 %    Physical Exam  Constitutional:       Comments: in pain   Eyes:      General: No scleral icterus.  Cardiovascular:      Rate and Rhythm: Normal rate.      Heart sounds: No murmur.   Pulmonary:      Effort: Pulmonary effort is normal. No respiratory distress.      Breath sounds: Wheezing present. No rales.   Abdominal:      General: Abdomen is flat. Bowel sounds are normal. There is no distension.      Tenderness: There is no abdominal tenderness. There is no guarding.   Genitourinary:     Rectum: Normal. Guaiac result negative.      Comments: Hemorrhoid but no signs of mass or active bleeding  Musculoskeletal:         General: No swelling or deformity.      Right lower leg: No edema.   Skin:     General: Skin is warm.      Coloration: Skin is not jaundiced.      Findings: No bruising.   Neurological:      General: No focal deficit present.      Mental Status: She is alert. Mental status is at baseline.      Cranial Nerves: No cranial nerve deficit.      Motor: No weakness.      Comments: The patient is oriented however she is very lethargic and not able to keep with a conversation         Fluids  No intake or output data in the 24 hours ending 03/10/20 1545    Laboratory  Recent Labs     03/09/20  1019 03/09/20  1915 03/10/20  0327   WBC 9.1 12.6* 12.1*   RBC 2.15* 2.88* 2.59*   HEMOGLOBIN 6.7* 8.6* 7.8*   HEMATOCRIT 21.1* 27.6* 25.0*   MCV 98.1* 95.8 96.5   MCH 31.2 29.9 30.1   MCHC 31.8* 31.2* 31.2*   RDW 66.4* 69.5* 73.5*   PLATELETCT 99* 96* 95*   MPV 9.5 9.7 10.0     Recent Labs     03/08/20  1435 03/09/20  0311 03/10/20  0327   SODIUM 134* 136 139   POTASSIUM 3.4* 3.6 2.9*   CHLORIDE 97 103 108   CO2 25 24 23   GLUCOSE 139* 114* 110*   BUN 15 12 10   CREATININE 0.52 0.43* 0.44*   CALCIUM 9.0 8.0* 8.0*                   Imaging  DX-CHEST-PORTABLE (1 VIEW)   Final Result         1.  Pulmonary infiltrates, increased since prior.   2.  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  MRI brain on 7/22/2019 showed diffuse osseous metastatic disease and no evidence of brain metastasis.  anemia and failure to thrive  Chronic pain on OxyContin 3 times a day  Oncology was consulted, case was discussed with Dr. Mccarthy  Palliative was consulted. As per above. Pt not ready for hospice    Debility- (present on admission)  Assessment & Plan  With generalized weakness and low appetite with weight loss  Failure to thrive due to cancer stage IV  Palliative was consulted.pt and  seems like they have never been informed well if they have any other treatment option. Not ready for hospice. Will give some time and continue to discuss   PT and OT  Nutrition      Anemia of chronic disease- (present on admission)  Assessment & Plan  She has chronic anemia and today she found to have hemoglobin of 5.7.  No significant elevation on LDH or bilirubin and no signs of GI bleeding; rectal exam showed normal stool  Received 3 units red blood cell and stable with no significant improving on her hemoglobin  Likely related to her cancer  Stop IVF   Oncology was consulted discussed the case with Dr. Mccarthy. No further intervention.         Cancer associated pain- (present on admission)  Assessment & Plan  Metastasis to the bone, pain on all over  she has been receiving significant amount of pain medication.  The patient has some somnolence and hypotension  Ok to use oxycontin prn. Fentanyl added   Palliative team was consulted    Metastatic cancer (HCC)- (present on admission)  Assessment & Plan  She has a metastatic  cancer, epithelioid hemangioendothelioma   PET: Extensive bony metastatic disease in 11/2019  On OxyContin for chronic pain  Oncology was consulted  Palliative was consulted    Hypotension  Assessment & Plan  Stop IVF   Start midodrine. Pt tachycardic, but no other option      DNR (do not resuscitate)  Assessment & Plan  The patient was not able to make a good conversation however according to admission physician and my discharge summary the patient always DNR and DNI.         VTE prophylaxis: SCDs only hold on Lovenox or heparin due to anemia and possible bleeding.

## 2020-03-10 NOTE — CONSULTS
DATE OF SERVICE:  03/10/2020    INPATIENT ONCOLOGY CONSULTATION    REASON FOR CONSULT:  Metastatic epithelial hemangioendothelium.    HISTORY OF PRESENT ILLNESS:  The patient is a very unfortunate 39-year-old   woman with a history of a stage IV metastatic epithelial hemangioendothelioma   who is cared for by my partner, Dr. Delaney.  This rare tumor was initially   diagnosed on 01/10/2018 when she had a lesion in the left femur, which was   biopsied.  She was then treated with palliative radiation.  She was then   started on palliative chemotherapy with Gemzar starting on 03/16/2018 and   eventually completing 7 cycles.  The notes indicate that she was lost to   follow up after that.  She did have a PET CT scan in 07/2018 that showed   stable disease.    She again resurfaced in 09/2018 when she presented with a left hip fracture.    She underwent repeat staging with a PET CT scan that showed worsening disease   with extensive bony metastasis and splenic metastasis.  She underwent a   stabilization surgery of the left hip.  She eventually got back on to Gemzar   chemotherapy and got a few more cycles.  Her case was discussed with Eduardo,   and they recommended Adriamycin given through the IV every 3 weeks for a goal   of doing 10 cycles.  She was started on cycle #1 on 08/30/2019.  She   eventually completed 3 cycles by 11/18/2019, but did have trouble with   significant side effects including cytopenias requiring dose reductions.    She has not had any chemo since that time per my knowledge.  There have been   many attempts to get her back on chemo, but she has either been noncompliant   related to social issues versus hospitalizations.  She was last seen in our   office on 12/31/2019.  She has been in and out of the hospital frequently.    She was just in the hospital from 01/30 through 02/12 because of a tailbone   pressure ulcer, which required debridement and a wound VAC.  It sounds like   she has been very  weak, and her performance status has been very poor such that   she is not a candidate for further chemo.  It looks like from the records on   02/14, she was actually referred to hospice from Dr. Delaney, and there was   some discussion with the  and the patient at that time who agreed with   this referral.    It is not entirely clear why she never got on the hospice.  It sounds like she   was brought to the hospital this time on 03/08 because of uncontrolled pain   as well as weakness and fatigue.  She describes the pain as being everywhere.    She had testing, which showed a hemoglobin of 5.7, and so she was transfused.    It sounds like over the course of this admission, she has been transfused 3   units of blood total.  By later in the day on 03/09, her hemoglobin was up to   8.6, although today it is down to 7.8.  We have been asked to consult on the   case.    PAST MEDICAL HISTORY:  1.  Metastatic epithelial hemangioendothelioma.  2.  Chronic pain.    PAST SURGICAL HISTORY:  Left hip surgery in 09/2018.    FAMILY HISTORY:  She has no significant family history of cancers.    SOCIAL HISTORY:  She is a current everyday smoker.  She smoked for 20 years.    She is  and lives with her .  She has 3 children.  She does not   use any alcohol or illicit drugs.  She is disabled.    ALLERGIES:  No known drug allergies.    MEDICATIONS:  Here in the hospital:  1.  Symbicort inhaler 2 puffs b.i.d.  2.  OxyContin 80 mg p.o. q. 6 hours.  3.  Protonix 40 mg b.i.d.  4.  Potassium chloride 40 mEq b.i.d.  5.  Senokot p.r.n.  6.  Milk of magnesia p.r.n.  7.  Dulcolax p.r.n.  8.  Topamax 50 mg p.o. q. 6 hours.  9.  Clonazepam p.r.n.  10.  DuoNeb nebulizer p.r.n.  11.  Oxycodone 30 mg p.o. q. 6 hours p.r.n. pain.    REVIEW OF SYSTEMS:  Review of systems is difficult to obtain.  She falls   asleep quickly and mumbles sometimes to answer questions.  She has diffuse   head to toe pain.  She has had some nausea, but  no throwing up.  She denies   any shortness of breath.  She denies any fevers or chills.  She is extremely   weak and fatigued.    PHYSICAL EXAMINATION:  VITAL SIGNS:  Her T-max is 99.4, pulse 119, blood pressure 106/67,   respirations 19, satting 94% on 2 liters nasal cannula.  GENERAL:  Very cachectic, weak and fatigued.  ECOG equals 4.  HEENT:  NCAT.  Sclerae are anicteric.  Conjunctivae clear.  Oropharynx is   clear without any erythema, exudate or discharge.  NECK:  Supple, no elevated JVP, no carotid bruits.  CHEST:  Clear to auscultation and percussion bilaterally with decreased breath   sounds at the bases.  CARDIOVASCULAR:  Regular rate and rhythm.  No murmurs, gallops or rubs.    Tachycardic.  ABDOMEN:  Somewhat distended and firm, but nontender.  No guarding or rebound.    Normoactive bowel sounds.  EXTREMITIES:  No cyanosis or clubbing.  She has trace bilateral lower   extremity edema.  SKIN:  No rashes, bruising, petechiae, or ulcerations.  I was not able to   examine the sacral wound.  NEUROLOGIC:  She arouses and answers questions that are simple.  She follows   simple commands, but falls asleep easily.  It is difficult to do a full   neurologic exam.    LABORATORY DATA:  White blood count 12.1, hemoglobin 7.8, hematocrit 25%,   platelets 95 with 70% neutrophils, 17% lymphocytes, 7% monocytes, 0%   eosinophils, 1% basophil.    Sodium 139, potassium 2.9, chloride 108, CO2 of 23, BUN 10, creatinine 0.44,   glucose 110, calcium 8.0, AST 25, ALT 14, alkaline phosphatase 86, bilirubin   1.1, albumin 2.3, protein 5.5, .    ASSESSMENT AND PLAN:  The patient is a very nice 39-year-old woman with an   unfortunate history of stage IV epithelial hemangioendothelioma with bony and   splenic metastasis.  She has been through multiple lines of therapy.  She has   not been on any treatment since at least 11/2019 because of poor compliance as   well as poor performance status.  She has an extremely poor  performance   status and is not a candidate for chemotherapy or other treatments.  She   appears to be at the end stages of her disease.    At this point, she does appear to be at the end stages of her disease.  I   think hospice would be the appropriate recommendation.  I think all effort   should be focused on keeping her comfortable.  She has had a palliative care   team consult, and they are adjusting her pain medications.  Hopefully, they can   find a good outpatient regimen.  I think she should have a hospice consult   while she is here in the hospital, and she should be discharged home with   hospice once she is medically stable.    I tried to call and talk with her  about this.  I only got a voice   mail.  I did not leave a message.  I think this is a discussion that he has   had with our office previously, and so this recommendation should not be a surprise.  I   would recommend the primary team discussing our recommendations for hospice at   this point.  If there are any questions, we certainly can help answer these.    In terms of her anemia, this is likely multifactorial related to underlying   disease and anemia of chronic disease.  This is likely not to improve in the   short term,  She was transfused 3 units of blood and did have some response.    I think if she chooses the hospice route, I would forego doing further   transfusions and move more towards comfort care.    At this point, I will be available for questions.  We have made our   recommendations.  If I can answer any questions, I will certainly come back,   but otherwise we will sign off on the case and anticipate the patient enrolls   on hospice.    Thank you very much for referral of this nice woman.       ____________________________________     MD HUGO García / TRACI    DD:  03/10/2020 10:03:56  DT:  03/10/2020 14:34:20    D#:  [id number]  Job#:  [id number]

## 2020-03-10 NOTE — PROGRESS NOTES
Assumed care of patient at 1900 from day shift RN. Pt resting in bed with no signs of distress. Assessment competed, VSS, pt is A/Ox4. Pt complaining of 7/10 pain but unable to mediated due to low BP. Pts heart rate is elevated in the 120s. DR Solis was notified, he ordered to give the patient her pain medications and a STAT EKG. EKG obtained, S-Tach noted. Pt denies any further needs. Bed in the lowest locked position, call light in reach.

## 2020-03-10 NOTE — THERAPY
OT eval attempted.  Pt lying in bed in extreme pain.  Pt reports she has not been amb much at home due to pain & has been primarily using her W/C.  Pt provided with hot packs to attempt to reduce muscle pain.  Will attempt OT eval once pt's pain is better controled & she can tolerate OOB ADL's.

## 2020-03-10 NOTE — PROGRESS NOTES
Bedside report received from Stephy HARDY . Assumed care of pt at 0645 . Pt is awake at this time with no signs of distress. Plan of care discussed with pt. Pt is A&O x 4. Pt is on 2L NC. Bed alarm is not in use, bed in lowest position, bed rails up x 2, belongings and call light within reach. Hourly rounding in place.

## 2020-03-10 NOTE — CONSULTS
Reason for Palliative Care Consult: Advance Care Planning    Consulted by: Les Kasper MD    HPI: Amanda Bae is a 39 y.o. 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  She found to have low hemoglobin.  She was admitted for further workup/monitoring  and received multiple blood transfusions.      Patient's rare tumor was initially diagnosed on 01/10/2018 when she had a lesion in the left femur, which was biopsied.  She was then treated with palliative radiation.  She was then started on palliative chemotherapy with Gemzar starting on 03/16/2018 and eventually completed 7 cycles.  Patient was lost to follow-up but had a PET CT scan in 07/2018 that showed stable disease.  She again presented 09/2018 with left hip fracture.  PET scan at that time showed worsening disease with extensive bony metastasis and splenic metastasis.  She underwent stabilization surgery of left hip followed by additional cycles of Gemzar.  Her case was discussed with [facility] and they recommended IV Adriamycin Q3 weeks with goal of 10 cycles.  Patient completed 3 cycles by 11/18/2019 but had significant side effects including cytopenias requiring dose reductions.  Patient was last seen by oncology on 12/31/19.  Since then patient had admission 1/30 - 2/12/20 for sacral pressure ulcer requiring debridement and wound vac.  Her performance status has since declined and she is not a candidate for further chemo.  Per Dr. Mccarthy's 3/10/20 note, Dr. Delaney referred patient to hospice on 2/14/20.  It is unclear why she never received hospice services.    Past medical/surgical history: epithelioid hemangioendothelioma, metastatic cancer, cancer-associated pain, anemia of chronic disease, debility, hypotension, thrombocytopenia, pressure injury of sacral region stage 3.    Additional  "consults:   Hematology/oncology  Wound Care      Assessment:  Neuro: Patient alert & oriented X4  Dyspnea: No-    Last BM: 03/10/20-    Pain: Yes- Generalized  Depression: Yes-    Dementia: No;       Living situation & psychosocial: Patient is , unemployed and lives with her  and 3 children (ages 2, 5, 7) in [place].  Her  Narciso is also unemployed and they currently live with his sister, who provides them with financial and social support.  Narciso reports his mother recently traveled from [facility] to stay with them and help care for the children due to Amanda's illness.  He states they recently applied for a \"section 8\" housing tenzin through the [facility] [place], which would provide them with subsidized housing.    Spiritual:  Is Sabianist or spirituality important for coping with this illness? Yes-    Has a  or spiritual provider visit been requested? Yes.   Narciso feels his wife would appreciate a chaplaincy visit for social support.  Spiritual care request placed and left message on  line .    Palliative Performance Scale: 30%    Advance Directive: None-    DPOA: No-    POLST: No-      Code Status: DNR-      Outcome:  Met with patient at bedside. Introduced myself and explained the role of palliative care.     Amanda derives yung from \"being together with my family and taking my kids to the park.\"  She has 2 daughters ages 5 and 7, and a son age 2.  She enjoys \"being outdoors\" and loves spending time \"by any lake.\"    When queried about her understanding of her current health situation, Amanda said, \"I really don't know what's going on with my health, that's what's scary.\"  She confirmed she has cancer and said, \"It infects the inside of my blood vessels.\"  Patient underwent chemotherapy and PET scan with Dr. Delaney, but felt like her chemotherapy treatments were just stopped with no explanation.  Discussed GOC.  Amanda said, \"I just want to be cured " "from my cancer.\"  She wants to get a second opinion and continue selective treatment.  At this point, patient became tearful and said, \"I just want to sleep now.  There's too much broken promises.  I'm tired and I'm in pain.\"  She gave me verbal permission to speak with her .    Called  Narciso ([phone]), who advised he was just entering hospital.  I met with him in [address] conference room.  From a functional standpoint he reports patient has declined significantly in the last month.  1 to 2 months ago patient was walking with a walker.  Now Narciso reports she can no longer walk and can barely stand.       When queried about his understanding of his wife's health he said, \"It's a really rare cancer in her blood.  It is not a bone cancer but it eats up her bones.  In a matter of weeks it ate 8% of her hip bone and she had to have a hip replacement.  They said it has been in there for possibly 10 years but did not become active until 1 to 2 years ago.  Her doctors do not see her reviving in any way.\"  He explained patient has only had 1 round of chemotherapy.  He states they subsequently showed up 3 additional times for OP chemotherapy only to have it withheld, but they didn't understand why.  He reports her oncologist \"has not seen her for 2 to 3 months, we've only seen the nurse practitioner.\"  He states she has required frequent blood transfusions and also explained that she now has a decubitus ulcer requiring wound VAC.  He has been providing wound care at home.    Discussed goals of care.  Narciso feels his wife would like to continue pursuing selective treatment, due to her young age and the fact that they have 3 small children at home.  He feels like they never received a thorough explanation about patient's disease, disease progression, imaging, and treatment options.  They have multiple questions about her disease and possible treatment options.  He verbalized that if local " "oncologists are unwilling to treat her, they are interested in obtaining second opinion (possibly from [place]) stating \"I believe someone else might be more interested in her and have more andres in her.\"  However, he said \"she is in so much pain, I believe it is making her crazy.  It is taking its toll.\"  He then shared how his wife has recently talked about \"going to God.\"  He shared his own stress due to being unemployed, his wife's illness and recent decline, and trying to provide care for their 3 small children.  He appeared overwhelmed.  I reassured him me and my team are here to support him and Amanda during this difficult time.    Discussed hospice and provided basic overview of Hospice services.  I advised hospice would be employed when goals of care shift from \"cure\" to \"comfort.\"  We discussed that Hospice care is administered by a group of doctors, nurses, aides, ,  who will manage amanda independently and that she would not come back to the doctor's office or hospital for her care, unless it was not possible to manage her symptoms at home. Relayed that this would allow her more time in the comfort of her own home, increased time with her family, and minimization of procedures that may result in suffering with little anticipated benefit.  Discussed benefit vs burden of aggressive disease-modifying treatment.  Asked Narciso how much Amanda would be willing to go through in order to gain more time.  He was unsure.  He will talk to her about GOC, specifically selective vs hospice care.      Patient/ have ongoing questions regarding treatment options, imaging, and test results.  From their perspective, they have been left without a clear understanding of patient's disease, disease progression, and prognosis.  They would like to speak with an oncologist to help them understand any and all treatment options.  They feel unprepared to make an informed decision about hospice, " without first understanding the treatment options (or lack thereof) for patient's cancer.    Provided therapeutic communication including open-ended questions, therapeutic silence, reflective listening, empathic support, validation, and therapeutic touch throughout encounter. Provided business card with palliative care contact information and encouraged Amanda and Narciso to call with any questions or needs.  All questions answered.    Provided therapeutic communication including open-ended questions, therapeutic silence, reflective listening, empathic support, validation, and therapeutic touch throughout encounter.     Plan: Patient/spouse requesting to speak with oncologist re: prognosis, treatment options.  I advised Dr. Pereira of this.  She will review Dr. Mccarthy's note with patient/spouse tomorrow.  Patient/spouse have not yet made a decision about GOC and need more time/information to consider options.  I provided Narciso with Hospice choice form and local hospice agency pamphlets for review, if he wishes to research hospice agencies.  Palliative care to continue to follow, provide support, and help facilitate decision making as clinical picture evolves.    Recommendations: I do not recommend an ethics or hospice consult at this time because patient/spouse still considering selective treatment (including second opinion) vs hospice.    Updated: Dr. Pereira, bedside RN Marie    Thank you for allowing Palliative Care to participate in Amanda's care. Please call our team with questions and/or additional needs.    As appropriate, Palliative Care encourages medical team to engage in further conversation, education for patient/family at bedside regarding code status, GOC/POC.    Total visit time was 70 minutes discussing advance care planning.     ALEJANDRO Johnson, [facility]  Palliative Care Nurse Practitioner  [phone]

## 2020-03-10 NOTE — PROGRESS NOTES
Palliative contacted in regards to setting up a time for pts  to come to discuss palliative care, maybe hospice. Anticipating a call back from [facility].

## 2020-03-11 VITALS
DIASTOLIC BLOOD PRESSURE: 70 MMHG | OXYGEN SATURATION: 94 % | HEIGHT: 64 IN | HEART RATE: 123 BPM | SYSTOLIC BLOOD PRESSURE: 123 MMHG | WEIGHT: 148.15 LBS | TEMPERATURE: 99.2 F | RESPIRATION RATE: 18 BRPM | BODY MASS INDEX: 25.29 KG/M2

## 2020-03-11 LAB
ALBUMIN SERPL BCP-MCNC: 2.4 G/DL (ref 3.2–4.9)
ALBUMIN/GLOB SERPL: 0.8 G/DL
ALP SERPL-CCNC: 80 U/L (ref 30–99)
ALT SERPL-CCNC: 11 U/L (ref 2–50)
ANION GAP SERPL CALC-SCNC: 7 MMOL/L (ref 0–11.9)
ANISOCYTOSIS BLD QL SMEAR: ABNORMAL
AST SERPL-CCNC: 21 U/L (ref 12–45)
BASOPHILS # BLD AUTO: 0 % (ref 0–1.8)
BASOPHILS # BLD: 0 K/UL (ref 0–0.12)
BILIRUB SERPL-MCNC: 1.2 MG/DL (ref 0.1–1.5)
BUN SERPL-MCNC: 9 MG/DL (ref 8–22)
CALCIUM SERPL-MCNC: 8 MG/DL (ref 8.5–10.5)
CHLORIDE SERPL-SCNC: 110 MMOL/L (ref 96–112)
CO2 SERPL-SCNC: 23 MMOL/L (ref 20–33)
CREAT SERPL-MCNC: 0.43 MG/DL (ref 0.5–1.4)
EOSINOPHIL # BLD AUTO: 0.1 K/UL (ref 0–0.51)
EOSINOPHIL NFR BLD: 0.9 % (ref 0–6.9)
ERYTHROCYTE [DISTWIDTH] IN BLOOD BY AUTOMATED COUNT: 72.4 FL (ref 35.9–50)
GLOBULIN SER CALC-MCNC: 3.1 G/DL (ref 1.9–3.5)
GLUCOSE SERPL-MCNC: 100 MG/DL (ref 65–99)
HCT VFR BLD AUTO: 23.3 % (ref 37–47)
HGB BLD-MCNC: 7.5 G/DL (ref 12–16)
LYMPHOCYTES # BLD AUTO: 1.24 K/UL (ref 1–4.8)
LYMPHOCYTES NFR BLD: 11.4 % (ref 22–41)
MACROCYTES BLD QL SMEAR: ABNORMAL
MANUAL DIFF BLD: NORMAL
MCH RBC QN AUTO: 30.6 PG (ref 27–33)
MCHC RBC AUTO-ENTMCNC: 32.2 G/DL (ref 33.6–35)
MCV RBC AUTO: 95.1 FL (ref 81.4–97.8)
METAMYELOCYTES NFR BLD MANUAL: 2.6 %
MICROCYTES BLD QL SMEAR: ABNORMAL
MONOCYTES # BLD AUTO: 0 K/UL (ref 0–0.85)
MONOCYTES NFR BLD AUTO: 0 % (ref 0–13.4)
MORPHOLOGY BLD-IMP: NORMAL
MYELOCYTES NFR BLD MANUAL: 0.9 %
NEUTROPHILS # BLD AUTO: 9.08 K/UL (ref 2–7.15)
NEUTROPHILS NFR BLD: 82.4 % (ref 44–72)
NEUTS BAND NFR BLD MANUAL: 0.9 % (ref 0–10)
NRBC # BLD AUTO: 1.55 K/UL
NRBC BLD-RTO: 14.2 /100 WBC
PLATELET # BLD AUTO: 90 K/UL (ref 164–446)
PLATELET BLD QL SMEAR: NORMAL
PMV BLD AUTO: 9.7 FL (ref 9–12.9)
POLYCHROMASIA BLD QL SMEAR: NORMAL
POTASSIUM SERPL-SCNC: 3.8 MMOL/L (ref 3.6–5.5)
PROMYELOCYTES NFR BLD MANUAL: 0.9 %
PROT SERPL-MCNC: 5.5 G/DL (ref 6–8.2)
RBC # BLD AUTO: 2.45 M/UL (ref 4.2–5.4)
RBC BLD AUTO: PRESENT
SODIUM SERPL-SCNC: 140 MMOL/L (ref 135–145)
WBC # BLD AUTO: 10.9 K/UL (ref 4.8–10.8)

## 2020-03-11 PROCEDURE — A9270 NON-COVERED ITEM OR SERVICE: HCPCS | Performed by: INTERNAL MEDICINE

## 2020-03-11 PROCEDURE — 80053 COMPREHEN METABOLIC PANEL: CPT

## 2020-03-11 PROCEDURE — 700102 HCHG RX REV CODE 250 W/ 637 OVERRIDE(OP): Performed by: INTERNAL MEDICINE

## 2020-03-11 PROCEDURE — 99239 HOSP IP/OBS DSCHRG MGMT >30: CPT | Performed by: INTERNAL MEDICINE

## 2020-03-11 PROCEDURE — 85007 BL SMEAR W/DIFF WBC COUNT: CPT

## 2020-03-11 PROCEDURE — 85027 COMPLETE CBC AUTOMATED: CPT

## 2020-03-11 PROCEDURE — 36415 COLL VENOUS BLD VENIPUNCTURE: CPT

## 2020-03-11 RX ORDER — OMEPRAZOLE 20 MG/1
20 CAPSULE, DELAYED RELEASE ORAL 2 TIMES DAILY
Qty: 60 CAP | Refills: 0 | Status: SHIPPED | OUTPATIENT
Start: 2020-03-11

## 2020-03-11 RX ORDER — MIDODRINE HYDROCHLORIDE 5 MG/1
5 TABLET ORAL 2 TIMES DAILY
Qty: 60 TAB | Refills: 0 | Status: SHIPPED | OUTPATIENT
Start: 2020-03-11

## 2020-03-11 RX ORDER — FENTANYL 50 UG/1
1 PATCH TRANSDERMAL
Qty: 5 PATCH | Refills: 0 | Status: SHIPPED | OUTPATIENT
Start: 2020-03-13 | End: 2020-03-28

## 2020-03-11 RX ADMIN — MIDODRINE HYDROCHLORIDE 5 MG: 5 TABLET ORAL at 06:15

## 2020-03-11 RX ADMIN — MIDODRINE HYDROCHLORIDE 5 MG: 5 TABLET ORAL at 18:24

## 2020-03-11 RX ADMIN — TOPIRAMATE 50 MG: 25 TABLET, FILM COATED ORAL at 06:15

## 2020-03-11 RX ADMIN — OMEPRAZOLE 20 MG: 20 CAPSULE, DELAYED RELEASE ORAL at 06:15

## 2020-03-11 RX ADMIN — OXYCODONE HYDROCHLORIDE 80 MG: 20 TABLET, FILM COATED, EXTENDED RELEASE ORAL at 11:37

## 2020-03-11 RX ADMIN — OMEPRAZOLE 20 MG: 20 CAPSULE, DELAYED RELEASE ORAL at 18:24

## 2020-03-11 RX ADMIN — TOPIRAMATE 50 MG: 25 TABLET, FILM COATED ORAL at 01:05

## 2020-03-11 RX ADMIN — TOPIRAMATE 50 MG: 25 TABLET, FILM COATED ORAL at 18:23

## 2020-03-11 RX ADMIN — OXYCODONE HYDROCHLORIDE 80 MG: 20 TABLET, FILM COATED, EXTENDED RELEASE ORAL at 01:05

## 2020-03-11 RX ADMIN — OXYCODONE HYDROCHLORIDE 30 MG: 30 TABLET ORAL at 04:28

## 2020-03-11 RX ADMIN — TOPIRAMATE 50 MG: 25 TABLET, FILM COATED ORAL at 11:37

## 2020-03-11 RX ADMIN — OXYCODONE HYDROCHLORIDE 80 MG: 20 TABLET, FILM COATED, EXTENDED RELEASE ORAL at 18:24

## 2020-03-11 RX ADMIN — MIDODRINE HYDROCHLORIDE 5 MG: 5 TABLET ORAL at 11:37

## 2020-03-11 RX ADMIN — OXYCODONE HYDROCHLORIDE 80 MG: 20 TABLET, FILM COATED, EXTENDED RELEASE ORAL at 06:15

## 2020-03-11 RX ADMIN — SENNOSIDES AND DOCUSATE SODIUM 2 TABLET: 8.6; 5 TABLET ORAL at 18:24

## 2020-03-11 NOTE — FACE TO FACE
"Face to Face Note  -  Durable Medical Equipment    Hanna Pereira M.D. - NPI: [id number]  I certify that this patient is under my care and that they had a durable medical equipment(DME)face to face encounter by myself that meets the physician DME face-to-face encounter requirements with this patient on:    Date of encounter:   Patient:                    MRN:                       YOB: 2018  Amanda Bae  [id number]  1980     The encounter with the patient was in whole, or in part, for the following medical condition, which is the primary reason for durable medical equipment:  Other - hypoxia,    I certify that, based on my findings, the following durable medical equipment is medically necessary:  Oxygen.    HOME O2 Saturation Measurements:(Values must be present for Home Oxygen orders)  Room air sat at rest: 85     With liters of O2: 2, O2 sat at rest with O2: 93   ,    Is the patient mobile?: No    My Clinical findings support the need for the above equipment due to:  Hypoxia    Supporting Symptoms: The patient requires supplemental oxygen, as the following interventions have been tried with limited or no improvement: \"Bronchodilators and/or steroid inhalers and \"Incentive spirometry    "

## 2020-03-11 NOTE — PROGRESS NOTES
Oxygen turned off and pts saturation tested - 88% on RA. Pt put on 2 L NC - 93% saturation. MD made aware.

## 2020-03-11 NOTE — THERAPY
PT orders received, eval attempted. Pt declined PT eval due to fatigue and pain. Reports anticipating DC home soon. Pt reports she has all the necessary DME at home including FWW, WC, BSC. PT will reattempt PT evaluation as able.     Hellen Barclay, PT, DPT Pager: [phone]

## 2020-03-11 NOTE — CARE PLAN
Problem: Discharge Barriers/Planning  Goal: Patient's continuum of care needs will be met  Outcome: PROGRESSING SLOWER THAN EXPECTED     Problem: Respiratory:  Goal: Respiratory status will improve  Outcome: PROGRESSING SLOWER THAN EXPECTED

## 2020-03-11 NOTE — WOUND TEAM
"Received wound consult for sacral pressure injury. Patient noted to have a wound vac at previous admission and was supposed to follow up outpatient but it appears that patient was a no show. Attempted to discuss wound vac with patient, pt reports that she \"ran out of supplies\" and removed home vac. Attempted to assess wound today, but pt refused and was very tearful. Pt reports that she needs to get out of the hospital to get to [place]. Discussed issue w/ charge RN and requested for primary RN to call this wound care RN back.   "

## 2020-03-11 NOTE — DISCHARGE PLANNING
Care Transition Team Assessment    Information Source  Orientation : Oriented x 4  Information Given By: Patient  Who is responsible for making decisions for patient? : Patient    Readmission Evaluation  Is this a readmission?: No    Elopement Risk  Legal Hold: No  Ambulatory or Self Mobile in Wheelchair: No-Not an Elopement Risk  Elopement Risk: Not at Risk for Elopement    Interdisciplinary Discharge Planning  Does Admitting Nurse Feel This Could be a Complex Discharge?: Yes  Primary Care Physician: no PCP has Dr Delaney as oncologist  Lives with - Patient's Self Care Capacity: Spouse, Child Less than 18 Years of Age  Patient or legal guardian wants to designate a caregiver (see row info): No  Support Systems: Spouse / Significant Other  Housing / Facility: 1 Story House  Able to Return to Previous ADL's: Yes  Mobility Issues: No  Prior Services: Intermittent Physical Support for ADL Per Family  Patient Expects to be Discharged to:: (home with O2)  Assistance Needed: Yes  Durable Medical Equipment: Home Oxygen  DME Provider / Phone: [facility], or [facility], or [employer]    Discharge Preparedness  What is your plan after discharge?: Home with help  Prior Functional Level: Independent with Activities of Daily Living    Functional Assesment  Prior Functional Level: Independent with Activities of Daily Living    Finances  Financial Barriers to Discharge: Yes  Prescription Coverage: Yes    Vision / Hearing Impairment  Vision Impairment : No  Hearing Impairment : No         Advance Directive  Advance Directive?: None  Advance Directive offered?: AD Booklet refused    Domestic Abuse  Have you ever been the victim of abuse or violence?: No  Physical Abuse or Sexual Abuse: No  Verbal Abuse or Emotional Abuse: No  Possible Abuse Reported to:: Not Applicable         Discharge Risks or Barriers  Discharge risks or barriers?: Uninsured / underinsured  Patient risk factors: Complex medical needs    Anticipated Discharge  Information  Anticipated discharge disposition: Home  Discharge Address: [address]  Discharge Contact Phone Number: [phone]

## 2020-03-11 NOTE — PROGRESS NOTES
Bedside report received from Stephy HARDY . Assumed care of pt at 0645 . Pt is resting at this time with no signs of distress. Plan of care discussed with pt. Pt is A&O x 4. Pt is on 2L NC. Bed alarm is not in use, bed in lowest position, bed rails up x 2, belongings and call light within reach. Hourly rounding in place.

## 2020-03-11 NOTE — DISCHARGE INSTRUCTIONS
Discharge Instructions per Hanna Pereira M.D.  Continue wound care  Follow up with oncology or [facility]     DIET: healthy     ACTIVITY: as tolerated     DIAGNOSIS: hypotension, anemia, sacral wound     Return to ER if worsen mentation, chest pain, shortness of breath, severe headache, unusual bleeding    Discharge Instructions    Discharged to home by car with relative. Discharged via wheelchair, hospital escort: Yes.  Special equipment needed: Not Applicable    Be sure to schedule a follow-up appointment with your primary care doctor or any specialists as instructed.     Discharge Plan:   Smoking Cessation Offered: Patient Refused  Influenza Vaccine Indication: Not indicated: Previously immunized this influenza season and > 8 years of age    I understand that a diet low in cholesterol, fat, and sodium is recommended for good health. Unless I have been given specific instructions below for another diet, I accept this instruction as my diet prescription.   Other diet: Healthy    Special Instructions: None    · Is patient discharged on Warfarin / Coumadin?   No       Wound Care, Adult  Taking care of your wound properly can help to prevent pain and infection. It can also help your wound to heal more quickly.  How is this treated?  Wound care  · Follow instructions from your health care provider about how to take care of your wound. Make sure you:  ¨ Wash your hands with soap and water before you change the bandage (dressing). If soap and water are not available, use hand .  ¨ Change your dressing as told by your health care provider.  ¨ Leave stitches (sutures), skin glue, or adhesive strips in place. These skin closures may need to stay in place for 2 weeks or longer. If adhesive strip edges start to loosen and curl up, you may trim the loose edges. Do not remove adhesive strips completely unless your health care provider tells you to do that.  · Check your wound area every day for signs of infection. Check  for:  ¨ More redness, swelling, or pain.  ¨ More fluid or blood.  ¨ Warmth.  ¨ Pus or a bad smell.  · Ask your health care provider if you should clean the wound with mild soap and water. Doing this may include:  ¨ Using a clean towel to pat the wound dry after cleaning it. Do not rub or scrub the wound.  ¨ Applying a cream or ointment. Do this only as told by your health care provider.  ¨ Covering the incision with a clean dressing.  · Ask your health care provider when you can leave the wound uncovered.  Medicines   · If you were prescribed an antibiotic medicine, cream, or ointment, take or use the antibiotic as told by your health care provider. Do not stop taking or using the antibiotic even if your condition improves.  · Take over-the-counter and prescription medicines only as told by your health care provider. If you were prescribed pain medicine, take it at least 30 minutes before doing any wound care or as told by your health care provider.  General instructions  · Return to your normal activities as told by your health care provider. Ask your health care provider what activities are safe.  · Do not scratch or pick at the wound.  · Keep all follow-up visits as told by your health care provider. This is important.  · Eat a diet that includes protein, vitamin A, vitamin C, and other nutrient-rich foods. These help the wound heal:  ¨ Protein-rich foods include meat, dairy, beans, nuts, and other sources.  ¨ Vitamin A-rich foods include carrots and dark green, leafy vegetables.  ¨ Vitamin C-rich foods include citrus, tomatoes, and other fruits and vegetables.  ¨ Nutrient-rich foods have protein, carbohydrates, fat, vitamins, or minerals. Eat a variety of healthy foods including vegetables, fruits, and whole grains.  Contact a health care provider if:  · You received a tetanus shot and you have swelling, severe pain, redness, or bleeding at the injection site.  · Your pain is not controlled with medicine.  · You  have more redness, swelling, or pain around the wound.  · You have more fluid or blood coming from the wound.  · Your wound feels warm to the touch.  · You have pus or a bad smell coming from the wound.  · You have a fever or chills.  · You are nauseous or you vomit.  · You are dizzy.  Get help right away if:  · You have a red streak going away from your wound.  · The edges of the wound open up and separate.  · Your wound is bleeding and the bleeding does not stop with gentle pressure.  · You have a rash.  · You faint.  · You have trouble breathing.  This information is not intended to replace advice given to you by your health care provider. Make sure you discuss any questions you have with your health care provider.  Document Released: 09/26/2009 Document Revised: 08/16/2017 Document Reviewed: 07/04/2017  [employer] Interactive Patient Education © 2017 [employer]    Depression / Suicide Risk    As you are discharged from this [facility] facility, it is important to learn how to keep safe from harming yourself.    Recognize the warning signs:  · Abrupt changes in personality, positive or negative- including increase in energy   · Giving away possessions  · Change in eating patterns- significant weight changes-  positive or negative  · Change in sleeping patterns- unable to sleep or sleeping all the time   · Unwillingness or inability to communicate  · Depression  · Unusual sadness, discouragement and loneliness  · Talk of wanting to die  · Neglect of personal appearance   · Rebelliousness- reckless behavior  · Withdrawal from people/activities they love  · Confusion- inability to concentrate     If you or a loved one observes any of these behaviors or has concerns about self-harm, here's what you can do:  · Talk about it- your feelings and reasons for harming yourself  · Remove any means that you might use to hurt yourself (examples: pills, rope, extension cords, firearm)  · Get professional help from the  community (Mental Health, Substance Abuse, psychological counseling)  · Do not be alone:Call your Safe Contact- someone whom you trust who will be there for you.  · Call your local CRISIS HOTLINE 552-3611 or 801-118-7720  · Call your local Children's Mobile Crisis Response Team [place] (184) 547-0118 or www.e-contratos  · Call the toll free National Suicide Prevention Hotlines   · National Suicide Prevention Lifeline 089-528-SNWG (9742)  · National Hope Line Network 800-SUICIDE (094-5868)

## 2020-03-11 NOTE — DISCHARGE PLANNING
@1550  Agency/Facility Name: [facility]  Spoke To: Aurelio  Outcome: She is on service with them already, they will deliver a portable to hospital for discharge.    @1540  Agency/Facility Name: [facility]  Spoke To: Ally  Outcome: Needs to just make sure they have everything they need then will call back.    Received Choice form at 1442  Agency/Facility Name: [facility]  Referral sent per Choice form @ 1443

## 2020-03-11 NOTE — CARE PLAN
Problem: Pain Management  Goal: Pain level will decrease to patient's comfort goal  Outcome: PROGRESSING AS EXPECTED     Problem: Respiratory:  Goal: Respiratory status will improve  Outcome: PROGRESSING AS EXPECTED     Problem: Psychosocial Needs:  Goal: Level of anxiety will decrease  Outcome: PROGRESSING AS EXPECTED

## 2020-03-11 NOTE — PROGRESS NOTES
Assumed care of patient at 1900 from Munir HARDY. Pt resting in bed with no signs of distress. Assessment competed, VSS, pt is A/Ox4. Pt has a slightly elevated temp, medicated per the MAR with Tylenol. HR remains elevated, Dr aware.  Pt denies any further needs. Bed in the lowest locked position, call light in reach.

## 2020-03-11 NOTE — DISCHARGE SUMMARY
Discharge Summary    CHIEF COMPLAINT ON ADMISSION  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 Orozco, ran out of her wound vac supplies for sacral wound, unable to get to wound care appointments       Reason for Admission  Short of Breath;      Admission Date  3/8/2020    CODE STATUS  DNAR/DNI    HPI & HOSPITAL COURSE   39-year-old female with history of epithelial hemangiomaendothelioma stage IV with excessive bone metastasis and spleen, history of decubitus ulcer treated with surgery and wound VAC presented 3/8 with generalized weakness and chronic pain, and admission labs showed low hemoglobin blood was given however there is no improving on her hemoglobin, the patient does not have any source of bleeding, rectal exam showed normal brown stool, LDH was mildly elevated, the patient has low blood pressure, IV fluid is running, and stopped later, because pt has interstitial fluid overload. the case was discussed with Dr. Mccarthy oncologist. Per Dr. Mccarthy note there is no other alternative treatment or option. Palliative team was consulted for comfort care. Pt and  do not feel ready for hospice. They would like to get second opinion and hoping to be evaluated at [facility]. Pt is in significant pain from cancer and decubitus ulcer. I did start her on fentanyl patch and seemed to easy pain a little. Prescription provided. Also she was started on midodrine for hypotension. She has chronic sinus tachycardia. She did require oxygen and likely she has some pulmonary edema. She cannot tolerate diuretic. Pt in tears asking to be discharge and she is hoping to get alternative treatment at [facility]. At this point besides comfort care we are not able to offer her anything else. I did arrange for oxygen supply.  Also I did request pt to continue wound care in the clinic since she did refuse home health. Pt would try to continue care at [facility] and she hopes to get her health back for her children.       Therefore, she is discharged in fair and stable condition to home with close outpatient follow-up.    The patient met 2-midnight criteria for an inpatient stay at the time of discharge.    Discharge Date  03/11/2020    FOLLOW UP ITEMS POST DISCHARGE  None     DISCHARGE DIAGNOSES  Principal Problem:    Epithelioid hemangioendothelioma POA: Yes  Active Problems:    Cancer associated pain POA: Yes    Anemia of chronic disease POA: Yes    Debility (Chronic) POA: Yes    Metastatic cancer (HCC) POA: Yes    DNR (do not resuscitate) POA: Yes    Hypotension POA: Yes  Resolved Problems:    * No resolved hospital problems. *      FOLLOW UP  No future appointments.  Rosita Delaney M.D.  [id number] [place] [id number]  [phone]    In 2 weeks        MEDICATIONS ON DISCHARGE     Medication List      START taking these medications      Instructions   fentaNYL 50 MCG/HR Pt72  Start taking on:  March 13, 2020  Commonly known as:  DURAGESIC   Apply 1 Patch to skin as directed every 72 hours for 15 days.  Dose:  1 Patch     midodrine 5 MG Tabs  Commonly known as:  PROAMATINE   Take 1 Tab by mouth 2 times a day.  Dose:  5 mg     omeprazole 20 MG delayed-release capsule  Commonly known as:  PRILOSEC   Take 1 Cap by mouth 2 times a day.  Dose:  20 mg        CONTINUE taking these medications      Instructions   clonazePAM 1 MG Tabs  Commonly known as:  KLONOPIN   TAKE 1 TABLET BY MOUTH AT BEDTIME MAY REPEAT 1 TAB IN 2 HRS FOR SLEEP     * OxyCONTIN 80 MG T12a  Generic drug:  oxyCODONE CR   Take 80 mg by mouth 4 times a day. 0600, 1200, 1800, 0000  Dose:  80 mg     * oxyCODONE 30 MG immediate release tablet  Commonly known as:  OXY-IR   Take 30 mg by mouth every 3 hours.  Dose:  30 mg     topiramate 50 MG tablet  Commonly known  as:  TOPAMAX   Take 50 mg by mouth every 6 hours. 0600, 1200, 1800, 0000  Dose:  50 mg         * This list has 2 medication(s) that are the same as other medications prescribed for you. Read the directions carefully, and ask your doctor or other care provider to review them with you.                Allergies  No Known Allergies    DIET  Orders Placed This Encounter   Procedures   • Diet Order Low Fiber(GI Soft)     Standing Status:   Standing     Number of Occurrences:   1     Order Specific Question:   Diet:     Answer:   Low Fiber(GI Soft) [2]       ACTIVITY  As tolerated.  Weight bearing as tolerated    CONSULTATIONS  Oncology     PROCEDURES  None     LABORATORY  Lab Results   Component Value Date    SODIUM 140 03/11/2020    POTASSIUM 3.8 03/11/2020    CHLORIDE 110 03/11/2020    CO2 23 03/11/2020    GLUCOSE 100 (H) 03/11/2020    BUN 9 03/11/2020    CREATININE 0.43 (L) 03/11/2020        Lab Results   Component Value Date    WBC 10.9 (H) 03/11/2020    HEMOGLOBIN 7.5 (L) 03/11/2020    HEMATOCRIT 23.3 (L) 03/11/2020    PLATELETCT 90 (L) 03/11/2020        Total time of the discharge process exceeds 45 minutes.

## 2020-03-11 NOTE — DISCHARGE PLANNING
Anticipated Discharge Disposition: home with O2    Action: Pt remains on O2 and per bedside RN Marie she needs O2 to go home since she desats down to 85-88%. Met at bedside with pt and she has Medicaid insurance so referral for O2 sent to 1. [facility], [facility], 3. [facility] who takes her medicaid. Choice form faxed to Marnie MAY to send.     Completed assessment at bedside with pt. Verified her home address and phone number. She has no PCP, never has. The MD listed is her Oncologist. She told the MD Dr Pereira that she does not want home health. She states her  takes care of her wound. Pt states she goes to the out wound care center on [address] although it looks like her previous appts were cancelled. Requested Dr Pereira place order for Wound care-outpatient for her.     Pt lives with her spouse and has assistance from her M-I-L and S-I-L. Pt previously was independent with ADLS and driving.     Barriers to Discharge: needs O2    Plan: await delivery of O2 to bedside.

## 2020-03-12 NOTE — PROGRESS NOTES
Discharge packet discussed thoroughly with patient and . Need to follow up discussed as well as need to go to wound clinic. Prescriptions given to pts . IV discontinued and  wheeled patient out.

## 2020-03-21 NOTE — CARE PLAN
Problem: Communication  Goal: The ability to communicate needs accurately and effectively will improve  Outcome: PROGRESSING AS EXPECTED      Problem: Infection  Goal: Will remain free from infection  Outcome: PROGRESSING AS EXPECTED      Problem: Venous Thromboembolism (VTW)/Deep Vein Thrombosis (DVT) Prevention:  Goal: Patient will participate in Venous Thrombosis (VTE)/Deep Vein Thrombosis (DVT)Prevention Measures  Outcome: PROGRESSING AS EXPECTED         GEORGE Espinoza RNC

## 2020-09-14 NOTE — CARE PLAN
Problem: Pain Management  Goal: Pain level will decrease to patient's comfort goal  Outcome: PROGRESSING AS EXPECTED     Problem: Safety  Goal: Will remain free from injury  Outcome: PROGRESSING AS EXPECTED     Problem: Discharge Barriers/Planning  Goal: Patient's continuum of care needs will be met  Outcome: PROGRESSING AS EXPECTED      Complex Repair And V-Y Plasty Text: The defect edges were debeveled with a #15 scalpel blade.  The primary defect was closed partially with a complex linear closure.  Given the location of the remaining defect, shape of the defect and the proximity to free margins a V-Y plasty was deemed most appropriate for complete closure of the defect.  Using a sterile surgical marker, an appropriate advancement flap was drawn incorporating the defect and placing the expected incisions within the relaxed skin tension lines where possible.    The area thus outlined was incised deep to adipose tissue with a #15 scalpel blade.  The skin margins were undermined to an appropriate distance in all directions utilizing iris scissors.

## 2020-09-30 NOTE — PROGRESS NOTES
Patient is alert and oriented up with one person assist.  at bedside this afternoon. Patient did have some drainage from her rectum or wound vac. Did a check on the wound vac green color came up on check. Talked to wound care they said if yellow came up then there would be a problem.Did multiple checks on wound vac today, always in the green. Pain medication given as needed. Call light within reach hourly rounding in practice. Patient is on a low air loss bed at this time.   other

## 2023-11-07 NOTE — CARE PLAN
Problem: Venous Thromboembolism (VTW)/Deep Vein Thrombosis (DVT) Prevention:  Goal: Patient will participate in Venous Thrombosis (VTE)/Deep Vein Thrombosis (DVT)Prevention Measures  Outcome: PROGRESSING AS EXPECTED  Note:   Patient compliant w/wearing SCDs.     Problem: Skin Integrity  Goal: Risk for impaired skin integrity will decrease  Outcome: PROGRESSING SLOWER THAN EXPECTED  Note:   Patient does not turn self in bed; Patient refuses most Q2 turns.       Rxs pended. Up to date.

## (undated) DEVICE — KIT, COLOSTOMY 2-PIECE 102MM

## (undated) DEVICE — PACK MINOR BASIN - (2EA/CA)

## (undated) DEVICE — TUBING CLEARLINK DUO-VENT - C-FLO (48EA/CA)

## (undated) DEVICE — SODIUM CHL IRRIGATION 0.9% 1000ML (12EA/CA)

## (undated) DEVICE — DRESSING KIT V.A.C. SENSA T.R.A.C. SMALL (10EA/CA)

## (undated) DEVICE — ELECTRODE 850 FOAM ADHESIVE - HYDROGEL RADIOTRNSPRNT (50/PK)

## (undated) DEVICE — VAC CANISTER W/GEL 500ML - FITS NEW MACHINES (10EA/CA)

## (undated) DEVICE — SET EXTENSION WITH 2 PORTS (48EA/CA) ***PART #2C8610 IS A SUBSTITUTE*****

## (undated) DEVICE — ELECTRODE DUAL RETURN W/ CORD - (50/PK)

## (undated) DEVICE — GLOVE BIOGEL INDICATOR SZ 7SURGICAL PF LTX - (50/BX 4BX/CA)

## (undated) DEVICE — TRAY SKIN SCRUB PVP WET (20EA/CA) PART #DYND70356 DISCONTINUED

## (undated) DEVICE — SUCTION INSTRUMENT YANKAUER BULBOUS TIP W/O VENT (50EA/CA)

## (undated) DEVICE — GLOVE BIOGEL SZ 6.5 SURGICAL PF LTX (50PR/BX 4BX/CA)

## (undated) DEVICE — STAPLER SKIN DISP - (6/BX 10BX/CA) VISISTAT

## (undated) DEVICE — KIT ANESTHESIA W/CIRCUIT & 3/LT BAG W/FILTER (20EA/CA)

## (undated) DEVICE — SUTURE GENERAL

## (undated) DEVICE — MASK ANESTHESIA ADULT  - (100/CA)

## (undated) DEVICE — CANISTER SUCTION 3000ML MECHANICAL FILTER AUTO SHUTOFF MEDI-VAC NONSTERILE LF DISP  (40EA/CA)

## (undated) DEVICE — PROTECTOR ULNA NERVE - (36PR/CA)

## (undated) DEVICE — HEAD HOLDER JUNIOR/ADULT

## (undated) DEVICE — BLADE SURGICAL #15 - (50/BX 3BX/CA)

## (undated) DEVICE — CORDS BIPOLAR COAGULATION - 12FT STERILE DISP. (10EA/BX)

## (undated) DEVICE — GOWN WARMING STANDARD FLEX - (30/CA)

## (undated) DEVICE — NEPTUNE 4 PORT MANIFOLD - (20/PK)

## (undated) DEVICE — DETERGENT RENUZYME PLUS 10 OZ PACKET (50/BX)

## (undated) DEVICE — LACTATED RINGERS INJ 1000 ML - (14EA/CA 60CA/PF)

## (undated) DEVICE — SPONGE GAUZESTER 4 X 4 4PLY - (128PK/CA)

## (undated) DEVICE — SET LEADWIRE 5 LEAD BEDSIDE DISPOSABLE ECG (1SET OF 5/EA)

## (undated) DEVICE — DRAPE LAPAROTOMY T SHEET - (12EA/CA)

## (undated) DEVICE — KIT ROOM DECONTAMINATION

## (undated) DEVICE — SUTURE 4-0 PROLENE PS-2 18 (36PK/BX)"

## (undated) DEVICE — SYRINGE 10 ML CONTROL LL (25EA/BX 4BX/CA)

## (undated) DEVICE — SENSOR SPO2 NEO LNCS ADHESIVE (20/BX) SEE USER NOTES